# Patient Record
Sex: FEMALE | Race: WHITE | Employment: OTHER | URBAN - NONMETROPOLITAN AREA
[De-identification: names, ages, dates, MRNs, and addresses within clinical notes are randomized per-mention and may not be internally consistent; named-entity substitution may affect disease eponyms.]

---

## 2017-01-11 ENCOUNTER — ANTI-COAG VISIT (OUTPATIENT)
Dept: PHARMACY | Facility: CLINIC | Age: 77
End: 2017-01-11

## 2017-01-11 LAB — INR BLD: 2.8

## 2017-02-08 ENCOUNTER — ANTI-COAG VISIT (OUTPATIENT)
Dept: PHARMACY | Facility: CLINIC | Age: 77
End: 2017-02-08

## 2017-02-08 LAB — INR BLD: 2.5

## 2017-02-20 RX ORDER — ALLOPURINOL 100 MG/1
TABLET ORAL
Qty: 180 TABLET | Refills: 0 | Status: SHIPPED | OUTPATIENT
Start: 2017-02-20 | End: 2017-05-18 | Stop reason: ALTCHOICE

## 2017-02-20 RX ORDER — ALLOPURINOL 100 MG/1
TABLET ORAL
Qty: 60 TABLET | Refills: 0 | Status: SHIPPED | OUTPATIENT
Start: 2017-02-20 | End: 2017-02-27

## 2017-02-27 ENCOUNTER — OFFICE VISIT (OUTPATIENT)
Dept: INTERNAL MEDICINE CLINIC | Age: 77
End: 2017-02-27

## 2017-02-27 VITALS — BODY MASS INDEX: 36.32 KG/M2 | WEIGHT: 218 LBS | HEIGHT: 65 IN

## 2017-02-27 DIAGNOSIS — M1A.00X0 CHRONIC GOUTY ARTHRITIS: ICD-10-CM

## 2017-02-27 DIAGNOSIS — I50.32 HEART FAILURE, DIASTOLIC, CHRONIC (HCC): ICD-10-CM

## 2017-02-27 DIAGNOSIS — E78.2 MIXED HYPERLIPIDEMIA: ICD-10-CM

## 2017-02-27 DIAGNOSIS — R82.90 ABNORMAL URINALYSIS: ICD-10-CM

## 2017-02-27 DIAGNOSIS — N18.30 CHRONIC KIDNEY DISEASE, STAGE III (MODERATE) (HCC): ICD-10-CM

## 2017-02-27 DIAGNOSIS — I10 ESSENTIAL HYPERTENSION: Primary | ICD-10-CM

## 2017-02-27 DIAGNOSIS — I48.20 CHRONIC ATRIAL FIBRILLATION (HCC): ICD-10-CM

## 2017-02-27 DIAGNOSIS — Z79.01 LONG TERM CURRENT USE OF ANTICOAGULANT THERAPY: ICD-10-CM

## 2017-02-27 DIAGNOSIS — I10 ESSENTIAL HYPERTENSION: ICD-10-CM

## 2017-02-27 LAB
A/G RATIO: 2.2 (ref 1.1–2.2)
ALBUMIN SERPL-MCNC: 4.3 G/DL (ref 3.4–5)
ALP BLD-CCNC: 67 U/L (ref 40–129)
ALT SERPL-CCNC: 25 U/L (ref 10–40)
ANION GAP SERPL CALCULATED.3IONS-SCNC: 14 MMOL/L (ref 3–16)
AST SERPL-CCNC: 30 U/L (ref 15–37)
BASOPHILS ABSOLUTE: 0 K/UL (ref 0–0.2)
BASOPHILS RELATIVE PERCENT: 0.9 %
BILIRUB SERPL-MCNC: 0.6 MG/DL (ref 0–1)
BILIRUBIN, POC: ABNORMAL
BLOOD URINE, POC: ABNORMAL
BUN BLDV-MCNC: 18 MG/DL (ref 7–20)
CALCIUM SERPL-MCNC: 9.7 MG/DL (ref 8.3–10.6)
CHLORIDE BLD-SCNC: 96 MMOL/L (ref 99–110)
CHOLESTEROL, TOTAL: 112 MG/DL (ref 0–199)
CLARITY, POC: ABNORMAL
CO2: 25 MMOL/L (ref 21–32)
COLOR, POC: ABNORMAL
CREAT SERPL-MCNC: 0.9 MG/DL (ref 0.6–1.2)
EOSINOPHILS ABSOLUTE: 0.1 K/UL (ref 0–0.6)
EOSINOPHILS RELATIVE PERCENT: 1.4 %
GFR AFRICAN AMERICAN: >60
GFR NON-AFRICAN AMERICAN: >60
GLOBULIN: 2 G/DL
GLUCOSE BLD-MCNC: 85 MG/DL (ref 70–99)
GLUCOSE URINE, POC: ABNORMAL
HCT VFR BLD CALC: 38.8 % (ref 36–48)
HDLC SERPL-MCNC: 56 MG/DL (ref 40–60)
HEMOGLOBIN: 13.2 G/DL (ref 12–16)
KETONES, POC: ABNORMAL
LDL CHOLESTEROL CALCULATED: 32 MG/DL
LEUKOCYTE EST, POC: ABNORMAL
LYMPHOCYTES ABSOLUTE: 1.8 K/UL (ref 1–5.1)
LYMPHOCYTES RELATIVE PERCENT: 31.2 %
MCH RBC QN AUTO: 32.6 PG (ref 26–34)
MCHC RBC AUTO-ENTMCNC: 34 G/DL (ref 31–36)
MCV RBC AUTO: 96 FL (ref 80–100)
MONOCYTES ABSOLUTE: 0.6 K/UL (ref 0–1.3)
MONOCYTES RELATIVE PERCENT: 10 %
NEUTROPHILS ABSOLUTE: 3.2 K/UL (ref 1.7–7.7)
NEUTROPHILS RELATIVE PERCENT: 56.5 %
NITRITE, POC: POSITIVE
PARATHYROID HORMONE INTACT: 42.2 PG/ML (ref 14–72)
PDW BLD-RTO: 14.3 % (ref 12.4–15.4)
PH, POC: ABNORMAL
PLATELET # BLD: 237 K/UL (ref 135–450)
PMV BLD AUTO: 8.5 FL (ref 5–10.5)
POTASSIUM SERPL-SCNC: 4.5 MMOL/L (ref 3.5–5.1)
PROTEIN, POC: ABNORMAL
RBC # BLD: 4.04 M/UL (ref 4–5.2)
SODIUM BLD-SCNC: 135 MMOL/L (ref 136–145)
SPECIFIC GRAVITY, POC: ABNORMAL
TOTAL PROTEIN: 6.3 G/DL (ref 6.4–8.2)
TRIGL SERPL-MCNC: 120 MG/DL (ref 0–150)
TSH REFLEX: 1.73 UIU/ML (ref 0.27–4.2)
URIC ACID, SERUM: 6.2 MG/DL (ref 2.6–6)
UROBILINOGEN, POC: ABNORMAL
VLDLC SERPL CALC-MCNC: 24 MG/DL
WBC # BLD: 5.6 K/UL (ref 4–11)

## 2017-02-27 PROCEDURE — 81002 URINALYSIS NONAUTO W/O SCOPE: CPT | Performed by: INTERNAL MEDICINE

## 2017-02-27 PROCEDURE — 99214 OFFICE O/P EST MOD 30 MIN: CPT | Performed by: INTERNAL MEDICINE

## 2017-02-27 RX ORDER — ALLOPURINOL 100 MG/1
TABLET ORAL
Qty: 180 TABLET | Refills: 1 | Status: SHIPPED | OUTPATIENT
Start: 2017-02-27 | End: 2018-01-08 | Stop reason: SDUPTHER

## 2017-02-27 RX ORDER — ATORVASTATIN CALCIUM 20 MG/1
20 TABLET, FILM COATED ORAL DAILY
Qty: 90 TABLET | Refills: 1 | Status: SHIPPED | OUTPATIENT
Start: 2017-02-27 | End: 2017-08-27 | Stop reason: SDUPTHER

## 2017-02-27 RX ORDER — LOSARTAN POTASSIUM 50 MG/1
TABLET ORAL
Qty: 90 TABLET | Refills: 1 | Status: SHIPPED | OUTPATIENT
Start: 2017-02-27 | End: 2017-05-18 | Stop reason: DRUGHIGH

## 2017-03-01 LAB — URINE CULTURE, ROUTINE: NORMAL

## 2017-03-08 ENCOUNTER — ANTI-COAG VISIT (OUTPATIENT)
Dept: PHARMACY | Facility: CLINIC | Age: 77
End: 2017-03-08

## 2017-03-08 LAB — INR BLD: 2.3

## 2017-03-08 RX ORDER — WARFARIN SODIUM 5 MG/1
TABLET ORAL
Qty: 90 TABLET | Refills: 3 | Status: SHIPPED | OUTPATIENT
Start: 2017-03-08 | End: 2018-12-03 | Stop reason: ALTCHOICE

## 2017-04-05 ENCOUNTER — ANTI-COAG VISIT (OUTPATIENT)
Dept: PHARMACY | Facility: CLINIC | Age: 77
End: 2017-04-05

## 2017-04-05 LAB — INR BLD: 2

## 2017-05-03 ENCOUNTER — ANTI-COAG VISIT (OUTPATIENT)
Dept: PHARMACY | Facility: CLINIC | Age: 77
End: 2017-05-03

## 2017-05-03 DIAGNOSIS — Z79.01 LONG TERM CURRENT USE OF ANTICOAGULANT THERAPY: ICD-10-CM

## 2017-05-03 DIAGNOSIS — I48.20 CHRONIC ATRIAL FIBRILLATION (HCC): ICD-10-CM

## 2017-05-03 LAB — INR BLD: 2.7

## 2017-05-18 ENCOUNTER — OFFICE VISIT (OUTPATIENT)
Dept: INTERNAL MEDICINE CLINIC | Age: 77
End: 2017-05-18

## 2017-05-18 VITALS
BODY MASS INDEX: 37.22 KG/M2 | WEIGHT: 218 LBS | DIASTOLIC BLOOD PRESSURE: 89 MMHG | SYSTOLIC BLOOD PRESSURE: 150 MMHG | HEIGHT: 64 IN

## 2017-05-18 DIAGNOSIS — N17.9 ACUTE RENAL FAILURE, UNSPECIFIED ACUTE RENAL FAILURE TYPE (HCC): ICD-10-CM

## 2017-05-18 DIAGNOSIS — I10 ESSENTIAL HYPERTENSION: ICD-10-CM

## 2017-05-18 DIAGNOSIS — N17.9 ACUTE RENAL FAILURE, UNSPECIFIED ACUTE RENAL FAILURE TYPE (HCC): Primary | ICD-10-CM

## 2017-05-18 LAB
ANION GAP SERPL CALCULATED.3IONS-SCNC: 18 MMOL/L (ref 3–16)
BUN BLDV-MCNC: 21 MG/DL (ref 7–20)
CALCIUM SERPL-MCNC: 9.3 MG/DL (ref 8.3–10.6)
CHLORIDE BLD-SCNC: 91 MMOL/L (ref 99–110)
CO2: 21 MMOL/L (ref 21–32)
CREAT SERPL-MCNC: 0.9 MG/DL (ref 0.6–1.2)
GFR AFRICAN AMERICAN: >60
GFR NON-AFRICAN AMERICAN: >60
GLUCOSE BLD-MCNC: 106 MG/DL (ref 70–99)
POTASSIUM SERPL-SCNC: 4.6 MMOL/L (ref 3.5–5.1)
SODIUM BLD-SCNC: 130 MMOL/L (ref 136–145)

## 2017-05-18 PROCEDURE — 99213 OFFICE O/P EST LOW 20 MIN: CPT | Performed by: PHYSICIAN ASSISTANT

## 2017-05-18 RX ORDER — LOSARTAN POTASSIUM AND HYDROCHLOROTHIAZIDE 12.5; 1 MG/1; MG/1
1 TABLET ORAL DAILY
Qty: 30 TABLET | Refills: 0 | Status: SHIPPED | OUTPATIENT
Start: 2017-05-18 | End: 2017-06-08 | Stop reason: ALTCHOICE

## 2017-05-18 ASSESSMENT — ENCOUNTER SYMPTOMS
SORE THROAT: 0
VOMITING: 0
SHORTNESS OF BREATH: 0
PHOTOPHOBIA: 0
BACK PAIN: 0
NAUSEA: 0
RHINORRHEA: 0
ABDOMINAL PAIN: 0

## 2017-05-28 ENCOUNTER — TELEPHONE (OUTPATIENT)
Dept: INTERNAL MEDICINE CLINIC | Age: 77
End: 2017-05-28

## 2017-05-31 ENCOUNTER — ANTI-COAG VISIT (OUTPATIENT)
Dept: PHARMACY | Facility: CLINIC | Age: 77
End: 2017-05-31

## 2017-05-31 LAB — INR BLD: 2.1

## 2017-06-06 ENCOUNTER — TELEPHONE (OUTPATIENT)
Dept: INTERNAL MEDICINE CLINIC | Age: 77
End: 2017-06-06

## 2017-06-08 ENCOUNTER — OFFICE VISIT (OUTPATIENT)
Dept: INTERNAL MEDICINE CLINIC | Age: 77
End: 2017-06-08

## 2017-06-08 VITALS
RESPIRATION RATE: 14 BRPM | HEART RATE: 81 BPM | BODY MASS INDEX: 37.56 KG/M2 | DIASTOLIC BLOOD PRESSURE: 90 MMHG | WEIGHT: 220 LBS | SYSTOLIC BLOOD PRESSURE: 150 MMHG | HEIGHT: 64 IN

## 2017-06-08 DIAGNOSIS — I10 ESSENTIAL HYPERTENSION: Primary | ICD-10-CM

## 2017-06-08 DIAGNOSIS — R60.0 PEDAL EDEMA: ICD-10-CM

## 2017-06-08 PROCEDURE — 99213 OFFICE O/P EST LOW 20 MIN: CPT | Performed by: NURSE PRACTITIONER

## 2017-06-08 RX ORDER — LOSARTAN POTASSIUM AND HYDROCHLOROTHIAZIDE 25; 100 MG/1; MG/1
1 TABLET ORAL DAILY
Qty: 30 TABLET | Refills: 3 | Status: ON HOLD | OUTPATIENT
Start: 2017-06-08 | End: 2019-09-28 | Stop reason: HOSPADM

## 2017-06-08 ASSESSMENT — ENCOUNTER SYMPTOMS
DIARRHEA: 0
COUGH: 0
ABDOMINAL PAIN: 0
NAUSEA: 0
SHORTNESS OF BREATH: 0
VOMITING: 0

## 2017-06-28 ENCOUNTER — ANTI-COAG VISIT (OUTPATIENT)
Dept: PHARMACY | Facility: CLINIC | Age: 77
End: 2017-06-28

## 2017-06-28 LAB — INR BLD: 2

## 2017-07-26 ENCOUNTER — ANTI-COAG VISIT (OUTPATIENT)
Dept: PHARMACY | Facility: CLINIC | Age: 77
End: 2017-07-26

## 2017-07-26 LAB — INR BLD: 2.4

## 2017-08-01 ENCOUNTER — TELEPHONE (OUTPATIENT)
Dept: INTERNAL MEDICINE CLINIC | Age: 77
End: 2017-08-01

## 2017-08-28 RX ORDER — ATORVASTATIN CALCIUM 20 MG/1
TABLET, FILM COATED ORAL
Qty: 90 TABLET | Refills: 0 | Status: SHIPPED | OUTPATIENT
Start: 2017-08-28 | End: 2017-11-26 | Stop reason: SDUPTHER

## 2017-08-30 ENCOUNTER — OFFICE VISIT (OUTPATIENT)
Dept: ORTHOPEDIC SURGERY | Age: 77
End: 2017-08-30

## 2017-08-30 VITALS
SYSTOLIC BLOOD PRESSURE: 153 MMHG | BODY MASS INDEX: 36.7 KG/M2 | HEART RATE: 66 BPM | HEIGHT: 64 IN | DIASTOLIC BLOOD PRESSURE: 90 MMHG | WEIGHT: 215 LBS

## 2017-08-30 DIAGNOSIS — M25.561 RIGHT KNEE PAIN, UNSPECIFIED CHRONICITY: ICD-10-CM

## 2017-08-30 DIAGNOSIS — M17.11 PRIMARY OSTEOARTHRITIS OF RIGHT KNEE: Primary | ICD-10-CM

## 2017-08-30 PROCEDURE — 73564 X-RAY EXAM KNEE 4 OR MORE: CPT | Performed by: PHYSICIAN ASSISTANT

## 2017-08-30 PROCEDURE — 99213 OFFICE O/P EST LOW 20 MIN: CPT | Performed by: PHYSICIAN ASSISTANT

## 2017-09-12 ENCOUNTER — TELEPHONE (OUTPATIENT)
Dept: PHARMACY | Facility: CLINIC | Age: 77
End: 2017-09-12

## 2017-09-13 ENCOUNTER — ANTI-COAG VISIT (OUTPATIENT)
Dept: PHARMACY | Facility: CLINIC | Age: 77
End: 2017-09-13

## 2017-09-13 DIAGNOSIS — Z79.01 LONG TERM CURRENT USE OF ANTICOAGULANT THERAPY: ICD-10-CM

## 2017-09-13 DIAGNOSIS — I48.20 CHRONIC ATRIAL FIBRILLATION (HCC): ICD-10-CM

## 2017-09-13 LAB — INR BLD: 2.7

## 2017-09-14 ENCOUNTER — TELEPHONE (OUTPATIENT)
Dept: ORTHOPEDIC SURGERY | Age: 77
End: 2017-09-14

## 2017-09-18 ENCOUNTER — TELEPHONE (OUTPATIENT)
Dept: ORTHOPEDIC SURGERY | Age: 77
End: 2017-09-18

## 2017-09-21 ENCOUNTER — OFFICE VISIT (OUTPATIENT)
Dept: ORTHOPEDIC SURGERY | Age: 77
End: 2017-09-21

## 2017-09-21 DIAGNOSIS — M25.561 RIGHT KNEE PAIN, UNSPECIFIED CHRONICITY: ICD-10-CM

## 2017-09-21 DIAGNOSIS — M17.11 PRIMARY OSTEOARTHRITIS OF RIGHT KNEE: ICD-10-CM

## 2017-09-21 DIAGNOSIS — M17.11 PRIMARY LOCALIZED OSTEOARTHROSIS, LOWER LEG, RIGHT: Primary | ICD-10-CM

## 2017-09-21 PROCEDURE — 20610 DRAIN/INJ JOINT/BURSA W/O US: CPT | Performed by: PHYSICIAN ASSISTANT

## 2017-09-28 ENCOUNTER — NURSE ONLY (OUTPATIENT)
Dept: ORTHOPEDIC SURGERY | Age: 77
End: 2017-09-28

## 2017-09-28 DIAGNOSIS — M17.11 PRIMARY LOCALIZED OSTEOARTHROSIS, LOWER LEG, RIGHT: Primary | ICD-10-CM

## 2017-09-28 PROCEDURE — 20610 DRAIN/INJ JOINT/BURSA W/O US: CPT | Performed by: PHYSICIAN ASSISTANT

## 2017-10-05 ENCOUNTER — NURSE ONLY (OUTPATIENT)
Dept: ORTHOPEDIC SURGERY | Age: 77
End: 2017-10-05

## 2017-10-05 DIAGNOSIS — M17.11 PRIMARY LOCALIZED OSTEOARTHROSIS, LOWER LEG, RIGHT: Primary | ICD-10-CM

## 2017-10-05 PROCEDURE — 20611 DRAIN/INJ JOINT/BURSA W/US: CPT | Performed by: PHYSICIAN ASSISTANT

## 2017-10-05 NOTE — PROGRESS NOTES
10/5/17 1:04 PM         NDC: [id number]    Lot Number: [id number]    Comments: 2ML RT KNEE [id number]  EXP 9/22/18

## 2017-10-05 NOTE — PATIENT INSTRUCTIONS
Management discussed and patient voiced understanding. They were instructed to follow up with their PCP regarding any abnormal vitals reading.

## 2017-10-11 ENCOUNTER — ANTI-COAG VISIT (OUTPATIENT)
Dept: PHARMACY | Facility: CLINIC | Age: 77
End: 2017-10-11

## 2017-10-11 DIAGNOSIS — I48.20 CHRONIC ATRIAL FIBRILLATION (HCC): ICD-10-CM

## 2017-10-11 DIAGNOSIS — Z79.01 LONG TERM CURRENT USE OF ANTICOAGULANT THERAPY: ICD-10-CM

## 2017-10-11 LAB — INR BLD: 2.8

## 2017-10-11 NOTE — PROGRESS NOTES
Ms. Melissa Glynn is here with hx of afib, for anticoagulation monitoring. Pt has PMH significant for gout, OA, Hyperlipidemia, HTN, CVA (2007)and afib (2007). Pt verifies dosing regimen as listed above. Pt denies s/s bleeding/bruising/swelling/SOB. No BRBPR. No melena. No changes in RX/OTCs/Herbals. Pt denies EtOH and tobacco use. No dietary changes. Pt started on Centrum Silver 08/2016. INR 2.8 is within therapeutic goal range of 2-3. Recommend to continue 5 mg daily, except 7.5 mg on Wednesdays. Patient has Warfarin 5 mg tablets. Will continue to monitor and check INR in 4 weeks. Dosing reminder card given with phone number, appointment date and time.   Return to clinic: 11/8 @ 11:15am    Hanna Cedeño PharmD 11:19 AM 10/11/17

## 2017-11-08 ENCOUNTER — ANTI-COAG VISIT (OUTPATIENT)
Dept: PHARMACY | Facility: CLINIC | Age: 77
End: 2017-11-08

## 2017-11-08 LAB — INR BLD: 2.4

## 2017-11-27 RX ORDER — ATORVASTATIN CALCIUM 20 MG/1
TABLET, FILM COATED ORAL
Qty: 90 TABLET | Refills: 0 | Status: SHIPPED | OUTPATIENT
Start: 2017-11-27 | End: 2018-04-13 | Stop reason: SDUPTHER

## 2017-12-06 ENCOUNTER — ANTI-COAG VISIT (OUTPATIENT)
Dept: PHARMACY | Facility: CLINIC | Age: 77
End: 2017-12-06

## 2017-12-06 LAB — INR BLD: 2.8

## 2018-01-08 RX ORDER — ALLOPURINOL 100 MG/1
TABLET ORAL
Qty: 180 TABLET | Refills: 1 | Status: SHIPPED | OUTPATIENT
Start: 2018-01-08 | End: 2018-01-09 | Stop reason: SDUPTHER

## 2018-01-09 RX ORDER — ALLOPURINOL 100 MG/1
TABLET ORAL
Qty: 60 TABLET | Refills: 0 | Status: SHIPPED | OUTPATIENT
Start: 2018-01-09 | End: 2018-04-13 | Stop reason: SDUPTHER

## 2018-01-17 ENCOUNTER — ANTI-COAG VISIT (OUTPATIENT)
Dept: PHARMACY | Facility: CLINIC | Age: 78
End: 2018-01-17

## 2018-01-17 LAB — INR BLD: 1.9

## 2018-02-07 ENCOUNTER — ANTI-COAG VISIT (OUTPATIENT)
Dept: PHARMACY | Facility: CLINIC | Age: 78
End: 2018-02-07

## 2018-02-07 LAB — INR BLD: 2.5

## 2018-02-07 NOTE — PROGRESS NOTES
Ms. Brenna Drew is here with hx of afib, for anticoagulation monitoring. Pt has PMH significant for gout, OA, Hyperlipidemia, HTN, CVA (2007)and afib (2007). Pt verifies dosing regimen as listed above. Pt denies s/s bleeding/bruising/swelling/SOB. No BRBPR. No melena. No changes in RX/OTCs/Herbals. Pt denies EtOH and tobacco use. No dietary changes. Pt started on Centrum Silver 08/2016. Patient leaving for [place] on 2/9/18. INR 2.5 is within acceptable therapeutic goal range of 2-3. Recommend to continue 5 mg daily, except 7.5 mg on Wednesdays. Patient has Warfarin 5 mg tablets. Will continue to monitor and check INR in 4 weeks. Dosing reminder card given with phone number, appointment date and time.   Return to clinic: 3/7@ 11:30am

## 2018-03-07 ENCOUNTER — ANTI-COAG VISIT (OUTPATIENT)
Dept: PHARMACY | Facility: CLINIC | Age: 78
End: 2018-03-07

## 2018-03-07 LAB — INR BLD: 1.9

## 2018-04-04 ENCOUNTER — ANTI-COAG VISIT (OUTPATIENT)
Dept: PHARMACY | Facility: CLINIC | Age: 78
End: 2018-04-04

## 2018-04-04 LAB — INR BLD: 2.3

## 2018-04-13 ENCOUNTER — OFFICE VISIT (OUTPATIENT)
Dept: INTERNAL MEDICINE CLINIC | Age: 78
End: 2018-04-13

## 2018-04-13 VITALS — BODY MASS INDEX: 35.7 KG/M2 | WEIGHT: 208 LBS

## 2018-04-13 DIAGNOSIS — I48.20 CHRONIC ATRIAL FIBRILLATION (HCC): ICD-10-CM

## 2018-04-13 DIAGNOSIS — N18.30 CHRONIC KIDNEY DISEASE, STAGE III (MODERATE) (HCC): ICD-10-CM

## 2018-04-13 DIAGNOSIS — I10 ESSENTIAL HYPERTENSION: ICD-10-CM

## 2018-04-13 DIAGNOSIS — M1A.00X0 CHRONIC GOUTY ARTHRITIS: ICD-10-CM

## 2018-04-13 DIAGNOSIS — I10 ESSENTIAL HYPERTENSION: Primary | ICD-10-CM

## 2018-04-13 LAB
BASOPHILS ABSOLUTE: 0 K/UL (ref 0–0.2)
BASOPHILS RELATIVE PERCENT: 0.7 %
EOSINOPHILS ABSOLUTE: 0.1 K/UL (ref 0–0.6)
EOSINOPHILS RELATIVE PERCENT: 1.8 %
HCT VFR BLD CALC: 38.7 % (ref 36–48)
HEMOGLOBIN: 13.3 G/DL (ref 12–16)
LYMPHOCYTES ABSOLUTE: 1.8 K/UL (ref 1–5.1)
LYMPHOCYTES RELATIVE PERCENT: 36.2 %
MCH RBC QN AUTO: 32.7 PG (ref 26–34)
MCHC RBC AUTO-ENTMCNC: 34.5 G/DL (ref 31–36)
MCV RBC AUTO: 94.9 FL (ref 80–100)
MONOCYTES ABSOLUTE: 0.6 K/UL (ref 0–1.3)
MONOCYTES RELATIVE PERCENT: 12.1 %
NEUTROPHILS ABSOLUTE: 2.4 K/UL (ref 1.7–7.7)
NEUTROPHILS RELATIVE PERCENT: 49.2 %
PDW BLD-RTO: 14 % (ref 12.4–15.4)
PLATELET # BLD: 229 K/UL (ref 135–450)
PMV BLD AUTO: 7.6 FL (ref 5–10.5)
RBC # BLD: 4.08 M/UL (ref 4–5.2)
WBC # BLD: 5 K/UL (ref 4–11)

## 2018-04-13 PROCEDURE — 99214 OFFICE O/P EST MOD 30 MIN: CPT | Performed by: INTERNAL MEDICINE

## 2018-04-13 RX ORDER — ALLOPURINOL 100 MG/1
TABLET ORAL
Qty: 180 TABLET | Refills: 0 | Status: SHIPPED | OUTPATIENT
Start: 2018-04-13 | End: 2019-04-26 | Stop reason: SDUPTHER

## 2018-04-13 RX ORDER — ATORVASTATIN CALCIUM 20 MG/1
TABLET, FILM COATED ORAL
Qty: 90 TABLET | Refills: 0 | Status: SHIPPED | OUTPATIENT
Start: 2018-04-13 | End: 2018-07-15 | Stop reason: SDUPTHER

## 2018-04-13 ASSESSMENT — PATIENT HEALTH QUESTIONNAIRE - PHQ9
2. FEELING DOWN, DEPRESSED OR HOPELESS: 0
SUM OF ALL RESPONSES TO PHQ9 QUESTIONS 1 & 2: 0
SUM OF ALL RESPONSES TO PHQ QUESTIONS 1-9: 0
1. LITTLE INTEREST OR PLEASURE IN DOING THINGS: 0

## 2018-04-14 LAB
A/G RATIO: 2.3 (ref 1.1–2.2)
ALBUMIN SERPL-MCNC: 4.6 G/DL (ref 3.4–5)
ALP BLD-CCNC: 69 U/L (ref 40–129)
ALT SERPL-CCNC: 22 U/L (ref 10–40)
ANION GAP SERPL CALCULATED.3IONS-SCNC: 15 MMOL/L (ref 3–16)
AST SERPL-CCNC: 31 U/L (ref 15–37)
BILIRUB SERPL-MCNC: 0.7 MG/DL (ref 0–1)
BUN BLDV-MCNC: 15 MG/DL (ref 7–20)
CALCIUM SERPL-MCNC: 9.5 MG/DL (ref 8.3–10.6)
CHLORIDE BLD-SCNC: 91 MMOL/L (ref 99–110)
CO2: 26 MMOL/L (ref 21–32)
CREAT SERPL-MCNC: 0.7 MG/DL (ref 0.6–1.2)
GFR AFRICAN AMERICAN: >60
GFR NON-AFRICAN AMERICAN: >60
GLOBULIN: 2 G/DL
GLUCOSE BLD-MCNC: 101 MG/DL (ref 70–99)
POTASSIUM SERPL-SCNC: 4 MMOL/L (ref 3.5–5.1)
SODIUM BLD-SCNC: 132 MMOL/L (ref 136–145)
TOTAL PROTEIN: 6.6 G/DL (ref 6.4–8.2)
TSH REFLEX: 1.47 UIU/ML (ref 0.27–4.2)
URIC ACID, SERUM: 5.5 MG/DL (ref 2.6–6)

## 2018-04-23 ENCOUNTER — TELEPHONE (OUTPATIENT)
Dept: INTERNAL MEDICINE CLINIC | Age: 78
End: 2018-04-23

## 2018-05-09 ENCOUNTER — ANTI-COAG VISIT (OUTPATIENT)
Dept: PHARMACY | Facility: CLINIC | Age: 78
End: 2018-05-09

## 2018-05-09 DIAGNOSIS — Z79.01 LONG TERM CURRENT USE OF ANTICOAGULANT THERAPY: ICD-10-CM

## 2018-05-09 DIAGNOSIS — I48.20 CHRONIC ATRIAL FIBRILLATION (HCC): ICD-10-CM

## 2018-05-09 LAB — INR BLD: 2.2

## 2018-06-04 ENCOUNTER — OFFICE VISIT (OUTPATIENT)
Dept: ORTHOPEDIC SURGERY | Age: 78
End: 2018-06-04

## 2018-06-04 VITALS
HEART RATE: 68 BPM | BODY MASS INDEX: 35.49 KG/M2 | WEIGHT: 207.89 LBS | SYSTOLIC BLOOD PRESSURE: 162 MMHG | HEIGHT: 64 IN | DIASTOLIC BLOOD PRESSURE: 94 MMHG

## 2018-06-04 DIAGNOSIS — M25.561 PAIN IN JOINT OF RIGHT KNEE: ICD-10-CM

## 2018-06-04 DIAGNOSIS — M17.11 PRIMARY LOCALIZED OSTEOARTHROSIS OF RIGHT LOWER LEG: Primary | ICD-10-CM

## 2018-06-04 PROCEDURE — 99214 OFFICE O/P EST MOD 30 MIN: CPT | Performed by: PHYSICIAN ASSISTANT

## 2018-06-05 ENCOUNTER — HOSPITAL ENCOUNTER (OUTPATIENT)
Dept: OTHER | Age: 78
Discharge: HOME | End: 2018-06-05
Attending: ORTHOPAEDIC SURGERY | Admitting: ORTHOPAEDIC SURGERY

## 2018-06-05 LAB
ALBUMIN SERPL-MCNC: 4.2 G/DL (ref 3.4–5)
ANION GAP SERPL CALCULATED.3IONS-SCNC: 15 MMOL/L (ref 3–16)
APTT: 32.4 SEC (ref 26–36)
BACTERIA: ABNORMAL /HPF
BASOPHILS ABSOLUTE: 0 K/UL (ref 0–0.2)
BASOPHILS RELATIVE PERCENT: 0.8 %
BILIRUBIN URINE: NEGATIVE
BLOOD, URINE: NEGATIVE
BUN BLDV-MCNC: 15 MG/DL (ref 7–20)
CALCIUM SERPL-MCNC: 8.9 MG/DL (ref 8.3–10.6)
CHLORIDE BLD-SCNC: 97 MMOL/L (ref 99–110)
CLARITY: CLEAR
CO2: 26 MMOL/L (ref 21–32)
COLOR: YELLOW
CREAT SERPL-MCNC: 0.8 MG/DL (ref 0.6–1.2)
EOSINOPHILS ABSOLUTE: 0.1 K/UL (ref 0–0.6)
EOSINOPHILS RELATIVE PERCENT: 1.8 %
EPITHELIAL CELLS, UA: ABNORMAL /HPF
GFR AFRICAN AMERICAN: >60
GFR NON-AFRICAN AMERICAN: >60
GLUCOSE BLD-MCNC: 97 MG/DL (ref 70–99)
GLUCOSE URINE: NEGATIVE MG/DL
HCT VFR BLD CALC: 36 % (ref 36–48)
HEMOGLOBIN: 12.7 G/DL (ref 12–16)
INR BLD: 2.32 (ref 0.86–1.14)
KETONES, URINE: NEGATIVE MG/DL
LEUKOCYTE ESTERASE, URINE: NEGATIVE
LYMPHOCYTES ABSOLUTE: 1.5 K/UL (ref 1–5.1)
LYMPHOCYTES RELATIVE PERCENT: 28.9 %
MCH RBC QN AUTO: 33.1 PG (ref 26–34)
MCHC RBC AUTO-ENTMCNC: 35.3 G/DL (ref 31–36)
MCV RBC AUTO: 94 FL (ref 80–100)
MICROSCOPIC EXAMINATION: YES
MONOCYTES ABSOLUTE: 0.6 K/UL (ref 0–1.3)
MONOCYTES RELATIVE PERCENT: 11 %
MUCUS: ABNORMAL /LPF
NEUTROPHILS ABSOLUTE: 3.1 K/UL (ref 1.7–7.7)
NEUTROPHILS RELATIVE PERCENT: 57.5 %
NITRITE, URINE: NEGATIVE
PDW BLD-RTO: 14.4 % (ref 12.4–15.4)
PH UA: 7
PLATELET # BLD: 208 K/UL (ref 135–450)
PMV BLD AUTO: 8.1 FL (ref 5–10.5)
POTASSIUM SERPL-SCNC: 3.7 MMOL/L (ref 3.5–5.1)
PROTEIN UA: 100 MG/DL
PROTHROMBIN TIME: 26.5 SEC (ref 9.8–13)
RBC # BLD: 3.83 M/UL (ref 4–5.2)
RBC UA: ABNORMAL /HPF (ref 0–2)
SODIUM BLD-SCNC: 138 MMOL/L (ref 136–145)
SPECIFIC GRAVITY UA: 1.01
TRANSFERRIN: 248 MG/DL (ref 200–360)
URINE TYPE: ABNORMAL
UROBILINOGEN, URINE: 0.2 E.U./DL
WBC # BLD: 5.3 K/UL (ref 4–11)
WBC UA: ABNORMAL /HPF (ref 0–5)

## 2018-06-06 LAB
ESTIMATED AVERAGE GLUCOSE: 114 MG/DL
HBA1C MFR BLD: 5.6 %
URINE CULTURE, ROUTINE: NORMAL

## 2018-06-08 DIAGNOSIS — M25.561 RIGHT KNEE PAIN, UNSPECIFIED CHRONICITY: Primary | ICD-10-CM

## 2018-06-11 ENCOUNTER — HOSPITAL ENCOUNTER (OUTPATIENT)
Dept: PHYSICAL THERAPY | Age: 78
Discharge: HOME | End: 2018-06-30
Admitting: ORTHOPAEDIC SURGERY

## 2018-06-11 NOTE — FLOWSHEET NOTE
[address], [address] [place] [address] [address]    Physical Therapy Daily Treatment Note  Date:  2018    Patient Name:  George Forrester    :  1940  MRN: [id number]  Restrictions/Precautions:    Medical/Treatment Diagnosis Information:  · Diagnosis: PT: R knee OA (Prehab)  · Treatment Diagnosis: R knee pain secondary to OA M25.561/ Z48.33  Insurance/Certification information:  PT Insurance Information: Aetna [facility]  Physician Information:  Referring Practitioner: Valeriy Yun of care signed (Y/N):     Date of Patient follow up with Physician:     G-Code (if applicable):      Date G-Code Applied:    PT G-Codes  Functional Assessment Tool Used: LEFS  Score: 35% disability  Functional Limitation: Mobility: Walking and moving around  Mobility: Walking and Moving Around Current Status (): At least 20 percent but less than 40 percent impaired, limited or restricted  Mobility: Walking and Moving Around Discharge Status (): At least 20 percent but less than 40 percent impaired, limited or restricted    Progress Note: [x]  Yes  []  No  Next due by: Visit #10       Latex Allergy:  [x]NO      []YES  Preferred Language for Healthcare:   [x]English       []other:    Visit # Insurance Allowable   1 Med Nec     Pain level:  8/10     SUBJECTIVE:  See eval    OBJECTIVE: See eval  Observation:   Test measurements:      RESTRICTIONS/PRECAUTIONS: L knee OA requires TKR. Fall risk.     Exercises/Interventions:     Therapeutic Ex Sets/reps Notes        Seated HS stretch 3x30 sec HEP   Seated calf stretch 3x30 sec HEP   Seated QS BLEs 10 sec 10x HEP   SLR FLX supine 1x10 HEP   Seated heelslide with strap 10 sec 10x HEP   LAQ  1x10 HEP                                                Manual Intervention                                   NMR re-education                                                      Therapeutic Exercise and NMR EXR  [x]

## 2018-07-01 ENCOUNTER — HOSPITAL ENCOUNTER (OUTPATIENT)
Dept: PHYSICAL THERAPY | Age: 78
Discharge: HOME | End: 2018-07-01
Attending: ORTHOPAEDIC SURGERY | Admitting: ORTHOPAEDIC SURGERY

## 2018-07-09 ENCOUNTER — ANTI-COAG VISIT (OUTPATIENT)
Dept: PHARMACY | Facility: CLINIC | Age: 78
End: 2018-07-09

## 2018-07-09 ENCOUNTER — OFFICE VISIT (OUTPATIENT)
Dept: ORTHOPEDIC SURGERY | Age: 78
End: 2018-07-09

## 2018-07-09 DIAGNOSIS — Z79.01 LONG TERM CURRENT USE OF ANTICOAGULANT THERAPY: ICD-10-CM

## 2018-07-09 DIAGNOSIS — M17.11 PRIMARY LOCALIZED OSTEOARTHROSIS OF RIGHT LOWER LEG: Primary | ICD-10-CM

## 2018-07-09 DIAGNOSIS — I48.20 CHRONIC ATRIAL FIBRILLATION (HCC): ICD-10-CM

## 2018-07-09 LAB — INR BLD: 2.1

## 2018-07-09 PROCEDURE — 99212 OFFICE O/P EST SF 10 MIN: CPT | Performed by: ORTHOPAEDIC SURGERY

## 2018-07-09 NOTE — PROGRESS NOTES
Ms. Radha Franks is here with hx of afib, for anticoagulation monitoring. Pt has PMH significant for gout, OA, Hyperlipidemia, HTN, CVA (2007)and afib (2007). Pt verifies dosing regimen as listed above. Pt denies s/s bleeding/bruising/swelling/SOB. No BRBPR. No melena. No changes in RX/OTCs/Herbals. Pt denies EtOH and tobacco use. No dietary changes. Pt started on Centrum Silver 08/2016. States she has been taking 5 mg daily instead of the 7.5 mg Q Wed  Has knee replacement coming up on Aug 1. Will hold warfarin for 5 days  Will need bridged per Dr. Taylor Méndez. Prepared bridging calendar and provided to patient,  Will call in prescription to her mail order pharmacy. Weight per pt = 210 lb=95 kg. Renal function is good. Will order Lovenox 100 mg BID x7 doses for Pre-op needs. Leave post-op management to hospital/ nursing facility. She plans to ask to be sent to a nursing facility after surgery as she does not have anyone to drive her around at home. She will call clinic for appointment once she is sent home after surgery    INR 2.1 is within acceptable therapeutic goal range of 2-3. Recommend to continue 5 mg daily  Patient has Warfarin 5 mg tablets. Will continue to monitor and check INR after surgery. Dosing reminder card given with phone number, appointment date and time.   Return to clinic: Pt to call     Leon Cervantes PharmD 10:28 AM 7/9/18

## 2018-07-12 ENCOUNTER — TELEPHONE (OUTPATIENT)
Dept: ORTHOPEDIC SURGERY | Age: 78
End: 2018-07-12

## 2018-07-16 ENCOUNTER — OFFICE VISIT (OUTPATIENT)
Dept: INTERNAL MEDICINE CLINIC | Age: 78
End: 2018-07-16

## 2018-07-16 VITALS
HEIGHT: 64 IN | BODY MASS INDEX: 35.51 KG/M2 | RESPIRATION RATE: 18 BRPM | DIASTOLIC BLOOD PRESSURE: 75 MMHG | WEIGHT: 208 LBS | SYSTOLIC BLOOD PRESSURE: 145 MMHG | HEART RATE: 70 BPM

## 2018-07-16 DIAGNOSIS — I34.0 NON-RHEUMATIC MITRAL REGURGITATION: ICD-10-CM

## 2018-07-16 DIAGNOSIS — Z01.818 PRE-OP EVALUATION: Primary | ICD-10-CM

## 2018-07-16 DIAGNOSIS — N18.30 CHRONIC KIDNEY DISEASE, STAGE III (MODERATE) (HCC): ICD-10-CM

## 2018-07-16 DIAGNOSIS — M17.11 PRIMARY LOCALIZED OSTEOARTHROSIS OF RIGHT LOWER LEG: Primary | ICD-10-CM

## 2018-07-16 DIAGNOSIS — I48.20 CHRONIC ATRIAL FIBRILLATION (HCC): ICD-10-CM

## 2018-07-16 DIAGNOSIS — Z79.01 LONG TERM CURRENT USE OF ANTICOAGULANT THERAPY: ICD-10-CM

## 2018-07-16 DIAGNOSIS — I10 ESSENTIAL HYPERTENSION: ICD-10-CM

## 2018-07-16 PROCEDURE — 99214 OFFICE O/P EST MOD 30 MIN: CPT | Performed by: INTERNAL MEDICINE

## 2018-07-16 RX ORDER — ATORVASTATIN CALCIUM 20 MG/1
TABLET, FILM COATED ORAL
Qty: 90 TABLET | Refills: 0 | Status: SHIPPED | OUTPATIENT
Start: 2018-07-16 | End: 2018-10-18 | Stop reason: SDUPTHER

## 2018-07-16 NOTE — PROGRESS NOTES
68 y.o. female with planned surgery as above. Plan:     Patient medically cleared for above planned procedure. Moderate risk surgery    HTN - stable on losartan /hctz 100/25 mg  , metoprolol 25 mg bid    CAD - f/w Dr. Domenico Sims . On ASA, statins- remains asymptomatic   Stress test this am neg at [address]  Pt to stop asa 5 days before surgery        H.o Afibb with CVA - 2007     Afibb-  chronic rate cotnrolled. On metoprolol bid, coumadin   F/w Coumadin clinic, no bleeding issues  Pt going on lovenox bridging for surgery.        Hyperlipidemia - on statins    Gout - with no recurrence - continue allopurinol         F/w in 4 months

## 2018-07-17 DIAGNOSIS — M17.11 PRIMARY LOCALIZED OSTEOARTHROSIS OF RIGHT LOWER LEG: ICD-10-CM

## 2018-07-17 PROCEDURE — L1830 KO IMMOB CANVAS LONG PRE OTS: HCPCS | Performed by: ORTHOPAEDIC SURGERY

## 2018-07-23 ENCOUNTER — TELEPHONE (OUTPATIENT)
Dept: INTERNAL MEDICINE CLINIC | Age: 78
End: 2018-07-23

## 2018-07-24 ENCOUNTER — TELEPHONE (OUTPATIENT)
Dept: INTERNAL MEDICINE CLINIC | Age: 78
End: 2018-07-24

## 2018-07-24 NOTE — TELEPHONE ENCOUNTER
Bipin Lewis only visits patient once a year, says she is with medicare. Per Dr. Myron Licea, have pt come in sometime this week for BP check.  Waiting on call back

## 2018-07-24 NOTE — TELEPHONE ENCOUNTER
----- Message from Evan Ochoa sent at 7/23/2018  4:49 PM EDT -----  Contact: [facility] care nurse- [phone]      ----- Message -----  From: Virgil Lazo MD  Sent: 7/23/2018   4:46 PM  To: Mckenzie Gonzalez    Repeat at next visit and let me know    ----- Message -----  From: Lamberto Rosas  Sent: 7/23/2018   4:10 PM  To: Virgil Lazo MD    Santy Niall with [facility] care called- reporting her high bp today at her visit with her-176/80 and 172/86--no other symptoms-pt. was acting fine-please call Milena Wahl with any orders at [phone]-[facility] appt- [phone]-next appt- 8-13-18-

## 2018-07-25 ENCOUNTER — TELEPHONE (OUTPATIENT)
Dept: INTERNAL MEDICINE CLINIC | Age: 78
End: 2018-07-25

## 2018-07-26 RX ORDER — CARVEDILOL 12.5 MG/1
12.5 TABLET ORAL 2 TIMES DAILY WITH MEALS
COMMUNITY
End: 2020-01-28 | Stop reason: SDUPTHER

## 2018-07-31 ENCOUNTER — ANESTHESIA EVENT (OUTPATIENT)
Dept: OPERATING ROOM | Age: 78
DRG: 470 | End: 2018-07-31
Payer: MEDICARE

## 2018-08-01 ENCOUNTER — TELEPHONE (OUTPATIENT)
Dept: ORTHOPEDIC SURGERY | Age: 78
End: 2018-08-01

## 2018-08-01 ENCOUNTER — ANESTHESIA (OUTPATIENT)
Dept: OPERATING ROOM | Age: 78
DRG: 470 | End: 2018-08-01
Payer: MEDICARE

## 2018-08-01 ENCOUNTER — HOSPITAL ENCOUNTER (INPATIENT)
Age: 78
LOS: 6 days | Discharge: NURSING, REHAB OR LONG-TERM CARE | DRG: 470 | End: 2018-08-07
Attending: ORTHOPAEDIC SURGERY | Admitting: ORTHOPAEDIC SURGERY
Payer: MEDICARE

## 2018-08-01 VITALS
OXYGEN SATURATION: 99 % | RESPIRATION RATE: 16 BRPM | TEMPERATURE: 96.1 F | DIASTOLIC BLOOD PRESSURE: 69 MMHG | SYSTOLIC BLOOD PRESSURE: 113 MMHG

## 2018-08-01 DIAGNOSIS — M17.11 PRIMARY OSTEOARTHRITIS OF RIGHT KNEE: ICD-10-CM

## 2018-08-01 DIAGNOSIS — Z96.651 STATUS POST RIGHT KNEE REPLACEMENT: Primary | ICD-10-CM

## 2018-08-01 LAB
ABO/RH: NORMAL
ANTIBODY SCREEN: NORMAL
APTT: 32.3 SEC (ref 26–36)
GLUCOSE BLD-MCNC: 262 MG/DL (ref 70–99)
INR BLD: 0.99 (ref 0.86–1.14)
PERFORMED ON: ABNORMAL
PROTHROMBIN TIME: 11.3 SEC (ref 9.8–13)

## 2018-08-01 PROCEDURE — 86901 BLOOD TYPING SEROLOGIC RH(D): CPT

## 2018-08-01 PROCEDURE — 86900 BLOOD TYPING SEROLOGIC ABO: CPT

## 2018-08-01 PROCEDURE — 6360000002 HC RX W HCPCS: Performed by: ORTHOPAEDIC SURGERY

## 2018-08-01 PROCEDURE — 7100000001 HC PACU RECOVERY - ADDTL 15 MIN: Performed by: ORTHOPAEDIC SURGERY

## 2018-08-01 PROCEDURE — 2580000003 HC RX 258: Performed by: ORTHOPAEDIC SURGERY

## 2018-08-01 PROCEDURE — 85730 THROMBOPLASTIN TIME PARTIAL: CPT

## 2018-08-01 PROCEDURE — 3600000019 HC SURGERY ROBOT ADDTL 15MIN: Performed by: ORTHOPAEDIC SURGERY

## 2018-08-01 PROCEDURE — C1776 JOINT DEVICE (IMPLANTABLE): HCPCS | Performed by: ORTHOPAEDIC SURGERY

## 2018-08-01 PROCEDURE — 2580000003 HC RX 258: Performed by: ANESTHESIOLOGY

## 2018-08-01 PROCEDURE — 88311 DECALCIFY TISSUE: CPT

## 2018-08-01 PROCEDURE — 3600000009 HC SURGERY ROBOT BASE: Performed by: ORTHOPAEDIC SURGERY

## 2018-08-01 PROCEDURE — 2720000010 HC SURG SUPPLY STERILE: Performed by: ORTHOPAEDIC SURGERY

## 2018-08-01 PROCEDURE — C9290 INJ, BUPIVACAINE LIPOSOME: HCPCS | Performed by: ORTHOPAEDIC SURGERY

## 2018-08-01 PROCEDURE — 6370000000 HC RX 637 (ALT 250 FOR IP): Performed by: ANESTHESIOLOGY

## 2018-08-01 PROCEDURE — 3700000001 HC ADD 15 MINUTES (ANESTHESIA): Performed by: ORTHOPAEDIC SURGERY

## 2018-08-01 PROCEDURE — 3E0T3BZ INTRODUCTION OF ANESTHETIC AGENT INTO PERIPHERAL NERVES AND PLEXI, PERCUTANEOUS APPROACH: ICD-10-PCS | Performed by: ORTHOPAEDIC SURGERY

## 2018-08-01 PROCEDURE — S2900 ROBOTIC SURGICAL SYSTEM: HCPCS | Performed by: ORTHOPAEDIC SURGERY

## 2018-08-01 PROCEDURE — 64447 NJX AA&/STRD FEMORAL NRV IMG: CPT | Performed by: ANESTHESIOLOGY

## 2018-08-01 PROCEDURE — 6360000002 HC RX W HCPCS: Performed by: PHYSICIAN ASSISTANT

## 2018-08-01 PROCEDURE — 94761 N-INVAS EAR/PLS OXIMETRY MLT: CPT

## 2018-08-01 PROCEDURE — 6370000000 HC RX 637 (ALT 250 FOR IP): Performed by: PHYSICIAN ASSISTANT

## 2018-08-01 PROCEDURE — 94664 DEMO&/EVAL PT USE INHALER: CPT

## 2018-08-01 PROCEDURE — 2500000003 HC RX 250 WO HCPCS: Performed by: ORTHOPAEDIC SURGERY

## 2018-08-01 PROCEDURE — 2709999900 HC NON-CHARGEABLE SUPPLY: Performed by: ORTHOPAEDIC SURGERY

## 2018-08-01 PROCEDURE — 2580000003 HC RX 258: Performed by: PHYSICIAN ASSISTANT

## 2018-08-01 PROCEDURE — C1713 ANCHOR/SCREW BN/BN,TIS/BN: HCPCS | Performed by: ORTHOPAEDIC SURGERY

## 2018-08-01 PROCEDURE — 86850 RBC ANTIBODY SCREEN: CPT

## 2018-08-01 PROCEDURE — 94150 VITAL CAPACITY TEST: CPT

## 2018-08-01 PROCEDURE — 7100000000 HC PACU RECOVERY - FIRST 15 MIN: Performed by: ORTHOPAEDIC SURGERY

## 2018-08-01 PROCEDURE — 88305 TISSUE EXAM BY PATHOLOGIST: CPT

## 2018-08-01 PROCEDURE — 6360000002 HC RX W HCPCS: Performed by: ANESTHESIOLOGY

## 2018-08-01 PROCEDURE — 85610 PROTHROMBIN TIME: CPT

## 2018-08-01 PROCEDURE — 8E0Y0CZ ROBOTIC ASSISTED PROCEDURE OF LOWER EXTREMITY, OPEN APPROACH: ICD-10-PCS | Performed by: ORTHOPAEDIC SURGERY

## 2018-08-01 PROCEDURE — 2700000000 HC OXYGEN THERAPY PER DAY

## 2018-08-01 PROCEDURE — 1200000000 HC SEMI PRIVATE

## 2018-08-01 PROCEDURE — 3700000000 HC ANESTHESIA ATTENDED CARE: Performed by: ORTHOPAEDIC SURGERY

## 2018-08-01 PROCEDURE — 6360000002 HC RX W HCPCS: Performed by: NURSE ANESTHETIST, CERTIFIED REGISTERED

## 2018-08-01 PROCEDURE — 2500000003 HC RX 250 WO HCPCS: Performed by: NURSE ANESTHETIST, CERTIFIED REGISTERED

## 2018-08-01 PROCEDURE — 0SRC0J9 REPLACEMENT OF RIGHT KNEE JOINT WITH SYNTHETIC SUBSTITUTE, CEMENTED, OPEN APPROACH: ICD-10-PCS | Performed by: ORTHOPAEDIC SURGERY

## 2018-08-01 DEVICE — CEMENT BNE 20ML 41GM FULL DOSE PMMA W/ TOBRA M VISC RADPQ: Type: IMPLANTABLE DEVICE | Status: FUNCTIONAL

## 2018-08-01 DEVICE — COMPONENT PAT DIA31MM THK9MM KNEE TRITANIUM MTL BK: Type: IMPLANTABLE DEVICE | Status: FUNCTIONAL

## 2018-08-01 RX ORDER — HYDROCHLOROTHIAZIDE 25 MG/1
25 TABLET ORAL DAILY
Status: DISCONTINUED | OUTPATIENT
Start: 2018-08-01 | End: 2018-08-02

## 2018-08-01 RX ORDER — DEXTROSE MONOHYDRATE 25 G/50ML
12.5 INJECTION, SOLUTION INTRAVENOUS PRN
Status: DISCONTINUED | OUTPATIENT
Start: 2018-08-01 | End: 2018-08-07 | Stop reason: HOSPADM

## 2018-08-01 RX ORDER — NICOTINE POLACRILEX 4 MG
15 LOZENGE BUCCAL PRN
Status: DISCONTINUED | OUTPATIENT
Start: 2018-08-01 | End: 2018-08-07 | Stop reason: HOSPADM

## 2018-08-01 RX ORDER — ROCURONIUM BROMIDE 10 MG/ML
INJECTION, SOLUTION INTRAVENOUS PRN
Status: DISCONTINUED | OUTPATIENT
Start: 2018-08-01 | End: 2018-08-01 | Stop reason: SDUPTHER

## 2018-08-01 RX ORDER — DEXAMETHASONE SODIUM PHOSPHATE 4 MG/ML
INJECTION, SOLUTION INTRA-ARTICULAR; INTRALESIONAL; INTRAMUSCULAR; INTRAVENOUS; SOFT TISSUE PRN
Status: DISCONTINUED | OUTPATIENT
Start: 2018-08-01 | End: 2018-08-01 | Stop reason: SDUPTHER

## 2018-08-01 RX ORDER — ACETAMINOPHEN 325 MG/1
650 TABLET ORAL EVERY 4 HOURS PRN
Status: DISCONTINUED | OUTPATIENT
Start: 2018-08-01 | End: 2018-08-07 | Stop reason: HOSPADM

## 2018-08-01 RX ORDER — DOCUSATE SODIUM 100 MG/1
100 CAPSULE, LIQUID FILLED ORAL 2 TIMES DAILY
Status: DISCONTINUED | OUTPATIENT
Start: 2018-08-01 | End: 2018-08-07 | Stop reason: HOSPADM

## 2018-08-01 RX ORDER — OXYCODONE HYDROCHLORIDE AND ACETAMINOPHEN 5; 325 MG/1; MG/1
2 TABLET ORAL PRN
Status: DISCONTINUED | OUTPATIENT
Start: 2018-08-01 | End: 2018-08-01 | Stop reason: HOSPADM

## 2018-08-01 RX ORDER — ONDANSETRON 2 MG/ML
INJECTION INTRAMUSCULAR; INTRAVENOUS PRN
Status: DISCONTINUED | OUTPATIENT
Start: 2018-08-01 | End: 2018-08-01 | Stop reason: SDUPTHER

## 2018-08-01 RX ORDER — CYCLOBENZAPRINE HCL 10 MG
10 TABLET ORAL 3 TIMES DAILY PRN
Status: DISCONTINUED | OUTPATIENT
Start: 2018-08-01 | End: 2018-08-07 | Stop reason: HOSPADM

## 2018-08-01 RX ORDER — WARFARIN SODIUM 2.5 MG/1
2.5 TABLET ORAL DAILY
Status: CANCELLED | OUTPATIENT
Start: 2018-08-02

## 2018-08-01 RX ORDER — OXYCODONE HYDROCHLORIDE AND ACETAMINOPHEN 5; 325 MG/1; MG/1
1 TABLET ORAL PRN
Status: DISCONTINUED | OUTPATIENT
Start: 2018-08-01 | End: 2018-08-01 | Stop reason: HOSPADM

## 2018-08-01 RX ORDER — OXYCODONE HYDROCHLORIDE AND ACETAMINOPHEN 5; 325 MG/1; MG/1
1 TABLET ORAL EVERY 4 HOURS PRN
Status: DISCONTINUED | OUTPATIENT
Start: 2018-08-01 | End: 2018-08-07 | Stop reason: HOSPADM

## 2018-08-01 RX ORDER — CARVEDILOL 6.25 MG/1
25 TABLET ORAL 2 TIMES DAILY WITH MEALS
Status: CANCELLED | OUTPATIENT
Start: 2018-08-01

## 2018-08-01 RX ORDER — LIDOCAINE HYDROCHLORIDE 20 MG/ML
INJECTION, SOLUTION INFILTRATION; PERINEURAL PRN
Status: DISCONTINUED | OUTPATIENT
Start: 2018-08-01 | End: 2018-08-01 | Stop reason: SDUPTHER

## 2018-08-01 RX ORDER — ONDANSETRON 2 MG/ML
4 INJECTION INTRAMUSCULAR; INTRAVENOUS EVERY 6 HOURS PRN
Status: DISCONTINUED | OUTPATIENT
Start: 2018-08-01 | End: 2018-08-07 | Stop reason: HOSPADM

## 2018-08-01 RX ORDER — SODIUM CHLORIDE, SODIUM LACTATE, POTASSIUM CHLORIDE, CALCIUM CHLORIDE 600; 310; 30; 20 MG/100ML; MG/100ML; MG/100ML; MG/100ML
INJECTION, SOLUTION INTRAVENOUS CONTINUOUS
Status: DISCONTINUED | OUTPATIENT
Start: 2018-08-01 | End: 2018-08-01

## 2018-08-01 RX ORDER — MORPHINE SULFATE 2 MG/ML
2 INJECTION, SOLUTION INTRAMUSCULAR; INTRAVENOUS
Status: DISCONTINUED | OUTPATIENT
Start: 2018-08-01 | End: 2018-08-07 | Stop reason: HOSPADM

## 2018-08-01 RX ORDER — SODIUM CHLORIDE 0.9 % (FLUSH) 0.9 %
10 SYRINGE (ML) INJECTION PRN
Status: DISCONTINUED | OUTPATIENT
Start: 2018-08-01 | End: 2018-08-01 | Stop reason: HOSPADM

## 2018-08-01 RX ORDER — CARVEDILOL 25 MG/1
25 TABLET ORAL 2 TIMES DAILY WITH MEALS
Status: DISCONTINUED | OUTPATIENT
Start: 2018-08-01 | End: 2018-08-07 | Stop reason: HOSPADM

## 2018-08-01 RX ORDER — MAGNESIUM HYDROXIDE 1200 MG/15ML
LIQUID ORAL CONTINUOUS PRN
Status: COMPLETED | OUTPATIENT
Start: 2018-08-01 | End: 2018-08-01

## 2018-08-01 RX ORDER — SODIUM CHLORIDE, SODIUM LACTATE, POTASSIUM CHLORIDE, CALCIUM CHLORIDE 600; 310; 30; 20 MG/100ML; MG/100ML; MG/100ML; MG/100ML
INJECTION, SOLUTION INTRAVENOUS CONTINUOUS
Status: DISCONTINUED | OUTPATIENT
Start: 2018-08-01 | End: 2018-08-02

## 2018-08-01 RX ORDER — ALLOPURINOL 100 MG/1
100 TABLET ORAL DAILY
Status: DISCONTINUED | OUTPATIENT
Start: 2018-08-02 | End: 2018-08-07 | Stop reason: HOSPADM

## 2018-08-01 RX ORDER — SODIUM CHLORIDE 0.9 % (FLUSH) 0.9 %
10 SYRINGE (ML) INJECTION EVERY 12 HOURS SCHEDULED
Status: DISCONTINUED | OUTPATIENT
Start: 2018-08-01 | End: 2018-08-01 | Stop reason: HOSPADM

## 2018-08-01 RX ORDER — OXYCODONE HYDROCHLORIDE AND ACETAMINOPHEN 5; 325 MG/1; MG/1
2 TABLET ORAL EVERY 4 HOURS PRN
Status: DISCONTINUED | OUTPATIENT
Start: 2018-08-01 | End: 2018-08-07 | Stop reason: HOSPADM

## 2018-08-01 RX ORDER — LABETALOL HYDROCHLORIDE 5 MG/ML
5 INJECTION, SOLUTION INTRAVENOUS
Status: DISCONTINUED | OUTPATIENT
Start: 2018-08-01 | End: 2018-08-01 | Stop reason: HOSPADM

## 2018-08-01 RX ORDER — PROPOFOL 10 MG/ML
INJECTION, EMULSION INTRAVENOUS PRN
Status: DISCONTINUED | OUTPATIENT
Start: 2018-08-01 | End: 2018-08-01 | Stop reason: SDUPTHER

## 2018-08-01 RX ORDER — LOSARTAN POTASSIUM AND HYDROCHLOROTHIAZIDE 25; 100 MG/1; MG/1
1 TABLET ORAL DAILY
Status: CANCELLED | OUTPATIENT
Start: 2018-08-01

## 2018-08-01 RX ORDER — ATORVASTATIN CALCIUM 20 MG/1
1 TABLET, FILM COATED ORAL DAILY
Status: CANCELLED | OUTPATIENT
Start: 2018-08-01

## 2018-08-01 RX ORDER — MEPERIDINE HYDROCHLORIDE 50 MG/ML
12.5 INJECTION INTRAMUSCULAR; INTRAVENOUS; SUBCUTANEOUS EVERY 5 MIN PRN
Status: DISCONTINUED | OUTPATIENT
Start: 2018-08-01 | End: 2018-08-01 | Stop reason: HOSPADM

## 2018-08-01 RX ORDER — HYDRALAZINE HYDROCHLORIDE 20 MG/ML
5 INJECTION INTRAMUSCULAR; INTRAVENOUS EVERY 30 MIN PRN
Status: DISCONTINUED | OUTPATIENT
Start: 2018-08-01 | End: 2018-08-01 | Stop reason: HOSPADM

## 2018-08-01 RX ORDER — LOSARTAN POTASSIUM 100 MG/1
100 TABLET ORAL DAILY
Status: DISCONTINUED | OUTPATIENT
Start: 2018-08-01 | End: 2018-08-07 | Stop reason: HOSPADM

## 2018-08-01 RX ORDER — ACETAMINOPHEN 500 MG
1000 TABLET ORAL ONCE
Status: COMPLETED | OUTPATIENT
Start: 2018-08-01 | End: 2018-08-01

## 2018-08-01 RX ORDER — DIPHENHYDRAMINE HYDROCHLORIDE 50 MG/ML
6.25 INJECTION INTRAMUSCULAR; INTRAVENOUS
Status: DISCONTINUED | OUTPATIENT
Start: 2018-08-01 | End: 2018-08-01 | Stop reason: HOSPADM

## 2018-08-01 RX ORDER — WARFARIN SODIUM 2.5 MG/1
5 TABLET ORAL DAILY
Status: DISCONTINUED | OUTPATIENT
Start: 2018-08-01 | End: 2018-08-02

## 2018-08-01 RX ORDER — DEXTROSE MONOHYDRATE 50 MG/ML
100 INJECTION, SOLUTION INTRAVENOUS PRN
Status: DISCONTINUED | OUTPATIENT
Start: 2018-08-01 | End: 2018-08-07 | Stop reason: HOSPADM

## 2018-08-01 RX ORDER — GLYCOPYRROLATE 0.2 MG/ML
INJECTION INTRAMUSCULAR; INTRAVENOUS PRN
Status: DISCONTINUED | OUTPATIENT
Start: 2018-08-01 | End: 2018-08-01 | Stop reason: SDUPTHER

## 2018-08-01 RX ORDER — PROMETHAZINE HYDROCHLORIDE 25 MG/ML
INJECTION, SOLUTION INTRAMUSCULAR; INTRAVENOUS
Status: DISPENSED
Start: 2018-08-01 | End: 2018-08-02

## 2018-08-01 RX ORDER — LOSARTAN POTASSIUM AND HYDROCHLOROTHIAZIDE 25; 100 MG/1; MG/1
1 TABLET ORAL DAILY
Status: DISCONTINUED | OUTPATIENT
Start: 2018-08-01 | End: 2018-08-01 | Stop reason: CLARIF

## 2018-08-01 RX ORDER — FENTANYL CITRATE 50 UG/ML
INJECTION, SOLUTION INTRAMUSCULAR; INTRAVENOUS PRN
Status: DISCONTINUED | OUTPATIENT
Start: 2018-08-01 | End: 2018-08-01 | Stop reason: SDUPTHER

## 2018-08-01 RX ORDER — ONDANSETRON 2 MG/ML
4 INJECTION INTRAMUSCULAR; INTRAVENOUS EVERY 30 MIN PRN
Status: DISCONTINUED | OUTPATIENT
Start: 2018-08-01 | End: 2018-08-01 | Stop reason: HOSPADM

## 2018-08-01 RX ORDER — SODIUM CHLORIDE 0.9 % (FLUSH) 0.9 %
10 SYRINGE (ML) INJECTION EVERY 12 HOURS SCHEDULED
Status: DISCONTINUED | OUTPATIENT
Start: 2018-08-01 | End: 2018-08-07 | Stop reason: HOSPADM

## 2018-08-01 RX ORDER — MORPHINE SULFATE 4 MG/ML
4 INJECTION, SOLUTION INTRAMUSCULAR; INTRAVENOUS
Status: DISCONTINUED | OUTPATIENT
Start: 2018-08-01 | End: 2018-08-07 | Stop reason: HOSPADM

## 2018-08-01 RX ORDER — ATORVASTATIN CALCIUM 10 MG/1
20 TABLET, FILM COATED ORAL NIGHTLY
Status: DISCONTINUED | OUTPATIENT
Start: 2018-08-01 | End: 2018-08-07 | Stop reason: HOSPADM

## 2018-08-01 RX ORDER — LIDOCAINE HYDROCHLORIDE 10 MG/ML
0.3 INJECTION, SOLUTION EPIDURAL; INFILTRATION; INTRACAUDAL; PERINEURAL
Status: DISCONTINUED | OUTPATIENT
Start: 2018-08-01 | End: 2018-08-01 | Stop reason: HOSPADM

## 2018-08-01 RX ORDER — ALLOPURINOL 100 MG/1
100 TABLET ORAL DAILY
Status: CANCELLED | OUTPATIENT
Start: 2018-08-01

## 2018-08-01 RX ORDER — SODIUM CHLORIDE 0.9 % (FLUSH) 0.9 %
10 SYRINGE (ML) INJECTION PRN
Status: DISCONTINUED | OUTPATIENT
Start: 2018-08-01 | End: 2018-08-07 | Stop reason: HOSPADM

## 2018-08-01 RX ORDER — PROMETHAZINE HYDROCHLORIDE 25 MG/ML
6.25 INJECTION, SOLUTION INTRAMUSCULAR; INTRAVENOUS ONCE
Status: COMPLETED | OUTPATIENT
Start: 2018-08-01 | End: 2018-08-01

## 2018-08-01 RX ADMIN — FENTANYL CITRATE 50 MCG: 50 INJECTION INTRAMUSCULAR; INTRAVENOUS at 12:37

## 2018-08-01 RX ADMIN — FENTANYL CITRATE 25 MCG: 50 INJECTION INTRAMUSCULAR; INTRAVENOUS at 13:54

## 2018-08-01 RX ADMIN — FENTANYL CITRATE 25 MCG: 50 INJECTION INTRAMUSCULAR; INTRAVENOUS at 13:13

## 2018-08-01 RX ADMIN — PROPOFOL 100 MG: 10 INJECTION, EMULSION INTRAVENOUS at 12:07

## 2018-08-01 RX ADMIN — HYDROMORPHONE HYDROCHLORIDE 0.5 MG: 1 INJECTION, SOLUTION INTRAMUSCULAR; INTRAVENOUS; SUBCUTANEOUS at 14:44

## 2018-08-01 RX ADMIN — PROPOFOL 20 MG: 10 INJECTION, EMULSION INTRAVENOUS at 13:39

## 2018-08-01 RX ADMIN — WARFARIN SODIUM 5 MG: 2.5 TABLET ORAL at 18:54

## 2018-08-01 RX ADMIN — Medication 2 G: at 12:04

## 2018-08-01 RX ADMIN — PROPOFOL 20 MG: 10 INJECTION, EMULSION INTRAVENOUS at 13:44

## 2018-08-01 RX ADMIN — FENTANYL CITRATE 25 MCG: 50 INJECTION INTRAMUSCULAR; INTRAVENOUS at 13:08

## 2018-08-01 RX ADMIN — PHENYLEPHRINE HYDROCHLORIDE 100 MCG: 10 INJECTION INTRAVENOUS at 13:22

## 2018-08-01 RX ADMIN — PHENYLEPHRINE HYDROCHLORIDE 100 MCG: 10 INJECTION INTRAVENOUS at 12:30

## 2018-08-01 RX ADMIN — CARVEDILOL 25 MG: 25 TABLET, FILM COATED ORAL at 18:45

## 2018-08-01 RX ADMIN — PHENYLEPHRINE HYDROCHLORIDE 100 MCG: 10 INJECTION INTRAVENOUS at 12:52

## 2018-08-01 RX ADMIN — ACETAMINOPHEN 1000 MG: 500 TABLET ORAL at 11:29

## 2018-08-01 RX ADMIN — Medication 2 G: at 21:06

## 2018-08-01 RX ADMIN — ONDANSETRON 4 MG: 2 INJECTION INTRAMUSCULAR; INTRAVENOUS at 12:16

## 2018-08-01 RX ADMIN — PROPOFOL 20 MG: 10 INJECTION, EMULSION INTRAVENOUS at 13:50

## 2018-08-01 RX ADMIN — SODIUM CHLORIDE, POTASSIUM CHLORIDE, SODIUM LACTATE AND CALCIUM CHLORIDE: 600; 310; 30; 20 INJECTION, SOLUTION INTRAVENOUS at 13:45

## 2018-08-01 RX ADMIN — ROCURONIUM BROMIDE 40 MG: 10 SOLUTION INTRAVENOUS at 12:07

## 2018-08-01 RX ADMIN — PROMETHAZINE HYDROCHLORIDE 6.25 MG: 25 INJECTION INTRAMUSCULAR; INTRAVENOUS at 16:28

## 2018-08-01 RX ADMIN — LIDOCAINE HYDROCHLORIDE 80 MG: 20 INJECTION, SOLUTION INFILTRATION; PERINEURAL at 12:07

## 2018-08-01 RX ADMIN — DOCUSATE SODIUM 100 MG: 100 CAPSULE, LIQUID FILLED ORAL at 21:06

## 2018-08-01 RX ADMIN — INSULIN LISPRO 2 UNITS: 100 INJECTION, SOLUTION INTRAVENOUS; SUBCUTANEOUS at 21:08

## 2018-08-01 RX ADMIN — ONDANSETRON HYDROCHLORIDE 4 MG: 2 INJECTION, SOLUTION INTRAMUSCULAR; INTRAVENOUS at 16:03

## 2018-08-01 RX ADMIN — FENTANYL CITRATE 50 MCG: 50 INJECTION INTRAMUSCULAR; INTRAVENOUS at 12:07

## 2018-08-01 RX ADMIN — ATORVASTATIN CALCIUM 20 MG: 10 TABLET, FILM COATED ORAL at 21:06

## 2018-08-01 RX ADMIN — ONDANSETRON 4 MG: 2 INJECTION INTRAMUSCULAR; INTRAVENOUS at 13:25

## 2018-08-01 RX ADMIN — SODIUM CHLORIDE, POTASSIUM CHLORIDE, SODIUM LACTATE AND CALCIUM CHLORIDE: 600; 310; 30; 20 INJECTION, SOLUTION INTRAVENOUS at 11:26

## 2018-08-01 RX ADMIN — PROPOFOL 20 MG: 10 INJECTION, EMULSION INTRAVENOUS at 13:47

## 2018-08-01 RX ADMIN — HYDROMORPHONE HYDROCHLORIDE 0.5 MG: 1 INJECTION, SOLUTION INTRAMUSCULAR; INTRAVENOUS; SUBCUTANEOUS at 14:36

## 2018-08-01 RX ADMIN — Medication 10 ML: at 21:23

## 2018-08-01 RX ADMIN — DEXAMETHASONE SODIUM PHOSPHATE 10 MG: 4 INJECTION, SOLUTION INTRAMUSCULAR; INTRAVENOUS at 12:16

## 2018-08-01 RX ADMIN — SUGAMMADEX 100 MG: 100 INJECTION, SOLUTION INTRAVENOUS at 13:30

## 2018-08-01 RX ADMIN — GLYCOPYRROLATE 0.2 MG: 0.2 INJECTION, SOLUTION INTRAMUSCULAR; INTRAVENOUS at 12:31

## 2018-08-01 RX ADMIN — PHENYLEPHRINE HYDROCHLORIDE 100 MCG: 10 INJECTION INTRAVENOUS at 12:18

## 2018-08-01 RX ADMIN — PROPOFOL 20 MG: 10 INJECTION, EMULSION INTRAVENOUS at 13:41

## 2018-08-01 ASSESSMENT — PULMONARY FUNCTION TESTS
PIF_VALUE: 36
PIF_VALUE: 25
PIF_VALUE: 21
PIF_VALUE: 2
PIF_VALUE: 26
PIF_VALUE: 4
PIF_VALUE: 0
PIF_VALUE: 16
PIF_VALUE: 3
PIF_VALUE: 23
PIF_VALUE: 25
PIF_VALUE: 27
PIF_VALUE: 26
PIF_VALUE: 19
PIF_VALUE: 22
PIF_VALUE: 25
PIF_VALUE: 2
PIF_VALUE: 2
PIF_VALUE: 20
PIF_VALUE: 17
PIF_VALUE: 26
PIF_VALUE: 4
PIF_VALUE: 26
PIF_VALUE: 17
PIF_VALUE: 31
PIF_VALUE: 20
PIF_VALUE: 16
PIF_VALUE: 0
PIF_VALUE: 27
PIF_VALUE: 23
PIF_VALUE: 26
PIF_VALUE: 20
PIF_VALUE: 16
PIF_VALUE: 17
PIF_VALUE: 0
PIF_VALUE: 24
PIF_VALUE: 21
PIF_VALUE: 25
PIF_VALUE: 27
PIF_VALUE: 28
PIF_VALUE: 16
PIF_VALUE: 3
PIF_VALUE: 26
PIF_VALUE: 0
PIF_VALUE: 2
PIF_VALUE: 24
PIF_VALUE: 3
PIF_VALUE: 22
PIF_VALUE: 26
PIF_VALUE: 26
PIF_VALUE: 25
PIF_VALUE: 26
PIF_VALUE: 26
PIF_VALUE: 17
PIF_VALUE: 26
PIF_VALUE: 27
PIF_VALUE: 17
PIF_VALUE: 28
PIF_VALUE: 24
PIF_VALUE: 22
PIF_VALUE: 26
PIF_VALUE: 17
PIF_VALUE: 26
PIF_VALUE: 16
PIF_VALUE: 21
PIF_VALUE: 27
PIF_VALUE: 16
PIF_VALUE: 24
PIF_VALUE: 14
PIF_VALUE: 21
PIF_VALUE: 15
PIF_VALUE: 27
PIF_VALUE: 26
PIF_VALUE: 16
PIF_VALUE: 19
PIF_VALUE: 16
PIF_VALUE: 27
PIF_VALUE: 27
PIF_VALUE: 16
PIF_VALUE: 21
PIF_VALUE: 25
PIF_VALUE: 14
PIF_VALUE: 17
PIF_VALUE: 14
PIF_VALUE: 16
PIF_VALUE: 2
PIF_VALUE: 16
PIF_VALUE: 28
PIF_VALUE: 25
PIF_VALUE: 26
PIF_VALUE: 16
PIF_VALUE: 27
PIF_VALUE: 25
PIF_VALUE: 26
PIF_VALUE: 16
PIF_VALUE: 26
PIF_VALUE: 25
PIF_VALUE: 17
PIF_VALUE: 27
PIF_VALUE: 26
PIF_VALUE: 22
PIF_VALUE: 27
PIF_VALUE: 16
PIF_VALUE: 26
PIF_VALUE: 14
PIF_VALUE: 26
PIF_VALUE: 1
PIF_VALUE: 2
PIF_VALUE: 26
PIF_VALUE: 27
PIF_VALUE: 17
PIF_VALUE: 26
PIF_VALUE: 26
PIF_VALUE: 2
PIF_VALUE: 26
PIF_VALUE: 25
PIF_VALUE: 26
PIF_VALUE: 6
PIF_VALUE: 27
PIF_VALUE: 10
PIF_VALUE: 2
PIF_VALUE: 27

## 2018-08-01 ASSESSMENT — PAIN SCALES - GENERAL
PAINLEVEL_OUTOF10: 7
PAINLEVEL_OUTOF10: 7
PAINLEVEL_OUTOF10: 0
PAINLEVEL_OUTOF10: 0

## 2018-08-01 ASSESSMENT — PAIN - FUNCTIONAL ASSESSMENT: PAIN_FUNCTIONAL_ASSESSMENT: 0-10

## 2018-08-01 ASSESSMENT — PAIN DESCRIPTION - DESCRIPTORS: DESCRIPTORS: ACHING

## 2018-08-01 NOTE — FLOWSHEET NOTE
Admitted from PACU in bed. Daughter at bedside. Alert and oriented but falls to sleep if not stimulated. Oriented to room, bed, call light, phone, ordering meals and T.V.  IV infusing with LR at 50cc/hr per pump into Lt. FA area. Hx: CHF  NPC. Lungs have decreased breath sounds bibasilar on ascultation. Abdomen soft and round with hypiactive BS on ascultation. Stated had a BM yesterday. Remains to void. Rt.leg ace wrap dry and intact. Immobilizer in place. Neuro circ. checks stable. Feet and toes cool to touch, slippers applied. Wiggles all toes. Push moderate bilateral legs and pulls weak bilaterally. Denies any numbness or tingling. SCD's in place bilaterally. Pedal pulses palpable.

## 2018-08-01 NOTE — TELEPHONE ENCOUNTER
Eleazar Pelzer, they've denied Lupis's surgery for today. ..they said for lack of conservative treatment for 3 months. When I called yesterday I told them she did have PT and that they said she was a fall risk but it's still denied.   peer to peer number is [phone] option 6 and reference number [id number]

## 2018-08-01 NOTE — ANESTHESIA POSTPROCEDURE EVALUATION
Department of Anesthesiology  Postprocedure Note    Patient: Jeni Iglesias  MRN: [id number]  YOB: 1940  Date of evaluation: 8/1/2018  Time:  4:13 PM     Procedure Summary     Date:  08/01/18 Room / Location:  [place] [address] 04 / [facility] OR    Anesthesia Start:  1200 Anesthesia Stop:  2567    Procedure:  RIGHT TOTAL KNEE MAKOPLASTY WITH PLATELET RICH PLASMA, ADDUCTOR CANAL BLOCK FOR PAIN CONTROL                    MAKOPLASTY CHEPE  CPT CODE - 23088 (Right Knee) Diagnosis:       Primary osteoarthritis of right knee      (RIGHT KNEE OSTEOARTHRITIS)    Surgeon:  Concetta Finnegan MD Responsible Provider:  Logan Fung MD    Anesthesia Type:  general, regional ASA Status:  3          Anesthesia Type: general, regional    Syd Phase I: Syd Score: 9    Syd Phase II:      Last vitals: Reviewed and per EMR flowsheets.        Anesthesia Post Evaluation    Comments: Postoperative Anesthesia Note    Name:    Jeni Iglesias  MRN:      [id number]    Patient Vitals in the past 12 hrs:  08/01/18 1535, BP:(!) 149/66, Pulse:69, Resp:16, SpO2:96 %  08/01/18 1530, BP:(!) 144/87, Pulse:68, Resp:12, SpO2:97 %  08/01/18 1525, BP:(!) 156/85, Pulse:74, Resp:12, SpO2:96 %  08/01/18 1520, BP:112/74, Pulse:70, Resp:11, SpO2:96 %  08/01/18 1510, Temp:97.4 °F (36.3 °C), Temp src:Temporal, Pulse:62, Resp:10, SpO2:98 %  08/01/18 1505, BP:125/76, Pulse:56, Resp:10, SpO2:97 %  08/01/18 1500, BP:(!) 137/56, Pulse:67, Resp:10, SpO2:96 %  08/01/18 1455, BP:(!) 155/81, Pulse:63, Resp:11, SpO2:96 %  08/01/18 1450, BP:(!) 144/106, Pulse:66, Resp:10, SpO2:95 %  08/01/18 1445, BP:(!) 142/93, Pulse:61, Resp:11, SpO2:96 %  08/01/18 1440, BP:136/86, Pulse:67, Resp:10, SpO2:95 %  08/01/18 1435, BP:134/77, Pulse:76, Resp:14, SpO2:94 %  08/01/18 1430, BP:(!) 144/87, Pulse:73, Resp:18, SpO2:95 %  08/01/18 1428, Temp:97.1 °F (36.2 °C), Temp src:Temporal  08/01/18 1138, BP:(!) 170/71, Pulse:72, SpO2:100 %  08/01/18 1103, BP:(!) 145/86, Temp:97.2 °F (36.2 °C), Temp src:Temporal, Pulse:68, Resp:12, SpO2:98 %, Height:5' 4\" (1.626 m)  08/01/18 1052, Weight:210 lb 14.4 oz (95.7 kg)     LABS:    CBC  Lab Results       Component                Value               Date/Time                  WBC                      5.3                 06/05/2018 11:56 AM        HGB                      12.7                06/05/2018 11:56 AM        HCT                      36.0                06/05/2018 11:56 AM        PLT                      208                 06/05/2018 11:56 AM   RENAL  Lab Results       Component                Value               Date/Time                  NA                       138                 06/05/2018 11:56 AM        K                        3.7                 06/05/2018 11:56 AM        CL                       97 (L)              06/05/2018 11:56 AM        CO2                      26                  06/05/2018 11:56 AM        BUN                      15                  06/05/2018 11:56 AM        CREATININE               0.8                 06/05/2018 11:56 AM        GLUCOSE                  97                  06/05/2018 11:56 AM   COAGS  Lab Results       Component                Value               Date/Time                  PROTIME                  11.3                08/01/2018 11:15 AM        PROTIME                  20.4 (H)            12/29/2009 11:55 AM        INR                      0.99                08/01/2018 11:15 AM        APTT                     32.3                08/01/2018 11:15 AM     Intake & Output: In: 1200 (I.V.:1200)  Out: 50     Nausea & Vomiting:  No    Level of Consciousness:  Awake    Pain Assessment:  Adequate analgesia    Anesthesia Complications:  No apparent anesthetic complications    SUMMARY      Vital signs stable  OK to discharge from Stage I post anesthesia care.   Care transferred from Anesthesiology department on discharge from perioperative area

## 2018-08-01 NOTE — ANESTHESIA PRE PROCEDURE
keratosis L82.1    Actinic keratoses L57.0    Cough due to ACE inhibitor R05, T46.4X5A    Mastoiditis H70.90    Other affections of shoulder region, not elsewhere classified M75.80    Rotator cuff (capsule) sprain S43.429A    Primary localized osteoarthrosis, lower leg M17.10    Chronic kidney disease, stage III (moderate) N18.3    Essential hypertension I10    Gout attack M10.9    Pain in joint, upper arm M25.529    Localized osteoarthritis of right knee M17.11       Past Medical History:        Diagnosis Date    Arthritis     Atrial fibrillation (HCC)     CAD (coronary artery disease)     a-fib    CHF (congestive heart failure) (HCC)     CKD (chronic kidney disease)     Cutaneous skin tags 4/8/2013    Depression     Gout     Grief reaction 4/8/2013    Hyperlipidemia     Hypertension     Obesity     Osteoarthritis     Pulmonary nodules     Unspecified cerebral artery occlusion with cerebral infarction 01/2007       Past Surgical History:        Procedure Laterality Date    CARPAL TUNNEL RELEASE      CATARACT REMOVAL      both    CHOLECYSTECTOMY      KNEE ARTHROSCOPY      RIGHT    OTHER SURGICAL HISTORY Left 02/27/2013    Left myringotomy, left PE tube insertion    OTHER SURGICAL HISTORY Bilateral 2/24/14    EXCISION OF RT NECK AND LEFT TEMPLE LESIONS       Social History:    Social History   Substance Use Topics    Smoking status: Never Smoker    Smokeless tobacco: Never Used    Alcohol use No                                Counseling given: Not Answered      Vital Signs (Current):   Vitals:    07/26/18 1614 08/01/18 1052 08/01/18 1103   BP:   (!) 145/86   Pulse:   68   Resp:   12   Temp:   97.2 °F (36.2 °C)   TempSrc:   Temporal   SpO2:   98%   Weight: 210 lb (95.3 kg) 210 lb 14.4 oz (95.7 kg)    Height:   5' 4\" (1.626 m)                                              BP Readings from Last 3 Encounters:   08/01/18 (!) 145/86   07/16/18 (!) 145/75   06/04/18 (!) 162/94       NPO Status: Time of last liquid consumption: 2100                        Time of last solid consumption: 2100                        Date of last liquid consumption: 07/31/18                        Date of last solid food consumption: 07/31/18    BMI:   Wt Readings from Last 3 Encounters:   08/01/18 210 lb 14.4 oz (95.7 kg)   07/16/18 208 lb (94.3 kg)   06/04/18 207 lb 14.3 oz (94.3 kg)     Body mass index is 36.2 kg/m². CBC:   Lab Results   Component Value Date    WBC 5.3 06/05/2018    RBC 3.83 06/05/2018    HGB 12.7 06/05/2018    HCT 36.0 06/05/2018    MCV 94.0 06/05/2018    RDW 14.4 06/05/2018     06/05/2018       CMP:   Lab Results   Component Value Date     06/05/2018    K 3.7 06/05/2018    CL 97 06/05/2018    CO2 26 06/05/2018    BUN 15 06/05/2018    CREATININE 0.8 06/05/2018    GFRAA >60 06/05/2018    GFRAA >60 06/10/2013    AGRATIO 2.3 04/13/2018    LABGLOM >60 06/05/2018    LABGLOM 51 08/20/2015    GLUCOSE 97 06/05/2018    PROT 6.6 04/13/2018    PROT 7.8 11/01/2012    CALCIUM 8.9 06/05/2018    BILITOT 0.7 04/13/2018    ALKPHOS 69 04/13/2018    AST 31 04/13/2018    ALT 22 04/13/2018       POC Tests: No results for input(s): POCGLU, POCNA, POCK, POCCL, POCBUN, POCHEMO, POCHCT in the last 72 hours.     Coags:   Lab Results   Component Value Date    PROTIME 26.5 06/05/2018    PROTIME 20.4 12/29/2009    INR 2.1 07/09/2018    APTT 32.4 06/05/2018       HCG (If Applicable): No results found for: PREGTESTUR, PREGSERUM, HCG, HCGQUANT     ABGs: No results found for: PHART, PO2ART, DUL4UIN, YTV0TZJ, BEART, Z8VOQJUR     Type & Screen (If Applicable):  No results found for: [facility]    Anesthesia Evaluation  Patient summary reviewed and Nursing notes reviewed no history of anesthetic complications:   Airway: Mallampati: III     Neck ROM: full   Dental:          Pulmonary:                              Cardiovascular:    (+) hypertension:, CAD:, CHF:,                   Neuro/Psych:   (+) CVA:, psychiatric

## 2018-08-01 NOTE — FLOWSHEET NOTE
Pharmacy called-spoke with [address]. Stated pt. OK to start Coumadin tonight but Lovenox should start in am.

## 2018-08-02 LAB
ANION GAP SERPL CALCULATED.3IONS-SCNC: 9 MMOL/L (ref 3–16)
BASOPHILS ABSOLUTE: 0 K/UL (ref 0–0.2)
BASOPHILS RELATIVE PERCENT: 0.3 %
BUN BLDV-MCNC: 18 MG/DL (ref 7–20)
CALCIUM SERPL-MCNC: 8.5 MG/DL (ref 8.3–10.6)
CHLORIDE BLD-SCNC: 95 MMOL/L (ref 99–110)
CO2: 27 MMOL/L (ref 21–32)
CREAT SERPL-MCNC: 1 MG/DL (ref 0.6–1.2)
EOSINOPHILS ABSOLUTE: 0 K/UL (ref 0–0.6)
EOSINOPHILS RELATIVE PERCENT: 0 %
GFR AFRICAN AMERICAN: >60
GFR NON-AFRICAN AMERICAN: 54
GLUCOSE BLD-MCNC: 149 MG/DL (ref 70–99)
GLUCOSE BLD-MCNC: 168 MG/DL (ref 70–99)
GLUCOSE BLD-MCNC: 170 MG/DL (ref 70–99)
GLUCOSE BLD-MCNC: 178 MG/DL (ref 70–99)
GLUCOSE BLD-MCNC: 232 MG/DL (ref 70–99)
GLUCOSE BLD-MCNC: 318 MG/DL (ref 70–99)
HCT VFR BLD CALC: 33.4 % (ref 36–48)
HEMOGLOBIN: 11.8 G/DL (ref 12–16)
INR BLD: 1.13 (ref 0.86–1.14)
LYMPHOCYTES ABSOLUTE: 0.6 K/UL (ref 1–5.1)
LYMPHOCYTES RELATIVE PERCENT: 6.2 %
MCH RBC QN AUTO: 33.2 PG (ref 26–34)
MCHC RBC AUTO-ENTMCNC: 35.2 G/DL (ref 31–36)
MCV RBC AUTO: 94.2 FL (ref 80–100)
MONOCYTES ABSOLUTE: 0.5 K/UL (ref 0–1.3)
MONOCYTES RELATIVE PERCENT: 5.8 %
NEUTROPHILS ABSOLUTE: 8.3 K/UL (ref 1.7–7.7)
NEUTROPHILS RELATIVE PERCENT: 87.7 %
PDW BLD-RTO: 14.2 % (ref 12.4–15.4)
PERFORMED ON: ABNORMAL
PLATELET # BLD: 189 K/UL (ref 135–450)
PMV BLD AUTO: 7.6 FL (ref 5–10.5)
POTASSIUM REFLEX MAGNESIUM: 4 MMOL/L (ref 3.5–5.1)
PROTHROMBIN TIME: 12.8 SEC (ref 9.8–13)
RBC # BLD: 3.54 M/UL (ref 4–5.2)
SODIUM BLD-SCNC: 131 MMOL/L (ref 136–145)
WBC # BLD: 9.4 K/UL (ref 4–11)

## 2018-08-02 PROCEDURE — G8988 SELF CARE GOAL STATUS: HCPCS

## 2018-08-02 PROCEDURE — 6360000002 HC RX W HCPCS: Performed by: PHYSICIAN ASSISTANT

## 2018-08-02 PROCEDURE — G8987 SELF CARE CURRENT STATUS: HCPCS

## 2018-08-02 PROCEDURE — 2580000003 HC RX 258: Performed by: NURSE PRACTITIONER

## 2018-08-02 PROCEDURE — 85610 PROTHROMBIN TIME: CPT

## 2018-08-02 PROCEDURE — 36415 COLL VENOUS BLD VENIPUNCTURE: CPT

## 2018-08-02 PROCEDURE — 2700000000 HC OXYGEN THERAPY PER DAY

## 2018-08-02 PROCEDURE — 80048 BASIC METABOLIC PNL TOTAL CA: CPT

## 2018-08-02 PROCEDURE — G8979 MOBILITY GOAL STATUS: HCPCS

## 2018-08-02 PROCEDURE — 97535 SELF CARE MNGMENT TRAINING: CPT

## 2018-08-02 PROCEDURE — 2580000003 HC RX 258: Performed by: PHYSICIAN ASSISTANT

## 2018-08-02 PROCEDURE — 97116 GAIT TRAINING THERAPY: CPT

## 2018-08-02 PROCEDURE — 6370000000 HC RX 637 (ALT 250 FOR IP): Performed by: PHYSICIAN ASSISTANT

## 2018-08-02 PROCEDURE — 94761 N-INVAS EAR/PLS OXIMETRY MLT: CPT

## 2018-08-02 PROCEDURE — 97166 OT EVAL MOD COMPLEX 45 MIN: CPT

## 2018-08-02 PROCEDURE — 1200000000 HC SEMI PRIVATE

## 2018-08-02 PROCEDURE — G8978 MOBILITY CURRENT STATUS: HCPCS

## 2018-08-02 PROCEDURE — 97530 THERAPEUTIC ACTIVITIES: CPT

## 2018-08-02 PROCEDURE — 97110 THERAPEUTIC EXERCISES: CPT

## 2018-08-02 PROCEDURE — 97161 PT EVAL LOW COMPLEX 20 MIN: CPT

## 2018-08-02 PROCEDURE — 85025 COMPLETE CBC W/AUTO DIFF WBC: CPT

## 2018-08-02 RX ORDER — WARFARIN SODIUM 5 MG/1
5 TABLET ORAL
Status: COMPLETED | OUTPATIENT
Start: 2018-08-02 | End: 2018-08-02

## 2018-08-02 RX ORDER — SODIUM CHLORIDE 9 MG/ML
INJECTION, SOLUTION INTRAVENOUS CONTINUOUS
Status: DISCONTINUED | OUTPATIENT
Start: 2018-08-02 | End: 2018-08-03

## 2018-08-02 RX ADMIN — LOSARTAN POTASSIUM 100 MG: 100 TABLET, FILM COATED ORAL at 08:54

## 2018-08-02 RX ADMIN — Medication 10 ML: at 20:23

## 2018-08-02 RX ADMIN — ONDANSETRON 4 MG: 2 INJECTION INTRAMUSCULAR; INTRAVENOUS at 23:03

## 2018-08-02 RX ADMIN — ENOXAPARIN SODIUM 30 MG: 30 INJECTION SUBCUTANEOUS at 20:23

## 2018-08-02 RX ADMIN — ALLOPURINOL 100 MG: 100 TABLET ORAL at 08:54

## 2018-08-02 RX ADMIN — CARVEDILOL 25 MG: 25 TABLET, FILM COATED ORAL at 08:54

## 2018-08-02 RX ADMIN — ENOXAPARIN SODIUM 30 MG: 30 INJECTION SUBCUTANEOUS at 08:53

## 2018-08-02 RX ADMIN — WARFARIN SODIUM 5 MG: 5 TABLET ORAL at 17:36

## 2018-08-02 RX ADMIN — OXYCODONE HYDROCHLORIDE AND ACETAMINOPHEN 1 TABLET: 5; 325 TABLET ORAL at 08:54

## 2018-08-02 RX ADMIN — INSULIN LISPRO 1 UNITS: 100 INJECTION, SOLUTION INTRAVENOUS; SUBCUTANEOUS at 08:55

## 2018-08-02 RX ADMIN — ONDANSETRON 4 MG: 2 INJECTION INTRAMUSCULAR; INTRAVENOUS at 08:52

## 2018-08-02 RX ADMIN — ATORVASTATIN CALCIUM 20 MG: 10 TABLET, FILM COATED ORAL at 20:22

## 2018-08-02 RX ADMIN — SODIUM CHLORIDE: 9 INJECTION, SOLUTION INTRAVENOUS at 10:10

## 2018-08-02 RX ADMIN — CARVEDILOL 25 MG: 25 TABLET, FILM COATED ORAL at 17:36

## 2018-08-02 RX ADMIN — Medication 10 ML: at 08:52

## 2018-08-02 RX ADMIN — OXYCODONE HYDROCHLORIDE AND ACETAMINOPHEN 2 TABLET: 5; 325 TABLET ORAL at 23:03

## 2018-08-02 RX ADMIN — DOCUSATE SODIUM 100 MG: 100 CAPSULE, LIQUID FILLED ORAL at 08:54

## 2018-08-02 RX ADMIN — INSULIN LISPRO 4 UNITS: 100 INJECTION, SOLUTION INTRAVENOUS; SUBCUTANEOUS at 12:02

## 2018-08-02 RX ADMIN — Medication 2 G: at 05:56

## 2018-08-02 RX ADMIN — DOCUSATE SODIUM 100 MG: 100 CAPSULE, LIQUID FILLED ORAL at 20:22

## 2018-08-02 RX ADMIN — HYDROCHLOROTHIAZIDE 25 MG: 25 TABLET ORAL at 08:54

## 2018-08-02 RX ADMIN — INSULIN LISPRO 1 UNITS: 100 INJECTION, SOLUTION INTRAVENOUS; SUBCUTANEOUS at 18:15

## 2018-08-02 RX ADMIN — SODIUM CHLORIDE, PRESERVATIVE FREE 10 ML: 5 INJECTION INTRAVENOUS at 10:10

## 2018-08-02 RX ADMIN — INSULIN LISPRO 1 UNITS: 100 INJECTION, SOLUTION INTRAVENOUS; SUBCUTANEOUS at 20:29

## 2018-08-02 ASSESSMENT — PAIN DESCRIPTION - LOCATION
LOCATION: KNEE
LOCATION: KNEE

## 2018-08-02 ASSESSMENT — PAIN SCALES - GENERAL
PAINLEVEL_OUTOF10: 8
PAINLEVEL_OUTOF10: 0
PAINLEVEL_OUTOF10: 2
PAINLEVEL_OUTOF10: 0
PAINLEVEL_OUTOF10: 0

## 2018-08-02 ASSESSMENT — PAIN DESCRIPTION - DESCRIPTORS
DESCRIPTORS: ACHING;SHARP
DESCRIPTORS: ACHING

## 2018-08-02 ASSESSMENT — PAIN DESCRIPTION - ONSET: ONSET: ON-GOING

## 2018-08-02 ASSESSMENT — PAIN DESCRIPTION - FREQUENCY
FREQUENCY: INTERMITTENT
FREQUENCY: CONTINUOUS

## 2018-08-02 ASSESSMENT — PAIN DESCRIPTION - ORIENTATION
ORIENTATION: RIGHT
ORIENTATION: RIGHT

## 2018-08-02 ASSESSMENT — PAIN DESCRIPTION - PAIN TYPE
TYPE: SURGICAL PAIN
TYPE: SURGICAL PAIN

## 2018-08-02 NOTE — CARE COORDINATION
CASE MANAGEMENT INITIAL ASSESSMENT      Reviewed chart and met with patient today, re: s/p TKR  Explained Case Management role/services. Family present: daughter  Primary contact information: Rissa Wahl [phone]    Admit date/status: 8-1-18/ [facility]  Diagnosis: Right TKR    Insurance: Aetna Medicare  Precert required for SNF - Yes    Living arrangements, Adls, care needs, prior to admission: Lives alone in a 2 level house. Independent. Drives. Retired. Transportation: medical transportation to SNF    [address] [place] [address] at home: Walker__Cane_X_RTS__ BSC__Shower Chair_X_  02__ HHN__ CPAP__  BiPap__  Hospital Bed__ W/C___ Other__________    Services in the home and/or outpatient, prior to admission: None    PT/OT recs: [address] Notification (HEN): started    Barriers to discharge: none identified    Plan/comments: Met with pt and daughter at bedside. Discussed therapy recommendations for SNF. Would like a referral to [facility]. Left VM for and face sheet faxed to [facility]- admissions.      ECOC on chart for MD signature

## 2018-08-02 NOTE — PROGRESS NOTES
~ 20 degrees full extension  Knee Short Arc Quad: 10 RLE through limited ROM lacking ~ 10 degrees with min assist  Ankle Pumps: 15 BLE's in supine and sitting  Comments: Pt demonstrates recall of ankle pumps and SLR's, requires cues for recall of quad sets, glute sets, heel slides, and LAQ's     AROM RLE (degrees)  RLE AROM: WFL  RLE General AROM: R knee ~ 10-80 degrees  AROM LLE (degrees)  LLE AROM : WFL  Strength RLE  Comment: Hip and knee at least 3/5 through limited ROM, manual testing deferred d/t pain. R ankle DF: 4/5  Strength LLE  Strength LLE: WFL  Comment: Grossly 4/5     Assessment   Body structures, Functions, Activity limitations: Decreased functional mobility ; Decreased ADL status; Decreased ROM; Decreased strength;Decreased balance  Assessment: Pt remains limited by pain, with fair R quad contraction with quad setting, unable to independently perform 1 rep SLR. Gait speed and step length remain significantly limited, with pt able to ambulate 61' with standard walker and CGA, limited by pain with activity (no pain at rest). Pt requires min assist for bed mobility and sit<>stand transfers. Pt will benefit from continued PT services for increased safety and independence with functional mobility while facilitating return to baseline LOF. Treatment Diagnosis: Generalized weakness R53.1, difficulty walking R26.2  Prognosis: Good  Decision Making: Low Complexity  Patient Education: Ther ex, safety with transfers, gait training with standard walker, bed mobility techniques, POC  Barriers to Learning: None, pt verbalized her understanding.   REQUIRES PT FOLLOW UP: Yes  Activity Tolerance  Activity Tolerance: Patient Tolerated treatment well;Patient limited by pain  Activity Tolerance: Pt reports pain 8/10 with activity     AM-PAC Score  AM-PAC Inpatient Mobility Raw Score : 16  AM-PAC Inpatient T-Scale Score : 40.78  Mobility Inpatient CMS 0-100% Score: 54.16  Mobility Inpatient CMS G-Code Modifier : CK

## 2018-08-02 NOTE — CONSULTS
Hospital Medicine  Consult History & Physical        Chief Complaint:  Right knee pain     Date of Service: Pt seen/examined in consultation on 8/2/18. History Of Present Illness:    68 y.o. female who we are asked to see/evaluate by Starr Fernandez MD for medical management of hyponatremia, HTN, CAD, AFIB, HLD and Gout. The patient reports a several year hx of progressive right knee pain 2nd to OA that is rated 10/10, worse with ambulation and relieved to some degree by rest and analgesic medication. This has progressed to the point that the analgesic meds are minimally effective and the patient decided to undergo an elective right total knee makoplasty on 8/1/18. The patient reports good post-operative pain control w/out associated fevers/chills or other signs/sxs of systemic illness. The patient denies any associated cardiopulmonary c/o such as CP/SOB. Denies any significant post-op N/V. Past Medical History:        Diagnosis Date    Arthritis     Atrial fibrillation ([address]     CAD (coronary artery disease)     a-fib    CHF (congestive heart failure) (HCC)     CKD (chronic kidney disease)     Cutaneous skin tags 4/8/2013    Depression     Gout     Grief reaction 4/8/2013    Hyperlipidemia     Hypertension     Obesity     Osteoarthritis     Pulmonary nodules     Unspecified cerebral artery occlusion with cerebral infarction 01/2007       Past Surgical History:        Procedure Laterality Date    CARPAL TUNNEL RELEASE      CATARACT REMOVAL      both    CHOLECYSTECTOMY      KNEE ARTHROSCOPY      RIGHT    OTHER SURGICAL HISTORY Left 02/27/2013    Left myringotomy, left PE tube insertion    OTHER SURGICAL HISTORY Bilateral 2/24/14    EXCISION OF RT NECK AND LEFT TEMPLE LESIONS       Medications Prior to Admission:    Prior to Admission medications    Medication Sig Start Date End Date Taking?  Authorizing Provider   enoxaparin (LOVENOX) 100 MG/ML injection Inject into the skin 2 times PERFORMED:  /78   Pulse 62   Temp 98.9 °F (37.2 °C) (Oral)   Resp 16   Ht 5' 4\" (1.626 m)   Wt 210 lb (95.3 kg)   SpO2 95%   BMI 36.05 kg/m²        General appearance: Elderly female in no apparent distress, appears stated age and cooperative. HEENT: Normal cephalic, atraumatic without obvious deformity. Pupils equal, round, and reactive to light. Extra ocular muscles intact. Conjunctivae/corneas clear. Neck: Supple, with full range of motion. No jugular venous distention. Trachea midline. Respiratory:  Normal respiratory effort. Clear to auscultation, bilaterally without Rales/Wheezes/Rhonchi. Cardiovascular: Irregular rate and rhythm with normal S1/S2 without murmurs, rubs or gallops. Abdomen: Soft, non-tender, non-distended with normal bowel sounds. Musculoskeletal: No clubbing, cyanosis or edema bilaterally. Right knee dressing C/D/I. Skin: Skin color, texture, turgor normal.  No rashes or lesions. Neurologic:  Neurovascularly intact without any focal sensory/motor deficits.  Cranial nerves: II-XII intact, grossly non-focal.  Psychiatric: Alert and oriented, thought content appropriate, normal insight  Capillary Refill: Brisk,< 3 seconds   Peripheral Pulses: +2 palpable, equal bilaterally     Labs:     Recent Labs      08/02/18   0611   WBC  9.4   HGB  11.8*   HCT  33.4*   PLT  189     Recent Labs      08/02/18   0611   NA  131*   K  4.0   CL  95*   CO2  27   BUN  18   CREATININE  1.0   CALCIUM  8.5     Recent Labs      08/01/18   1115  08/02/18   0815   INR  0.99  1.13     Urinalysis:    Lab Results   Component Value Date    NITRU Negative 06/05/2018    WBCUA 3-5 06/05/2018    BACTERIA Rare 06/05/2018    RBCUA 0-2 06/05/2018    BLOODU Negative 06/05/2018    SPECGRAV 1.010 06/05/2018    GLUCOSEU Negative 06/05/2018    GLUCOSEU NEGATIVE 04/20/2011       ASSESSMENT:    Active Hospital Problems    Diagnosis Date Noted    Localized osteoarthritis of right knee [M17.11] 08/01/2018    Primary

## 2018-08-02 NOTE — PROGRESS NOTES
Pharmacy to Dose Warfarin     Dx: a-fib  Goal INR range 2-3   Home Warfarin dose: 5mg daily per [facility] An[facility]agulation clinic  Was on Lovenox bridge of 100mg BID pre-op,  will clarify post-op bridge order of 30mg BID      Date                 INR                  Warfarin  8/1                  0.9                     5mg  8/2                  1.13                   5mg     Recommend Warfarin 5mg tonight x1. Daily INR ordered. Rx will continue to manage therapy per consult order.   Trung Moreland PharmD  8/2/2018 at 8:42 AM

## 2018-08-02 NOTE — PROGRESS NOTES
Physical Therapy    Facility/Department: [place] [address] - MED SURG/ORTHO  Initial Assessment    NAME: Courtney Rizvi  : 1940  MRN: [id number]    Date of Service: 2018    Discharge Recommendations:  24 hour supervision or assist, Home with Home health PT, Continue to assess pending progress (Cont. to assess. May be able to D/C to daughter's, but pt expresses interest in SNF)   PT Equipment Recommendations  Equipment Needed: Yes (Will need standard walker if D/C home)  Mobility Devices: Ran Josh: Standard    Patient Diagnosis(es): The encounter diagnosis was Primary osteoarthritis of right knee. has a past medical history of Arthritis; Atrial fibrillation ([facility] [address] CAD (coronary artery disease); CHF (congestive heart failure) ([facility] [address] CKD (chronic kidney disease); Cutaneous skin tags; Depression; Gout; Grief reaction; Hyperlipidemia; Hypertension; Obesity; Osteoarthritis; Pulmonary nodules; and Unspecified cerebral artery occlusion with cerebral infarction. has a past surgical history that includes Carpal tunnel release; Knee arthroscopy; Cataract removal; other surgical history (Left, 2013); Cholecystectomy; and other surgical history (Bilateral, 14). Restrictions  Restrictions/Precautions  Restrictions/Precautions: Weight Bearing, Surgical Protocols, General Precautions, Fall Risk  Required Braces or Orthoses?: Yes  Lower Extremity Weight Bearing Restrictions  Right Lower Extremity Weight Bearing: Weight Bearing As Tolerated  Required Braces or Orthoses  Right Lower Extremity Brace: Knee Brace  RLE Brace Type: When ambulating until able to perform SLR in PT  Position Activity Restriction  Other position/activity restrictions:  Activity day of surgery     Vision/Hearing  Vision: Impaired  Vision Exceptions: Wears glasses for reading  Hearing: Within functional limits       Subjective  General  Chart Reviewed: Yes  Patient assessed for rehabilitation services?: Yes  Family / Caregiver Present: rails, and increased time)  Sit to Supine: Unable to assess (Pt left seated up in chair)  Scooting: Modified independent (Toward EOB with increased time)  Transfers  Sit to Stand: Minimal Assistance (From EOB to standing with standard walker, cues for hand placement)  Stand to sit: Moderate Assistance (From standing with standard walker to sitting on toilet and sitting in chair, cues for finding toilet and chair with UE's and mod assist for eccentric control of transfer)  Ambulation  Ambulation?: Yes  Ambulation 1  Surface: level tile  Device: Standard Walker  Assistance: Minimal assistance  Quality of Gait: Pt demonstrates decreased step length, occasionally taking multiple steps after advancing walker (was then cued to keep walker closer/increase step length to optimize sequencing pattern). Decreased foot clearance with limited heel strike/toe off, no LOB  Distance: 13' + 25'  Comments: Pt instructed in proper step-to sequencing pattern with standard walker. Balance  Posture: Fair  Sitting - Static: Good  Sitting - Dynamic: Fair;+  Standing - Static: Fair;+  Standing - Dynamic: Fair  Exercises  Straight Leg Raise: 10 RLE with ~ 10 degree extension lag and mod-to-min assist  Quad Sets: 10 RLE - poor quad contraction  Heelslides: 15 RLE in sitting  Gluteal Sets: 10 BLE's 5s isometric holds  Knee Long Arc Quad: 10 RLE through limited ROM, lacking ~ 20 degrees full extension  Knee Short Arc Quad: 10 RLE through limited ROM lacking ~ 10 degrees with min assist  Ankle Pumps: 15 BLE's in supine and sitting  Comments: Pt instructed in proper performance and purpose of the above exercises. Instructed to perform supine ther ex every 2 hours while in bed and seated heel slides every 30min while seated in chair. Assessment   Body structures, Functions, Activity limitations: Decreased functional mobility ; Decreased ADL status; Decreased ROM; Decreased strength;Decreased balance  Assessment: The pt is a 68year old ():  At least 1 percent but less than 20 percent impaired, limited or restricted    AM-PAC Score  AM-PAC Inpatient Mobility Raw Score : 16  AM-PAC Inpatient T-Scale Score : 40.78  Mobility Inpatient CMS 0-100% Score: 54.16  Mobility Inpatient CMS G-Code Modifier : CK    Goals  Short term goals  Time Frame for Short term goals: 8/7/18  Short term goal 1: Pt will be independent with bed mobility with HOB flat and without use of bed rails  Short term goal 2: Pt will be independent with sit<>stand transfers with RW   Short term goal 3: Pt will safely ambulate 200' with standard walker with supervision  Short term goal 4: Pt will safely ascend and descend 3 steps with charles hand rail as needed to enter and exit her daughter's home  Short term goal 5: Pt will be independent with supine and seated LE TKA ther ex protocol for LE strenghtening and ROM     Therapy Time   Individual Concurrent Group Co-treatment   Time In 0916         Time Out 1004         Minutes 48         Timed Code Treatment Minutes: 35 Minutes       Sunitha Pantoja, PT

## 2018-08-02 NOTE — PROGRESS NOTES
Goal: 2  Pain Intervention(s): Medication (see eMar)  Response to Pain Intervention: Asleep with RR greater than 10  Multiple Pain Sites: No  Oxygen Therapy  SpO2: 94 %  O2 Device: None (Room air)  Social/Functional History  Social/Functional History  Lives With: Alone  Type of Home: House  Home Layout: Two level, Able to Live on Main level with bedroom/bathroom  Home Access: Stairs to enter with rails  Entrance Stairs - Number of Steps: 3 TREY  Entrance Stairs - Rails: Both  Bathroom Shower/Tub: Tub/Shower unit  Bathroom Equipment: Shower chair  Home Equipment: Quad cane  ADL Assistance: Independent  Homemaking Assistance: Independent  Homemaking Responsibilities: Yes  Ambulation Assistance: Independent  Transfer Assistance: Independent  Active : Yes  Mode of Transportation: Car  Occupation: Retired  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[place] [address] Street: Impaired  Vision Exceptions: Wears glasses for reading  Hearing: Within functional limits    Orientation  Overall Orientation Status: Within Functional Limits     Balance  Sitting Balance: Supervision  Standing Balance: Minimal assistance  Standing Balance  Sit to stand: Minimal assistance  Stand to sit: Minimal assistance  Functional Mobility  Functional - Mobility Device: Standard Walker  Activity: To/from bathroom  Assist Level: Minimal assistance  Functional Mobility Comments: vc's for sequencing steps with walker   Toilet Transfers  Toilet - Technique: Ambulating (SW)  Equipment Used: Grab bars  Toilet Transfer: Moderate assistance  ADL  Grooming: Setup  LE Dressing:  (brief and socks)  Toileting: Moderate assistance (to manage brief up/down)        Bed mobility  Supine to Sit: Minimal assistance  Sit to Supine: Unable to assess  Scooting: Modified independent  Transfers  Sit to stand: Minimal assistance  Stand to sit: Minimal assistance     Cognition  Overall Cognitive Status: Exceptions  Following Commands:  Follows one step commands with

## 2018-08-03 LAB
ANION GAP SERPL CALCULATED.3IONS-SCNC: 10 MMOL/L (ref 3–16)
BASOPHILS ABSOLUTE: 0 K/UL (ref 0–0.2)
BASOPHILS RELATIVE PERCENT: 0.7 %
BUN BLDV-MCNC: 22 MG/DL (ref 7–20)
CALCIUM SERPL-MCNC: 8 MG/DL (ref 8.3–10.6)
CHLORIDE BLD-SCNC: 98 MMOL/L (ref 99–110)
CO2: 27 MMOL/L (ref 21–32)
CREAT SERPL-MCNC: 0.9 MG/DL (ref 0.6–1.2)
EOSINOPHILS ABSOLUTE: 0 K/UL (ref 0–0.6)
EOSINOPHILS RELATIVE PERCENT: 0.4 %
GFR AFRICAN AMERICAN: >60
GFR NON-AFRICAN AMERICAN: >60
GLUCOSE BLD-MCNC: 100 MG/DL (ref 70–99)
GLUCOSE BLD-MCNC: 144 MG/DL (ref 70–99)
GLUCOSE BLD-MCNC: 160 MG/DL (ref 70–99)
GLUCOSE BLD-MCNC: 194 MG/DL (ref 70–99)
GLUCOSE BLD-MCNC: 96 MG/DL (ref 70–99)
HCT VFR BLD CALC: 30.8 % (ref 36–48)
HEMOGLOBIN: 10.8 G/DL (ref 12–16)
INR BLD: 1.46 (ref 0.86–1.14)
LYMPHOCYTES ABSOLUTE: 1.6 K/UL (ref 1–5.1)
LYMPHOCYTES RELATIVE PERCENT: 23.7 %
MAGNESIUM: 1.3 MG/DL (ref 1.8–2.4)
MCH RBC QN AUTO: 33.3 PG (ref 26–34)
MCHC RBC AUTO-ENTMCNC: 35.2 G/DL (ref 31–36)
MCV RBC AUTO: 94.8 FL (ref 80–100)
MONOCYTES ABSOLUTE: 0.9 K/UL (ref 0–1.3)
MONOCYTES RELATIVE PERCENT: 12.6 %
NEUTROPHILS ABSOLUTE: 4.2 K/UL (ref 1.7–7.7)
NEUTROPHILS RELATIVE PERCENT: 62.6 %
PDW BLD-RTO: 14.3 % (ref 12.4–15.4)
PERFORMED ON: ABNORMAL
PLATELET # BLD: 165 K/UL (ref 135–450)
PMV BLD AUTO: 7.5 FL (ref 5–10.5)
POTASSIUM REFLEX MAGNESIUM: 3.4 MMOL/L (ref 3.5–5.1)
PROTHROMBIN TIME: 16.7 SEC (ref 9.8–13)
RBC # BLD: 3.25 M/UL (ref 4–5.2)
SODIUM BLD-SCNC: 135 MMOL/L (ref 136–145)
WBC # BLD: 6.8 K/UL (ref 4–11)

## 2018-08-03 PROCEDURE — 6370000000 HC RX 637 (ALT 250 FOR IP): Performed by: PHYSICIAN ASSISTANT

## 2018-08-03 PROCEDURE — 85025 COMPLETE CBC W/AUTO DIFF WBC: CPT

## 2018-08-03 PROCEDURE — 1200000000 HC SEMI PRIVATE

## 2018-08-03 PROCEDURE — 6360000002 HC RX W HCPCS: Performed by: PHYSICIAN ASSISTANT

## 2018-08-03 PROCEDURE — 97110 THERAPEUTIC EXERCISES: CPT

## 2018-08-03 PROCEDURE — 83735 ASSAY OF MAGNESIUM: CPT

## 2018-08-03 PROCEDURE — 97530 THERAPEUTIC ACTIVITIES: CPT

## 2018-08-03 PROCEDURE — 6360000002 HC RX W HCPCS: Performed by: REGISTERED NURSE

## 2018-08-03 PROCEDURE — 80048 BASIC METABOLIC PNL TOTAL CA: CPT

## 2018-08-03 PROCEDURE — 2580000003 HC RX 258: Performed by: PHYSICIAN ASSISTANT

## 2018-08-03 PROCEDURE — 6370000000 HC RX 637 (ALT 250 FOR IP): Performed by: REGISTERED NURSE

## 2018-08-03 PROCEDURE — 97535 SELF CARE MNGMENT TRAINING: CPT

## 2018-08-03 PROCEDURE — 6360000002 HC RX W HCPCS: Performed by: NURSE PRACTITIONER

## 2018-08-03 PROCEDURE — 85610 PROTHROMBIN TIME: CPT

## 2018-08-03 PROCEDURE — 36415 COLL VENOUS BLD VENIPUNCTURE: CPT

## 2018-08-03 PROCEDURE — 97116 GAIT TRAINING THERAPY: CPT

## 2018-08-03 RX ORDER — PROMETHAZINE HYDROCHLORIDE 25 MG/ML
6.25 INJECTION, SOLUTION INTRAMUSCULAR; INTRAVENOUS EVERY 6 HOURS PRN
Status: DISCONTINUED | OUTPATIENT
Start: 2018-08-03 | End: 2018-08-07

## 2018-08-03 RX ORDER — MAGNESIUM SULFATE IN WATER 40 MG/ML
2 INJECTION, SOLUTION INTRAVENOUS ONCE
Status: COMPLETED | OUTPATIENT
Start: 2018-08-03 | End: 2018-08-03

## 2018-08-03 RX ORDER — POTASSIUM CHLORIDE 750 MG/1
40 TABLET, FILM COATED, EXTENDED RELEASE ORAL ONCE
Status: COMPLETED | OUTPATIENT
Start: 2018-08-03 | End: 2018-08-03

## 2018-08-03 RX ORDER — WARFARIN SODIUM 5 MG/1
5 TABLET ORAL DAILY
Status: COMPLETED | OUTPATIENT
Start: 2018-08-03 | End: 2018-08-03

## 2018-08-03 RX ADMIN — ALLOPURINOL 100 MG: 100 TABLET ORAL at 08:31

## 2018-08-03 RX ADMIN — INSULIN LISPRO 1 UNITS: 100 INJECTION, SOLUTION INTRAVENOUS; SUBCUTANEOUS at 21:09

## 2018-08-03 RX ADMIN — ONDANSETRON 4 MG: 2 INJECTION INTRAMUSCULAR; INTRAVENOUS at 05:05

## 2018-08-03 RX ADMIN — MAGNESIUM SULFATE HEPTAHYDRATE 2 G: 40 INJECTION, SOLUTION INTRAVENOUS at 16:41

## 2018-08-03 RX ADMIN — OXYCODONE HYDROCHLORIDE AND ACETAMINOPHEN 2 TABLET: 5; 325 TABLET ORAL at 23:49

## 2018-08-03 RX ADMIN — INSULIN LISPRO 1 UNITS: 100 INJECTION, SOLUTION INTRAVENOUS; SUBCUTANEOUS at 13:03

## 2018-08-03 RX ADMIN — POTASSIUM CHLORIDE 40 MEQ: 750 TABLET, EXTENDED RELEASE ORAL at 16:41

## 2018-08-03 RX ADMIN — OXYCODONE HYDROCHLORIDE AND ACETAMINOPHEN 2 TABLET: 5; 325 TABLET ORAL at 10:31

## 2018-08-03 RX ADMIN — ATORVASTATIN CALCIUM 20 MG: 10 TABLET, FILM COATED ORAL at 20:22

## 2018-08-03 RX ADMIN — PROMETHAZINE HYDROCHLORIDE 6.25 MG: 25 INJECTION INTRAMUSCULAR; INTRAVENOUS at 23:50

## 2018-08-03 RX ADMIN — LOSARTAN POTASSIUM 100 MG: 100 TABLET, FILM COATED ORAL at 08:31

## 2018-08-03 RX ADMIN — WARFARIN SODIUM 5 MG: 5 TABLET ORAL at 17:49

## 2018-08-03 RX ADMIN — DOCUSATE SODIUM 100 MG: 100 CAPSULE, LIQUID FILLED ORAL at 08:31

## 2018-08-03 RX ADMIN — CARVEDILOL 25 MG: 25 TABLET, FILM COATED ORAL at 08:31

## 2018-08-03 RX ADMIN — CARVEDILOL 25 MG: 25 TABLET, FILM COATED ORAL at 17:49

## 2018-08-03 RX ADMIN — OXYCODONE HYDROCHLORIDE AND ACETAMINOPHEN 1 TABLET: 5; 325 TABLET ORAL at 05:05

## 2018-08-03 RX ADMIN — Medication 10 ML: at 08:31

## 2018-08-03 RX ADMIN — ENOXAPARIN SODIUM 30 MG: 30 INJECTION SUBCUTANEOUS at 20:22

## 2018-08-03 RX ADMIN — INSULIN LISPRO 1 UNITS: 100 INJECTION, SOLUTION INTRAVENOUS; SUBCUTANEOUS at 17:49

## 2018-08-03 RX ADMIN — ENOXAPARIN SODIUM 30 MG: 30 INJECTION SUBCUTANEOUS at 08:31

## 2018-08-03 RX ADMIN — ONDANSETRON 4 MG: 2 INJECTION INTRAMUSCULAR; INTRAVENOUS at 10:31

## 2018-08-03 ASSESSMENT — PAIN DESCRIPTION - LOCATION
LOCATION: KNEE

## 2018-08-03 ASSESSMENT — PAIN SCALES - WONG BAKER: WONGBAKER_NUMERICALRESPONSE: 6

## 2018-08-03 ASSESSMENT — PAIN DESCRIPTION - PAIN TYPE
TYPE: SURGICAL PAIN

## 2018-08-03 ASSESSMENT — PAIN SCALES - GENERAL
PAINLEVEL_OUTOF10: 8
PAINLEVEL_OUTOF10: 0
PAINLEVEL_OUTOF10: 4
PAINLEVEL_OUTOF10: 7

## 2018-08-03 ASSESSMENT — PAIN DESCRIPTION - ORIENTATION
ORIENTATION: RIGHT

## 2018-08-03 ASSESSMENT — PAIN DESCRIPTION - ONSET: ONSET: ON-GOING

## 2018-08-03 ASSESSMENT — PAIN DESCRIPTION - DESCRIPTORS: DESCRIPTORS: ACHING

## 2018-08-03 ASSESSMENT — PAIN DESCRIPTION - FREQUENCY: FREQUENCY: CONTINUOUS

## 2018-08-03 NOTE — CARE COORDINATION
Call received from [facility] at [facility]. Pt was DENIED for skilled stay at facility. CM will f/u with patient regarding above and discharge plan.      Amanda High, RN DCP

## 2018-08-03 NOTE — PROGRESS NOTES
Assessment  Patient Currently in Pain: Yes  Pain Assessment: Faces  Keith-Baker Pain Rating: Hurts even more  Pain Type: Surgical pain  Pain Location: Knee  Pain Orientation: Right  Pain Intervention(s): Repositioned; Emotional support; Ambulation/Increased activity    Orientation  Orientation  Overall Orientation Status: Within Functional Limits     Objective    ADL  Grooming: Contact guard assistance (handwashing at sink)  Toileting: Moderate assistance (to manage brief)  Additional Comments: Pt max A to manage KI     Balance  Sitting Balance: Supervision  Standing Balance: Minimal assistance (with SW)  Standing Balance  Sit to stand: Minimal assistance  Stand to sit: Minimal assistance    Functional Mobility  Functional - Mobility Device: Standard Walker  Activity: To/from bathroom  Assist Level: Minimal assistance    Toilet Transfers  Toilet - Technique: Ambulating (with SW)  Equipment Used: Standard toilet (with use of grab bars)  Toilet Transfer: Minimal assistance    Bed mobility  Supine to Sit: Minimal assistance (to L with HOB elevated)     Transfers  Sit to stand: Minimal assistance  Stand to sit: Minimal assistance     Cognition  Overall Cognitive Status: Exceptions  Following Commands: Follows one step commands with repetition  Safety Judgement: Decreased awareness of need for safety  Sequencing: Requires cues for some  Cognition Comment: Pt hard of hearing, however nods head and agrees at times even when pt does not hear what has been said to her. Assessment   Performance deficits / Impairments: Decreased functional mobility ; Decreased ADL status; Decreased high-level IADLs;Decreased endurance;Decreased balance;Decreased strength;Decreased safe awareness  Prognosis: Good  Patient Education: bed mobility, functional transfers, functional mobility, ADLs, role of OT  REQUIRES OT FOLLOW UP: Yes  Activity Tolerance  Activity Tolerance: Treatment limited secondary to medical complications (free text)  Activity

## 2018-08-03 NOTE — PROGRESS NOTES
Delivered jean-paul to [facility] room.   Thanks for the referral.  Tammy Cover  8/3/2018

## 2018-08-03 NOTE — PROGRESS NOTES
ocular muscles intact. Conjunctivae/corneas clear. Neck: Supple, with full range of motion. No jugular venous distention. Trachea midline. Respiratory:  Normal respiratory effort. Clear to auscultation, bilaterally without Rales/Wheezes/Rhonchi. Cardiovascular: Irregular rate and rhythm with normal S1/S2 without murmurs, rubs or gallops. Abdomen: Soft, non-tender, non-distended with normal bowel sounds. Musculoskeletal: No clubbing, cyanosis or edema bilaterally. Right knee dressing C/D/I. Skin: Skin color, texture, turgor normal.  No rashes or lesions. Neurologic:  Neurovascularly intact without any focal sensory/motor deficits. Cranial nerves: II-XII intact, grossly non-focal.  Psychiatric: Alert and oriented, thought content appropriate, normal insight  Capillary Refill: Brisk,< 3 seconds   Peripheral Pulses: +2 palpable, equal bilaterally       Labs:   Recent Labs      08/02/18   0611  08/03/18   0608   WBC  9.4  6.8   HGB  11.8*  10.8*   HCT  33.4*  30.8*   PLT  189  165     Recent Labs      08/02/18   0611  08/03/18   0608   NA  131*  135*   K  4.0  3.4*   CL  95*  98*   CO2  27  27   BUN  18  22*   CREATININE  1.0  0.9   CALCIUM  8.5  8.0*     Recent Labs      08/01/18   1115  08/02/18   0815  08/03/18   0608   INR  0.99  1.13  1.46*     Urinalysis:    Lab Results   Component Value Date    NITRU Negative 06/05/2018    WBCUA 3-5 06/05/2018    BACTERIA Rare 06/05/2018    RBCUA 0-2 06/05/2018    BLOODU Negative 06/05/2018    SPECGRAV 1.010 06/05/2018    GLUCOSEU Negative 06/05/2018    GLUCOSEU NEGATIVE 04/20/2011       Assessment/Plan:    Active Hospital Problems    Diagnosis Date Noted    Localized osteoarthritis of right knee [M17.11] 08/01/2018    Primary localized osteoarthritis of right knee [M17.11] 08/01/2018       Right knee osteoarthritis s/p right total knee makoplasty on 8/1/18:  - Postoperative management and pain control per Orthopedic Surgery.    - Continue PT/OT, DVT prophylaxis, bowel

## 2018-08-03 NOTE — PROGRESS NOTES
Department of Orthopedic Surgery  Nurse Practitioner   Progress Note    Subjective:     Post-Operative Day: 2 Status Post right Total Knee Arthroplasty with cement  Systemic or Specific Complaints:No Complaints. Patient is resting in bed. Pain is controlled, therapy is going ok. No family at bedside. Objective:     Patient Vitals for the past 24 hrs:   BP Temp Temp src Pulse Resp SpO2   08/03/18 0731 119/79 97.8 °F (36.6 °C) Oral 72 14 94 %   08/03/18 0337 139/74 98 °F (36.7 °C) Oral 72 15 97 %   08/03/18 0258 121/74 98.1 °F (36.7 °C) Oral 72 16 95 %   08/02/18 2355 130/64 98 °F (36.7 °C) Oral 63 15 99 %   08/02/18 1926 118/72 98.3 °F (36.8 °C) Oral 69 16 98 %       General: alert, appears stated age, cooperative and no distress   Wound: Wound clean and dry no evidence of infection. Motion: Painful range of Motion   DVT Exam: No evidence of DVT seen on physical exam.       Knee swollen but thigh soft to palpation. Moving foot and ankle. Good distal pulses. Data Review  CBC:   Lab Results   Component Value Date    WBC 6.8 08/03/2018    RBC 3.25 08/03/2018    HGB 10.8 08/03/2018    HCT 30.8 08/03/2018     08/03/2018       Assessment:     Status Post right Total Knee Arthroplasty. Doing well postoperatively. Plan:      1: Continues current post-op course : Post-op labs reviewed and stable. IM reconsulted and now following. Na improved overnight. 2:  Continue Deep venous thrombosis prophylaxis - Coumadin, bridging with Lovenox. INR 1.46 today. 3:  Continue physical therapy, OT, WBAT  4:  Continue Pain Control  5:  Patient was denied SNF at D/C. She will need to safely navigate steps prior to D/C as she will have to D/C home with her daughter, she does not have any help at home and her daughter is not able to stay with her. Discharge pending progress with therapy and safe navigation of steps. Patient will need HHC at D/C for PT, OT. Cardiology to follow INR.     Marcio Joyner, CNP

## 2018-08-03 NOTE — PROGRESS NOTES
Notified NP of pt HR dipping to 36 bpm for a few seconds per CMU around 53-51-13-11, VSS and pt asymptomatic. NP states no new orders and continue to monitor.

## 2018-08-03 NOTE — CARE COORDINATION
Spoke with patient and daughter present, regarding homecare services. [facility] has accepted referral Patient aware and agreeable to services. Demographics verified, but patient and family have not made a decision on service address. Daughter will be initial contact from agency at discharge. Pending referral faxed to [facility]. Electronically signed by Aleja Phillips LPN on 1/4/10 at 1:73 PM        [address]  Cleaning [place] [address] Transition Nurse  [phone]

## 2018-08-04 LAB
ANION GAP SERPL CALCULATED.3IONS-SCNC: 7 MMOL/L (ref 3–16)
BASOPHILS ABSOLUTE: 0 K/UL (ref 0–0.2)
BASOPHILS RELATIVE PERCENT: 0.4 %
BUN BLDV-MCNC: 18 MG/DL (ref 7–20)
CALCIUM SERPL-MCNC: 8.2 MG/DL (ref 8.3–10.6)
CHLORIDE BLD-SCNC: 96 MMOL/L (ref 99–110)
CO2: 30 MMOL/L (ref 21–32)
CREAT SERPL-MCNC: 0.9 MG/DL (ref 0.6–1.2)
EOSINOPHILS ABSOLUTE: 0.1 K/UL (ref 0–0.6)
EOSINOPHILS RELATIVE PERCENT: 1.1 %
GFR AFRICAN AMERICAN: >60
GFR NON-AFRICAN AMERICAN: >60
GLUCOSE BLD-MCNC: 105 MG/DL (ref 70–99)
GLUCOSE BLD-MCNC: 134 MG/DL (ref 70–99)
GLUCOSE BLD-MCNC: 149 MG/DL (ref 70–99)
GLUCOSE BLD-MCNC: 177 MG/DL (ref 70–99)
GLUCOSE BLD-MCNC: 96 MG/DL (ref 70–99)
HCT VFR BLD CALC: 28.1 % (ref 36–48)
HEMOGLOBIN: 9.7 G/DL (ref 12–16)
INR BLD: 1.62 (ref 0.86–1.14)
LYMPHOCYTES ABSOLUTE: 1.7 K/UL (ref 1–5.1)
LYMPHOCYTES RELATIVE PERCENT: 28.2 %
MAGNESIUM: 1.4 MG/DL (ref 1.8–2.4)
MCH RBC QN AUTO: 33.2 PG (ref 26–34)
MCHC RBC AUTO-ENTMCNC: 34.5 G/DL (ref 31–36)
MCV RBC AUTO: 96.2 FL (ref 80–100)
MONOCYTES ABSOLUTE: 0.8 K/UL (ref 0–1.3)
MONOCYTES RELATIVE PERCENT: 13.6 %
NEUTROPHILS ABSOLUTE: 3.4 K/UL (ref 1.7–7.7)
NEUTROPHILS RELATIVE PERCENT: 56.7 %
PDW BLD-RTO: 14.3 % (ref 12.4–15.4)
PERFORMED ON: ABNORMAL
PLATELET # BLD: 161 K/UL (ref 135–450)
PMV BLD AUTO: 7.2 FL (ref 5–10.5)
POTASSIUM SERPL-SCNC: 3.8 MMOL/L (ref 3.5–5.1)
PROTHROMBIN TIME: 18.5 SEC (ref 9.8–13)
RBC # BLD: 2.92 M/UL (ref 4–5.2)
SODIUM BLD-SCNC: 133 MMOL/L (ref 136–145)
WBC # BLD: 6 K/UL (ref 4–11)

## 2018-08-04 PROCEDURE — 6360000002 HC RX W HCPCS: Performed by: NURSE PRACTITIONER

## 2018-08-04 PROCEDURE — 2580000003 HC RX 258: Performed by: PHYSICIAN ASSISTANT

## 2018-08-04 PROCEDURE — G8987 SELF CARE CURRENT STATUS: HCPCS

## 2018-08-04 PROCEDURE — 85610 PROTHROMBIN TIME: CPT

## 2018-08-04 PROCEDURE — 80048 BASIC METABOLIC PNL TOTAL CA: CPT

## 2018-08-04 PROCEDURE — 36415 COLL VENOUS BLD VENIPUNCTURE: CPT

## 2018-08-04 PROCEDURE — 6360000002 HC RX W HCPCS: Performed by: PHYSICIAN ASSISTANT

## 2018-08-04 PROCEDURE — 83735 ASSAY OF MAGNESIUM: CPT

## 2018-08-04 PROCEDURE — 97535 SELF CARE MNGMENT TRAINING: CPT

## 2018-08-04 PROCEDURE — 97530 THERAPEUTIC ACTIVITIES: CPT

## 2018-08-04 PROCEDURE — 1200000000 HC SEMI PRIVATE

## 2018-08-04 PROCEDURE — 6370000000 HC RX 637 (ALT 250 FOR IP): Performed by: NURSE PRACTITIONER

## 2018-08-04 PROCEDURE — 97116 GAIT TRAINING THERAPY: CPT

## 2018-08-04 PROCEDURE — 85025 COMPLETE CBC W/AUTO DIFF WBC: CPT

## 2018-08-04 PROCEDURE — 6370000000 HC RX 637 (ALT 250 FOR IP): Performed by: PHYSICIAN ASSISTANT

## 2018-08-04 PROCEDURE — 97110 THERAPEUTIC EXERCISES: CPT

## 2018-08-04 RX ORDER — SODIUM CHLORIDE 9 MG/ML
INJECTION, SOLUTION INTRAVENOUS
Status: DISPENSED
Start: 2018-08-04 | End: 2018-08-05

## 2018-08-04 RX ORDER — MAGNESIUM SULFATE IN WATER 40 MG/ML
2 INJECTION, SOLUTION INTRAVENOUS ONCE
Status: COMPLETED | OUTPATIENT
Start: 2018-08-04 | End: 2018-08-04

## 2018-08-04 RX ORDER — WARFARIN SODIUM 5 MG/1
5 TABLET ORAL
Status: COMPLETED | OUTPATIENT
Start: 2018-08-04 | End: 2018-08-04

## 2018-08-04 RX ADMIN — MAGNESIUM SULFATE HEPTAHYDRATE 2 G: 40 INJECTION, SOLUTION INTRAVENOUS at 14:21

## 2018-08-04 RX ADMIN — OXYCODONE HYDROCHLORIDE AND ACETAMINOPHEN 2 TABLET: 5; 325 TABLET ORAL at 10:30

## 2018-08-04 RX ADMIN — ENOXAPARIN SODIUM 30 MG: 30 INJECTION SUBCUTANEOUS at 09:27

## 2018-08-04 RX ADMIN — ATORVASTATIN CALCIUM 20 MG: 10 TABLET, FILM COATED ORAL at 21:08

## 2018-08-04 RX ADMIN — Medication 10 ML: at 09:27

## 2018-08-04 RX ADMIN — CARVEDILOL 25 MG: 25 TABLET, FILM COATED ORAL at 18:50

## 2018-08-04 RX ADMIN — INSULIN LISPRO 1 UNITS: 100 INJECTION, SOLUTION INTRAVENOUS; SUBCUTANEOUS at 21:08

## 2018-08-04 RX ADMIN — ALLOPURINOL 100 MG: 100 TABLET ORAL at 09:27

## 2018-08-04 RX ADMIN — DOCUSATE SODIUM 100 MG: 100 CAPSULE, LIQUID FILLED ORAL at 21:08

## 2018-08-04 RX ADMIN — PROMETHAZINE HYDROCHLORIDE 6.25 MG: 25 INJECTION INTRAMUSCULAR; INTRAVENOUS at 10:33

## 2018-08-04 RX ADMIN — INSULIN LISPRO 1 UNITS: 100 INJECTION, SOLUTION INTRAVENOUS; SUBCUTANEOUS at 18:17

## 2018-08-04 RX ADMIN — CARVEDILOL 25 MG: 25 TABLET, FILM COATED ORAL at 09:27

## 2018-08-04 RX ADMIN — WARFARIN SODIUM 5 MG: 5 TABLET ORAL at 18:50

## 2018-08-04 RX ADMIN — ENOXAPARIN SODIUM 30 MG: 30 INJECTION SUBCUTANEOUS at 21:11

## 2018-08-04 RX ADMIN — LOSARTAN POTASSIUM 100 MG: 100 TABLET, FILM COATED ORAL at 09:27

## 2018-08-04 ASSESSMENT — PAIN DESCRIPTION - FREQUENCY
FREQUENCY: CONTINUOUS

## 2018-08-04 ASSESSMENT — PAIN SCALES - GENERAL
PAINLEVEL_OUTOF10: 9
PAINLEVEL_OUTOF10: 5
PAINLEVEL_OUTOF10: 5
PAINLEVEL_OUTOF10: 4
PAINLEVEL_OUTOF10: 8

## 2018-08-04 ASSESSMENT — PAIN DESCRIPTION - ORIENTATION
ORIENTATION: RIGHT

## 2018-08-04 ASSESSMENT — PAIN DESCRIPTION - PAIN TYPE
TYPE: SURGICAL PAIN

## 2018-08-04 ASSESSMENT — PAIN DESCRIPTION - LOCATION
LOCATION: KNEE

## 2018-08-04 ASSESSMENT — PAIN DESCRIPTION - DESCRIPTORS
DESCRIPTORS: ACHING

## 2018-08-04 NOTE — PLAN OF CARE
Problem: Falls - Risk of:  Goal: Will remain free from falls  Will remain free from falls   Outcome: Ongoing  Pt resting in bed quietly. Bed in lowest position, wheels locked, side rails up X2, non skid socks on. Bed check alarm engaged. Pt instructed not to get out of bed on own, to use call light for staff assistance when ambulating or other needs. Pt verbalizes understanding. Call light within reach. Will continue to monitor. Problem: Pain:  Goal: Control of acute pain  Control of acute pain   Outcome: Ongoing  Pt rates pain level using 0-10 pain scale. Pain meds administered as ordered per MAR. Pt instructed to use call light for increasing pain or ineffective pain management. Pt verbalizes understanding. Call light within reach. Will continue to monitor.

## 2018-08-04 NOTE — PROGRESS NOTES
Physical Therapy  Facility/Department: [place] [address] - MED SURG/ORTHO  Daily Treatment Note  NAME: Nelli Mascorro  : 1940  MRN: [id number]    Date of Service: 2018    Discharge Recommendations:  Subacute/Skilled Nursing Facility   PT Equipment Recommendations  Equipment Needed: Yes  Mobility Devices: Nicky Mering: Standard (will be needed if pt D/C's directly home from hospital)    Patient Diagnosis(es): The primary encounter diagnosis was Status post right knee replacement. A diagnosis of Primary osteoarthritis of right knee was also pertinent to this visit. has a past medical history of Arthritis; Atrial fibrillation ([facility] [address] CAD (coronary artery disease); CHF (congestive heart failure) ([facility] [address] CKD (chronic kidney disease); Cutaneous skin tags; Depression; Gout; Grief reaction; Hyperlipidemia; Hypertension; Obesity; Osteoarthritis; Pulmonary nodules; and Unspecified cerebral artery occlusion with cerebral infarction. has a past surgical history that includes Carpal tunnel release; Knee arthroscopy; Cataract removal; other surgical history (Left, 2013); Cholecystectomy; other surgical history (Bilateral, 14); and pr total knee arthroplasty (Right, 2018). Restrictions  Restrictions/Precautions  Restrictions/Precautions: Weight Bearing, Surgical Protocols, General Precautions, Fall Risk  Required Braces or Orthoses?: Yes  Lower Extremity Weight Bearing Restrictions  Right Lower Extremity Weight Bearing: Weight Bearing As Tolerated  Required Braces or Orthoses  Right Lower Extremity Brace: Knee Brace  RLE Brace Type: When ambulating until able to perform SLR in PT  Position Activity Restriction  Other position/activity restrictions: Activity day of surgery     Subjective   General  Chart Reviewed: Yes  Response To Previous Treatment: Patient with no complaints from previous session.   Family / Caregiver Present: No  Referring Practitioner: Eamon Benitez PA-C  Subjective  Subjective: Pt agreeable to work with PT this morning. States she just got finished working with OT. Pt states her nausea feels \"a little better right now. \"  Pt states she would prefer to D/C to SNF. \"I don't feel like I'm ready to go home and my daughter won't be able to help me much. \"  General Comment  Comments: Pt sitting up in chair upon entry of PT; cleared by RN to work with therapy  Pain Screening  Patient Currently in Pain: Yes  Pain Assessment  Pain Assessment: 0-10  Pain Level: 8  Pain Type: Surgical pain  Pain Location: Knee  Pain Orientation: Right  Pain Descriptors: Aching  Pain Frequency: Continuous  Pain Intervention(s): Repositioned; Emotional support; Ambulation/Increased activity;RN notified    Orientation  Orientation  Overall Orientation Status: Within Normal Limits     Objective   Bed mobility  Supine to Sit: Unable to assess (pt up in chair before & after therapy)  Scooting: Modified independent (to scoot forward<>backward in chair)     Transfers  Sit to Stand: Minimal Assistance (from chair to std. walker)  Stand to sit: Contact guard assistance  Bed to Chair: Unable to assess (pt up in chair before & after therapy)     Ambulation  WB Status: WBAT RLE with KI  Surface: level tile  Device: Standard Walker  Other Apparatus: Knee Immobilizer;Right  Assistance: Contact guard assistance;Stand by assistance (CGA x 1 decreasing to close SBA at times)  Quality of Gait: Pt amb with very slow beverly with decreased step length and increased B stance time. C/o increasing knee pain with WB'ing as amb progressed. No cues needed for proper gait sequencing with walker. Distance: x 40 feet  Comments: Amb distance limited by increasing knee pain and fatigue.     Stairs/Curb  Stairs?: No (unable to safely attempt given difficulty with amb over level surfaces)     Balance  Posture: Fair  Sitting - Static: Good  Sitting - Dynamic: Fair;+  Standing - Static: Fair;+  Standing - Dynamic: Fair     Exercises  Quad Sets: x 15 RLE  Heelslides: x 15 RLE (seated towel slides, mod overpressure into flexion on last 5 reps)  Gluteal Sets: x 15 bilat  Hip Abduction: x 15 RLE with modA  Knee Long Arc Quad: x 15 RLE (with modA from PT for TKE)  Knee Active Range of Motion: R knee AAROM = 5-85 degrees  Ankle Pumps: x 15 BLE      Assessment   Body structures, Functions, Activity limitations: Decreased functional mobility ; Decreased ROM; Decreased strength;Decreased balance  Assessment: Pt tolerated therapy session fairly well today. Pt still c/o significant knee pain with ROM and WB'ing, although able to participate fully with PT despite pain. Ambulated slightly increased distance today with less assist needed for portions of transfers. Knee ROM progressing slowly but steadily into flex/ext. Given pt's decreased activity tolerance and current deficits, recommend SNF at D/C. Treatment Diagnosis: Generalized weakness R53.1, difficulty walking R26.2  Specific instructions for Next Treatment: Progress ther ex and mobility as tolerated  Prognosis: Good  Decision Making: Low Complexity  Patient Education: Role of PT, benefits of mobility, safety in transfers/amb with std. walker, RLE ther ex, D/C recs - pt verbalizes understanding  REQUIRES PT FOLLOW UP: Yes  Activity Tolerance: Patient Tolerated treatment well;Patient limited by pain     AM-PAC Score  AM-PAC Inpatient Mobility Raw Score : 16  AM-PAC Inpatient T-Scale Score : 40.78  Mobility Inpatient CMS 0-100% Score: 54.16  Mobility Inpatient CMS G-Code Modifier : CK    Goals  Short term goals  Time Frame for Short term goals: 3-5 days (unless otherwise specified)  Short term goal 1: Pt will be independent with bed mobility with HOB flat and without use of bed rails  Short term goal 2: Pt will be independent with sit<>stand transfers with RW - goal downgraded on 8/04/18 due to slower-than-expected progress. New goal: Pt will transfer sit <-> stand and bed>chair using std.  walker with supervision. Short term goal 3: Pt will safely ambulate 200' with standard walker with supervision - goal downgraded on 8/04/18 due to slower-than-expected progress. New goal: Pt will ambulate x 125 feet using std. walker with SBA. Short term goal 4: If pt D/C's directly home from hospital: Pt will safely ascend and descend 3 steps with B handrails as needed to enter and exit her daughter's home  Short term goal 5: By 8/05/18: Pt will tolerate 12-15 reps RLE ther ex for ROM/strengthening  Patient Goals   Patient goals :  \"To be able to go to rehab and get stronger\"    Plan    Times per week: BID 7x/week  Times per day: Twice a day  Specific instructions for Next Treatment: Progress ther ex and mobility as tolerated  Current Treatment Recommendations: Strengthening, ROM, Balance Training, Functional Mobility Training, Transfer Training, Endurance Training, Gait Training, Stair training, Pain Management, Home Exercise Program, Safety Education & Training, Patient/Caregiver Education & Training, Equipment Evaluation, Education, & procurement, Positioning  Safety Devices: Nurse notified, Call light within reach, Gait belt, Patient at risk for falls, Left in chair, All fall risk precautions in place, Chair alarm in place     Therapy Time   Individual Concurrent Group Co-treatment   Time In 0950         Time Out 1029         Minutes 39         Timed Code Treatment Minutes: [facility], [address]

## 2018-08-04 NOTE — PROGRESS NOTES
Conjunctivae/corneas clear. Neck: Supple, with full range of motion. No jugular venous distention. Trachea midline. Respiratory:  Normal respiratory effort. Clear to auscultation, bilaterally without Rales/Wheezes/Rhonchi. Cardiovascular: Irregular rate and rhythm with normal S1/S2 without murmurs, rubs or gallops. Abdomen: Soft, non-tender, non-distended with normal bowel sounds. Musculoskeletal: No clubbing, cyanosis or edema bilaterally. Right knee dressing C/D/I. Skin: Skin color, texture, turgor normal.  No rashes or lesions. Neurologic:  Neurovascularly intact without any focal sensory/motor deficits.  Cranial nerves: II-XII intact, grossly non-focal.  Psychiatric: Alert and oriented, thought content appropriate, normal insight  Capillary Refill: Brisk,< 3 seconds   Peripheral Pulses: +2 palpable, equal bilaterally       Labs:   Recent Labs      08/02/18   0611  08/03/18   0608  08/04/18   0633   WBC  9.4  6.8  6.0   HGB  11.8*  10.8*  9.7*   HCT  33.4*  30.8*  28.1*   PLT  189  165  161     Recent Labs      08/02/18   0611  08/03/18   0608  08/04/18   0633   NA  131*  135*  133*   K  4.0  3.4*  3.8   CL  95*  98*  96*   CO2  27  27  30   BUN  18  22*  18   CREATININE  1.0  0.9  0.9   CALCIUM  8.5  8.0*  8.2*     Recent Labs      08/02/18   0815  08/03/18   0608  08/04/18   0633   INR  1.13  1.46*  1.62*     Urinalysis:      Lab Results   Component Value Date    NITRU Negative 06/05/2018    WBCUA 3-5 06/05/2018    BACTERIA Rare 06/05/2018    RBCUA 0-2 06/05/2018    BLOODU Negative 06/05/2018    SPECGRAV 1.010 06/05/2018    GLUCOSEU Negative 06/05/2018    GLUCOSEU NEGATIVE 04/20/2011       Assessment/Plan:    Active Hospital Problems    Diagnosis Date Noted    Localized osteoarthritis of right knee [M17.11] 08/01/2018    Primary localized osteoarthritis of right knee [M17.11] 08/01/2018       Right knee osteoarthritis s/p right total knee makoplasty on 8/1/18:  - Postoperative management and pain

## 2018-08-04 NOTE — PLAN OF CARE
Problem: Falls - Risk of:  Goal: Will remain free from falls  Will remain free from falls   Outcome: Ongoing  Bed in lowest position, wheels locked, 2/4 side rails up, nonskid footwear on. Bed/ chair check alarm in place, call light within reach. Pt instructed to call out when needing assistance. Pt stated understanding. Nurse will continue to monitor. Problem: Pain:  Goal: Pain level will decrease  Pain level will decrease   Outcome: Ongoing      Problem: Pain - Acute: Intervention: Assess barriers to pain control  Pt scoring pain on 0-10 scale. Pain medications given per MAR. Pt instructed to call out when pain level increasing. Call light within reach. Nurse will continue to reassess and monitor.

## 2018-08-04 NOTE — PROGRESS NOTES
Occupational Therapy  Facility/Department: [place] [address] - MED SURG/ORTHO  Daily Treatment Note  NAME: Jacqueline Perea  : 1940  MRN: [id number]    Date of Service: 2018    Discharge Recommendations:  Subacute/Skilled Nursing Facility  OT Equipment Recommendations  Other: defer to next level of care    Patient Diagnosis(es): The primary encounter diagnosis was Status post right knee replacement. A diagnosis of Primary osteoarthritis of right knee was also pertinent to this visit. has a past medical history of Arthritis; Atrial fibrillation ([address] CAD (coronary artery disease); CHF (congestive heart failure) ([address] CKD (chronic kidney disease); Cutaneous skin tags; Depression; Gout; Grief reaction; Hyperlipidemia; Hypertension; Obesity; Osteoarthritis; Pulmonary nodules; and Unspecified cerebral artery occlusion with cerebral infarction. has a past surgical history that includes Carpal tunnel release; Knee arthroscopy; Cataract removal; other surgical history (Left, 2013); Cholecystectomy; other surgical history (Bilateral, 14); and pr total knee arthroplasty (Right, 2018). Restrictions  Restrictions/Precautions  Restrictions/Precautions: Weight Bearing, Surgical Protocols, General Precautions, Fall Risk  Required Braces or Orthoses?: Yes  Lower Extremity Weight Bearing Restrictions  Right Lower Extremity Weight Bearing: Weight Bearing As Tolerated  Required Braces or Orthoses  Right Lower Extremity Brace: Knee Brace  RLE Brace Type: When ambulating until able to perform SLR in PT  Position Activity Restriction  Other position/activity restrictions:  Activity day of surgery  Subjective   General  Chart Reviewed: Yes  Patient assessed for rehabilitation services?: Yes  Response to previous treatment: Patient with no complaints from previous session  Family / Caregiver Present: No  Referring Practitioner: Darylene Mas  Diagnosis: R knee OA s/p R TKR 18  Subjective  Subjective: Pt resting in bed, agreeable to OT treatment. General Comment  Comments: RN approved pt for therapy. Pain Assessment  Patient Currently in Pain: Yes  Pain Assessment: 0-10  Pain Level: 5  Pain Type: Surgical pain  Pain Location: Knee  Pain Orientation: Right  Pain Descriptors: Aching  Pain Frequency: Continuous  Pain Intervention(s): Repositioned; Ambulation/Increased activity; Emotional support;RN notified  Response to Pain Intervention: Patient Satisfied  Vital Signs  Patient Currently in Pain: Yes   Orientation  Orientation  Overall Orientation Status: Within Functional Limits  Objective    ADL  Feeding: Independent (seated in chair )  LE Dressing: Moderate assistance (able to doff/don L sock, assist to doff/don R sock seated in chair )        Balance  Sitting Balance: Supervision  Standing Balance: Minimal assistance (SW)  Standing Balance  Sit to stand: Minimal assistance (SW)  Stand to sit: Minimal assistance (SW)  Comment: SW, Min A to initiate stand, pt with posterior lean requiring Min A to remain within YANA  Bed mobility  Supine to Sit: Minimal assistance (assist for RLE)  Comment: HOB elevated, bed rails used  Transfers  Sit to stand: Minimal assistance (SW)  Stand to sit: Minimal assistance (SW)        Cognition  Overall Cognitive Status: Exceptions  Following Commands: Follows one step commands with repetition  Attention Span: Attends with cues to redirect  Safety Judgement: Decreased awareness of need for safety  Problem Solving: Decreased awareness of errors  Sequencing: Requires cues for some        Assessment   Performance deficits / Impairments: Decreased functional mobility ; Decreased ADL status; Decreased high-level IADLs;Decreased endurance;Decreased balance;Decreased strength;Decreased safe awareness  Assessment: Pt requiring Min A to initiate stand with SW, pt with posterior leab standing at SW requiring Min A to remain within YANA.  continue POC  Prognosis: Good  Patient Education: bed mobility, functional transfers, ADLs, role of OT, DC recommendations   REQUIRES OT FOLLOW UP: Yes  Activity Tolerance  Activity Tolerance: Patient Tolerated treatment well  Safety Devices  Safety Devices in place: Yes  Type of devices: Nurse notified;Gait belt;Call light within reach; Chair alarm in place; Left in chair; All fall risk precautions in place          Plan   Plan  Times per week: 4-6x/week   Current Treatment Recommendations: Self-Care / ADL, Functional Mobility Training, Patient/Caregiver Education & Training, Equipment Evaluation, Education, & procurement, Safety Education & Training, Endurance Training, Balance Training  G-Code  OT G-codes  Functional Assessment Tool Used: [facility]  Score: 16  Functional Limitation: Self care  Self Care Current Status ():  At least 40 percent but less than 60 percent impaired, limited or restricted    AM-PAC Score  AM-[facility] Inpatient Daily Activity Raw Score: 16  AM-PAC Inpatient ADL T-Scale Score : 35.96  ADL Inpatient CMS 0-100% Score: 53.32  ADL Inpatient CMS G-Code Modifier : CK    Goals  Short term goals  Time Frame for Short term goals: 1 week  Short term goal 1: Perform LE dressing with min A by 8/7  Short term goal 2: Perform toilet/chair transfers with SBA with SW  Short term goal 3: Perform 2 grooming tasks while standing at sink with SW and CGA  Short term goal 4: Verbalize safe car transfer technique after instruction   Patient Goals   Patient goals : \"Walk better\"       Therapy Time   Individual Concurrent Group Co-treatment   Time In 0910         Time Out 0950         Minutes 40         Timed Code Treatment Minutes: 65 [facility]       Leo Yogi, OTR/L

## 2018-08-04 NOTE — PROGRESS NOTES
supervision. Short term goal 3: Pt will safely ambulate 200' with standard walker with supervision - goal downgraded on 8/04/18 due to slower-than-expected progress. New goal: Pt will ambulate x 125 feet using std. walker with SBA. Short term goal 4: If pt D/C's directly home from hospital: Pt will safely ascend and descend 3 steps with B handrails as needed to enter and exit her daughter's home  Short term goal 5: By 8/05/18: Pt will tolerate 12-15 reps RLE ther ex for ROM/strengthening  Patient Goals   Patient goals :  \"To be able to go to rehab and get stronger\"    Plan    Times per week: BID 7x/week  Times per day: Twice a day  Specific instructions for Next Treatment: Progress ther ex and mobility as tolerated  Current Treatment Recommendations: Strengthening, ROM, Balance Training, Functional Mobility Training, Transfer Training, Endurance Training, Gait Training, Stair training, Pain Management, Home Exercise Program, Safety Education & Training, Patient/Caregiver Education & Training, Equipment Evaluation, Education, & procurement, Positioning  Safety Devices: Nurse notified, Call light within reach, Gait belt, Patient at risk for falls, Left in chair, All fall risk precautions in place, Chair alarm in place     Therapy Time   Individual Concurrent Group Co-treatment   Time In 1332         Time Out 1412         Minutes 40         Timed Code Treatment Minutes: Christal Tobin, [address]

## 2018-08-05 LAB
ANION GAP SERPL CALCULATED.3IONS-SCNC: 8 MMOL/L (ref 3–16)
BASOPHILS ABSOLUTE: 0.1 K/UL (ref 0–0.2)
BASOPHILS RELATIVE PERCENT: 0.9 %
BUN BLDV-MCNC: 23 MG/DL (ref 7–20)
CALCIUM SERPL-MCNC: 8.9 MG/DL (ref 8.3–10.6)
CHLORIDE BLD-SCNC: 97 MMOL/L (ref 99–110)
CO2: 28 MMOL/L (ref 21–32)
CREAT SERPL-MCNC: 0.8 MG/DL (ref 0.6–1.2)
EOSINOPHILS ABSOLUTE: 0.1 K/UL (ref 0–0.6)
EOSINOPHILS RELATIVE PERCENT: 1.9 %
GFR AFRICAN AMERICAN: >60
GFR NON-AFRICAN AMERICAN: >60
GLUCOSE BLD-MCNC: 120 MG/DL (ref 70–99)
GLUCOSE BLD-MCNC: 135 MG/DL (ref 70–99)
GLUCOSE BLD-MCNC: 135 MG/DL (ref 70–99)
GLUCOSE BLD-MCNC: 92 MG/DL (ref 70–99)
GLUCOSE BLD-MCNC: 96 MG/DL (ref 70–99)
HCT VFR BLD CALC: 28.5 % (ref 36–48)
HEMOGLOBIN: 9.9 G/DL (ref 12–16)
INR BLD: 1.44 (ref 0.86–1.14)
LYMPHOCYTES ABSOLUTE: 1.7 K/UL (ref 1–5.1)
LYMPHOCYTES RELATIVE PERCENT: 29 %
MAGNESIUM: 1.6 MG/DL (ref 1.8–2.4)
MCH RBC QN AUTO: 33.3 PG (ref 26–34)
MCHC RBC AUTO-ENTMCNC: 34.8 G/DL (ref 31–36)
MCV RBC AUTO: 95.7 FL (ref 80–100)
MONOCYTES ABSOLUTE: 0.7 K/UL (ref 0–1.3)
MONOCYTES RELATIVE PERCENT: 12 %
NEUTROPHILS ABSOLUTE: 3.3 K/UL (ref 1.7–7.7)
NEUTROPHILS RELATIVE PERCENT: 56.2 %
PDW BLD-RTO: 14.4 % (ref 12.4–15.4)
PERFORMED ON: ABNORMAL
PERFORMED ON: NORMAL
PLATELET # BLD: 178 K/UL (ref 135–450)
PMV BLD AUTO: 7.8 FL (ref 5–10.5)
POTASSIUM SERPL-SCNC: 4.1 MMOL/L (ref 3.5–5.1)
PROTHROMBIN TIME: 16.4 SEC (ref 9.8–13)
RBC # BLD: 2.97 M/UL (ref 4–5.2)
SODIUM BLD-SCNC: 133 MMOL/L (ref 136–145)
WBC # BLD: 5.8 K/UL (ref 4–11)

## 2018-08-05 PROCEDURE — 6360000002 HC RX W HCPCS: Performed by: NURSE PRACTITIONER

## 2018-08-05 PROCEDURE — 36415 COLL VENOUS BLD VENIPUNCTURE: CPT

## 2018-08-05 PROCEDURE — 2580000003 HC RX 258: Performed by: PHYSICIAN ASSISTANT

## 2018-08-05 PROCEDURE — 85610 PROTHROMBIN TIME: CPT

## 2018-08-05 PROCEDURE — 6370000000 HC RX 637 (ALT 250 FOR IP): Performed by: PHYSICIAN ASSISTANT

## 2018-08-05 PROCEDURE — 85025 COMPLETE CBC W/AUTO DIFF WBC: CPT

## 2018-08-05 PROCEDURE — 1200000000 HC SEMI PRIVATE

## 2018-08-05 PROCEDURE — 83735 ASSAY OF MAGNESIUM: CPT

## 2018-08-05 PROCEDURE — 6360000002 HC RX W HCPCS: Performed by: PHYSICIAN ASSISTANT

## 2018-08-05 PROCEDURE — 97110 THERAPEUTIC EXERCISES: CPT

## 2018-08-05 PROCEDURE — 6370000000 HC RX 637 (ALT 250 FOR IP): Performed by: NURSE PRACTITIONER

## 2018-08-05 PROCEDURE — 97116 GAIT TRAINING THERAPY: CPT

## 2018-08-05 PROCEDURE — 80048 BASIC METABOLIC PNL TOTAL CA: CPT

## 2018-08-05 RX ORDER — WARFARIN SODIUM 7.5 MG/1
7.5 TABLET ORAL
Status: COMPLETED | OUTPATIENT
Start: 2018-08-05 | End: 2018-08-05

## 2018-08-05 RX ADMIN — ENOXAPARIN SODIUM 30 MG: 30 INJECTION SUBCUTANEOUS at 09:39

## 2018-08-05 RX ADMIN — CARVEDILOL 25 MG: 25 TABLET, FILM COATED ORAL at 18:28

## 2018-08-05 RX ADMIN — PROMETHAZINE HYDROCHLORIDE 6.25 MG: 25 INJECTION INTRAMUSCULAR; INTRAVENOUS at 09:39

## 2018-08-05 RX ADMIN — DOCUSATE SODIUM 100 MG: 100 CAPSULE, LIQUID FILLED ORAL at 20:43

## 2018-08-05 RX ADMIN — Medication 10 ML: at 09:39

## 2018-08-05 RX ADMIN — OXYCODONE HYDROCHLORIDE AND ACETAMINOPHEN 2 TABLET: 5; 325 TABLET ORAL at 14:25

## 2018-08-05 RX ADMIN — OXYCODONE HYDROCHLORIDE AND ACETAMINOPHEN 2 TABLET: 5; 325 TABLET ORAL at 09:38

## 2018-08-05 RX ADMIN — DOCUSATE SODIUM 100 MG: 100 CAPSULE, LIQUID FILLED ORAL at 09:38

## 2018-08-05 RX ADMIN — Medication 10 ML: at 20:43

## 2018-08-05 RX ADMIN — CARVEDILOL 25 MG: 25 TABLET, FILM COATED ORAL at 09:38

## 2018-08-05 RX ADMIN — ATORVASTATIN CALCIUM 20 MG: 10 TABLET, FILM COATED ORAL at 20:43

## 2018-08-05 RX ADMIN — ENOXAPARIN SODIUM 30 MG: 30 INJECTION SUBCUTANEOUS at 20:43

## 2018-08-05 RX ADMIN — ALLOPURINOL 100 MG: 100 TABLET ORAL at 09:38

## 2018-08-05 RX ADMIN — LOSARTAN POTASSIUM 100 MG: 100 TABLET, FILM COATED ORAL at 09:38

## 2018-08-05 RX ADMIN — WARFARIN SODIUM 7.5 MG: 7.5 TABLET ORAL at 18:28

## 2018-08-05 ASSESSMENT — PAIN SCALES - GENERAL
PAINLEVEL_OUTOF10: 7

## 2018-08-05 ASSESSMENT — PAIN DESCRIPTION - PAIN TYPE
TYPE: SURGICAL PAIN

## 2018-08-05 ASSESSMENT — PAIN DESCRIPTION - ORIENTATION
ORIENTATION: RIGHT

## 2018-08-05 ASSESSMENT — PAIN DESCRIPTION - LOCATION
LOCATION: KNEE
LOCATION: KNEE

## 2018-08-05 NOTE — PROGRESS NOTES
Tolerance  Activity Tolerance: Patient Tolerated treatment well     G-Code     OutComes Score                                                    AM-PAC Score             Goals  Short term goals  Time Frame for Short term goals: 3-5 days (unless otherwise specified)  Short term goal 1: Pt will be independent with bed mobility with HOB flat and without use of bed rails  Short term goal 2: Pt will be independent with sit<>stand transfers with RW - goal downgraded on 8/04/18 due to slower-than-expected progress. New goal: Pt will transfer sit <-> stand and bed>chair using std. walker with supervision. Short term goal 3: Pt will safely ambulate 200' with standard walker with supervision - goal downgraded on 8/04/18 due to slower-than-expected progress. New goal: Pt will ambulate x 125 feet using std. walker with SBA. Short term goal 4: If pt D/C's directly home from hospital: Pt will safely ascend and descend 3 steps with B handrails as needed to enter and exit her daughter's home  Short term goal 5: By 8/05/18: Pt will tolerate 12-15 reps RLE ther ex for ROM/strengthening  Patient Goals   Patient goals :  \"To be able to go to rehab and get stronger\"    Plan    Plan  Times per week: BID 7x/week  Times per day: Twice a day  Specific instructions for Next Treatment: Progress ther ex and mobility as tolerated  Current Treatment Recommendations: Strengthening, ROM, Balance Training, Functional Mobility Training, Transfer Training, Endurance Training, Gait Training, Stair training, Pain Management, Home Exercise Program, Safety Education & Training, Patient/Caregiver Education & Training, Equipment Evaluation, Education, & procurement, Positioning  Safety Devices  Type of devices: Nurse notified, Call light within reach, Gait belt, Patient at risk for falls, Left in chair, All fall risk precautions in place, Chair alarm in place     Therapy Time   Individual Concurrent Group Co-treatment   Time In 0910         Time Out

## 2018-08-05 NOTE — CARE COORDINATION
Call received from dannie at [facility], stating that the precert can not be started until Monday, but doesn't see why they would not be able to except patient.

## 2018-08-05 NOTE — PROGRESS NOTES
Pain: Yes  Pain Assessment  Pain Assessment: 0-10  Pain Level: 7  Pain Type: Surgical pain  Pain Location: Knee  Pain Orientation: Right  Pain Intervention(s): Repositioned; Ambulation/Increased activity;Cold applied  Vital Signs  Patient Currently in Pain: Yes       Orientation     Objective   Bed mobility  Supine to Sit: Unable to assess  Sit to Supine: Unable to assess  Scooting: Modified independent  Transfers  Sit to Stand: Minimal Assistance  Stand to sit: Contact guard assistance  Ambulation  Ambulation?: Yes  WB Status: WBAT RLE with KI  Ambulation 1  Surface: level tile  Device: Standard Walker  Other Apparatus: Knee Immobilizer;Right  Assistance: Stand by assistance  Quality of Gait: Pt amb with very slow beverly with decreased step length and increased B stance time. antalgic RLE  Distance: 30 ft   Comments: limited by fatigue  Stairs/Curb  Stairs?: No     Balance  Sitting - Static: Good  Sitting - Dynamic: Fair;+  Comments: static stance X3 mins while PCA assists with drying/patient care after shower   Exercises  Straight Leg Raise: X10 RLE with max A  Quad Sets: X10 RLE   Heelslides: 2X15 RLE towel slides while seated in chair  Gluteal Sets: X10 B  Hip Flexion: X15 RLE  Hip Abduction: X10 RLE  Knee Long Arc Quad: X15 RLE with min A  Knee Short Arc Quad: X10 RLE with min A  Knee Active Range of Motion: R knee AAROM = 4-86 degrees  Ankle Pumps: X15 BLE  Comments: manual hamstring stretch 30 sec X2 RLE, calf stetch RLE 30 sec X2 (pt educated on self stretch with sheet)         Other Activities: Dangling protocol  Comment: ice applied to knee after               Assessment   Body structures, Functions, Activity limitations: Decreased functional mobility ; Decreased ROM; Decreased strength;Decreased balance  Assessment: Pt limited by fatigue and pain 7/10 Right knee, increased swelling noted. Pt continuing to apply ice after therapy, Pt ambulates 30 ft with RW at SBA, X10 reps of TKA protocol.  Right knee AAROM Patient at risk for falls, Left in chair, All fall risk precautions in place, Chair alarm in place     Therapy Time   Individual Concurrent Group Co-treatment   Time In 1412         Time Out 1444         Minutes 32         Timed Code Treatment Minutes: [facility], [facility]

## 2018-08-05 NOTE — PROGRESS NOTES
Supple, with full range of motion. No jugular venous distention. Trachea midline. Respiratory:  Normal respiratory effort. Clear to auscultation, bilaterally without Rales/Wheezes/Rhonchi. Cardiovascular: Irregular rate and rhythm with normal S1/S2 without murmurs, rubs or gallops. Abdomen: Soft, non-tender, non-distended with normal bowel sounds. Musculoskeletal: No clubbing, cyanosis or edema bilaterally. Right knee dressing C/D/I. Skin: Skin color, texture, turgor normal.  No rashes or lesions. Neurologic:  Neurovascularly intact without any focal sensory/motor deficits. Cranial nerves: II-XII intact, grossly non-focal.  Psychiatric: Alert and oriented, thought content appropriate, normal insight  Capillary Refill: Brisk,< 3 seconds   Peripheral Pulses: +2 palpable, equal bilaterally       Labs:   Recent Labs      08/03/18 0608  08/04/18   0633  08/05/18   0603   WBC  6.8  6.0  5.8   HGB  10.8*  9.7*  9.9*   HCT  30.8*  28.1*  28.5*   PLT  165  161  178     Recent Labs      08/03/18   0608  08/04/18   0633  08/05/18   0603   NA  135*  133*  133*   K  3.4*  3.8  4.1   CL  98*  96*  97*   CO2  27  30  28   BUN  22*  18  23*   CREATININE  0.9  0.9  0.8   CALCIUM  8.0*  8.2*  8.9     Recent Labs      08/03/18   0608  08/04/18   0633  08/05/18   0603   INR  1.46*  1.62*  1.44*     Urinalysis:      Lab Results   Component Value Date    NITRU Negative 06/05/2018    WBCUA 3-5 06/05/2018    BACTERIA Rare 06/05/2018    RBCUA 0-2 06/05/2018    BLOODU Negative 06/05/2018    SPECGRAV 1.010 06/05/2018    GLUCOSEU Negative 06/05/2018    GLUCOSEU NEGATIVE 04/20/2011       Assessment/Plan:    Active Hospital Problems    Diagnosis Date Noted    Localized osteoarthritis of right knee [M17.11] 08/01/2018    Primary localized osteoarthritis of right knee [M17.11] 08/01/2018       Right knee osteoarthritis s/p right total knee makoplasty on 8/1/18:  - Postoperative management and pain control per Orthopedic Surgery.    - Continue PT/OT, DVT prophylaxis, bowel regimen and IS.      Mild acute hyponatremia (resolved):  - Resolved with IV fluids. Monitor labs     Hypokalemia/hypomagnesemia:  - Monitor and replete as needed.      Hypertension:  - BP is controlled. Continue coreg and losartan. Continue to hold HCTZ.      Coronary artery disease:  - Stable. Continue statin. Resume aspirin when okay with Ortho.      Atrial fibrillation:   - Monitor on telemetry. Pt is on coumadin. INR subtherapeutic. Monitor daily INR. Continue coreg.      Hyperglycemia:   - Likely stress response from surgery. A1C 5.6%  on 6/5/18. Continue low dose SSI ACHS. Carb control diet.      Hyperlipidemia:  - Continue statin.      Gout:  - Continue allopurinol. DVT Prophylaxis: Lovenox   Diet: DIET CARB CONTROL;  Code Status: Full Code    PT/OT Eval Status: Active and ongoing.      Dispo - Per primary team.     KWAME Santiago - CNP

## 2018-08-06 PROBLEM — Z96.651 STATUS POST TOTAL RIGHT KNEE REPLACEMENT: Status: ACTIVE | Noted: 2018-08-06

## 2018-08-06 LAB
BASOPHILS ABSOLUTE: 0 K/UL (ref 0–0.2)
BASOPHILS RELATIVE PERCENT: 0.5 %
EOSINOPHILS ABSOLUTE: 0.1 K/UL (ref 0–0.6)
EOSINOPHILS RELATIVE PERCENT: 2.9 %
GLUCOSE BLD-MCNC: 139 MG/DL (ref 70–99)
GLUCOSE BLD-MCNC: 92 MG/DL (ref 70–99)
HCT VFR BLD CALC: 27.6 % (ref 36–48)
HEMOGLOBIN: 9.6 G/DL (ref 12–16)
INR BLD: 1.46 (ref 0.86–1.14)
LYMPHOCYTES ABSOLUTE: 1.6 K/UL (ref 1–5.1)
LYMPHOCYTES RELATIVE PERCENT: 31.8 %
MCH RBC QN AUTO: 33.5 PG (ref 26–34)
MCHC RBC AUTO-ENTMCNC: 34.8 G/DL (ref 31–36)
MCV RBC AUTO: 96.3 FL (ref 80–100)
MONOCYTES ABSOLUTE: 0.7 K/UL (ref 0–1.3)
MONOCYTES RELATIVE PERCENT: 13 %
NEUTROPHILS ABSOLUTE: 2.6 K/UL (ref 1.7–7.7)
NEUTROPHILS RELATIVE PERCENT: 51.8 %
PDW BLD-RTO: 14.2 % (ref 12.4–15.4)
PERFORMED ON: ABNORMAL
PERFORMED ON: NORMAL
PLATELET # BLD: 172 K/UL (ref 135–450)
PMV BLD AUTO: 7.5 FL (ref 5–10.5)
PROTHROMBIN TIME: 16.7 SEC (ref 9.8–13)
RBC # BLD: 2.87 M/UL (ref 4–5.2)
WBC # BLD: 5.1 K/UL (ref 4–11)

## 2018-08-06 PROCEDURE — 6360000002 HC RX W HCPCS: Performed by: PHYSICIAN ASSISTANT

## 2018-08-06 PROCEDURE — 97110 THERAPEUTIC EXERCISES: CPT

## 2018-08-06 PROCEDURE — 2580000003 HC RX 258: Performed by: PHYSICIAN ASSISTANT

## 2018-08-06 PROCEDURE — 6360000002 HC RX W HCPCS: Performed by: NURSE PRACTITIONER

## 2018-08-06 PROCEDURE — 36415 COLL VENOUS BLD VENIPUNCTURE: CPT

## 2018-08-06 PROCEDURE — 97530 THERAPEUTIC ACTIVITIES: CPT

## 2018-08-06 PROCEDURE — 6370000000 HC RX 637 (ALT 250 FOR IP): Performed by: PHYSICIAN ASSISTANT

## 2018-08-06 PROCEDURE — 97116 GAIT TRAINING THERAPY: CPT

## 2018-08-06 PROCEDURE — 97535 SELF CARE MNGMENT TRAINING: CPT

## 2018-08-06 PROCEDURE — 85025 COMPLETE CBC W/AUTO DIFF WBC: CPT

## 2018-08-06 PROCEDURE — 85610 PROTHROMBIN TIME: CPT

## 2018-08-06 PROCEDURE — 1200000000 HC SEMI PRIVATE

## 2018-08-06 RX ORDER — WARFARIN SODIUM 7.5 MG/1
7.5 TABLET ORAL
Status: COMPLETED | OUTPATIENT
Start: 2018-08-06 | End: 2018-08-06

## 2018-08-06 RX ADMIN — CARVEDILOL 25 MG: 25 TABLET, FILM COATED ORAL at 08:27

## 2018-08-06 RX ADMIN — PROMETHAZINE HYDROCHLORIDE 6.25 MG: 25 INJECTION INTRAMUSCULAR; INTRAVENOUS at 08:27

## 2018-08-06 RX ADMIN — ENOXAPARIN SODIUM 100 MG: 100 INJECTION SUBCUTANEOUS at 20:29

## 2018-08-06 RX ADMIN — DOCUSATE SODIUM 100 MG: 100 CAPSULE, LIQUID FILLED ORAL at 20:29

## 2018-08-06 RX ADMIN — OXYCODONE HYDROCHLORIDE AND ACETAMINOPHEN 2 TABLET: 5; 325 TABLET ORAL at 08:26

## 2018-08-06 RX ADMIN — Medication 10 ML: at 08:27

## 2018-08-06 RX ADMIN — WARFARIN SODIUM 7.5 MG: 7.5 TABLET ORAL at 18:12

## 2018-08-06 RX ADMIN — OXYCODONE HYDROCHLORIDE AND ACETAMINOPHEN 2 TABLET: 5; 325 TABLET ORAL at 01:56

## 2018-08-06 RX ADMIN — OXYCODONE HYDROCHLORIDE AND ACETAMINOPHEN 2 TABLET: 5; 325 TABLET ORAL at 22:21

## 2018-08-06 RX ADMIN — PROMETHAZINE HYDROCHLORIDE 6.25 MG: 25 INJECTION INTRAMUSCULAR; INTRAVENOUS at 02:01

## 2018-08-06 RX ADMIN — DOCUSATE SODIUM 100 MG: 100 CAPSULE, LIQUID FILLED ORAL at 08:26

## 2018-08-06 RX ADMIN — ENOXAPARIN SODIUM 30 MG: 30 INJECTION SUBCUTANEOUS at 08:27

## 2018-08-06 RX ADMIN — PROMETHAZINE HYDROCHLORIDE 6.25 MG: 25 INJECTION INTRAMUSCULAR; INTRAVENOUS at 22:21

## 2018-08-06 RX ADMIN — CARVEDILOL 25 MG: 25 TABLET, FILM COATED ORAL at 18:12

## 2018-08-06 RX ADMIN — ATORVASTATIN CALCIUM 20 MG: 10 TABLET, FILM COATED ORAL at 20:29

## 2018-08-06 RX ADMIN — ALLOPURINOL 100 MG: 100 TABLET ORAL at 08:27

## 2018-08-06 RX ADMIN — LOSARTAN POTASSIUM 100 MG: 100 TABLET, FILM COATED ORAL at 08:27

## 2018-08-06 ASSESSMENT — PAIN SCALES - GENERAL
PAINLEVEL_OUTOF10: 8
PAINLEVEL_OUTOF10: 7
PAINLEVEL_OUTOF10: 6
PAINLEVEL_OUTOF10: 8
PAINLEVEL_OUTOF10: 0

## 2018-08-06 ASSESSMENT — PAIN DESCRIPTION - ORIENTATION
ORIENTATION: RIGHT

## 2018-08-06 ASSESSMENT — PAIN SCALES - WONG BAKER
WONGBAKER_NUMERICALRESPONSE: 4
WONGBAKER_NUMERICALRESPONSE: 4

## 2018-08-06 ASSESSMENT — PAIN DESCRIPTION - PROGRESSION
CLINICAL_PROGRESSION: GRADUALLY IMPROVING
CLINICAL_PROGRESSION: GRADUALLY IMPROVING

## 2018-08-06 ASSESSMENT — PAIN DESCRIPTION - ONSET
ONSET: ON-GOING
ONSET: ON-GOING

## 2018-08-06 ASSESSMENT — PAIN DESCRIPTION - PAIN TYPE
TYPE: SURGICAL PAIN

## 2018-08-06 ASSESSMENT — PAIN DESCRIPTION - FREQUENCY: FREQUENCY: INTERMITTENT

## 2018-08-06 ASSESSMENT — PAIN DESCRIPTION - LOCATION
LOCATION: KNEE

## 2018-08-06 ASSESSMENT — PAIN DESCRIPTION - DESCRIPTORS
DESCRIPTORS: DISCOMFORT
DESCRIPTORS: DISCOMFORT

## 2018-08-06 NOTE — PROGRESS NOTES
Assessment  Patient Currently in Pain: Yes  Pain Assessment: 0-10  Pain Level: 6  Pain Type: Surgical pain  Pain Location: Knee  Pain Orientation: Right  Pain Intervention(s): Repositioned; Emotional support; Ambulation/Increased activity    Orientation  Orientation  Overall Orientation Status: Within Functional Limits     Objective    ADL  Grooming: Stand by assistance (sink level to wash face, hands, and comb hair at sink)  Toileting: Contact guard assistance; Increased time to complete  Additional Comments: Pt max A to manage KI     Balance  Sitting Balance: Supervision  Standing Balance: Contact guard assistance (with SW)  Standing Balance  Sit to stand: Contact guard assistance  Stand to sit: Contact guard assistance  Comment: cues for safety    Functional Mobility  Functional - Mobility Device: Standard Walker  Activity: To/from bathroom  Assist Level: Contact guard assistance  Functional Mobility Comments: min cues for sequencing    Toilet Transfers  Toilet - Technique: Ambulating (with SW)  Equipment Used: Standard toilet (with use of grab bars)  Toilet Transfer: Contact guard assistance    Bed mobility  Supine to Sit: Stand by assistance (to L with HOB elevated)     Transfers  Sit to stand: Contact guard assistance  Stand to sit: Contact guard assistance     Cognition  Overall Cognitive Status: Exceptions  Safety Judgement: Decreased awareness of need for safety  Problem Solving: Decreased awareness of errors  Sequencing: Requires cues for some      Assessment   Performance deficits / Impairments: Decreased functional mobility ; Decreased ADL status; Decreased high-level IADLs;Decreased endurance;Decreased balance;Decreased strength;Decreased safe awareness  Prognosis: Good  Patient Education: bed mobility, functional transfers, ADLs, role of OT  REQUIRES OT FOLLOW UP: Yes  Activity Tolerance  Activity Tolerance: Patient Tolerated treatment well  Safety Devices  Safety Devices in place: Yes  Type of devices:

## 2018-08-06 NOTE — PROGRESS NOTES
Department of Orthopedic Surgery  Physician Assistant   Progress Note    Subjective:       Systemic or Specific Complaints:No Complaints today, able to sit up for 1.5 hours this am.  Still fatigues easily. Objective:     Patient Vitals for the past 24 hrs:   BP Temp Temp src Pulse Resp SpO2   08/06/18 0801 124/82 98.2 °F (36.8 °C) Oral 75 16 96 %   08/06/18 0409 113/74 98.1 °F (36.7 °C) Oral 74 16 94 %   08/05/18 2320 116/73 98 °F (36.7 °C) Oral 67 16 97 %   08/05/18 1938 (!) 93/58 98.2 °F (36.8 °C) Oral 73 14 95 %       General: alert, appears stated age and cooperative   Wound: Wound clean and dry no evidence of infection. Motion: Painful range of Motion in affected extremity   DVT Exam: No evidence of DVT seen on physical exam.     Additional exam: n/v intact    Data Review  CBC:   Lab Results   Component Value Date    WBC 5.1 08/06/2018    RBC 2.87 08/06/2018    HGB 9.6 08/06/2018    HCT 27.6 08/06/2018     08/06/2018           Assessment:      right knee makoplasty, POD #5    Plan:      1:  Continue current plan of care, IM following. accuchecks discontinued, normal BS. BMP ordered for tomorrow with hx of CKD. 2:  Continue Deep venous thrombosis prophylaxis- lovenox and coumadin bridge- therapeutic. Pharmacy dosing, INR 1.46 today  3:  Continue Pain Control PRN  4:  PT and OT, WBAT.    5:  D/c planning for SNF placement, EGS contacted and precert pending.   DCP updated    Shukri Kelley PA-C

## 2018-08-06 NOTE — PROGRESS NOTES
Pain: Yes  Pain Assessment: Faces  Keith-Baker Pain Rating: Hurts little more  Pain Type: Surgical pain  Pain Location: Knee  Pain Orientation: Right  Pain Descriptors: Discomfort  Pain Onset: On-going  Clinical Progression: Gradually improving  Pain Intervention(s): Repositioned; Ambulation/Increased activity; Emotional support;Cold applied  Response to Pain Intervention: Patient Satisfied       Orientation  Overall Orientation Status: Within Normal Limits  Objective   Bed mobility  Supine to Sit:  (up in chair on approach)  Sit to Supine: Minimal assistance (for RLE)  Transfers  Sit to Stand: Contact guard assistance  Ambulation  WB Status: WBAT RLE with KI  Surface: level tile  Device: Standard Walker  Other Apparatus: Knee Immobilizer;Right  Assistance: Stand by assistance  Quality of Gait: cues for sequence and to place walker out to toes and step to middle for longer stride and to conserve energy  Distance: 60 ft  Comments: limited by fatigue        Exercises  Straight Leg Raise: 12 x RLE max assist  Quad Sets: 15 x B with verbal and tactile cues  Heelslides: 20 x assisted RLE supine  Gluteal Sets: 15 x B  Hip Flexion: 10 x RLE seated  Knee Long Arc Quad: 2 x 10 B  Knee Active Range of Motion: 2 to 90  Ankle Pumps: 20 x B seated HR/TR             Assessment   Body structures, Functions, Activity limitations: Decreased functional mobility ; Decreased ROM; Decreased strength;Decreased balance  Assessment: Pt s/p R TKR, WBAT, making gradual progress. Pain today 6/10, AAROM 2-90, amb 60 ft with SW and CG, participating in 15-20 reps LE Ex. Needs rest breaks due to fatigue.  Pt states plans to discharge to rehab  Treatment Diagnosis: Generalized weakness R53.1, difficulty walking R26.2  Specific instructions for Next Treatment: Progress ther ex and mobility as tolerated  Prognosis: Good  Patient Education: ther ex, KI, gait training  Barriers to Learning: pt voices understanding  REQUIRES PT FOLLOW UP: Yes  Activity Tolerance  Activity Tolerance: Patient Tolerated treatment well  Activity Tolerance:       G-Code  PT G-Codes  Functional Assessment Tool Used: AM PAC with stairs  Score: 18               AM-PAC Score  AM-PAC Inpatient Mobility Raw Score : 18  AM-PAC Inpatient T-Scale Score : 43.63  Mobility Inpatient CMS 0-100% Score: 46.58  Mobility Inpatient CMS G-Code Modifier : CK     Goals  Short term goals  Time Frame for Short term goals: 3-5 days (unless otherwise specified)  Short term goal 1: Pt will be independent with bed mobility with HOB flat and without use of bed rails  Short term goal 2: Pt will be independent with sit<>stand transfers with RW - goal downgraded on 8/04/18 due to slower-than-expected progress. New goal: Pt will transfer sit <-> stand and bed>chair using std. walker with supervision. Short term goal 3: Pt will safely ambulate 200' with standard walker with supervision - goal downgraded on 8/04/18 due to slower-than-expected progress. New goal: Pt will ambulate x 125 feet using std. walker with SBA. Short term goal 4: If pt D/C's directly home from hospital: Pt will safely ascend and descend 3 steps with B handrails as needed to enter and exit her daughter's home  Short term goal 5: By 8/05/18: Pt will tolerate 12-15 reps RLE ther ex for ROM/strengthening- met 8/6 ONGOING  Patient Goals   Patient goals : \"To be able to go to rehab and get stronger\"    Plan    Times per week: BID 7x/week  Times per day: Daily  Specific instructions for Next Treatment: Progress ther ex and mobility as tolerated  Current Treatment Recommendations: Strengthening, ROM, Balance Training, Functional Mobility Training, Transfer Training, Endurance Training, Gait Training, Stair training, Pain Management, Home Exercise Program, Safety Education & Training, Patient/Caregiver Education & Training, Equipment Evaluation, Education, & procurement, Positioning  Safety Devices  Type of devices:  All fall risk precautions in place,

## 2018-08-06 NOTE — PROGRESS NOTES
Hospitalist Progress Note      PCP: Alicia Beverly MD    Date of Admission: 8/1/2018    Chief Complaint: Right knee pain    Hospital Course: Right knee osteoarthritis s/p right total knee makoplasty on 8/1/18. Subjective:  No acute events overnight. Ambulation is improving slowly. No chest pain, SOB, nausea or vomiting. Tolerating PO well. Medications:  Reviewed    Infusion Medications    dextrose       Scheduled Medications    warfarin  7.5 mg Oral Once    enoxaparin  1 mg/kg Subcutaneous BID    warfarin (COUMADIN) daily dosing (placeholder)   Other RX Placeholder    bupivacaine liposome  20 mL Subcutaneous Once    sodium chloride flush  10 mL Intravenous 2 times per day    docusate sodium  100 mg Oral BID    allopurinol  100 mg Oral Daily    atorvastatin  20 mg Oral Nightly    carvedilol  25 mg Oral BID WC    tranexamic acid (CYCLOKAPRON) IVPB  1,000 mg Irrigation Once    losartan  100 mg Oral Daily     PRN Meds: promethazine, cyclobenzaprine, sodium chloride flush, acetaminophen, oxyCODONE-acetaminophen **OR** oxyCODONE-acetaminophen, morphine **OR** morphine, magnesium hydroxide, ondansetron, glucose, dextrose, glucagon (rDNA), dextrose      Intake/Output Summary (Last 24 hours) at 08/06/18 1412  Last data filed at 08/05/18 1708   Gross per 24 hour   Intake              360 ml   Output                0 ml   Net              360 ml       Physical Exam Performed:    /82   Pulse 75   Temp 98.2 °F (36.8 °C) (Oral)   Resp 16   Ht 5' 4\" (1.626 m)   Wt 210 lb (95.3 kg)   SpO2 96%   BMI 36.05 kg/m²     General appearance: Elderly female in no apparent distress, appears stated age and cooperative. HEENT: Normal cephalic, atraumatic without obvious deformity. Pupils equal, round, and reactive to light. Extra ocular muscles intact. Conjunctivae/corneas clear. Neck: Supple, with full range of motion. No jugular venous distention. Trachea midline.   Respiratory:  Normal

## 2018-08-06 NOTE — PROGRESS NOTES
Physical Therapy  Facility/Department: [place] [address] - MED SURG/ORTHO  Daily Treatment Note  NAME: Sammi Burr  : 1940  MRN: [id number]    Date of Service: 2018    Discharge Recommendations:  Subacute/Skilled Nursing Facility   PT Equipment Recommendations  Equipment Needed: No    Patient Diagnosis(es): The primary encounter diagnosis was Status post right knee replacement. A diagnosis of Primary osteoarthritis of right knee was also pertinent to this visit. has a past medical history of Arthritis; Atrial fibrillation ([facility] [address] CAD (coronary artery disease); CHF (congestive heart failure) ([facility] [address] CKD (chronic kidney disease); Cutaneous skin tags; Depression; Gout; Grief reaction; Hyperlipidemia; Hypertension; Obesity; Osteoarthritis; Pulmonary nodules; and Unspecified cerebral artery occlusion with cerebral infarction. has a past surgical history that includes Carpal tunnel release; Knee arthroscopy; Cataract removal; other surgical history (Left, 2013); Cholecystectomy; other surgical history (Bilateral, 14); and pr total knee arthroplasty (Right, 2018). Restrictions  Restrictions/Precautions: Weight Bearing, Surgical Protocols, General Precautions, Fall Risk  Right Lower Extremity Weight Bearing: Weight Bearing As Tolerated  Right Lower Extremity Brace: Knee Brace  RLE Brace Type: When ambulating until able to perform SLR in PT  Other position/activity restrictions: Activity day of surgery  Subjective   Chart Reviewed: Yes  Response To Previous Treatment: Patient with no complaints from previous session.   Family / Caregiver Present: Yes  Referring Practitioner: Juan C Hernandez PA-C  Subjective: Pt agreeable to PT  Comments: RN cleared pt for therapy, pt up in chair on approach  Patient Currently in Pain: Yes  Pain Assessment: Faces  Keith-Baker Pain Rating: Hurts little more  Pain Type: Surgical pain  Pain Location: Knee  Pain Orientation: Right  Pain Descriptors: Discomfort  Pain Frequency: Intermittent  Pain Onset: On-going  Clinical Progression: Gradually improving  Pain Intervention(s): Ambulation/Increased activity; Emotional support;Repositioned  Response to Pain Intervention: Patient Satisfied         Orientation  Orientation  Overall Orientation Status: Within Normal Limits  Objective   Bed mobility  Supine to Sit: Minimal assistance  Sit to Supine: Minimal assistance (for RLE)  Transfers  Sit to Stand: Contact guard assistance  Stand to sit: Contact guard assistance  Ambulation  WB Status: WBAT RLE with KI  Surface: level tile  Device: Standard Walker  Other Apparatus: Knee Immobilizer;Right  Assistance: Stand by assistance  Quality of Gait: cues for sequence and to place walker out to toes and step to middle for longer stride and to conserve energy  Distance: 75 ft  Comments:  one standing rest during gait trial        Exercises  Straight Leg Raise: 12 x RLE mod assist  Quad Sets: 15 x B with verbal and tactile cues  Heelslides: 20 x assisted RLE supine  Gluteal Sets: 15 x B  Hip Flexion:    Hip Abduction:    Knee Long Arc Quad:    Knee Short Arc Quad: 15 x RLE assisted  Knee Active Range of Motion: 2 to 90  Ankle Pumps: 20 x B        Comment: remained up in chair to work on knee ROM at end of session      Assessment   Body structures, Functions, Activity limitations: Decreased functional mobility ; Decreased ROM; Decreased strength;Decreased balance  Assessment: Pt s/p R TKR, WBAT, making gradual progress. Pain today 6/10, AAROM 2-90, amb 75 ft with SW and CG, participating in 15-20 reps LE Ex. Needs rest breaks due to fatigue. Pt states plans to discharge to rehab.  If pt discharges prior to next PT session this note shall serve as discharge summary  Treatment Diagnosis: Generalized weakness R53.1, difficulty walking R26.2  Specific instructions for Next Treatment: Progress ther ex and mobility as tolerated  Prognosis: Good  Patient Education: ther ex, KI, gait training  Barriers to Devices  Type of devices:  All fall risk precautions in place, Gait belt, Patient at risk for falls, Call light within reach, Left in chair, Chair alarm in place, Nurse notified     Therapy Time   Individual Concurrent Group Co-treatment   Time In [facility]         Time Out 1330         Minutes [address] [facility], [id number]

## 2018-08-06 NOTE — CARE COORDINATION
Daughter, Carson Swift, approached discharge planner about status of precert with [facility]. CM explained precert process and possible time frame for insurance determination. CM further explained that SNF may be denied (especially since [facility] was denied), but peer to peer could be done IF cert denied. CM also discussed possibility of home with home care IF patient was denied SNF. Daughter concerned about amount of steps at patient's home, as well as \"weakness\" that pt had pre-op. Of note: post op day 1, therapy was recommending home with 24 hr asst. Recommendation changed to SNF based on patient preference, not necessarily functional need. Pending precert for EGS/call back from [facility].      Gerri Swann RN DCP

## 2018-08-06 NOTE — CARE COORDINATION
This CM spoke with Lanie Aceves at [facility]. Per Lanie Aceves, pt was DENIED SNF when cert attempted with [address] [place] [address]. Appeal was NOT started for [facility] d/t them not having a bed available. Instead, family asked that referral be made to [facility] and precert started on Monday. Lanie Aceves states she will start precert this morning. CM following.      Renetta Sherman RN DCP

## 2018-08-06 NOTE — PLAN OF CARE
Problem: Falls - Risk of:  Goal: Will remain free from falls  Will remain free from falls   Outcome: Ongoing  Bed in lowest position, wheels locked, 2/4 side rails up, nonskid footwear on. Bed/ chair check alarm in place, call light within reach. Pt instructed to call out when needing assistance. Pt stated understanding. Nurse will continue to monitor. Problem: Pain:  Goal: Pain level will decrease  Pain level will decrease   Outcome: Ongoing  Pt scoring pain on 0-10 scale. Pain medications given per MAR. Pt instructed to call out when pain level increasing. Call light within reach. Nurse will continue to reassess and monitor.       Problem: Mobility - Impaired:  Goal: Mobility will improve  Mobility will improve   Outcome: Ongoing

## 2018-08-07 VITALS
HEART RATE: 85 BPM | HEIGHT: 64 IN | SYSTOLIC BLOOD PRESSURE: 113 MMHG | RESPIRATION RATE: 14 BRPM | TEMPERATURE: 97.7 F | BODY MASS INDEX: 35.85 KG/M2 | OXYGEN SATURATION: 98 % | DIASTOLIC BLOOD PRESSURE: 74 MMHG | WEIGHT: 210 LBS

## 2018-08-07 LAB
ANION GAP SERPL CALCULATED.3IONS-SCNC: 8 MMOL/L (ref 3–16)
BASOPHILS ABSOLUTE: 0 K/UL (ref 0–0.2)
BASOPHILS RELATIVE PERCENT: 0.7 %
BUN BLDV-MCNC: 25 MG/DL (ref 7–20)
CALCIUM SERPL-MCNC: 8.5 MG/DL (ref 8.3–10.6)
CHLORIDE BLD-SCNC: 98 MMOL/L (ref 99–110)
CO2: 29 MMOL/L (ref 21–32)
CREAT SERPL-MCNC: 0.8 MG/DL (ref 0.6–1.2)
EOSINOPHILS ABSOLUTE: 0.1 K/UL (ref 0–0.6)
EOSINOPHILS RELATIVE PERCENT: 2.6 %
GFR AFRICAN AMERICAN: >60
GFR NON-AFRICAN AMERICAN: >60
GLUCOSE BLD-MCNC: 86 MG/DL (ref 70–99)
HCT VFR BLD CALC: 26.9 % (ref 36–48)
HEMOGLOBIN: 9.5 G/DL (ref 12–16)
INR BLD: 1.65 (ref 0.86–1.14)
LYMPHOCYTES ABSOLUTE: 1.8 K/UL (ref 1–5.1)
LYMPHOCYTES RELATIVE PERCENT: 36.8 %
MCH RBC QN AUTO: 33.5 PG (ref 26–34)
MCHC RBC AUTO-ENTMCNC: 35.2 G/DL (ref 31–36)
MCV RBC AUTO: 95.1 FL (ref 80–100)
MONOCYTES ABSOLUTE: 0.6 K/UL (ref 0–1.3)
MONOCYTES RELATIVE PERCENT: 12.9 %
NEUTROPHILS ABSOLUTE: 2.2 K/UL (ref 1.7–7.7)
NEUTROPHILS RELATIVE PERCENT: 47 %
PDW BLD-RTO: 14.1 % (ref 12.4–15.4)
PLATELET # BLD: 184 K/UL (ref 135–450)
PMV BLD AUTO: 7.5 FL (ref 5–10.5)
POTASSIUM SERPL-SCNC: 4.6 MMOL/L (ref 3.5–5.1)
PROTHROMBIN TIME: 18.8 SEC (ref 9.8–13)
RBC # BLD: 2.83 M/UL (ref 4–5.2)
SODIUM BLD-SCNC: 135 MMOL/L (ref 136–145)
WBC # BLD: 4.8 K/UL (ref 4–11)

## 2018-08-07 PROCEDURE — 36415 COLL VENOUS BLD VENIPUNCTURE: CPT

## 2018-08-07 PROCEDURE — 97110 THERAPEUTIC EXERCISES: CPT

## 2018-08-07 PROCEDURE — 6360000002 HC RX W HCPCS: Performed by: PHYSICIAN ASSISTANT

## 2018-08-07 PROCEDURE — 97530 THERAPEUTIC ACTIVITIES: CPT

## 2018-08-07 PROCEDURE — 85025 COMPLETE CBC W/AUTO DIFF WBC: CPT

## 2018-08-07 PROCEDURE — 85610 PROTHROMBIN TIME: CPT

## 2018-08-07 PROCEDURE — 6360000002 HC RX W HCPCS: Performed by: NURSE PRACTITIONER

## 2018-08-07 PROCEDURE — 97116 GAIT TRAINING THERAPY: CPT

## 2018-08-07 PROCEDURE — 80048 BASIC METABOLIC PNL TOTAL CA: CPT

## 2018-08-07 PROCEDURE — 6370000000 HC RX 637 (ALT 250 FOR IP): Performed by: PHYSICIAN ASSISTANT

## 2018-08-07 RX ORDER — WARFARIN SODIUM 7.5 MG/1
7.5 TABLET ORAL
Status: DISCONTINUED | OUTPATIENT
Start: 2018-08-07 | End: 2018-08-07 | Stop reason: HOSPADM

## 2018-08-07 RX ORDER — PROMETHAZINE HYDROCHLORIDE 25 MG/1
25 TABLET ORAL EVERY 6 HOURS PRN
Status: DISCONTINUED | OUTPATIENT
Start: 2018-08-07 | End: 2018-08-07 | Stop reason: HOSPADM

## 2018-08-07 RX ORDER — OXYCODONE HYDROCHLORIDE AND ACETAMINOPHEN 5; 325 MG/1; MG/1
1-2 TABLET ORAL EVERY 4 HOURS PRN
Qty: 10 TABLET | Refills: 0 | Status: ON HOLD | OUTPATIENT
Start: 2018-08-07 | End: 2018-08-16 | Stop reason: HOSPADM

## 2018-08-07 RX ADMIN — PROMETHAZINE HYDROCHLORIDE 25 MG: 25 TABLET ORAL at 12:39

## 2018-08-07 RX ADMIN — DOCUSATE SODIUM 100 MG: 100 CAPSULE, LIQUID FILLED ORAL at 08:51

## 2018-08-07 RX ADMIN — ENOXAPARIN SODIUM 100 MG: 100 INJECTION SUBCUTANEOUS at 08:50

## 2018-08-07 RX ADMIN — OXYCODONE HYDROCHLORIDE AND ACETAMINOPHEN 2 TABLET: 5; 325 TABLET ORAL at 05:22

## 2018-08-07 RX ADMIN — LOSARTAN POTASSIUM 100 MG: 100 TABLET, FILM COATED ORAL at 08:51

## 2018-08-07 RX ADMIN — CARVEDILOL 25 MG: 25 TABLET, FILM COATED ORAL at 08:51

## 2018-08-07 RX ADMIN — PROMETHAZINE HYDROCHLORIDE 6.25 MG: 25 INJECTION INTRAMUSCULAR; INTRAVENOUS at 05:21

## 2018-08-07 RX ADMIN — ALLOPURINOL 100 MG: 100 TABLET ORAL at 08:51

## 2018-08-07 RX ADMIN — OXYCODONE HYDROCHLORIDE AND ACETAMINOPHEN 2 TABLET: 5; 325 TABLET ORAL at 12:41

## 2018-08-07 ASSESSMENT — PAIN DESCRIPTION - PAIN TYPE
TYPE: SURGICAL PAIN

## 2018-08-07 ASSESSMENT — PAIN SCALES - GENERAL
PAINLEVEL_OUTOF10: 6
PAINLEVEL_OUTOF10: 8
PAINLEVEL_OUTOF10: 6
PAINLEVEL_OUTOF10: 8

## 2018-08-07 ASSESSMENT — PAIN DESCRIPTION - ORIENTATION
ORIENTATION: RIGHT

## 2018-08-07 ASSESSMENT — PAIN DESCRIPTION - LOCATION
LOCATION: KNEE

## 2018-08-07 NOTE — PROGRESS NOTES
respiratory effort. Clear to auscultation, bilaterally without Rales/Wheezes/Rhonchi. Cardiovascular: Irregular rate and rhythm with normal S1/S2 without murmurs, rubs or gallops. Abdomen: Soft, non-tender, non-distended with normal bowel sounds. Musculoskeletal: No clubbing, cyanosis or edema bilaterally. Right knee dressing C/D/I. Skin: Skin color, texture, turgor normal.  No rashes or lesions. Neurologic:  Neurovascularly intact without any focal sensory/motor deficits. Cranial nerves: II-XII intact, grossly non-focal.  Psychiatric: Alert and oriented, thought content appropriate, normal insight  Capillary Refill: Brisk,< 3 seconds   Peripheral Pulses: +2 palpable, equal bilaterally       Labs:   Recent Labs      08/05/18   0603  08/06/18   0606  08/07/18   0541   WBC  5.8  5.1  4.8   HGB  9.9*  9.6*  9.5*   HCT  28.5*  27.6*  26.9*   PLT  178  172  184     Recent Labs      08/05/18   0603  08/07/18   0541   NA  133*  135*   K  4.1  4.6   CL  97*  98*   CO2  28  29   BUN  23*  25*   CREATININE  0.8  0.8   CALCIUM  8.9  8.5     Recent Labs      08/05/18   0603  08/06/18   0606  08/07/18   0541   INR  1.44*  1.46*  1.65*     Urinalysis:      Lab Results   Component Value Date    NITRU Negative 06/05/2018    WBCUA 3-5 06/05/2018    BACTERIA Rare 06/05/2018    RBCUA 0-2 06/05/2018    BLOODU Negative 06/05/2018    SPECGRAV 1.010 06/05/2018    GLUCOSEU Negative 06/05/2018    GLUCOSEU NEGATIVE 04/20/2011       Assessment/Plan:    Active Hospital Problems    Diagnosis Date Noted    Status post total right knee replacement [Z96.651] 08/06/2018    Localized osteoarthritis of right knee [M17.11] 08/01/2018    Primary localized osteoarthritis of right knee [M17.11] 08/01/2018       Right knee osteoarthritis s/p right total knee makoplasty on 8/1/18:  - Postoperative management and pain control per Orthopedic Surgery.    - Continue PT/OT, DVT prophylaxis, bowel regimen and IS.      Mild acute hyponatremia (resolved):  - Resolved with IV fluids. Monitor labs     Hypokalemia/hypomagnesemia:  - Monitor and replete as needed.      Hypertension:  - BP is controlled. Continue coreg and losartan. Continue to hold HCTZ.      Coronary artery disease:  - Stable. Continue statin. Resume aspirin when okay with Ortho.      Atrial fibrillation:   - Monitor on telemetry. Pt is on coumadin. INR subtherapeutic. Monitor daily INR. Continue coreg.      Hyperglycemia:   - Likely stress response from surgery. A1C 5.6%  on 6/5/18. Continue low dose SSI ACHS. Carb control diet.      Hyperlipidemia:  - Continue statin.      Gout:  - Continue allopurinol. DVT Prophylaxis: Lovenox   Diet: DIET CARB CONTROL;  Code Status: Full Code    PT/OT Eval Status: Active and ongoing.      Dispo - Per primary team.     KWAME Cowart - CNP

## 2018-08-07 NOTE — PROGRESS NOTES
being DC'd within the half hour  Treatment Diagnosis: Generalized weakness R53.1, difficulty walking R26.2  Specific instructions for Next Treatment: Progress ther ex and mobility as tolerated  Prognosis: Good  Decision Making: Low Complexity  Patient Education: ther ex, KI, gait training  REQUIRES PT FOLLOW UP: Yes  Activity Tolerance  Activity Tolerance: Patient Tolerated treatment well     G-Code  PT G-Codes  Functional Assessment Tool Used: AMPA  Score: 20    AM-PAC Score  AM-PAC Inpatient Mobility Raw Score : 20  AM-PAC Inpatient T-Scale Score : 47.67  Mobility Inpatient CMS 0-100% Score: 35.83  Mobility Inpatient CMS G-Code Modifier : CJ          Goals  Short term goals  Time Frame for Short term goals: 3-5 days (unless otherwise specified)  Short term goal 1: Pt will be independent with bed mobility with HOB flat and without use of bed rails  Short term goal 2: Pt will be independent with sit<>stand transfers with RW - goal downgraded on 8/04/18 due to slower-than-expected progress. New goal: Pt will transfer sit <-> stand and bed>chair using std. walker with supervision. Short term goal 3: Pt will safely ambulate 200' with standard walker with supervision - goal downgraded on 8/04/18 due to slower-than-expected progress. New goal: Pt will ambulate x 125 feet using std. walker with SBA.; goal met 8/7/18  Short term goal 4: If pt D/C's directly home from hospital: Pt will safely ascend and descend 3 steps with B handrails as needed to enter and exit her daughter's home  Short term goal 5: By 8/05/18: Pt will tolerate 12-15 reps RLE ther ex for ROM/strengthening- met 8/6 ONGOING  Patient Goals   Patient goals :  \"To be able to go to rehab and get stronger\"    Plan    Plan  Times per week: BID 7x/week  Times per day: Daily  Specific instructions for Next Treatment: Progress ther ex and mobility as tolerated  Current Treatment Recommendations: Strengthening, ROM, Balance Training, Functional Mobility Training, Transfer Training, Endurance Training, Gait Training, Stair training, Pain Management, Home Exercise Program, Safety Education & Training, Patient/Caregiver Education & Training, Equipment Evaluation, Education, & procurement, Positioning  Safety Devices  Type of devices:  All fall risk precautions in place, Patient at risk for falls, Call light within reach, Chair alarm in place, Nurse notified, Bed alarm in place, Left in bed     Therapy Time   Individual Concurrent Group Co-treatment   Time In [address] [place] [address],

## 2018-08-07 NOTE — DISCHARGE SUMMARY
1,000 mg in dextrose 5 % 100 mL IVPB, 1,000 mg, Irrigation, Once  losartan (COZAAR) tablet 100 mg, 100 mg, Oral, Daily **AND** [DISCONTINUED] hydrochlorothiazide (HYDRODIURIL) tablet 25 mg, 25 mg, Oral, Daily    Post-operatively the patients diet was advanced as tolerated and their incision was checked on POD #1. The incision is dressing in place, clean, dry and intact with no signs of infection. The patient remained neurovascularly intact in the lower extremity and had intact pulses distally. Patients calf remained soft and showed no evidence of DVT. The patient was able to move their leg and ankle/foot without any problems post-operatively. Physical therapy and occupational therapy were consulted and began working with the patient post-operatively. The patient progressed with PT/OT as would be expected and continued to improve through their stay. The patients pain was initially controlled with IV medications but we were able to transition to oral pain medications soon after arrival to the floor and their pain remained under good control through their hospital stay. From a medical standpoint the patient remained stable and continued to have the medicine team follow throughout their stay. The patients dressing was changed/incison was checked on day of d/c. The patient will be discharged at this time to Home  with their current diet restrictions and will continue to follow the total knee precautions outlined to them by us and PT/OT. INR monitoring during stay, required therapeutic bridging. Condition on Discharge: Stable    Plan  Return visit in 2 weeks. .  Patient was instructed on the use of pain medications, the signs and symptoms of infection, and was given our number to call should they have any questions or concerns following discharge.

## 2018-08-07 NOTE — PROGRESS NOTES
Physical Therapy  Facility/Department: [place] [address] - MED SURG/ORTHO  Daily Treatment Note  NAME: Sammi Burr  : 1940  MRN: [id number]    Date of Service: 2018    Discharge Recommendations:  [address]        Patient Diagnosis(es): The primary encounter diagnosis was Status post right knee replacement. A diagnosis of Primary osteoarthritis of right knee was also pertinent to this visit. has a past medical history of Arthritis; Atrial fibrillation ([facility] [address] CAD (coronary artery disease); CHF (congestive heart failure) ([address] CKD (chronic kidney disease); Cutaneous skin tags; Depression; Gout; Grief reaction; Hyperlipidemia; Hypertension; Obesity; Osteoarthritis; Pulmonary nodules; and Unspecified cerebral artery occlusion with cerebral infarction. has a past surgical history that includes Carpal tunnel release; Knee arthroscopy; Cataract removal; other surgical history (Left, 2013); Cholecystectomy; other surgical history (Bilateral, 14); and pr total knee arthroplasty (Right, 2018). Restrictions  Restrictions/Precautions  Restrictions/Precautions: Weight Bearing, Surgical Protocols, General Precautions, Fall Risk  Required Braces or Orthoses?: Yes  Lower Extremity Weight Bearing Restrictions  Right Lower Extremity Weight Bearing: Weight Bearing As Tolerated  Required Braces or Orthoses  Right Lower Extremity Brace: Knee Brace  RLE Brace Type: When ambulating until able to perform SLR in PT  Position Activity Restriction  Other position/activity restrictions: Activity day of surgery  Subjective   General  Chart Reviewed: Yes  Response To Previous Treatment: Patient with no complaints from previous session. Family / Caregiver Present: No  Referring Practitioner: Juan C Hernandez PA-C  Subjective  Subjective: Pt agreeable to PT  General Comment  Comments: RN cleared pt for therapy, pt in restroom upon entry.   Pain Screening  Patient Currently in Pain: Yes  Pain Assessment  Pain Assessment: 0-10  Pain Level: 6  Pain Type: Surgical pain  Pain Location: Knee  Pain Orientation: Right  Pain Intervention(s): Ambulation/Increased activity;Cold applied;Repositioned; Therapeutic presence;RN notified  Vital Signs  Patient Currently in Pain: Yes       Orientation  Orientation  Overall Orientation Status: Within Normal Limits  Objective   Bed mobility  Supine to Sit: Stand by assistance  Sit to Supine: Minimal assistance (for RLE)  Scooting: Modified independent  Transfers  Sit to Stand: Stand by assistance  Stand to sit: Stand by assistance  Bed to Chair: Stand by assistance (with SW)  Ambulation  Ambulation?: Yes  WB Status: WBAT RLE with KI  Ambulation 1  Surface: level tile  Device: Standard Walker  Other Apparatus: Knee Immobilizer;Right  Assistance: Stand by assistance  Quality of Gait: cues for sequencing required  Distance: 150 ft  Comments: Pt fatigued with progressive ambulation, requiring short frequent stops during last 50 ft. Balance  Posture: Fair  Sitting - Static: Good  Sitting - Dynamic: Good;-  Standing - Static: Good;-  Standing - Dynamic: Good; - (with SW)  Exercises  Straight Leg Raise: 12 x RLE min assist  Quad Sets: 15 x B   Heelslides: 15 x assisted RLE supine  Gluteal Sets: 15 x B  Knee Short Arc Quad: 15 x RLE   Knee Passive Range of Motion: 2 to 86  Ankle Pumps: 20 x B                         Assessment   Body structures, Functions, Activity limitations: Decreased functional mobility ; Decreased ROM; Decreased strength;Decreased balance  Assessment: Pt progressing, SBA for supine to sit, gait with SWx 150 ft, [employer] for RLE for sit to supine  Treatment Diagnosis: Generalized weakness R53.1, difficulty walking R26.2  Specific instructions for Next Treatment: Progress ther ex and mobility as tolerated  Prognosis: Good  Patient Education: ther ex, KI, gait training  REQUIRES PT FOLLOW UP: Yes  Activity Tolerance  Activity Tolerance: Patient Tolerated treatment

## 2018-08-07 NOTE — PLAN OF CARE
Problem: Falls - Risk of:  Goal: Will remain free from falls  Will remain free from falls   Outcome: Ongoing  Bed in lowest, locked position, side rails up X2, bed check in place, call light within reach. Instructed pt to call for assistance before getting out of bed, pt verbalized understanding. Will continue to monitor.

## 2018-08-07 NOTE — CARE COORDINATION
1105: Received call from [facility] stating they have obtained pre- cert. MARY Jon aware. CASE MANAGEMENT DISCHARGE SUMMARY      Discharge to: [facility] completed: per facility  Hospital Exemption Notification (HENS) completed: Yes 8-7-18    Transportation:    Medical Transport/ time: [address] [address]   Ambulance form completed: Yes    Notified:    Family: Left VM for daughterJustice Part at [address]: Marlyn Paz- admissions. DARRYN/AVS faxed to [phone]   RN: Whitley   Phone number for report to facility: [phone]    Note: Discharging nurse to complete DARRYN, reconcile AVS, and place final copy with patient's discharge packet. RN to ensure that written prescriptions for  Level II medications are sent with patient to the facility as per protocol.

## 2018-08-07 NOTE — PROGRESS NOTES
Pharmacy to Dose Warfarin     Dx: a-fib  Goal INR range 2-3   Home Warfarin dose: 5mg daily per [facility] Anticoagulation clinic  Was on Lovenox bridge of 100mg BID pre-op,  post-op bridge order of 30mg BID      Date                 INR                  Warfarin  8/1                  0.9                     5 mg  8/2                  1.13                   5 mg  8/3                  1.46                   5 mg  8/4                  1.62                   5 mg  8/5                  1.44                   7.5 mg  8/6                  1.46                   7.5 mg     8/7                  1.65                   7.5 mg     Recommend Warfarin 7.5 mg tonight x1. Daily INR ordered. Rx will continue to manage therapy per consult order.     Marlyn Johnson [facility]

## 2018-08-09 ENCOUNTER — HOSPITAL ENCOUNTER (INPATIENT)
Age: 78
LOS: 7 days | Discharge: NURSING, REHAB OR LONG-TERM CARE | DRG: 920 | End: 2018-08-16
Attending: EMERGENCY MEDICINE | Admitting: INTERNAL MEDICINE
Payer: MEDICARE

## 2018-08-09 ENCOUNTER — TELEPHONE (OUTPATIENT)
Dept: INTERNAL MEDICINE CLINIC | Age: 78
End: 2018-08-09

## 2018-08-09 ENCOUNTER — APPOINTMENT (OUTPATIENT)
Dept: CT IMAGING | Age: 78
DRG: 920 | End: 2018-08-09
Payer: MEDICARE

## 2018-08-09 DIAGNOSIS — R10.9 ABDOMINAL PAIN, UNSPECIFIED ABDOMINAL LOCATION: ICD-10-CM

## 2018-08-09 DIAGNOSIS — S30.1XXA ABDOMINAL HEMATOMA: Primary | ICD-10-CM

## 2018-08-09 DIAGNOSIS — Z96.651 STATUS POST TOTAL RIGHT KNEE REPLACEMENT: ICD-10-CM

## 2018-08-09 LAB
A/G RATIO: 1.2 (ref 1.1–2.2)
ALBUMIN SERPL-MCNC: 3.1 G/DL (ref 3.4–5)
ALP BLD-CCNC: 49 U/L (ref 40–129)
ALT SERPL-CCNC: 12 U/L (ref 10–40)
ANION GAP SERPL CALCULATED.3IONS-SCNC: 11 MMOL/L (ref 3–16)
AST SERPL-CCNC: 23 U/L (ref 15–37)
BACTERIA: ABNORMAL /HPF
BASOPHILS ABSOLUTE: 0 K/UL (ref 0–0.2)
BASOPHILS RELATIVE PERCENT: 0.5 %
BILIRUB SERPL-MCNC: 0.9 MG/DL (ref 0–1)
BILIRUBIN URINE: NEGATIVE
BLOOD, URINE: NEGATIVE
BUN BLDV-MCNC: 21 MG/DL (ref 7–20)
CALCIUM SERPL-MCNC: 8.6 MG/DL (ref 8.3–10.6)
CHLORIDE BLD-SCNC: 98 MMOL/L (ref 99–110)
CLARITY: CLEAR
CO2: 27 MMOL/L (ref 21–32)
COLOR: YELLOW
CREAT SERPL-MCNC: 0.7 MG/DL (ref 0.6–1.2)
EOSINOPHILS ABSOLUTE: 0.2 K/UL (ref 0–0.6)
EOSINOPHILS RELATIVE PERCENT: 2.8 %
EPITHELIAL CELLS, UA: ABNORMAL /HPF
GFR AFRICAN AMERICAN: >60
GFR NON-AFRICAN AMERICAN: >60
GLOBULIN: 2.6 G/DL
GLUCOSE BLD-MCNC: 110 MG/DL (ref 70–99)
GLUCOSE URINE: NEGATIVE MG/DL
HCT VFR BLD CALC: 25.4 % (ref 36–48)
HCT VFR BLD CALC: 26.3 % (ref 36–48)
HEMOGLOBIN: 8.7 G/DL (ref 12–16)
HEMOGLOBIN: 9 G/DL (ref 12–16)
INR BLD: 1.98 (ref 0.86–1.14)
KETONES, URINE: NEGATIVE MG/DL
LEUKOCYTE ESTERASE, URINE: NEGATIVE
LIPASE: 14 U/L (ref 13–60)
LYMPHOCYTES ABSOLUTE: 1.1 K/UL (ref 1–5.1)
LYMPHOCYTES RELATIVE PERCENT: 19.4 %
MCH RBC QN AUTO: 33 PG (ref 26–34)
MCHC RBC AUTO-ENTMCNC: 34.3 G/DL (ref 31–36)
MCV RBC AUTO: 96.2 FL (ref 80–100)
MICROSCOPIC EXAMINATION: YES
MONOCYTES ABSOLUTE: 0.8 K/UL (ref 0–1.3)
MONOCYTES RELATIVE PERCENT: 14.5 %
NEUTROPHILS ABSOLUTE: 3.5 K/UL (ref 1.7–7.7)
NEUTROPHILS RELATIVE PERCENT: 62.8 %
NITRITE, URINE: NEGATIVE
PDW BLD-RTO: 13.9 % (ref 12.4–15.4)
PH UA: 6
PLATELET # BLD: 231 K/UL (ref 135–450)
PMV BLD AUTO: 7.2 FL (ref 5–10.5)
POTASSIUM SERPL-SCNC: 4.4 MMOL/L (ref 3.5–5.1)
PROTEIN UA: ABNORMAL MG/DL
PROTHROMBIN TIME: 22.6 SEC (ref 9.8–13)
RBC # BLD: 2.73 M/UL (ref 4–5.2)
RBC UA: ABNORMAL /HPF (ref 0–2)
SODIUM BLD-SCNC: 136 MMOL/L (ref 136–145)
SPECIFIC GRAVITY UA: 1.01
TOTAL PROTEIN: 5.7 G/DL (ref 6.4–8.2)
URINE TYPE: ABNORMAL
UROBILINOGEN, URINE: 1 E.U./DL
WBC # BLD: 5.5 K/UL (ref 4–11)
WBC UA: ABNORMAL /HPF (ref 0–5)

## 2018-08-09 PROCEDURE — S0028 INJECTION, FAMOTIDINE, 20 MG: HCPCS | Performed by: NURSE PRACTITIONER

## 2018-08-09 PROCEDURE — 2580000003 HC RX 258: Performed by: NURSE PRACTITIONER

## 2018-08-09 PROCEDURE — 83690 ASSAY OF LIPASE: CPT

## 2018-08-09 PROCEDURE — 6360000004 HC RX CONTRAST MEDICATION: Performed by: EMERGENCY MEDICINE

## 2018-08-09 PROCEDURE — 85610 PROTHROMBIN TIME: CPT

## 2018-08-09 PROCEDURE — 96376 TX/PRO/DX INJ SAME DRUG ADON: CPT

## 2018-08-09 PROCEDURE — 85018 HEMOGLOBIN: CPT

## 2018-08-09 PROCEDURE — 6370000000 HC RX 637 (ALT 250 FOR IP): Performed by: NURSE PRACTITIONER

## 2018-08-09 PROCEDURE — 80053 COMPREHEN METABOLIC PANEL: CPT

## 2018-08-09 PROCEDURE — 81001 URINALYSIS AUTO W/SCOPE: CPT

## 2018-08-09 PROCEDURE — 99222 1ST HOSP IP/OBS MODERATE 55: CPT | Performed by: SURGERY

## 2018-08-09 PROCEDURE — 1200000000 HC SEMI PRIVATE

## 2018-08-09 PROCEDURE — 85025 COMPLETE CBC W/AUTO DIFF WBC: CPT

## 2018-08-09 PROCEDURE — 85014 HEMATOCRIT: CPT

## 2018-08-09 PROCEDURE — 6360000002 HC RX W HCPCS: Performed by: NURSE PRACTITIONER

## 2018-08-09 PROCEDURE — 99285 EMERGENCY DEPT VISIT HI MDM: CPT

## 2018-08-09 PROCEDURE — 6360000002 HC RX W HCPCS: Performed by: EMERGENCY MEDICINE

## 2018-08-09 PROCEDURE — 36415 COLL VENOUS BLD VENIPUNCTURE: CPT

## 2018-08-09 PROCEDURE — 74177 CT ABD & PELVIS W/CONTRAST: CPT

## 2018-08-09 PROCEDURE — 96375 TX/PRO/DX INJ NEW DRUG ADDON: CPT

## 2018-08-09 PROCEDURE — 96374 THER/PROPH/DIAG INJ IV PUSH: CPT

## 2018-08-09 PROCEDURE — 2500000003 HC RX 250 WO HCPCS: Performed by: NURSE PRACTITIONER

## 2018-08-09 PROCEDURE — 2580000003 HC RX 258: Performed by: EMERGENCY MEDICINE

## 2018-08-09 PROCEDURE — 6360000002 HC RX W HCPCS

## 2018-08-09 RX ORDER — MORPHINE SULFATE 2 MG/ML
2 INJECTION, SOLUTION INTRAMUSCULAR; INTRAVENOUS EVERY 4 HOURS PRN
Status: DISCONTINUED | OUTPATIENT
Start: 2018-08-09 | End: 2018-08-12

## 2018-08-09 RX ORDER — ONDANSETRON 2 MG/ML
4 INJECTION INTRAMUSCULAR; INTRAVENOUS ONCE
Status: COMPLETED | OUTPATIENT
Start: 2018-08-09 | End: 2018-08-09

## 2018-08-09 RX ORDER — SODIUM CHLORIDE 9 MG/ML
INJECTION, SOLUTION INTRAVENOUS CONTINUOUS
Status: DISCONTINUED | OUTPATIENT
Start: 2018-08-09 | End: 2018-08-13

## 2018-08-09 RX ORDER — LOSARTAN POTASSIUM 100 MG/1
100 TABLET ORAL DAILY
Status: DISCONTINUED | OUTPATIENT
Start: 2018-08-09 | End: 2018-08-16 | Stop reason: HOSPADM

## 2018-08-09 RX ORDER — PROMETHAZINE HYDROCHLORIDE 25 MG/1
25 TABLET ORAL EVERY 6 HOURS PRN
Status: DISCONTINUED | OUTPATIENT
Start: 2018-08-09 | End: 2018-08-16 | Stop reason: HOSPADM

## 2018-08-09 RX ORDER — SODIUM CHLORIDE 0.9 % (FLUSH) 0.9 %
10 SYRINGE (ML) INJECTION PRN
Status: DISCONTINUED | OUTPATIENT
Start: 2018-08-09 | End: 2018-08-16 | Stop reason: HOSPADM

## 2018-08-09 RX ORDER — CARVEDILOL 25 MG/1
25 TABLET ORAL 2 TIMES DAILY WITH MEALS
Status: DISCONTINUED | OUTPATIENT
Start: 2018-08-09 | End: 2018-08-16 | Stop reason: HOSPADM

## 2018-08-09 RX ORDER — MORPHINE SULFATE 4 MG/ML
4 INJECTION, SOLUTION INTRAMUSCULAR; INTRAVENOUS ONCE
Status: COMPLETED | OUTPATIENT
Start: 2018-08-09 | End: 2018-08-09

## 2018-08-09 RX ORDER — FAMOTIDINE 20 MG/1
20 TABLET, FILM COATED ORAL 2 TIMES DAILY
Status: DISCONTINUED | OUTPATIENT
Start: 2018-08-09 | End: 2018-08-09

## 2018-08-09 RX ORDER — ATORVASTATIN CALCIUM 10 MG/1
20 TABLET, FILM COATED ORAL NIGHTLY
Status: DISCONTINUED | OUTPATIENT
Start: 2018-08-09 | End: 2018-08-16 | Stop reason: HOSPADM

## 2018-08-09 RX ORDER — ONDANSETRON 2 MG/ML
4 INJECTION INTRAMUSCULAR; INTRAVENOUS EVERY 6 HOURS PRN
Status: DISCONTINUED | OUTPATIENT
Start: 2018-08-09 | End: 2018-08-16 | Stop reason: HOSPADM

## 2018-08-09 RX ORDER — BISACODYL 10 MG
10 SUPPOSITORY, RECTAL RECTAL DAILY PRN
Status: DISCONTINUED | OUTPATIENT
Start: 2018-08-09 | End: 2018-08-16 | Stop reason: HOSPADM

## 2018-08-09 RX ORDER — PROMETHAZINE HYDROCHLORIDE 25 MG/1
TABLET ORAL
Status: COMPLETED
Start: 2018-08-09 | End: 2018-08-09

## 2018-08-09 RX ORDER — SODIUM CHLORIDE 0.9 % (FLUSH) 0.9 %
10 SYRINGE (ML) INJECTION EVERY 12 HOURS SCHEDULED
Status: DISCONTINUED | OUTPATIENT
Start: 2018-08-09 | End: 2018-08-16 | Stop reason: HOSPADM

## 2018-08-09 RX ADMIN — BISACODYL 10 MG: 10 SUPPOSITORY RECTAL at 21:31

## 2018-08-09 RX ADMIN — PROMETHAZINE HYDROCHLORIDE: 25 TABLET ORAL at 17:46

## 2018-08-09 RX ADMIN — HYDROMORPHONE HYDROCHLORIDE 1 MG: 1 INJECTION, SOLUTION INTRAMUSCULAR; INTRAVENOUS; SUBCUTANEOUS at 13:41

## 2018-08-09 RX ADMIN — SODIUM CHLORIDE: 9 INJECTION, SOLUTION INTRAVENOUS at 14:00

## 2018-08-09 RX ADMIN — MORPHINE SULFATE 4 MG: 4 INJECTION INTRAVENOUS at 08:38

## 2018-08-09 RX ADMIN — Medication 10 ML: at 14:38

## 2018-08-09 RX ADMIN — PROMETHAZINE HYDROCHLORIDE 25 MG: 25 TABLET ORAL at 16:58

## 2018-08-09 RX ADMIN — FAMOTIDINE 20 MG: 20 TABLET ORAL at 14:37

## 2018-08-09 RX ADMIN — ONDANSETRON 4 MG: 2 INJECTION INTRAMUSCULAR; INTRAVENOUS at 18:36

## 2018-08-09 RX ADMIN — ATORVASTATIN CALCIUM 20 MG: 10 TABLET, FILM COATED ORAL at 21:31

## 2018-08-09 RX ADMIN — IOPAMIDOL 75 ML: 755 INJECTION, SOLUTION INTRAVENOUS at 07:31

## 2018-08-09 RX ADMIN — Medication 10 ML: at 21:31

## 2018-08-09 RX ADMIN — ONDANSETRON 4 MG: 2 INJECTION INTRAMUSCULAR; INTRAVENOUS at 07:00

## 2018-08-09 RX ADMIN — MAGNESIUM HYDROXIDE 30 ML: 400 SUSPENSION ORAL at 18:39

## 2018-08-09 RX ADMIN — MORPHINE SULFATE 4 MG: 4 INJECTION INTRAVENOUS at 07:00

## 2018-08-09 RX ADMIN — FAMOTIDINE 20 MG: 10 INJECTION, SOLUTION INTRAVENOUS at 21:31

## 2018-08-09 RX ADMIN — PHYTONADIONE 10 MG: 10 INJECTION, EMULSION INTRAMUSCULAR; INTRAVENOUS; SUBCUTANEOUS at 12:31

## 2018-08-09 RX ADMIN — MORPHINE SULFATE 2 MG: 2 INJECTION, SOLUTION INTRAMUSCULAR; INTRAVENOUS at 10:27

## 2018-08-09 RX ADMIN — SODIUM CHLORIDE: 9 INJECTION, SOLUTION INTRAVENOUS at 21:31

## 2018-08-09 RX ADMIN — ONDANSETRON 4 MG: 2 INJECTION INTRAMUSCULAR; INTRAVENOUS at 13:40

## 2018-08-09 ASSESSMENT — PAIN DESCRIPTION - PAIN TYPE
TYPE: ACUTE PAIN

## 2018-08-09 ASSESSMENT — PAIN SCALES - GENERAL
PAINLEVEL_OUTOF10: 5
PAINLEVEL_OUTOF10: 10
PAINLEVEL_OUTOF10: 6
PAINLEVEL_OUTOF10: 10
PAINLEVEL_OUTOF10: 6
PAINLEVEL_OUTOF10: 10
PAINLEVEL_OUTOF10: 8
PAINLEVEL_OUTOF10: 10

## 2018-08-09 ASSESSMENT — PAIN DESCRIPTION - LOCATION
LOCATION: ABDOMEN
LOCATION: ABDOMEN;BACK
LOCATION: ABDOMEN

## 2018-08-09 ASSESSMENT — PAIN DESCRIPTION - DESCRIPTORS: DESCRIPTORS: CRAMPING

## 2018-08-09 NOTE — CONSULTS
Department of General Surgery Consult    PATIENT NAME: Tiki Wu   YOB: 1940    ADMISSION DATE: 8/9/2018  6:20 AM      TODAY'S DATE: 8/9/2018    Reason for Consult:  Rectus sheath hematoma    Chief Complaint: pain    Requesting Physician:  Elmer Figueroa    HISTORY OF PRESENT ILLNESS:              The patient is a 68 y.o. female who presents with pain in lower abdomen. Started Tuesday and associated with ecchymosis and swelling. Has been getting therapeutic lovenox and bridging to coumadin. No fevers, chills, nausea. .    Past Medical History:        Diagnosis Date    Arthritis     Atrial fibrillation ([facility] [address]     CAD (coronary artery disease)     a-fib    CHF (congestive heart failure) (HCC)     CKD (chronic kidney disease)     Cutaneous skin tags 4/8/2013    Depression     Gout     Grief reaction 4/8/2013    Hyperlipidemia     Hypertension     Obesity     Osteoarthritis     Pulmonary nodules     Unspecified cerebral artery occlusion with cerebral infarction 01/2007       Past Surgical History:        Procedure Laterality Date    CARPAL TUNNEL RELEASE      CATARACT REMOVAL      both    CHOLECYSTECTOMY      KNEE ARTHROSCOPY      RIGHT    OTHER SURGICAL HISTORY Left 02/27/2013    Left myringotomy, left PE tube insertion    OTHER SURGICAL HISTORY Bilateral 2/24/14    EXCISION OF RT NECK AND LEFT TEMPLE LESIONS    UT TOTAL KNEE ARTHROPLASTY Right 8/1/2018    RIGHT TOTAL KNEE MAKOPLASTY WITH PLATELET RICH PLASMA, ADDUCTOR CANAL BLOCK FOR PAIN CONTROL                    MAKOPLASTY CHEPE  CPT CODE - 60369 performed by Abeba Shultz MD at [facility]       Current Medications:   Current Facility-Administered Medications: atorvastatin (LIPITOR) tablet 20 mg, 20 mg, Oral, Nightly  carvedilol (COREG) tablet 25 mg, 25 mg, Oral, BID WC  sodium chloride flush 0.9 % injection 10 mL, 10 mL, Intravenous, 2 times per day  sodium chloride flush 0.9 % injection 10 mL, 10 mL, Intravenous, PRN  magnesium hydroxide (MILK OF MAGNESIA) 400 MG/5ML suspension 30 mL, 30 mL, Oral, Daily PRN  ondansetron (ZOFRAN) injection 4 mg, 4 mg, Intravenous, Q6H PRN  0.9 % sodium chloride infusion, , Intravenous, Continuous  famotidine (PEPCID) tablet 20 mg, 20 mg, Oral, BID  morphine injection 2 mg, 2 mg, Intravenous, Q4H PRN  losartan (COZAAR) tablet 100 mg, 100 mg, Oral, Daily  Prior to Admission medications    Medication Sig Start Date End Date Taking? Authorizing Provider   oxyCODONE-acetaminophen (PERCOCET) 5-325 MG per tablet Take 1-2 tablets by mouth every 4 hours as needed for Pain for up to 7 days. . 8/7/18 8/14/18 Yes Miryam Seen, PA   enoxaparin (LOVENOX) 100 MG/ML injection Inject 1 mL into the skin 2 times daily Continue with lovenox bridge until INR therapeutic, then continue coumadin only 8/7/18  Yes MARY Holland   carvedilol (COREG) 12.5 MG tablet Take 25 mg by mouth 2 times daily (with meals)   Yes Historical Provider, MD   atorvastatin (LIPITOR) 20 MG tablet TAKE 1 TABLET DAILY 7/16/18  Yes Joey Camargo MD   allopurinol (ZYLOPRIM) 100 MG tablet TAKE 2 TABLETS DAILY 4/13/18  Yes Joey Camargo MD   losartan-hydrochlorothiazide [facility]) 100-25 MG per tablet Take 1 tablet by mouth daily 6/8/17  Yes KWAME Gillespie - CNP   warfarin (COUMADIN) 5 MG tablet Indications: SUNDAY, TUES., THURSDAY, SATURDAY Take 1 tablet by mouth daily, except 1.5 tablets on Wednesdays 3/8/17  Yes Princess Nolan MD   aspirin 81 MG EC tablet Take 81 mg by mouth daily. Yes Historical Provider, MD        Allergies:  Hydrocodone; Hydrocodone bitartrate;  Lisinopril; and Naproxen    Social History:   TOBACCO:  no  ETOH: no    Family History:    Family History   Problem Relation Age of Onset    Depression Mother     Arthritis Father     Asthma Father     High Blood Pressure Father     High Cholesterol Father     High Blood Pressure Sister     High Cholesterol Sister     Arthritis Sister     High Blood Pressure Brother     Hearing Loss Brother     High Cholesterol Brother     Learning Disabilities Brother     Mental Illness Brother     Asthma Brother     Arthritis Brother        REVIEW OF SYSTEMS:  CONSTITUTIONAL:  negative  HEENT:  negative  RESPIRATORY:  negative  CARDIOVASCULAR:  negative  GASTROINTESTINAL:  negative except for abdominal pain  GENITOURINARY:  negative  HEMATOLOGIC/LYMPHATIC:  negative  NEUROLOGICAL:  Negative  * All other ROS reviewed and negative. PHYSICAL EXAM:  VITALS:  BP 97/68   Pulse 90   Temp 97.7 °F (36.5 °C) (Oral)   Resp 16   Ht 5' 4\" (1.626 m)   Wt 227 lb 8.2 oz (103.2 kg)   SpO2 96%   BMI 39.05 kg/m²   24HR INTAKE/OUTPUT:    No intake/output data recorded. No intake/output data recorded.     CONSTITUTIONAL:  alert, no apparent distress and moderately obese  EYES:  PERRL, sclera clear  ENT:  Normocepalic,atraumatic, without obvious abnormality  NECK:  supple, symmetrical, trachea midline  LUNGS: Resp effort easy and unlabored, no crackles or wheezing  CARDIOVASCULAR:  NO JVD, regular rate   ABDOMEN:  Hematoma lower right abdomen with ecchymosis, normal bowel sounds, soft, non-distended, tenderness noted in the left lower quadrant, voluntary guarding absent  MUSCULOSKELETAL: No clubbing or cyanosis, 0+ pitting edema lower extremities  NEUROLOGIC:  Mental Status Exam:  Level of Alertness:   awake  PSYCHIATRIC:   person, place, time  SKIN:  no redness, warmth, or swelling    DATA:    CBC:   Recent Labs      08/07/18   0541  08/09/18   0645  08/09/18   1220   WBC  4.8  5.5   --    HGB  9.5*  9.0*  8.7*   HCT  26.9*  26.3*  25.4*   PLT  184  231   --      BMP:    Recent Labs      08/07/18   0541  08/09/18   0645   NA  135*  136   K  4.6  4.4   CL  98*  98*   CO2  29  27   BUN  25*  21*   CREATININE  0.8  0.7   GLUCOSE  86  110*     Hepatic:   Recent Labs      08/09/18   0645   AST  23   ALT  12   BILITOT  0.9   ALKPHOS  49     Mag:    No results for input(s): MG in the

## 2018-08-09 NOTE — PROGRESS NOTES
Pt and family arrived to floor. Orientated to room and surroundings. Call light within reach. Educated on how to use call light and phone. No questions or concern. Inge

## 2018-08-09 NOTE — H&P
HPI. All other systems reviewed and negative. PHYSICAL EXAM PERFORMED:    /74   Pulse 85   Temp 98.3 °F (36.8 °C) (Oral)   Resp 18   Ht 5' 4\" (1.626 m)   Wt 210 lb (95.3 kg)   SpO2 96%   BMI 36.05 kg/m²     General appearance:  No apparent distress, appears stated age and cooperative. HEENT:  Normal cephalic, atraumatic without obvious deformity. Pupils equal, round, and reactive to light. Extra ocular muscles intact. Conjunctivae/corneas clear. Neck: Supple, with full range of motion. No jugular venous distention. Trachea midline. Respiratory:  Normal respiratory effort. Clear to auscultation, bilaterally without Rales/Wheezes/Rhonchi. Cardiovascular:  Regular rate and rhythm with normal S1/S2 without murmurs, rubs or gallops. Abdomen: Soft, tender, large grapefruit size hematoma, non-distended with normal bowel sounds. Musculoskeletal:  No clubbing, cyanosis or edema bilaterally. Full range of motion without deformity. Skin: Skin color pale, texture, turgor poor. No rashes or lesions. Right knee incision intact   Neurologic:  Neurovascularly intact without any focal sensory/motor deficits. Cranial nerves: II-XII intact, grossly non-focal.  Psychiatric:  Alert and oriented, thought content appropriate, normal insight  Capillary Refill: Brisk,< 3 seconds   Peripheral Pulses: +2 palpable, equal bilaterally       Labs:     Recent Labs      08/07/18 0541  08/09/18   0645   WBC  4.8  5.5   HGB  9.5*  9.0*   HCT  26.9*  26.3*   PLT  184  231     Recent Labs      08/07/18   0541  08/09/18   0645   NA  135*  136   K  4.6  4.4   CL  98*  98*   CO2  29  27   BUN  25*  21*   CREATININE  0.8  0.7   CALCIUM  8.5  8.6     Recent Labs      08/09/18   0645   AST  23   ALT  12   BILITOT  0.9   ALKPHOS  49     Recent Labs      08/07/18   0541  08/09/18   0645   INR  1.65*  1.98*     No results for input(s): Ladena Quale in the last 72 hours.     Urinalysis:      Lab Results   Component Value Date NITRU Negative 06/05/2018    WBCUA 3-5 06/05/2018    BACTERIA Rare 06/05/2018    RBCUA 0-2 06/05/2018    BLOODU Negative 06/05/2018    SPECGRAV 1.010 06/05/2018    GLUCOSEU Negative 06/05/2018    GLUCOSEU NEGATIVE 04/20/2011       Radiology:     EKG: Atrial fibrillation   Low voltage QRS   Nonspecific ST abnormality   Abnormal ECG   When compared with ECG of 12-JAN-2014 14:43,   No significant change was found   Confirmed by AMBIKA Dunn MD ([id number]) on 5/16/2017 8:05:23 AM     CT ABDOMEN PELVIS W IV CONTRAST Additional Contrast? None   Final Result   Rectus muscle hematoma with hematoma extension into the extraperitoneal   prevesical space.   There is extravasation of intravenous contrast compatible   with active bleeding at the time of imaging             ASSESSMENT:    [address] Problems    Diagnosis Date Noted    Atrial fibrillation [facility]) [I48.91] 11/30/2012     Priority: Medium    Long term current use of anticoagulant therapy [Z79.01] 11/30/2012     Priority: Medium    MR (mitral regurgitation) [I34.0] 10/11/2011     Priority: Medium    TI (tricuspid incompetence) [I07.1] 10/11/2011     Priority: Medium    Rectus sheath hematoma [S30.1XXA] 08/09/2018    Abdominal pain [R10.9] 08/09/2018    Status post total right knee replacement [Z96.651] 08/06/2018    Localized osteoarthritis of right knee [M17.11] 08/01/2018    Essential hypertension [I10] 08/13/2014    Chronic kidney disease, stage III (moderate) [N18.3] 08/13/2014    HTN (hypertension) [I10] 10/10/2011         PLAN:    Abdominal pain POA 2/2 to rectus sheath hematoma in the setting of Lovenox and coumadin   - CT imaginging as above   - HOLD coumadin and no DVT PPX, received Vitamin K dose for active bleeding   - serial H/H q 6  - IVF and pain and nausea meds    S/P total knee replacement   - was in SNF for rehab will need to continue PT/OT     Anemia acute blood loss from surgery and now bleeding hematoma in abdomen   - Serial H/H     AF -

## 2018-08-09 NOTE — ED PROVIDER NOTES
HENT:   Head: Normocephalic and atraumatic. Mouth/Throat: Oropharynx is clear and moist.   Eyes: EOM are normal. Pupils are equal, round, and reactive to light. Right eye exhibits no discharge. Left eye exhibits no discharge. Neck: Normal range of motion. Neck supple. No tracheal deviation present. Cardiovascular: Normal rate, regular rhythm, normal heart sounds and intact distal pulses. Exam reveals no gallop and no friction rub. No murmur heard. Pulmonary/Chest: Effort normal and breath sounds normal. No stridor. No respiratory distress. She has no wheezes. She has no rales. She exhibits no tenderness. Abdominal: Soft. She exhibits no distension. There is tenderness. There is no rigidity, no rebound, no guarding, no CVA tenderness, no tenderness at McBurney's point and negative Beckham's sign. Musculoskeletal: Normal range of motion. She exhibits no edema, tenderness or deformity. Neurological: She is alert and oriented to person, place, and time. No cranial nerve deficit. Coordination normal.   Skin: Skin is warm and dry. No rash noted. She is not diaphoretic. No erythema. No pallor. Psychiatric: She has a normal mood and affect. Nursing note and vitals reviewed.       Diagnostics   Labs:  Results for orders placed or performed during the hospital encounter of 08/09/18   CBC auto differential   Result Value Ref Range    WBC 5.5 4.0 - 11.0 K/uL    RBC 2.73 (L) 4.00 - 5.20 M/uL    Hemoglobin 9.0 (L) 12.0 - 16.0 g/dL    Hematocrit 26.3 (L) 36.0 - 48.0 %    MCV 96.2 80.0 - 100.0 fL    MCH 33.0 26.0 - 34.0 pg    MCHC 34.3 31.0 - 36.0 g/dL    RDW 13.9 12.4 - 15.4 %    Platelets 224 973 - 532 K/uL    MPV 7.2 5.0 - 10.5 fL    Neutrophils % 62.8 %    Lymphocytes % 19.4 %    Monocytes % 14.5 %    Eosinophils % 2.8 %    Basophils % 0.5 %    Neutrophils # 3.5 1.7 - 7.7 K/uL    Lymphocytes # 1.1 1.0 - 5.1 K/uL    Monocytes # 0.8 0.0 - 1.3 K/uL    Eosinophils # 0.2 0.0 - 0.6 K/uL    Basophils # 0.0 0.0 - 0.2 K/uL   Comprehensive metabolic panel   Result Value Ref Range    Sodium 136 136 - 145 mmol/L    Potassium 4.4 3.5 - 5.1 mmol/L    Chloride 98 (L) 99 - 110 mmol/L    CO2 27 21 - 32 mmol/L    Anion Gap 11 3 - 16    Glucose 110 (H) 70 - 99 mg/dL    BUN 21 (H) 7 - 20 mg/dL    CREATININE 0.7 0.6 - 1.2 mg/dL    GFR Non-African American >60 >60    GFR African American >60 >60    Calcium 8.6 8.3 - 10.6 mg/dL    Total Protein 5.7 (L) 6.4 - 8.2 g/dL    Alb 3.1 (L) 3.4 - 5.0 g/dL    Albumin/Globulin Ratio 1.2 1.1 - 2.2    Total Bilirubin 0.9 0.0 - 1.0 mg/dL    Alkaline Phosphatase 49 40 - 129 U/L    ALT 12 10 - 40 U/L    AST 23 15 - 37 U/L    Globulin 2.6 g/dL   Lipase   Result Value Ref Range    Lipase 14.0 13.0 - 60.0 U/L     Radiographs:  Ct Abdomen Pelvis W Iv Contrast Additional Contrast? None    Result Date: 8/9/2018  EXAMINATION: CT OF THE ABDOMEN AND PELVIS WITH CONTRAST 8/9/2018 7:32 am TECHNIQUE: CT of the abdomen and pelvis was performed with the administration of intravenous contrast. Multiplanar reformatted images are provided for review. Dose modulation, iterative reconstruction, and/or weight based adjustment of the mA/kV was utilized to reduce the radiation dose to as low as reasonably achievable. COMPARISON: 06/06/2013 HISTORY: ORDERING SYSTEM PROVIDED HISTORY: abdominal pain TECHNOLOGIST PROVIDED HISTORY: Additional Contrast?->None Ordering Physician Provided Reason for Exam: low abd pain started yesterday with nausea, noticed lump this am Acuity: Acute Type of Exam: Initial Relevant Medical/Surgical History: knee surgery 8/1/18 FINDINGS: Lower Chest: Bibasilar atelectasis. Small hiatal hernia Organs: Solid abdominal organs unremarkable. Gallbladder surgically absent. Bile ducts within normal limits when accounting for patient's age and cholecystectomy status GI/Bowel: Colonic diverticulosis with no pericolonic inflammatory stranding. No other gastrointestinal abnormality demonstrated.  Pelvis: on blood thinning medication with active bleeding I do feel that she best be treated with admission to hospital for further monitoring of her H&H, orthopedic consultation for anticoagulation issues, and continue monitoring her hematoma. I will discuss the case with the hospitalist.  Patient family agree with the plan at this time no further concerns or questions. ED Medication Orders     Start Ordered     Status Ordering Provider    08/09/18 0845 08/09/18 0830  morphine (PF) injection 4 mg  ONCE      Last MAR action:  Given - by Heather Nunez on 08/09/18 at 0838 [facility]    08/09/18 0713 08/09/18 0714  iopamidol (ISOVUE-370) 76 % injection 75 mL  IMG ONCE PRN      Last MAR action:  Given - by Letty Shah on 08/09/18 at 0731 [facility]    08/09/18 0645 08/09/18 0642  ondansetron (ZOFRAN) injection 4 mg  ONCE      Last MAR action:  Given - by Heather Nunez on 08/09/18 at [address]    08/09/18 0645 08/09/18 0642  morphine (PF) injection 4 mg  ONCE      Last MAR action:  Given - by Heather Nunez on 08/09/18 at [facility] Time:  Upon my examination, the patient has a high probability of imminent or life threatening deterioration due to bleeding/hypovolemia, which required my direct attention, intervention, and management. I have personally provided 30 minutes of critical care time exclusive of time spent on separately billable procedures. Time includes review of labs, radiology results, discussion with consultants, and monitoring for potential decompensation. Interventions were documented as above. Final Impression      1. Abdominal hematoma    2.  Abdominal pain, unspecified abdominal location      DISPOSITION    (Please note that portions of this note may have been completed with a voice recognition program. Efforts were made to edit the dictations but occasionally words are mis-transcribed.)    Mele Mueller,   [facility] Acute

## 2018-08-10 LAB
ABO/RH: NORMAL
ANION GAP SERPL CALCULATED.3IONS-SCNC: 11 MMOL/L (ref 3–16)
ANTIBODY SCREEN: NORMAL
BUN BLDV-MCNC: 26 MG/DL (ref 7–20)
CALCIUM SERPL-MCNC: 8.4 MG/DL (ref 8.3–10.6)
CHLORIDE BLD-SCNC: 95 MMOL/L (ref 99–110)
CO2: 26 MMOL/L (ref 21–32)
CREAT SERPL-MCNC: 1 MG/DL (ref 0.6–1.2)
GFR AFRICAN AMERICAN: >60
GFR NON-AFRICAN AMERICAN: 54
GLUCOSE BLD-MCNC: 143 MG/DL (ref 70–99)
HCT VFR BLD CALC: 17.6 % (ref 36–48)
HCT VFR BLD CALC: 19.6 % (ref 36–48)
HCT VFR BLD CALC: 21 % (ref 36–48)
HCT VFR BLD CALC: 21.8 % (ref 36–48)
HEMOGLOBIN: 6 G/DL (ref 12–16)
HEMOGLOBIN: 6.9 G/DL (ref 12–16)
HEMOGLOBIN: 7.4 G/DL (ref 12–16)
HEMOGLOBIN: 7.8 G/DL (ref 12–16)
INR BLD: 1 (ref 0.86–1.14)
MCH RBC QN AUTO: 33 PG (ref 26–34)
MCHC RBC AUTO-ENTMCNC: 35.9 G/DL (ref 31–36)
MCV RBC AUTO: 92 FL (ref 80–100)
PDW BLD-RTO: 14.3 % (ref 12.4–15.4)
PLATELET # BLD: 223 K/UL (ref 135–450)
PMV BLD AUTO: 6.9 FL (ref 5–10.5)
POTASSIUM REFLEX MAGNESIUM: 4 MMOL/L (ref 3.5–5.1)
PRO-BNP: 2089 PG/ML (ref 0–449)
PROTHROMBIN TIME: 11.4 SEC (ref 9.8–13)
RBC # BLD: 2.37 M/UL (ref 4–5.2)
REASON FOR REJECTION: NORMAL
REJECTED TEST: NORMAL
SODIUM BLD-SCNC: 132 MMOL/L (ref 136–145)
WBC # BLD: 8.7 K/UL (ref 4–11)

## 2018-08-10 PROCEDURE — 2500000003 HC RX 250 WO HCPCS: Performed by: NURSE PRACTITIONER

## 2018-08-10 PROCEDURE — 83880 ASSAY OF NATRIURETIC PEPTIDE: CPT

## 2018-08-10 PROCEDURE — 85018 HEMOGLOBIN: CPT

## 2018-08-10 PROCEDURE — 86901 BLOOD TYPING SEROLOGIC RH(D): CPT

## 2018-08-10 PROCEDURE — 80048 BASIC METABOLIC PNL TOTAL CA: CPT

## 2018-08-10 PROCEDURE — 85027 COMPLETE CBC AUTOMATED: CPT

## 2018-08-10 PROCEDURE — 36430 TRANSFUSION BLD/BLD COMPNT: CPT

## 2018-08-10 PROCEDURE — 6370000000 HC RX 637 (ALT 250 FOR IP): Performed by: NURSE PRACTITIONER

## 2018-08-10 PROCEDURE — 85014 HEMATOCRIT: CPT

## 2018-08-10 PROCEDURE — 2580000003 HC RX 258: Performed by: NURSE PRACTITIONER

## 2018-08-10 PROCEDURE — 36415 COLL VENOUS BLD VENIPUNCTURE: CPT

## 2018-08-10 PROCEDURE — 85610 PROTHROMBIN TIME: CPT

## 2018-08-10 PROCEDURE — 86923 COMPATIBILITY TEST ELECTRIC: CPT

## 2018-08-10 PROCEDURE — 6360000002 HC RX W HCPCS: Performed by: NURSE PRACTITIONER

## 2018-08-10 PROCEDURE — S0028 INJECTION, FAMOTIDINE, 20 MG: HCPCS | Performed by: NURSE PRACTITIONER

## 2018-08-10 PROCEDURE — P9016 RBC LEUKOCYTES REDUCED: HCPCS

## 2018-08-10 PROCEDURE — 1200000000 HC SEMI PRIVATE

## 2018-08-10 PROCEDURE — 99223 1ST HOSP IP/OBS HIGH 75: CPT | Performed by: INTERNAL MEDICINE

## 2018-08-10 PROCEDURE — 86850 RBC ANTIBODY SCREEN: CPT

## 2018-08-10 PROCEDURE — 86900 BLOOD TYPING SEROLOGIC ABO: CPT

## 2018-08-10 PROCEDURE — 99232 SBSQ HOSP IP/OBS MODERATE 35: CPT | Performed by: SURGERY

## 2018-08-10 PROCEDURE — 51702 INSERT TEMP BLADDER CATH: CPT

## 2018-08-10 RX ORDER — OXYCODONE HYDROCHLORIDE AND ACETAMINOPHEN 5; 325 MG/1; MG/1
2 TABLET ORAL EVERY 6 HOURS PRN
Status: DISCONTINUED | OUTPATIENT
Start: 2018-08-10 | End: 2018-08-16 | Stop reason: HOSPADM

## 2018-08-10 RX ORDER — SODIUM CHLORIDE 9 MG/ML
INJECTION, SOLUTION INTRAVENOUS
Status: DISPENSED
Start: 2018-08-10 | End: 2018-08-10

## 2018-08-10 RX ORDER — 0.9 % SODIUM CHLORIDE 0.9 %
500 INTRAVENOUS SOLUTION INTRAVENOUS ONCE
Status: COMPLETED | OUTPATIENT
Start: 2018-08-10 | End: 2018-08-10

## 2018-08-10 RX ORDER — 0.9 % SODIUM CHLORIDE 0.9 %
250 INTRAVENOUS SOLUTION INTRAVENOUS ONCE
Status: COMPLETED | OUTPATIENT
Start: 2018-08-10 | End: 2018-08-10

## 2018-08-10 RX ORDER — OXYCODONE HYDROCHLORIDE AND ACETAMINOPHEN 5; 325 MG/1; MG/1
1 TABLET ORAL EVERY 6 HOURS PRN
Status: DISCONTINUED | OUTPATIENT
Start: 2018-08-10 | End: 2018-08-16 | Stop reason: HOSPADM

## 2018-08-10 RX ADMIN — SODIUM CHLORIDE 500 ML: 9 INJECTION, SOLUTION INTRAVENOUS at 00:20

## 2018-08-10 RX ADMIN — ONDANSETRON 4 MG: 2 INJECTION INTRAMUSCULAR; INTRAVENOUS at 09:58

## 2018-08-10 RX ADMIN — SODIUM CHLORIDE 250 ML: 9 INJECTION, SOLUTION INTRAVENOUS at 21:36

## 2018-08-10 RX ADMIN — FAMOTIDINE 20 MG: 10 INJECTION, SOLUTION INTRAVENOUS at 21:34

## 2018-08-10 RX ADMIN — CARVEDILOL 25 MG: 25 TABLET, FILM COATED ORAL at 09:15

## 2018-08-10 RX ADMIN — FAMOTIDINE 20 MG: 10 INJECTION, SOLUTION INTRAVENOUS at 09:16

## 2018-08-10 RX ADMIN — Medication 10 ML: at 21:35

## 2018-08-10 RX ADMIN — CARVEDILOL 25 MG: 25 TABLET, FILM COATED ORAL at 17:32

## 2018-08-10 RX ADMIN — SODIUM CHLORIDE: 9 INJECTION, SOLUTION INTRAVENOUS at 22:54

## 2018-08-10 RX ADMIN — OXYCODONE HYDROCHLORIDE AND ACETAMINOPHEN 1 TABLET: 5; 325 TABLET ORAL at 17:34

## 2018-08-10 RX ADMIN — ATORVASTATIN CALCIUM 20 MG: 10 TABLET, FILM COATED ORAL at 21:34

## 2018-08-10 RX ADMIN — OXYCODONE HYDROCHLORIDE AND ACETAMINOPHEN 1 TABLET: 5; 325 TABLET ORAL at 09:58

## 2018-08-10 RX ADMIN — LOSARTAN POTASSIUM 100 MG: 100 TABLET, FILM COATED ORAL at 09:15

## 2018-08-10 RX ADMIN — SODIUM CHLORIDE 250 ML: 900 INJECTION, SOLUTION INTRAVENOUS at 02:03

## 2018-08-10 RX ADMIN — OXYCODONE HYDROCHLORIDE AND ACETAMINOPHEN 1 TABLET: 5; 325 TABLET ORAL at 11:17

## 2018-08-10 RX ADMIN — Medication 10 ML: at 09:16

## 2018-08-10 ASSESSMENT — PAIN SCALES - GENERAL
PAINLEVEL_OUTOF10: 6
PAINLEVEL_OUTOF10: 6
PAINLEVEL_OUTOF10: 8

## 2018-08-10 NOTE — CONSULTS
auscultation bilaterally, respirations unlabored   Chest Wall:  No tenderness or deformity   Heart:  Irregular rate and rhythm, S1, S2 normal, no murmur, rub or gallop   Abdomen:   Soft, mild tenderness. Bowel sounds active all four quadrants,  Ecchymosis in lower abdomen           Extremities: Extremities normal, atraumatic, no cyanosis or edema   Pulses: 2+ and symmetric   Skin: Skin color, texture, turgor normal, no rashes or lesions   Pysch: Normal mood and affect   Neurologic: Normal gross motor and sensory exam.       Labs  CBC:   Lab Results   Component Value Date    WBC 8.7 08/10/2018    RBC 2.37 08/10/2018    HGB 7.8 08/10/2018    HCT 21.8 08/10/2018    MCV 92.0 08/10/2018    RDW 14.3 08/10/2018     08/10/2018     CMP:    Lab Results   Component Value Date     08/10/2018    K 4.0 08/10/2018    CL 95 08/10/2018    CO2 26 08/10/2018    BUN 26 08/10/2018    CREATININE 1.0 08/10/2018    GFRAA >60 08/10/2018    GFRAA >60 06/10/2013    AGRATIO 1.2 08/09/2018    LABGLOM 54 08/10/2018    LABGLOM 51 08/20/2015    GLUCOSE 143 08/10/2018    PROT 5.7 08/09/2018    PROT 7.8 11/01/2012    CALCIUM 8.4 08/10/2018    BILITOT 0.9 08/09/2018    ALKPHOS 49 08/09/2018    AST 23 08/09/2018    ALT 12 08/09/2018     PT/INR:  No results found for: [facility]  Lab Results   Component Value Date    CKTOTAL 68 07/09/2013    TROPONINI <0.01 05/15/2017       Imaging:  NM myocardial perfusion 7/11/2018 ([facility])  SPECT PERFUSION IMAGES:   Normal scan after attenuation correction. On nonattenuation corrected images there is a moderate fixed defect in the midportion of the lateral wall. LVEF:   71 %      (Normal is at least 62% for women and 53% for men)  LV WALL MOTION:   Normal without areas of hypo- or dyskinesia. LVEDV:   96ml     (Normal is up to 100ml for women and 150ml for men)  TRANSIENT DILATATION RATIO:    0.97      (Normal is up to 1.3 for women and 1.2 for men)  OTHER:  None      Assessment  Permanent A.fib: on BB. D/c'd coumadin yesterday: INR 1.0 today  Rectus sheath hematoma S/P total knee replacement in setting of coumadin and lovenox  Anemia secondary to blood loss due to rectus hematoma  History of CVA  HTN: controlled  CKD: controlled      Plan:    I had the opportunity to review the clinical symptoms and presentation of Jacque Gallagher with Dr. Maria Del Carmen Nunez    - Continue to hold Asa and Coumadin. Will renew Coumadin depending on stability of her H&H. She was transfused 2U PRBC. She has a high [id number] score of 6 and needs long tern anticoagulation. Thank you for allowing to us to participate in the care or Jacque Gallagher. Further evaluation will be based upon the patient's clinical course and testing results. Queenie Pineda PGY-2  8/10/2018 2:21 PM  [phone]    I personally took the history and examined the patient, reviewed the chart and labs and formulated the plan.        TRAVIS Koch MD, [facility]

## 2018-08-10 NOTE — PROGRESS NOTES
Nutrition Assessment    Type and Reason for Visit: Initial, Positive Nutrition Screen    Nutrition Recommendations:   · Continue general diet  · Start Ensure High Protein to increase protein intake and aid in wound healing  · Monitor and encourage PO intake  · Monitor nutrition adequacy, weights, pertinent labs, BMs and clinical progress      Malnutrition Assessment:  · Malnutrition Status: No malnutrition    Nutrition Diagnosis:   · Problem: Increased nutrient needs (protein)  · Etiology: related to Increased demand for energy/nutrients due to     Signs and symptoms:  as evidenced by Presence of wounds (surgical incision)    Nutrition Assessment:  · Subjective Assessment: Positive screen for home TPN or TF. Patient is a 68 y.o. female admitted from SNF for rectus cheath hematoma. PMHx ofCKD, HTN and HLD. Flase screen for EN or TPN. S/p TKR on 8/1/18. Pt seen in room this date, family at bedside. Pt and family report decreased appetite and intake this past week. Pt denies N/V this date. Family reports pt was planning on increasing protein intake at SNF to aid in wound healing. Pt agreeable to Ensure High Protein, will order. Encouraged meals as able and supplement to increase protein intake. · Nutrition-Focused Physical Findings: surgical incision  · Wound Type: Surgical Wound  · Current Nutrition Therapies:  · Oral Diet Orders: General   · Oral Diet intake: 26-50%  · Oral Nutrition Supplement (ONS) Orders: None  · Anthropometric Measures:  · Ht: 5' 4\" (162.6 cm)   · Current Body Wt: 227 lb (103 kg)  · Ideal Body Wt: 120 lb (54.4 kg)   · BMI Classification: BMI 35.0 - 39.9 Obese Class II  · Comparative Standards (Estimated Nutrition Needs):  · Estimated Daily Total Kcal: 6312-7816  · Estimated Daily Protein (g): 66-77    Estimated Intake vs Estimated Needs: Intake Less Than Needs    Nutrition Risk Level:  Moderate    Nutrition Interventions:   Continue current diet, Start ONS  Continued Inpatient

## 2018-08-10 NOTE — PROGRESS NOTES
reactive to light. Extra ocular muscles intact. Conjunctivae/corneas clear. Neck: Supple, with full range of motion. No jugular venous distention. Trachea midline. Respiratory:  Normal respiratory effort. Clear to auscultation, bilaterally without Rales/Wheezes/Rhonchi. Cardiovascular:  Regular rate and rhythm with normal S1/S2 without murmurs, rubs or gallops. Abdomen: Soft, tender, hematoma resolved, non-distended with normal bowel sounds. Musculoskeletal:  No clubbing, cyanosis or edema bilaterally. Full range of motion without deformity. Skin: Skin color pale, texture, turgor poor. No rashes or lesions. Right knee incision intact   Neurologic:  Neurovascularly intact without any focal sensory/motor deficits. Cranial nerves: II-XII intact, grossly non-focal.  Psychiatric:  Alert and oriented, thought content appropriate, normal insight  Capillary Refill: Brisk,< 3 seconds   Peripheral Pulses: +2 palpable, equal bilaterally          Labs:   Recent Labs      08/09/18   0645   08/10/18   0014  08/10/18   1045  08/10/18   1701   WBC  5.5   --    --   8.7   --    HGB  9.0*   < >  6.0*  7.8*  7.4*   HCT  26.3*   < >  17.6*  21.8*  21.0*   PLT  231   --    --   223   --     < > = values in this interval not displayed. Recent Labs      08/09/18   0645  08/10/18   0957   NA  136  132*   K  4.4  4.0   CL  98*  95*   CO2  27  26   BUN  21*  26*   CREATININE  0.7  1.0   CALCIUM  8.6  8.4     Recent Labs      08/09/18   0645   AST  23   ALT  12   BILITOT  0.9   ALKPHOS  49     Recent Labs      08/09/18   0645  08/10/18   0957   INR  1.98*  1.00     No results for input(s): [place] in the last 72 hours.     Urinalysis:    Lab Results   Component Value Date    NITRU Negative 08/09/2018    WBCUA 0-2 08/09/2018    BACTERIA 1+ 08/09/2018    RBCUA 3-5 08/09/2018    BLOODU Negative 08/09/2018    SPECGRAV 1.010 08/09/2018    GLUCOSEU Negative 08/09/2018    GLUCOSEU NEGATIVE 04/20/2011       Radiology:  CT ABDOMEN PELVIS W IV CONTRAST Additional Contrast? None   Final Result   Rectus muscle hematoma with hematoma extension into the extraperitoneal   prevesical space.   There is extravasation of intravenous contrast compatible   with active bleeding at the time of imaging                 Assessment/Plan:    Active Hospital Problems    Diagnosis Date Noted    Atrial fibrillation [facility]) [I48.91] 11/30/2012     Priority: Medium    Long term current use of anticoagulant therapy [Z79.01] 11/30/2012     Priority: Medium    MR (mitral regurgitation) [I34.0] 10/11/2011     Priority: Medium    TI (tricuspid incompetence) [I07.1] 10/11/2011     Priority: Medium    Rectus sheath hematoma [S30.1XXA] 08/09/2018    Abdominal pain [R10.9] 08/09/2018    Abdominal hematoma [S30.1XXA]     Status post total right knee replacement [Z96.651] 08/06/2018    Localized osteoarthritis of right knee [M17.11] 08/01/2018    Chronic kidney disease, stage III (moderate) [N18.3] 08/13/2014    HTN (hypertension) [I10] 10/10/2011       Abdominal pain POA 2/2 to rectus sheath hematoma in the setting of Lovenox and coumadin   - CT imaginging as above   - HOLD coumadin and no DVT PPX, received Vitamin K dose for active bleeding   - serial H/H q 6  - IVF and pain and nausea meds  - If continues to have s/so pf bleeding will need consult to IR  - INR @1.0 no need for FFP      S/P total knee replacement   - was in SNF for rehab will need to continue PT/OT   - Made Ortho aware pt was readmitted      Anemia acute blood loss from surgery and now bleeding hematoma in abdomen   - Serial H/H   - 8/10 drop to 6.0 transfused with 2 Units      AF - controlled   - will hold AC, cont BB for rate control   - Cardiology consulted per family request- appreciated  - Has [id number] score of 6 and needs long tern anticoagulation.  - R>B at pres for any AC     HTN- ( BP soft 2/2 to volume loss )   - continue current meds, coreg, losartan- with parameters      CKD-

## 2018-08-10 NOTE — PROGRESS NOTES
[facility]    PATIENT NAME: Brendan Alykeeper     TODAY'S DATE: 8/10/2018    CHIEF COMPLAINT: abd pain    INTERVAL HISTORY/HPI:    Pt with mild pain, no nausea, no fevers or chills. Getting PRBCs. REVIEW OF SYSTEMS:  Pertinent positives and negatives as per interval history section    OBJECTIVE:  VITALS:  /62   Pulse 88   Temp 98.2 °F (36.8 °C) (Oral)   Resp 18   Ht 5' 4\" (1.626 m)   Wt 227 lb 8.2 oz (103.2 kg)   SpO2 93%   BMI 39.05 kg/m²     INTAKE/OUTPUT:    I/O last 3 completed shifts: In: 9343 [P.O.:560; I.V.:1935]  Out: 900 [Urine:900]  No intake/output data recorded. CONSTITUTIONAL:  awake and alert  LUNGS:  Respirations easy and unlabored, no crackles or wheezing  CARD:  regular rate   ABDOMEN:  normal bowel sounds, soft, non-distended, mild tender lower abdomen, ecchymosis stable     Data:  CBC:   Recent Labs      08/09/18   0645  08/09/18   1220  08/10/18   0014   WBC  5.5   --    --    HGB  9.0*  8.7*  6.0*   HCT  26.3*  25.4*  17.6*   PLT  231   --    --      BMP:    Recent Labs      08/09/18   0645   NA  136   K  4.4   CL  98*   CO2  27   BUN  21*   CREATININE  0.7   GLUCOSE  110*     Hepatic:   Recent Labs      08/09/18   0645   AST  23   ALT  12   BILITOT  0.9   ALKPHOS  49     Mag:    No results for input(s): MG in the last 72 hours. Phos:   No results for input(s): PHOS in the last 72 hours. INR:   Recent Labs      08/09/18   0645   INR  1.98*       Radiology Review:  *Imaging personally reviewed by me. NA      ASSESSMENT AND PLAN:  69 yo with rectus sheath hematoma  1. Responding well to 2 units PRBC with stable vitals. 2.  Recheck Labs including INR after transfusion. Received Vit K yesterday morning. If INR still elevated may have to consider FFP to more quickly correct.   3.  If were to become unstable and still bleeding would consult with CAMPBELL Robertson   [id number]

## 2018-08-10 NOTE — PLAN OF CARE
Problem: Nutrition  Goal: Optimal nutrition therapy  Outcome: Ongoing  Nutrition Problem: Increased nutrient needs (protein)  Intervention: Food and/or Nutrient Delivery: Continue current diet, Start ONS  Nutritional Goals: PO intakes of 50% or more of protein rich meals and supplements this admission

## 2018-08-10 NOTE — PROGRESS NOTES
Shift assessment completed and charted. Per pt hasnt had a bm in 3 days, suppository given, pt now has had two bm. x2 to [facility]. No weight per pt on right leg due to recent knee replacement. No c/o for pain med needs at this time. bp stable but low, pt denies any symptoms at this time. Bed alarm on for pt safety. Pt not tolerating any PO at this time. Bed locked and in lowest position. Call light within reach. Pt denies any other needs at this time. Will continue to monitor.

## 2018-08-11 LAB
BLOOD BANK DISPENSE STATUS: NORMAL
BLOOD BANK PRODUCT CODE: NORMAL
BPU ID: NORMAL
DESCRIPTION BLOOD BANK: NORMAL
HCT VFR BLD CALC: 22.4 % (ref 36–48)
HCT VFR BLD CALC: 22.6 % (ref 36–48)
HCT VFR BLD CALC: 23.1 % (ref 36–48)
HEMOGLOBIN: 7.7 G/DL (ref 12–16)
HEMOGLOBIN: 7.7 G/DL (ref 12–16)
HEMOGLOBIN: 8 G/DL (ref 12–16)

## 2018-08-11 PROCEDURE — 6360000002 HC RX W HCPCS: Performed by: NURSE PRACTITIONER

## 2018-08-11 PROCEDURE — 85014 HEMATOCRIT: CPT

## 2018-08-11 PROCEDURE — P9016 RBC LEUKOCYTES REDUCED: HCPCS

## 2018-08-11 PROCEDURE — 94761 N-INVAS EAR/PLS OXIMETRY MLT: CPT

## 2018-08-11 PROCEDURE — 2580000003 HC RX 258: Performed by: NURSE PRACTITIONER

## 2018-08-11 PROCEDURE — 99232 SBSQ HOSP IP/OBS MODERATE 35: CPT | Performed by: SURGERY

## 2018-08-11 PROCEDURE — 36415 COLL VENOUS BLD VENIPUNCTURE: CPT

## 2018-08-11 PROCEDURE — 1200000000 HC SEMI PRIVATE

## 2018-08-11 PROCEDURE — 6370000000 HC RX 637 (ALT 250 FOR IP): Performed by: NURSE PRACTITIONER

## 2018-08-11 PROCEDURE — 86923 COMPATIBILITY TEST ELECTRIC: CPT

## 2018-08-11 PROCEDURE — 85018 HEMOGLOBIN: CPT

## 2018-08-11 PROCEDURE — S0028 INJECTION, FAMOTIDINE, 20 MG: HCPCS | Performed by: NURSE PRACTITIONER

## 2018-08-11 PROCEDURE — 36430 TRANSFUSION BLD/BLD COMPNT: CPT

## 2018-08-11 PROCEDURE — 2500000003 HC RX 250 WO HCPCS: Performed by: NURSE PRACTITIONER

## 2018-08-11 RX ORDER — 0.9 % SODIUM CHLORIDE 0.9 %
250 INTRAVENOUS SOLUTION INTRAVENOUS ONCE
Status: COMPLETED | OUTPATIENT
Start: 2018-08-11 | End: 2018-08-11

## 2018-08-11 RX ADMIN — SODIUM CHLORIDE: 9 INJECTION, SOLUTION INTRAVENOUS at 20:49

## 2018-08-11 RX ADMIN — OXYCODONE HYDROCHLORIDE AND ACETAMINOPHEN 1 TABLET: 5; 325 TABLET ORAL at 12:37

## 2018-08-11 RX ADMIN — ONDANSETRON 4 MG: 2 INJECTION INTRAMUSCULAR; INTRAVENOUS at 12:41

## 2018-08-11 RX ADMIN — LOSARTAN POTASSIUM 100 MG: 100 TABLET, FILM COATED ORAL at 09:33

## 2018-08-11 RX ADMIN — CARVEDILOL 25 MG: 25 TABLET, FILM COATED ORAL at 07:42

## 2018-08-11 RX ADMIN — FAMOTIDINE 20 MG: 10 INJECTION, SOLUTION INTRAVENOUS at 20:51

## 2018-08-11 RX ADMIN — FAMOTIDINE 20 MG: 10 INJECTION, SOLUTION INTRAVENOUS at 08:06

## 2018-08-11 RX ADMIN — OXYCODONE HYDROCHLORIDE AND ACETAMINOPHEN 1 TABLET: 5; 325 TABLET ORAL at 08:03

## 2018-08-11 RX ADMIN — SODIUM CHLORIDE 250 ML: 900 INJECTION, SOLUTION INTRAVENOUS at 01:12

## 2018-08-11 RX ADMIN — CARVEDILOL 25 MG: 25 TABLET, FILM COATED ORAL at 17:11

## 2018-08-11 RX ADMIN — OXYCODONE HYDROCHLORIDE AND ACETAMINOPHEN 2 TABLET: 5; 325 TABLET ORAL at 19:34

## 2018-08-11 RX ADMIN — ATORVASTATIN CALCIUM 20 MG: 10 TABLET, FILM COATED ORAL at 20:50

## 2018-08-11 RX ADMIN — ONDANSETRON 4 MG: 2 INJECTION INTRAMUSCULAR; INTRAVENOUS at 19:34

## 2018-08-11 ASSESSMENT — PAIN DESCRIPTION - LOCATION
LOCATION: KNEE
LOCATION: KNEE

## 2018-08-11 ASSESSMENT — PAIN DESCRIPTION - PAIN TYPE
TYPE: ACUTE PAIN
TYPE: ACUTE PAIN

## 2018-08-11 ASSESSMENT — PAIN SCALES - GENERAL
PAINLEVEL_OUTOF10: 7
PAINLEVEL_OUTOF10: 6
PAINLEVEL_OUTOF10: 5
PAINLEVEL_OUTOF10: 6

## 2018-08-11 NOTE — PLAN OF CARE
Problem: Pain:  Goal: Control of acute pain  Control of acute pain   Outcome: Ongoing  Patient rated pain 6/10. Administered PRN analgesic,see MAR. Will reassess.

## 2018-08-11 NOTE — PROGRESS NOTES
Shift assessment completed and charted. Low b/p-250 bolus given, b/p improved. 1 unit of blood ordered, will start when blood ready. Q2 turn. Daughter updated over phone. x2 to [facility] when not dizzy or lightheaded. abd less hard. Per pt she feels a little better today. [place] better output, draining. SCDs in place. Bed alarm on. Bed locked and in lowest position. Call light within reach. Pt denies any other needs at this time. Will continue to monitor.

## 2018-08-11 NOTE — PROGRESS NOTES
[facility]    PATIENT NAME: Shahnaz Anderson     TODAY'S DATE: 8/11/2018    CHIEF COMPLAINT: Abdominal pain    INTERVAL HISTORY/HPI:    Pt c/o pain over hematoma. Worse with movement. REVIEW OF SYSTEMS:  Pertinent positives and negatives as per interval history section    OBJECTIVE:  VITALS:  BP 96/68   Pulse 69   Temp 98.2 °F (36.8 °C) (Oral)   Resp 16   Ht 5' 4\" (1.626 m)   Wt 227 lb 8.2 oz (103.2 kg)   SpO2 96%   BMI 39.05 kg/m²     INTAKE/OUTPUT:    I/O last 3 completed shifts: In: 1861.7 [P.O.:820; I.V.:450; Blood:591.7]  Out: 950 [Urine:950]  No intake/output data recorded. CONSTITUTIONAL:  awake and alert  LUNGS:  Respirations easy and unlabored, clear to auscultation  CARD:  regular rate and rhythm  ABDOMEN:  normal bowel sounds, soft, non-distended, tenderness noted in the left upper quadrant and in the left lower quadrant     Data:  CBC:   Recent Labs      08/09/18   0645   08/10/18   1045  08/10/18   1701  08/10/18   2339  08/11/18   0538   WBC  5.5   --   8.7   --    --    --    HGB  9.0*   < >  7.8*  7.4*  6.9*  8.0*   HCT  26.3*   < >  21.8*  21.0*  19.6*  23.1*   PLT  231   --   223   --    --    --     < > = values in this interval not displayed. BMP:    Recent Labs      08/09/18   0645  08/10/18   0957   NA  136  132*   K  4.4  4.0   CL  98*  95*   CO2  27  26   BUN  21*  26*   CREATININE  0.7  1.0   GLUCOSE  110*  143*     Hepatic:   Recent Labs      08/09/18   0645   AST  23   ALT  12   BILITOT  0.9   ALKPHOS  49     Mag:    No results for input(s): MG in the last 72 hours. Phos:   No results for input(s): PHOS in the last 72 hours. INR:   Recent Labs      08/09/18   0645  08/10/18   0957   INR  1.98*  1.00       Radiology Review:  *Imaging personally reviewed by me. No new imaging    INR yesterday was normal      ASSESSMENT AND PLAN:  Rectus sheath hematoma. Coags corrected. Follow HH. If she has ongoing bleeding will need IR embolization. No surgical needs. Will sign off.     Manuela ENCARNACION   [id number]

## 2018-08-11 NOTE — PROGRESS NOTES
range of motion. No jugular venous distention. Trachea midline. Respiratory:  Normal respiratory effort. Clear to auscultation, bilaterally without Rales/Wheezes/Rhonchi. Cardiovascular:  Regular rate and rhythm with normal S1/S2 without murmurs, rubs or gallops. Abdomen: Soft, tender, hematoma resolved, non-distended with normal bowel sounds. Musculoskeletal:  No clubbing, cyanosis or edema bilaterally. Full range of motion without deformity. Skin: Skin color pale, texture, turgor poor. No rashes or lesions. Right knee incision intact   Neurologic:  Neurovascularly intact without any focal sensory/motor deficits. Cranial nerves: II-XII intact, grossly non-focal.  Psychiatric:  Alert and oriented, thought content appropriate, normal insight  Capillary Refill: Brisk,< 3 seconds   Peripheral Pulses: +2 palpable, equal bilaterally          Labs:   Recent Labs      08/09/18   0645   08/10/18   1045   08/10/18   2339  08/11/18   0538  08/11/18   1201   WBC  5.5   --   8.7   --    --    --    --    HGB  9.0*   < >  7.8*   < >  6.9*  8.0*  7.7*   HCT  26.3*   < >  21.8*   < >  19.6*  23.1*  22.4*   PLT  231   --   223   --    --    --    --     < > = values in this interval not displayed.      Recent Labs      08/09/18   0645  08/10/18   0957   NA  136  132*   K  4.4  4.0   CL  98*  95*   CO2  27  26   BUN  21*  26*   CREATININE  0.7  1.0   CALCIUM  8.6  8.4     Recent Labs      08/09/18   0645   AST  23   ALT  12   BILITOT  0.9   ALKPHOS  49     Recent Labs      08/09/18   0645  08/10/18   0957   INR  1.98*  1.00     Urinalysis:      Lab Results   Component Value Date    NITRU Negative 08/09/2018    WBCUA 0-2 08/09/2018    BACTERIA 1+ 08/09/2018    RBCUA 3-5 08/09/2018    BLOODU Negative 08/09/2018    SPECGRAV 1.010 08/09/2018    GLUCOSEU Negative 08/09/2018    GLUCOSEU NEGATIVE 04/20/2011       Radiology:  CT ABDOMEN PELVIS W IV CONTRAST Additional Contrast? None   Final Result   Rectus muscle hematoma with hematoma extension into the extraperitoneal   prevesical space.   There is extravasation of intravenous contrast compatible   with active bleeding at the time of imaging                 Assessment/Plan:    Active Hospital Problems    Diagnosis Date Noted    Atrial fibrillation [facility]) [I48.91] 11/30/2012     Priority: Medium    Long term current use of anticoagulant therapy [Z79.01] 11/30/2012     Priority: Medium    MR (mitral regurgitation) [I34.0] 10/11/2011     Priority: Medium    TI (tricuspid incompetence) [I07.1] 10/11/2011     Priority: Medium    Rectus sheath hematoma [S30.1XXA] 08/09/2018    Abdominal pain [R10.9] 08/09/2018    Abdominal hematoma [S30.1XXA]     Status post total right knee replacement [Z96.651] 08/06/2018    Localized osteoarthritis of right knee [M17.11] 08/01/2018    Chronic kidney disease, stage III (moderate) [N18.3] 08/13/2014    HTN (hypertension) [I10] 10/10/2011       Abdominal pain POA and acute blood loss anemia 2/2 to rectus sheath hematoma:  - In the setting of Lovenox and Coumadin   - Continue to hold coumadin and no DVT PPX, s/p Vitamin K   - Continue to monitor serial H/H's q 6 hrs; pt is s/p 2 units PRBC on 8/10  - INR @1.0 - no need for FFP   - General surgery consulted, no surgical needs, if continues to have s/s of bleeding will need to consult IR for embolization      Status post total knee replacement:   - Was in SNF for rehab will need to continue PT/OT   - Made Ortho aware pt was readmitted      Atrial fibrillation - controlled:   - Continue to hold AC, R>B at present for any AC   - Continue BB for rate control   - Cardiology consulted, has [id number] score of 6 and needs long tern anticoagulation.   - Monitor on telemetry    Hypertension:    - BP soft 2/2 to volume/blood loss  - Continue current meds, coreg and losartan with hold parameters      Chronic kidney disease:   - Stable, monitor labs     DVT Prophylaxis: None in setting of active bleeding,

## 2018-08-11 NOTE — PROGRESS NOTES
Shift assessment updated as charted. Patient a/ox4. VSS. Patient resting at this time. Bed alarm active and audible. Call light in reach. Will monitor.

## 2018-08-12 ENCOUNTER — APPOINTMENT (OUTPATIENT)
Dept: GENERAL RADIOLOGY | Age: 78
DRG: 920 | End: 2018-08-12
Payer: MEDICARE

## 2018-08-12 LAB
ANION GAP SERPL CALCULATED.3IONS-SCNC: 9 MMOL/L (ref 3–16)
BUN BLDV-MCNC: 21 MG/DL (ref 7–20)
CALCIUM SERPL-MCNC: 8.1 MG/DL (ref 8.3–10.6)
CHLORIDE BLD-SCNC: 99 MMOL/L (ref 99–110)
CO2: 26 MMOL/L (ref 21–32)
CREAT SERPL-MCNC: 0.9 MG/DL (ref 0.6–1.2)
GFR AFRICAN AMERICAN: >60
GFR NON-AFRICAN AMERICAN: >60
GLUCOSE BLD-MCNC: 84 MG/DL (ref 70–99)
HCT VFR BLD CALC: 22.1 % (ref 36–48)
HCT VFR BLD CALC: 22.5 % (ref 36–48)
HCT VFR BLD CALC: 23.7 % (ref 36–48)
HEMOGLOBIN: 7.7 G/DL (ref 12–16)
HEMOGLOBIN: 7.9 G/DL (ref 12–16)
HEMOGLOBIN: 8.3 G/DL (ref 12–16)
MCH RBC QN AUTO: 31.9 PG (ref 26–34)
MCHC RBC AUTO-ENTMCNC: 34.9 G/DL (ref 31–36)
MCV RBC AUTO: 91.4 FL (ref 80–100)
PDW BLD-RTO: 15.4 % (ref 12.4–15.4)
PLATELET # BLD: 224 K/UL (ref 135–450)
PMV BLD AUTO: 7.4 FL (ref 5–10.5)
POTASSIUM REFLEX MAGNESIUM: 4.1 MMOL/L (ref 3.5–5.1)
RBC # BLD: 2.42 M/UL (ref 4–5.2)
SODIUM BLD-SCNC: 134 MMOL/L (ref 136–145)
WBC # BLD: 6.8 K/UL (ref 4–11)

## 2018-08-12 PROCEDURE — 85014 HEMATOCRIT: CPT

## 2018-08-12 PROCEDURE — 74018 RADEX ABDOMEN 1 VIEW: CPT

## 2018-08-12 PROCEDURE — 2580000003 HC RX 258: Performed by: NURSE PRACTITIONER

## 2018-08-12 PROCEDURE — 85027 COMPLETE CBC AUTOMATED: CPT

## 2018-08-12 PROCEDURE — 99233 SBSQ HOSP IP/OBS HIGH 50: CPT | Performed by: NURSE PRACTITIONER

## 2018-08-12 PROCEDURE — 80048 BASIC METABOLIC PNL TOTAL CA: CPT

## 2018-08-12 PROCEDURE — 85018 HEMOGLOBIN: CPT

## 2018-08-12 PROCEDURE — 6370000000 HC RX 637 (ALT 250 FOR IP): Performed by: REGISTERED NURSE

## 2018-08-12 PROCEDURE — 6360000002 HC RX W HCPCS: Performed by: NURSE PRACTITIONER

## 2018-08-12 PROCEDURE — 1200000000 HC SEMI PRIVATE

## 2018-08-12 PROCEDURE — S0028 INJECTION, FAMOTIDINE, 20 MG: HCPCS | Performed by: NURSE PRACTITIONER

## 2018-08-12 PROCEDURE — 2500000003 HC RX 250 WO HCPCS: Performed by: NURSE PRACTITIONER

## 2018-08-12 PROCEDURE — 6370000000 HC RX 637 (ALT 250 FOR IP): Performed by: NURSE PRACTITIONER

## 2018-08-12 PROCEDURE — 36415 COLL VENOUS BLD VENIPUNCTURE: CPT

## 2018-08-12 RX ORDER — DOCUSATE SODIUM 100 MG/1
100 CAPSULE, LIQUID FILLED ORAL 2 TIMES DAILY
Status: DISCONTINUED | OUTPATIENT
Start: 2018-08-12 | End: 2018-08-15

## 2018-08-12 RX ADMIN — LOSARTAN POTASSIUM 100 MG: 100 TABLET, FILM COATED ORAL at 08:43

## 2018-08-12 RX ADMIN — FAMOTIDINE 20 MG: 10 INJECTION, SOLUTION INTRAVENOUS at 20:12

## 2018-08-12 RX ADMIN — DOCUSATE SODIUM 100 MG: 100 CAPSULE, LIQUID FILLED ORAL at 20:12

## 2018-08-12 RX ADMIN — ONDANSETRON 4 MG: 2 INJECTION INTRAMUSCULAR; INTRAVENOUS at 14:50

## 2018-08-12 RX ADMIN — ONDANSETRON 4 MG: 2 INJECTION INTRAMUSCULAR; INTRAVENOUS at 08:43

## 2018-08-12 RX ADMIN — PROMETHAZINE HYDROCHLORIDE 25 MG: 25 TABLET ORAL at 16:32

## 2018-08-12 RX ADMIN — CARVEDILOL 25 MG: 25 TABLET, FILM COATED ORAL at 08:43

## 2018-08-12 RX ADMIN — PROMETHAZINE HYDROCHLORIDE 25 MG: 25 TABLET ORAL at 10:03

## 2018-08-12 RX ADMIN — SODIUM CHLORIDE: 9 INJECTION, SOLUTION INTRAVENOUS at 23:06

## 2018-08-12 RX ADMIN — FAMOTIDINE 20 MG: 10 INJECTION, SOLUTION INTRAVENOUS at 08:43

## 2018-08-12 RX ADMIN — OXYCODONE HYDROCHLORIDE AND ACETAMINOPHEN 2 TABLET: 5; 325 TABLET ORAL at 15:28

## 2018-08-12 RX ADMIN — DOCUSATE SODIUM 100 MG: 100 CAPSULE, LIQUID FILLED ORAL at 11:27

## 2018-08-12 RX ADMIN — OXYCODONE HYDROCHLORIDE AND ACETAMINOPHEN 2 TABLET: 5; 325 TABLET ORAL at 08:43

## 2018-08-12 RX ADMIN — ATORVASTATIN CALCIUM 20 MG: 10 TABLET, FILM COATED ORAL at 20:12

## 2018-08-12 ASSESSMENT — PAIN DESCRIPTION - PAIN TYPE
TYPE: ACUTE PAIN
TYPE: ACUTE PAIN

## 2018-08-12 ASSESSMENT — PAIN SCALES - GENERAL
PAINLEVEL_OUTOF10: 7
PAINLEVEL_OUTOF10: 6

## 2018-08-12 ASSESSMENT — PAIN DESCRIPTION - LOCATION
LOCATION: ABDOMEN
LOCATION: KNEE

## 2018-08-12 NOTE — PROGRESS NOTES
Shift assessment updated as charted. Patient a/ox4. VSS. Patient c/o of pain, administered PRN analgesic. Ice applied to R knee. Call light in reach. Will monitor.

## 2018-08-12 NOTE — PROGRESS NOTES
Pt is alert and oriented x 4. Vitals signs are stable. Assessment is as charted. Pt denies further needs at this time. Will continue to monitor.

## 2018-08-12 NOTE — PROGRESS NOTES
[address]     Cardiology                                     Progress Note    Admission date:  2018    Reason for follow up visit: afib and rectus sheath hematoma    HPI/CC: Jennifer Evans was admitted on 2018 with abdominal pain and was diagnosed with a rectus sheath hematoma. INR was 1.98 and Coumadin and Lovenox were stopped. Rhythm has been afib with an average HR of 65. Subjective: She complains of nausea and abdominal tenderness. Denies chest pain and palpitations. Vitals:  Blood pressure 103/70, pulse 71, temperature 98.1 °F (36.7 °C), temperature source Oral, resp. rate 16, height 5' 4\" (1.626 m), weight 227 lb 8.2 oz (103.2 kg), SpO2 96 %, not currently breastfeeding.   Temp  Av.9 °F (36.6 °C)  Min: 97.5 °F (36.4 °C)  Max: 98.1 °F (36.7 °C)  Pulse  Av.9  Min: 65  Max: 89  BP  Min: 94/51  Max: 130/75  SpO2  Av.5 %  Min: 94 %  Max: 97 %    24 hour I/O    Intake/Output Summary (Last 24 hours) at 18 1358  Last data filed at 18 0843   Gross per 24 hour   Intake             1435 ml   Output             1150 ml   Net              285 ml     Current Facility-Administered Medications   Medication Dose Route Frequency Provider Last Rate Last Dose    docusate sodium (COLACE) capsule 100 mg  100 mg Oral  [facility], APRN - CNP   100 mg at 18 1127    oxyCODONE-acetaminophen (PERCOCET) 5-325 MG per tablet 1 tablet  1 tablet Oral Q6H PRN Alireza Urias, APRN - CNP   1 tablet at 18 1237    Or    oxyCODONE-acetaminophen (PERCOCET) 5-325 MG per tablet 2 tablet  2 tablet Oral Q6H PRN Alireza Urias, APRN - CNP   2 tablet at 18 0843    atorvastatin (LIPITOR) tablet 20 mg  20 mg Oral Nightly Alireza Urias, APRN - CNP   20 mg at 18 2050    carvedilol (COREG) tablet 25 mg  25 mg Oral BID  Alirezajose daniel Urias, APRN - CNP   25 mg at 18 0843    sodium chloride flush 0.9 % injection 10 mL  10 mL Intravenous 2 times per day Pat Levels, APRN - CNP   10 mL at 08/10/18 2135    sodium chloride flush 0.9 % injection 10 mL  10 mL Intravenous PRN Pat Levels, APRN - CNP        magnesium hydroxide (MILK OF MAGNESIA) 400 MG/5ML suspension 30 mL  30 mL Oral Daily PRN Pat Levels, APRN - CNP   30 mL at 08/09/18 1839    ondansetron (ZOFRAN) injection 4 mg  4 mg Intravenous Q6H PRN Pat Levels, APRN - CNP   4 mg at 08/12/18 0843    0.9 % sodium chloride infusion   Intravenous Continuous Pat Levels, APRN - CNP 50 mL/hr at 08/11/18 2049      losartan (COZAAR) tablet 100 mg  100 mg Oral Daily Pat Levels, APRN - CNP   100 mg at 08/12/18 4375    promethazine (PHENERGAN) tablet 25 mg  25 mg Oral Q6H PRN Pat Levels, APRN - CNP   25 mg at 08/12/18 1003    famotidine (PEPCID) injection 20 mg  20 mg Intravenous BID Kenyatta Jackeline, APRN - CNP   20 mg at 08/12/18 6140    bisacodyl (DULCOLAX) suppository 10 mg  10 mg Rectal Daily PRN Kenyatta Jackeline, APRN - CNP   10 mg at 08/09/18 2131       Objective:     Telemetry monitor: AFib    Physical Exam:  Constitutional:  Comfortable, NAD  Skin:  Warm and dry; no rash or lesions; pale  Neck:  Supple, no JVD  Heart:  Irregular, normal apex, S1 and S2 normal  Lungs:  Clear; no wheezing/rhonchi/rales  Abdomen: soft, + tender, + bowel sounds  Extremities:  No edema or cyanosis  Neuro: alert and oriented, moves all extremities equally    Data  Stress test 7/11/2018:  1. No convincing evidence of ischemia or infarction. On noncontrasted images there is a moderate sized fixed defect in the midportion of the lateral wall but this resolves with attenuation correction. Echo 9/22/2015:  Atrial fibrillation with a ventricular rate of 92 beats per minute. Overall left ventricular ejection fraction is estimated to be 55-60%. The left ventricular wall motion is normal.  The left atrium is mildly dilated. The right atrium is mildly dilated.   There is trace mitral

## 2018-08-12 NOTE — PROGRESS NOTES
Occupational Therapy/Physical Therapy  HOLD OT/PT Eval at this time per RN, pt with emesis and now resting. Will re-attempt 8/13.    ALICIA Jones/CLAUDE LeonardoT

## 2018-08-12 NOTE — PROGRESS NOTES
Hospitalist Progress Note      PCP: Marie Luis MD    Date of Admission: 8/9/2018     Chief Complaint: Abdominal pain       History Of Present Illness:   68 y.o. female  PNH of AF on long term AC, HTN, CKD, who is s/p TKR on 8/1/18 and was discharged to SNF  who presented to [facility] ED with complaints of abdominal pain and palpable lump. CT showed rectus hematoma with extension and compatable with active bleeding. The patient was on Lovenox and coumadin, INR 1.9 on admission. Hemodynamically stable. ED doc consulted with Surgery in the ED.      Subjective/Objective:   Pt is on RA. Afebrile. Reports N/V this morning after eating breakfast. No dyspnea or chest pain. Mild abdominal pain with palpation. H&H is stable. Discussed plan of care with daughter on the phone today. Medications:  Reviewed    Infusion Medications    sodium chloride 50 mL/hr at 08/11/18 2049     Scheduled Medications    docusate sodium  100 mg Oral BID    atorvastatin  20 mg Oral Nightly    carvedilol  25 mg Oral BID WC    sodium chloride flush  10 mL Intravenous 2 times per day    losartan  100 mg Oral Daily    famotidine (PEPCID) injection  20 mg Intravenous BID     PRN Meds: oxyCODONE-acetaminophen **OR** oxyCODONE-acetaminophen, sodium chloride flush, magnesium hydroxide, ondansetron, promethazine, bisacodyl      Intake/Output Summary (Last 24 hours) at 08/12/18 1301  Last data filed at 08/12/18 0843   Gross per 24 hour   Intake             1435 ml   Output             1150 ml   Net              285 ml       Physical Exam Performed:    /70   Pulse 71   Temp 98.1 °F (36.7 °C) (Oral)   Resp 16   Ht 5' 4\" (1.626 m)   Wt 227 lb 8.2 oz (103.2 kg)   SpO2 96%   BMI 39.05 kg/m²     General appearance:  No apparent distress, appears stated age and cooperative. HEENT:  Normal cephalic, atraumatic without obvious deformity. Pupils equal, round, and reactive to light. Extra ocular muscles intact. Conjunctivae/corneas clear. Neck: Supple, with full range of motion. No jugular venous distention. Trachea midline. Respiratory:  Normal respiratory effort. Clear to auscultation, bilaterally without Rales/Wheezes/Rhonchi. Cardiovascular:  Regular rate and rhythm with normal S1/S2 without murmurs, rubs or gallops. Abdomen: Soft, tender, hematoma resolved, non-distended with normal bowel sounds. Musculoskeletal:  No clubbing, cyanosis or edema bilaterally. Full range of motion without deformity. Skin: Skin color pale, texture, turgor poor. No rashes or lesions. Right knee incision intact   Neurologic:  Neurovascularly intact without any focal sensory/motor deficits. Cranial nerves: II-XII intact, grossly non-focal.  Psychiatric:  Alert and oriented, thought content appropriate, normal insight  Capillary Refill: Brisk,< 3 seconds   Peripheral Pulses: +2 palpable, equal bilaterally          Labs:   Recent Labs      08/10/18   1045   08/11/18   1814  08/12/18   0058  08/12/18   0621   WBC  8.7   --    --    --   6.8   HGB  7.8*   < >  7.7*  7.9*  7.7*   HCT  21.8*   < >  22.6*  22.5*  22.1*   PLT  223   --    --    --   224    < > = values in this interval not displayed. Recent Labs      08/10/18   0957  08/12/18   0621   NA  132*  134*   K  4.0  4.1   CL  95*  99   CO2  26  26   BUN  26*  21*   CREATININE  1.0  0.9   CALCIUM  8.4  8.1*     Recent Labs      08/10/18   0957   INR  1.00     Urinalysis:      Lab Results   Component Value Date    NITRU Negative 08/09/2018    WBCUA 0-2 08/09/2018    BACTERIA 1+ 08/09/2018    RBCUA 3-5 08/09/2018    BLOODU Negative 08/09/2018    SPECGRAV 1.010 08/09/2018    GLUCOSEU Negative 08/09/2018    GLUCOSEU NEGATIVE 04/20/2011       Radiology:  XR ABDOMEN (KUB) (SINGLE AP VIEW)   Final Result   Unremarkable KUB.          CT ABDOMEN PELVIS W IV CONTRAST Additional Contrast? None   Final Result   Rectus muscle hematoma with hematoma extension into the extraperitoneal prevesical space.   There is extravasation of intravenous contrast compatible   with active bleeding at the time of imaging                 Assessment/Plan:    Active Hospital Problems    Diagnosis Date Noted    Atrial fibrillation [facility]) [I48.91] 11/30/2012     Priority: Medium    Long term current use of anticoagulant therapy [Z79.01] 11/30/2012     Priority: Medium    MR (mitral regurgitation) [I34.0] 10/11/2011     Priority: Medium    TI (tricuspid incompetence) [I07.1] 10/11/2011     Priority: Medium    Rectus sheath hematoma [S30.1XXA] 08/09/2018    Abdominal pain [R10.9] 08/09/2018    Abdominal hematoma [S30.1XXA]     Status post total right knee replacement [Z96.651] 08/06/2018    Localized osteoarthritis of right knee [M17.11] 08/01/2018    Chronic kidney disease, stage III (moderate) [N18.3] 08/13/2014    HTN (hypertension) [I10] 10/10/2011       Abdominal pain POA and acute blood loss anemia 2/2 to rectus sheath hematoma:  - In the setting of Lovenox bridge with Coumadin   - Continue to hold coumadin and no DVT PPX, s/p Vitamin K on admission   - Continue to monitor serial H/H's q 12 hrs; pt is s/p 2 units PRBC on 8/10  - INR @1.0 - no need for FFP   - General surgery consulted, no surgical needs, if continues to have s/s of bleeding will need to consult IR for embolization      Status post total knee replacement:   - Was in SNF for rehab, PT/OT ordered   - Made Ortho aware pt was readmitted      Atrial fibrillation - controlled:   - Continue to hold AC, R>B at present for any AC   - Continue BB for rate control   - Cardiology consulted, has [id number] score of 6 and needs long tern anticoagulation.   - Monitor on telemetry    Hypertension:    - BP soft 2/2 to volume/blood loss  - Continue current meds, coreg and losartan with hold parameters      Chronic kidney disease:   - Stable, monitor labs    Nausea/vomiting:  - Possibly side effect of pain meds  - Continue anti-emetics as needed   -

## 2018-08-12 NOTE — PLAN OF CARE
Problem: Falls - Risk of:  Goal: Will remain free from falls  Will remain free from falls    Outcome: Ongoing  Pt is a high fall risk. Bed alarm remains in use to promote pt safety.

## 2018-08-13 LAB
HCT VFR BLD CALC: 23.6 % (ref 36–48)
HCT VFR BLD CALC: 24.9 % (ref 36–48)
HEMOGLOBIN: 8.2 G/DL (ref 12–16)
HEMOGLOBIN: 8.5 G/DL (ref 12–16)
INR BLD: 1.05 (ref 0.86–1.14)
MAGNESIUM: 1.5 MG/DL (ref 1.8–2.4)
PROTHROMBIN TIME: 12 SEC (ref 9.8–13)

## 2018-08-13 PROCEDURE — 1200000000 HC SEMI PRIVATE

## 2018-08-13 PROCEDURE — 97165 OT EVAL LOW COMPLEX 30 MIN: CPT

## 2018-08-13 PROCEDURE — 6360000002 HC RX W HCPCS: Performed by: HOSPITALIST

## 2018-08-13 PROCEDURE — S0028 INJECTION, FAMOTIDINE, 20 MG: HCPCS | Performed by: NURSE PRACTITIONER

## 2018-08-13 PROCEDURE — G8978 MOBILITY CURRENT STATUS: HCPCS

## 2018-08-13 PROCEDURE — 85014 HEMATOCRIT: CPT

## 2018-08-13 PROCEDURE — 99232 SBSQ HOSP IP/OBS MODERATE 35: CPT | Performed by: NURSE PRACTITIONER

## 2018-08-13 PROCEDURE — 36415 COLL VENOUS BLD VENIPUNCTURE: CPT

## 2018-08-13 PROCEDURE — 97116 GAIT TRAINING THERAPY: CPT

## 2018-08-13 PROCEDURE — 6370000000 HC RX 637 (ALT 250 FOR IP): Performed by: REGISTERED NURSE

## 2018-08-13 PROCEDURE — 2500000003 HC RX 250 WO HCPCS: Performed by: NURSE PRACTITIONER

## 2018-08-13 PROCEDURE — 6370000000 HC RX 637 (ALT 250 FOR IP): Performed by: NURSE PRACTITIONER

## 2018-08-13 PROCEDURE — 6360000002 HC RX W HCPCS: Performed by: REGISTERED NURSE

## 2018-08-13 PROCEDURE — 85610 PROTHROMBIN TIME: CPT

## 2018-08-13 PROCEDURE — 85018 HEMOGLOBIN: CPT

## 2018-08-13 PROCEDURE — 97110 THERAPEUTIC EXERCISES: CPT

## 2018-08-13 PROCEDURE — G8987 SELF CARE CURRENT STATUS: HCPCS

## 2018-08-13 PROCEDURE — 97161 PT EVAL LOW COMPLEX 20 MIN: CPT

## 2018-08-13 PROCEDURE — G8988 SELF CARE GOAL STATUS: HCPCS

## 2018-08-13 PROCEDURE — 97535 SELF CARE MNGMENT TRAINING: CPT

## 2018-08-13 PROCEDURE — 2580000003 HC RX 258: Performed by: NURSE PRACTITIONER

## 2018-08-13 PROCEDURE — 6360000002 HC RX W HCPCS: Performed by: NURSE PRACTITIONER

## 2018-08-13 PROCEDURE — 83735 ASSAY OF MAGNESIUM: CPT

## 2018-08-13 PROCEDURE — 97530 THERAPEUTIC ACTIVITIES: CPT

## 2018-08-13 PROCEDURE — G8979 MOBILITY GOAL STATUS: HCPCS

## 2018-08-13 RX ORDER — POTASSIUM CHLORIDE 20MEQ/15ML
40 LIQUID (ML) ORAL PRN
Status: DISCONTINUED | OUTPATIENT
Start: 2018-08-13 | End: 2018-08-16 | Stop reason: HOSPADM

## 2018-08-13 RX ORDER — POTASSIUM CHLORIDE 20 MEQ/1
40 TABLET, EXTENDED RELEASE ORAL PRN
Status: DISCONTINUED | OUTPATIENT
Start: 2018-08-13 | End: 2018-08-16 | Stop reason: HOSPADM

## 2018-08-13 RX ORDER — POTASSIUM CHLORIDE 7.45 MG/ML
10 INJECTION INTRAVENOUS PRN
Status: DISCONTINUED | OUTPATIENT
Start: 2018-08-13 | End: 2018-08-16 | Stop reason: HOSPADM

## 2018-08-13 RX ORDER — MAGNESIUM SULFATE 1 G/100ML
1 INJECTION INTRAVENOUS PRN
Status: DISCONTINUED | OUTPATIENT
Start: 2018-08-13 | End: 2018-08-16 | Stop reason: HOSPADM

## 2018-08-13 RX ORDER — MAGNESIUM SULFATE 1 G/100ML
1 INJECTION INTRAVENOUS ONCE
Status: COMPLETED | OUTPATIENT
Start: 2018-08-13 | End: 2018-08-13

## 2018-08-13 RX ORDER — WARFARIN SODIUM 5 MG/1
5 TABLET ORAL
Status: COMPLETED | OUTPATIENT
Start: 2018-08-13 | End: 2018-08-13

## 2018-08-13 RX ADMIN — OXYCODONE HYDROCHLORIDE AND ACETAMINOPHEN 1 TABLET: 5; 325 TABLET ORAL at 04:21

## 2018-08-13 RX ADMIN — FAMOTIDINE 20 MG: 10 INJECTION, SOLUTION INTRAVENOUS at 08:58

## 2018-08-13 RX ADMIN — FAMOTIDINE 20 MG: 10 INJECTION, SOLUTION INTRAVENOUS at 22:42

## 2018-08-13 RX ADMIN — WARFARIN SODIUM 5 MG: 5 TABLET ORAL at 18:01

## 2018-08-13 RX ADMIN — OXYCODONE HYDROCHLORIDE AND ACETAMINOPHEN 1 TABLET: 5; 325 TABLET ORAL at 18:59

## 2018-08-13 RX ADMIN — DOCUSATE SODIUM 100 MG: 100 CAPSULE, LIQUID FILLED ORAL at 08:58

## 2018-08-13 RX ADMIN — PROMETHAZINE HYDROCHLORIDE 12.5 MG: 25 TABLET ORAL at 18:59

## 2018-08-13 RX ADMIN — MAGNESIUM SULFATE HEPTAHYDRATE 1 G: 1 INJECTION, SOLUTION INTRAVENOUS at 10:58

## 2018-08-13 RX ADMIN — ATORVASTATIN CALCIUM 20 MG: 10 TABLET, FILM COATED ORAL at 22:42

## 2018-08-13 RX ADMIN — CARVEDILOL 25 MG: 25 TABLET, FILM COATED ORAL at 08:58

## 2018-08-13 RX ADMIN — CARVEDILOL 25 MG: 25 TABLET, FILM COATED ORAL at 18:01

## 2018-08-13 RX ADMIN — MAGNESIUM SULFATE HEPTAHYDRATE 1 G: 1 INJECTION, SOLUTION INTRAVENOUS at 12:39

## 2018-08-13 RX ADMIN — PROMETHAZINE HYDROCHLORIDE 25 MG: 25 TABLET ORAL at 04:21

## 2018-08-13 RX ADMIN — LOSARTAN POTASSIUM 100 MG: 100 TABLET, FILM COATED ORAL at 08:58

## 2018-08-13 RX ADMIN — Medication 10 ML: at 22:43

## 2018-08-13 RX ADMIN — MAGNESIUM SULFATE HEPTAHYDRATE 1 G: 1 INJECTION, SOLUTION INTRAVENOUS at 14:21

## 2018-08-13 ASSESSMENT — PAIN DESCRIPTION - ORIENTATION: ORIENTATION: RIGHT

## 2018-08-13 ASSESSMENT — PAIN SCALES - GENERAL
PAINLEVEL_OUTOF10: 6
PAINLEVEL_OUTOF10: 5
PAINLEVEL_OUTOF10: 5

## 2018-08-13 ASSESSMENT — PAIN DESCRIPTION - DESCRIPTORS: DESCRIPTORS: DISCOMFORT

## 2018-08-13 ASSESSMENT — PAIN DESCRIPTION - LOCATION: LOCATION: KNEE

## 2018-08-13 ASSESSMENT — PAIN DESCRIPTION - ONSET: ONSET: ON-GOING

## 2018-08-13 ASSESSMENT — PAIN DESCRIPTION - PAIN TYPE: TYPE: ACUTE PAIN

## 2018-08-13 ASSESSMENT — PAIN DESCRIPTION - PROGRESSION: CLINICAL_PROGRESSION: NOT CHANGED

## 2018-08-13 ASSESSMENT — PAIN DESCRIPTION - FREQUENCY: FREQUENCY: CONTINUOUS

## 2018-08-13 NOTE — PROGRESS NOTES
step commands with repetition; Follows one step commands with increased time  Problem Solving: Assistance required to generate solutions  Insights: Decreased awareness of deficits  Initiation: Requires cues for some  Sequencing: Requires cues for some           Type of ROM/Therapeutic Exercise  Type of ROM/Therapeutic Exercise: AROM  Comment: BUE sitting in chair  Exercises  Scapular Protraction: x10  Scapular Retraction: x10  Shoulder Elevation: x10  Shoulder Flexion: x10  Elbow Flexion: x15  Elbow Extension: x15  Supination: x10  Pronation: x10  Other: x10 chest press, overhead press, circles forwards     LUE AROM (degrees)  LUE AROM : WFL  RUE AROM (degrees)  RUE AROM : WFL  LUE Strength  Gross LUE Strength: WFL  RUE Strength  Gross RUE Strength: WFL                  Assessment   Performance deficits / Impairments: Decreased functional mobility ; Decreased safe awareness;Decreased balance;Decreased ADL status; Decreased endurance;Decreased strength;Decreased high-level IADLs  Assessment: Pt admit to hospital from recent admission to SNF for revision of R knee. Pt c/o pain, dx of rectus hematoma. Pt presents with above mentioned deficits hindering independence with ADLs, mobility, and transfers. SNF post DC to continue to progress with deficits. Pt would benefit from OT skilled services in the acute care setting to address above deficits and f/u with patient education. Prognosis: Fair  Decision Making: Low Complexity  Patient Education: role of OT, OT POC, safety, ADLs  REQUIRES OT FOLLOW UP: Yes  Activity Tolerance  Activity Tolerance: Patient limited by pain; Patient limited by fatigue  Safety Devices  Safety Devices in place: Yes  Type of devices: Gait belt;Patient at risk for falls; Left in chair;Chair alarm in place;Call light within reach;Nurse notified         Plan   Plan  Times per week: 3-5x a week  Times per day: Daily  Current Treatment Recommendations: Strengthening, Endurance Training,

## 2018-08-13 NOTE — PROGRESS NOTES
Physical Therapy    Facility/Department: [facility] C3 TELE/MED SURG/ONC  Initial Assessment    NAME: Priscilla Overton  : 1940  MRN: [id number]    Date of Service: 2018    Discharge Recommendations:  Subacute/Skilled Nursing Facility   PT Equipment Recommendations  Equipment Needed: No    Patient Diagnosis(es): The primary encounter diagnosis was Abdominal hematoma. A diagnosis of Abdominal pain, unspecified abdominal location was also pertinent to this visit. has a past medical history of Arthritis; Atrial fibrillation ([facility] [address] CAD (coronary artery disease); CHF (congestive heart failure) ([address] CKD (chronic kidney disease); Cutaneous skin tags; Depression; Gout; Grief reaction; Hyperlipidemia; Hypertension; Obesity; Osteoarthritis; Pulmonary nodules; and Unspecified cerebral artery occlusion with cerebral infarction. has a past surgical history that includes Carpal tunnel release; Knee arthroscopy; Cataract removal; other surgical history (Left, 2013); Cholecystectomy; other surgical history (Bilateral, 14); pr total knee arthroplasty (Right, 2018); and joint replacement.     Restrictions  Restrictions/Precautions  Restrictions/Precautions: Weight Bearing  Required Braces or Orthoses?: No (now able to perform 10 reps SLR)  Lower Extremity Weight Bearing Restrictions  Right Lower Extremity Weight Bearing: Weight Bearing As Tolerated  Position Activity Restriction  Other position/activity restrictions: up with assist  Vision/Hearing  Vision: Impaired  Vision Exceptions: Wears glasses for reading  Hearing: Within functional limits     Subjective  Chart Reviewed: Yes  Patient assessed for rehabilitation services?: Yes  Referring Practitioner: Zoraida  Referral Date : 18  Diagnosis: a fib and rectus sheath hematoma  Follows Commands: Within Functional Limits  Comments: RN cleared pt for therapy, pt resting in bed on approach  Subjective: Agrees to therapy, voices understanding of all instructions  Patient Currently in Pain: Yes  Pain Assessment  Pain Assessment: 0-10  Pain Level: 5  Pain Type: Acute pain  Pain Location: Knee  Pain Orientation: Right  Pain Descriptors: Discomfort  Pain Frequency: Continuous  Pain Onset: On-going  Clinical Progression: Not changed  Pain Intervention(s): Ambulation/Increased activity; Emotional support;Repositioned  Pre Treatment Pain Screening  Intervention List: Patient able to continue with treatment    Orientation  Orientation  Overall Orientation Status: Within Normal Limits    Social/Functional History  Social/Functional History  Lives With: Alone  Type of Home: House  Home Layout: One level  Home Access: Ramped entrance (plus one step to get inside)  Bathroom Shower/Tub: Tub/Shower unit  Bathroom Toilet: Standard  Bathroom Equipment: Grab bars in shower, Shower chair  Home Equipment: Standard walker  Additional Comments: Pt was discharged post R TKR to rehab at [facility], returned to hospital with hematoma of rectus sheath/abdominal pain  Objective   AROM RLE (degrees)  RLE General AROM: R knee ~ 0 to 90  AROM LLE (degrees)  LLE AROM : WFL           Bed mobility  Rolling to Left: Modified independent  Supine to Sit: Minimal assistance  Comment: HOB slightly elevated, use of rails, extra time needed.  Pt instructed in technique to protect abdomen as she arises  Transfers  Sit to Stand: Contact guard assistance  Stand to sit: Contact guard assistance  Bed to Chair: Contact guard assistance  WB Status: WBAT RLE  Ambulation   Surface: level tile  Device: Standard Walker  Assistance: Contact guard assistance  Quality of Gait: slow pace, cues for sequence and safety, pt with early fatigue  Distance: 25 ft x 1        Exercises  Straight Leg Raise: 15 x RLE indep, lifts only 6-8 inches from bed  Quad Sets: 12 x B  Heelslides: 15 x RLE assisted  Gluteal Sets: 12 x B  Knee Short Arc Quad: 15 x RLE  Ankle Pumps: 15 x B     Assessment   Body structures, Functions, Activity limitations: Decreased functional mobility ; Decreased endurance;Decreased strength;Decreased ROM  Assessment: Pt  had R TKR 8/1, discharged to SNF, returned to hospital with abdominal pain, hematoma of rectus sheath. Prior to TKR pt lived indep, amb indep, indep with ADLs. Functioning below baseline. Recommend return to SNF to complete rehab course  Treatment Diagnosis: RLE weakness and stiffness, decreased gait  Specific instructions for Next Treatment: ther ex, bed mob, transfers, gait  Prognosis: Good  Decision Making: Low Complexity  Patient Education: role of PT, safety, may amb without immob as now indep with SLR 10 x  Barriers to Learning: pt voices understanding  REQUIRES PT FOLLOW UP: Yes  Activity Tolerance: Patient Tolerated treatment well         Plan   Times per week: 5-7 days week  Times per day: Daily  Specific instructions for Next Treatment: ther ex, bed mob, transfers, gait  Current Treatment Recommendations: Strengthening, Transfer Training, Endurance Training, ROM, Functional Mobility Training, Safety Education & Training  Safety Devices  Type of devices: All fall risk precautions in place, Gait belt, Patient at risk for falls, Call light within reach, Left in chair, Chair alarm in place, Nurse notified    G-Code  PT G-Codes  Functional Assessment Tool Used: AM PAC  Score: 16  Functional Limitation: Mobility: Walking and moving around  Mobility: Walking and Moving Around Current Status (): At least 40 percent but less than 60 percent impaired, limited or restricted  Mobility: Walking and Moving Around Goal Status ():  At least 20 percent but less than 40 percent impaired, limited or restricted  OutComes Score                                           AM-PAC Score     AM-PAC Inpatient Mobility without Stair Climbing Raw Score : 16  AM-PAC Inpatient without Stair Climbing T-Scale Score : 45.54  Mobility Inpatient CMS 0-100% Score: 40.64  Mobility Inpatient without Stair CMS G-Code

## 2018-08-13 NOTE — PROGRESS NOTES
[facility]     Cardiology                                     Progress Note    Admission date:  2018    Reason for follow up visit: afib and rectus sheath hematoma    HPI/CC: Grecia Jenkins was admitted on 2018 with abdominal pain and was diagnosed with a rectus sheath hematoma. INR was 1.98 and Coumadin and Lovenox were stopped. Rhythm has been afib with an average HR of 75 (some aberrantly conducted beats noted, no VT noted). Subjective: She denies chest pain, palpitations, shortness of breath, and dizziness. Vitals:  Blood pressure 128/69, pulse 94, temperature 99.5 °F (37.5 °C), temperature source Oral, resp. rate 18, height 5' 4\" (1.626 m), weight 227 lb 8.2 oz (103.2 kg), SpO2 96 %, not currently breastfeeding.   Temp  Av.5 °F (36.9 °C)  Min: 98 °F (36.7 °C)  Max: 99.5 °F (37.5 °C)  Pulse  Av.6  Min: 73  Max: 94  BP  Min: 102/54  Max: 128/69  SpO2  Av.3 %  Min: 93 %  Max: 98 %    24 hour I/O    Intake/Output Summary (Last 24 hours) at 18 1120  Last data filed at 18 0554   Gross per 24 hour   Intake             1003 ml   Output             1000 ml   Net                3 ml     Current Facility-Administered Medications   Medication Dose Route Frequency Provider Last Rate Last Dose    magnesium sulfate 1 g in dextrose 5% 100 mL IVPB  1 g Intravenous PRN Sirena Barnes MD        potassium chloride (KLOR-CON M) extended release tablet 40 mEq  40 mEq Oral PRN Sirena Barnes MD        Or    potassium chloride 20 MEQ/15ML (10%) oral solution 40 mEq  40 mEq Oral PRN Sirena Barnes MD        Or    potassium chloride 10 mEq/100 mL IVPB (Peripheral Line)  10 mEq Intravenous PRN Sirena Barnes MD        magnesium sulfate 1 g in dextrose 5% 100 mL IVPB  1 g Intravenous Once KWAME Wilkins  mL/hr at 18 1058 1 g at 18 1058    docusate sodium (COLACE) capsule 100 mg  100 mg Oral BID Tosha Nascimento 7/11/2018:  1. No convincing evidence of ischemia or infarction. On noncontrasted images there is a moderate sized fixed defect in the midportion of the lateral wall but this resolves with attenuation correction. Echo 9/22/2015:  Atrial fibrillation with a ventricular rate of 92 beats per minute. Overall left ventricular ejection fraction is estimated to be 55-60%. The left ventricular wall motion is normal.  The left atrium is mildly dilated. The right atrium is mildly dilated. There is trace mitral regurgitation. Mild aortic sclerosis present without evidence of stenosis. .  Mild aortic regurgitation. Lab Review     Renal Profile:   Lab Results   Component Value Date    CREATININE 0.9 08/12/2018    BUN 21 08/12/2018     08/12/2018    K 4.1 08/12/2018    CL 99 08/12/2018    CO2 26 08/12/2018     CBC:    Lab Results   Component Value Date    WBC 6.8 08/12/2018    RBC 2.42 08/12/2018    HGB 8.5 08/13/2018    HCT 24.9 08/13/2018    MCV 91.4 08/12/2018    RDW 15.4 08/12/2018     08/12/2018     BNP:    Lab Results   Component Value Date     01/12/2014     Fasting Lipid Panel:    Lab Results   Component Value Date    CHOL 112 02/27/2017    HDL 56 02/27/2017    TRIG 120 02/27/2017     Cardiac Enzymes:  CK/MbTroponin  Lab Results   Component Value Date    CKTOTAL 68 07/09/2013    TROPONINI <0.01 05/15/2017     PT/ INR   Lab Results   Component Value Date    INR 1.00 08/10/2018    INR 1.98 08/09/2018    INR 1.65 08/07/2018    PROTIME 11.4 08/10/2018    PROTIME 22.6 08/09/2018    PROTIME 18.8 08/07/2018    PROTIME 20.4 12/29/2009    PROTIME 30.6 12/18/2009    PROTIME 25.2 12/11/2009     PTT No results found for: PTT   Lab Results   Component Value Date    MG 1.50 08/13/2018      Lab Results   Component Value Date    TSH 1.19 08/20/2015    TSH 1.67 02/06/2014       Assessment:  1.  Permanent atrial fibrillation: HR controlled (brief aberrantly conducted beats noted)   -[id number] score 6 (age,

## 2018-08-13 NOTE — PROGRESS NOTES
[employer] Dr. Solomon Mari notified via perfect serve \"Correction to last page. 348. Rolinda Beech. 5 beats of V tach and then 4 beats of V tach. VSS. Pt stated no chest pain and no SOB. \"

## 2018-08-14 LAB
ANION GAP SERPL CALCULATED.3IONS-SCNC: 13 MMOL/L (ref 3–16)
BACTERIA: ABNORMAL /HPF
BILIRUBIN URINE: NEGATIVE
BLOOD, URINE: ABNORMAL
BUN BLDV-MCNC: 24 MG/DL (ref 7–20)
CALCIUM SERPL-MCNC: 8.6 MG/DL (ref 8.3–10.6)
CHLORIDE BLD-SCNC: 95 MMOL/L (ref 99–110)
CLARITY: CLEAR
CO2: 22 MMOL/L (ref 21–32)
COLOR: YELLOW
CREAT SERPL-MCNC: 1.1 MG/DL (ref 0.6–1.2)
EPITHELIAL CELLS, UA: ABNORMAL /HPF
GFR AFRICAN AMERICAN: 58
GFR NON-AFRICAN AMERICAN: 48
GLUCOSE BLD-MCNC: 181 MG/DL (ref 70–99)
GLUCOSE URINE: NEGATIVE MG/DL
HCT VFR BLD CALC: 25.9 % (ref 36–48)
HEMOGLOBIN: 8.9 G/DL (ref 12–16)
INR BLD: 1.1 (ref 0.86–1.14)
KETONES, URINE: NEGATIVE MG/DL
LEUKOCYTE ESTERASE, URINE: ABNORMAL
MAGNESIUM: 1.6 MG/DL (ref 1.8–2.4)
MICROSCOPIC EXAMINATION: YES
NITRITE, URINE: NEGATIVE
PH UA: 6
POTASSIUM SERPL-SCNC: 4.2 MMOL/L (ref 3.5–5.1)
PROTEIN UA: 100 MG/DL
PROTHROMBIN TIME: 12.5 SEC (ref 9.8–13)
RBC UA: ABNORMAL /HPF (ref 0–2)
SODIUM BLD-SCNC: 130 MMOL/L (ref 136–145)
SPECIFIC GRAVITY UA: 1.01
URINE REFLEX TO CULTURE: YES
URINE TYPE: ABNORMAL
UROBILINOGEN, URINE: 0.2 E.U./DL
WBC UA: ABNORMAL /HPF (ref 0–5)

## 2018-08-14 PROCEDURE — 80048 BASIC METABOLIC PNL TOTAL CA: CPT

## 2018-08-14 PROCEDURE — 97110 THERAPEUTIC EXERCISES: CPT

## 2018-08-14 PROCEDURE — 6360000002 HC RX W HCPCS: Performed by: NURSE PRACTITIONER

## 2018-08-14 PROCEDURE — 83735 ASSAY OF MAGNESIUM: CPT

## 2018-08-14 PROCEDURE — 6370000000 HC RX 637 (ALT 250 FOR IP): Performed by: NURSE PRACTITIONER

## 2018-08-14 PROCEDURE — 85018 HEMOGLOBIN: CPT

## 2018-08-14 PROCEDURE — 1200000000 HC SEMI PRIVATE

## 2018-08-14 PROCEDURE — 36415 COLL VENOUS BLD VENIPUNCTURE: CPT

## 2018-08-14 PROCEDURE — 51798 US URINE CAPACITY MEASURE: CPT

## 2018-08-14 PROCEDURE — 2580000003 HC RX 258: Performed by: REGISTERED NURSE

## 2018-08-14 PROCEDURE — 6360000002 HC RX W HCPCS: Performed by: REGISTERED NURSE

## 2018-08-14 PROCEDURE — 81001 URINALYSIS AUTO W/SCOPE: CPT

## 2018-08-14 PROCEDURE — 85014 HEMATOCRIT: CPT

## 2018-08-14 PROCEDURE — 6370000000 HC RX 637 (ALT 250 FOR IP): Performed by: REGISTERED NURSE

## 2018-08-14 PROCEDURE — S0028 INJECTION, FAMOTIDINE, 20 MG: HCPCS | Performed by: NURSE PRACTITIONER

## 2018-08-14 PROCEDURE — 87186 SC STD MICRODIL/AGAR DIL: CPT

## 2018-08-14 PROCEDURE — 2500000003 HC RX 250 WO HCPCS: Performed by: NURSE PRACTITIONER

## 2018-08-14 PROCEDURE — 87077 CULTURE AEROBIC IDENTIFY: CPT

## 2018-08-14 PROCEDURE — 2580000003 HC RX 258: Performed by: NURSE PRACTITIONER

## 2018-08-14 PROCEDURE — 97116 GAIT TRAINING THERAPY: CPT

## 2018-08-14 PROCEDURE — 87086 URINE CULTURE/COLONY COUNT: CPT

## 2018-08-14 PROCEDURE — 85610 PROTHROMBIN TIME: CPT

## 2018-08-14 RX ORDER — SODIUM CHLORIDE 9 MG/ML
INJECTION, SOLUTION INTRAVENOUS
Status: DISPENSED
Start: 2018-08-14 | End: 2018-08-15

## 2018-08-14 RX ORDER — WARFARIN SODIUM 5 MG/1
5 TABLET ORAL
Status: COMPLETED | OUTPATIENT
Start: 2018-08-14 | End: 2018-08-14

## 2018-08-14 RX ORDER — MAGNESIUM SULFATE IN WATER 40 MG/ML
4 INJECTION, SOLUTION INTRAVENOUS ONCE
Status: COMPLETED | OUTPATIENT
Start: 2018-08-14 | End: 2018-08-14

## 2018-08-14 RX ADMIN — OXYCODONE HYDROCHLORIDE AND ACETAMINOPHEN 1 TABLET: 5; 325 TABLET ORAL at 09:22

## 2018-08-14 RX ADMIN — OXYCODONE HYDROCHLORIDE AND ACETAMINOPHEN 1 TABLET: 5; 325 TABLET ORAL at 19:00

## 2018-08-14 RX ADMIN — Medication 10 ML: at 09:22

## 2018-08-14 RX ADMIN — CEFTRIAXONE SODIUM 1 G: 1 INJECTION, POWDER, FOR SOLUTION INTRAMUSCULAR; INTRAVENOUS at 19:45

## 2018-08-14 RX ADMIN — CARVEDILOL 25 MG: 25 TABLET, FILM COATED ORAL at 09:22

## 2018-08-14 RX ADMIN — DOCUSATE SODIUM 100 MG: 100 CAPSULE, LIQUID FILLED ORAL at 09:22

## 2018-08-14 RX ADMIN — FAMOTIDINE 20 MG: 10 INJECTION, SOLUTION INTRAVENOUS at 09:21

## 2018-08-14 RX ADMIN — OXYCODONE HYDROCHLORIDE AND ACETAMINOPHEN 1 TABLET: 5; 325 TABLET ORAL at 01:01

## 2018-08-14 RX ADMIN — CARVEDILOL 25 MG: 25 TABLET, FILM COATED ORAL at 19:45

## 2018-08-14 RX ADMIN — WARFARIN SODIUM 5 MG: 5 TABLET ORAL at 19:45

## 2018-08-14 RX ADMIN — MAGNESIUM SULFATE HEPTAHYDRATE 4 G: 40 INJECTION, SOLUTION INTRAVENOUS at 16:01

## 2018-08-14 RX ADMIN — LOSARTAN POTASSIUM 100 MG: 100 TABLET, FILM COATED ORAL at 09:21

## 2018-08-14 RX ADMIN — PROMETHAZINE HYDROCHLORIDE 25 MG: 25 TABLET ORAL at 01:01

## 2018-08-14 RX ADMIN — PROMETHAZINE HYDROCHLORIDE 25 MG: 25 TABLET ORAL at 09:30

## 2018-08-14 RX ADMIN — PROMETHAZINE HYDROCHLORIDE 25 MG: 25 TABLET ORAL at 19:00

## 2018-08-14 ASSESSMENT — PAIN DESCRIPTION - ORIENTATION: ORIENTATION: RIGHT

## 2018-08-14 ASSESSMENT — PAIN DESCRIPTION - FREQUENCY: FREQUENCY: CONTINUOUS

## 2018-08-14 ASSESSMENT — PAIN DESCRIPTION - LOCATION: LOCATION: KNEE

## 2018-08-14 ASSESSMENT — PAIN DESCRIPTION - PAIN TYPE: TYPE: ACUTE PAIN

## 2018-08-14 ASSESSMENT — PAIN SCALES - GENERAL
PAINLEVEL_OUTOF10: 5

## 2018-08-14 ASSESSMENT — PAIN DESCRIPTION - DESCRIPTORS: DESCRIPTORS: DISCOMFORT

## 2018-08-14 ASSESSMENT — PAIN DESCRIPTION - ONSET: ONSET: ON-GOING

## 2018-08-14 NOTE — PROGRESS NOTES
Additional Contrast? None   Final Result   Rectus muscle hematoma with hematoma extension into the extraperitoneal   prevesical space. There is extravasation of intravenous contrast compatible   with active bleeding at the time of imaging                 Assessment/Plan:    Active Hospital Problems    Diagnosis Date Noted    Atrial fibrillation [facility]) [I48.91] 11/30/2012     Priority: Medium    Long term current use of anticoagulant therapy [Z79.01] 11/30/2012     Priority: Medium    MR (mitral regurgitation) [I34.0] 10/11/2011     Priority: Medium    TI (tricuspid incompetence) [I07.1] 10/11/2011     Priority: Medium    Rectus sheath hematoma [S30.1XXA] 08/09/2018    Abdominal pain [R10.9] 08/09/2018    Abdominal hematoma [S30.1XXA]     Status post total right knee replacement [Z96.651] 08/06/2018    Localized osteoarthritis of right knee [M17.11] 08/01/2018    Chronic kidney disease, stage III (moderate) [N18.3] 08/13/2014    HTN (hypertension) [I10] 10/10/2011       Abdominal pain POA and acute blood loss anemia 2/2 to rectus sheath hematoma:  - In the setting of Lovenox bridge with Coumadin.  - Held coumadin and no DVT PPX, s/p Vitamin K on admission.  - Pt is s/p 2 units PRBC on 8/10. Monitor daily CBC.   - INR @1.0 - no need for FFP   - General surgery consulted, no surgical needs at this time. H&H has remained stable/improved. Okay to resume coumadin per surgery.      Status post total knee replacement:   - Was in SNF for rehab, PT/OT ordered   - Made Ortho aware pt was readmitted      Atrial fibrillation - controlled:   - Cardiology okay with resuming anticoagulation when okay with surgery. Surgery okay to resume AC.   - Continue BB for rate control.  - Pt has [id number] score of 6 and needs long term anticoagulation.   - Monitor on telemetry.     Hypertension:    - BP soft 2/2 to volume/blood loss  - Continue current meds, coreg and losartan with hold parameters.      Chronic kidney disease:   -

## 2018-08-14 NOTE — PROGRESS NOTES
Pt is alert and oriented. VSS. RA. Pt denies pain and discomfort at this time. BS active x4. Pt abdomen is tender at times. Bruises noted on abdomen. Shift assessment completed and documented. Call light within reach. Bed side table within reach. Wheels locked. Bed in lowest position. Bed check in place. Pt instructed to call out for assistance. Pt expressesed understanding & calls out appropritately. All care per orders. Will continue to monitor.  Electronically signed by Juan F Gupta RN on 8/14/2018 at 2:16 AM

## 2018-08-14 NOTE — PROGRESS NOTES
Physical Therapy  Facility/Department: [facility] C3 TELE/MED SURG/ONC  Daily Treatment Note  NAME: Courtney Rizvi  : 1940  MRN: [id number]    Date of Service: 2018    Discharge Recommendations:  Subacute/Skilled Nursing Facility   PT Equipment Recommendations  Equipment Needed: No    Patient Diagnosis(es): The primary encounter diagnosis was Abdominal hematoma. A diagnosis of Abdominal pain, unspecified abdominal location was also pertinent to this visit. has a past medical history of Arthritis; Atrial fibrillation ([facility] [address] CAD (coronary artery disease); CHF (congestive heart failure) ([facility] [address] CKD (chronic kidney disease); Cutaneous skin tags; Depression; Gout; Grief reaction; Hyperlipidemia; Hypertension; Obesity; Osteoarthritis; Pulmonary nodules; and Unspecified cerebral artery occlusion with cerebral infarction. has a past surgical history that includes Carpal tunnel release; Knee arthroscopy; Cataract removal; other surgical history (Left, 2013); Cholecystectomy; other surgical history (Bilateral, 14); pr total knee arthroplasty (Right, 2018); and joint replacement. Restrictions  Restrictions/Precautions  Restrictions/Precautions: Weight Bearing  Required Braces or Orthoses?: No (able to perform 10 SLR)  Lower Extremity Weight Bearing Restrictions  Right Lower Extremity Weight Bearing: Weight Bearing As Tolerated  Position Activity Restriction  Other position/activity restrictions: up with assist  Subjective   General  Chart Reviewed: Yes  Response To Previous Treatment: Patient with no complaints from previous session.   Family / Caregiver Present: No  Referring Practitioner: Zoraida  Subjective  Subjective: Agrees to therapy, voices understanding of all instructions  General Comment  Comments: RN cleared pt for therapy, pt resting in bed on approach  Pain Screening  Patient Currently in Pain: Yes  Pain Assessment  Pain Assessment: 0-10  Pain Level: 5  Pain Type: Acute pain  Pain Location: Knee  Pain Orientation: Right  Pain Descriptors: Discomfort  Pain Frequency: Continuous  Pain Onset: On-going  Pain Intervention(s): RN notified;Emotional support; Ambulation/Increased activity;Repositioned  Response to Pain Intervention: Patient Satisfied       Orientation  Orientation  Overall Orientation Status: Within Normal Limits  Objective   Bed mobility  Rolling to Left: Modified independent  Supine to Sit: Minimal assistance  Comment: HOB flat, use of rails, increased time, cues for technique  Transfers  Sit to Stand: Contact guard assistance  Stand to sit: Contact guard assistance  Bed to Chair: Contact guard assistance  Ambulation  Ambulation?: Yes  WB Status: WBAT RLE  Ambulation 1  Surface: level tile  Device: Standard Walker  Assistance: Contact guard assistance  Quality of Gait: slow pace, cues for sequence and safety, pt with early fatigue  Distance: 60 ft        Exercises  Straight Leg Raise: 2 x 10 with verbal and tactile cues, intermittent min assist  Quad Sets: 2 x 10  Heelslides: 2 x 10 RLE  Gluteal Sets: 2 x 10  Knee Short Arc Quad: 3 x 10 RLE  Ankle Pumps: 20 x  Manual gastroc stretch 30 sec x 2 RLE    Assessment   Body structures, Functions, Activity limitations: Decreased functional mobility ; Decreased endurance;Decreased strength;Decreased ROM  Assessment: Pt  had R TKR 8/1, discharged to SNF, returned to hospital with abdominal pain, hematoma of rectus sheath. Prior to TKR pt lived indep, amb indep, indep with ADLs. Functioning below baseline.  Recommend return to SNF to complete rehab course  Treatment Diagnosis: RLE weakness and stiffness, decreased gait  Specific instructions for Next Treatment: ther ex, bed mob, transfers, gait  Prognosis: Good  Patient Education: safety, ther ex, use of immobilizer as pt less consistent with SLR today  Barriers to Learning: Pt voices understanding  REQUIRES PT FOLLOW UP: Yes  Activity Tolerance  Activity Tolerance: Patient Tolerated treatment well  Activity Tolerance: /70     G-Code  PT G-Codes  Functional Assessment Tool Used: AM PAC  Score: 16    AM-PAC Score     AM-PAC Inpatient Mobility without Stair Climbing Raw Score : 16  AM-PAC Inpatient without Stair Climbing T-Scale Score : 45.54  Mobility Inpatient CMS 0-100% Score: 40.64  Mobility Inpatient without Stair CMS G-Code Modifier : CK       Goals  Short term goals  Time Frame for Short term goals: 1 week  Short term goal 1: pt to move supine to and from sit CG? Cues  Short term goal 2: pt to move sit to and from stand supervised  Short term goal 3: pt to amb 100 ft with walker and CG  Short term goal 4: pt to participate in 12-15 reps LE Ex per TKR protocol  Patient Goals   Patient goals : \"to move well enough to return home\"    Plan    Plan  Times per week: 5-7 days week  Times per day: Daily  Specific instructions for Next Treatment: ther ex, bed mob, transfers, gait  Current Treatment Recommendations: Strengthening, Transfer Training, Endurance Training, ROM, Functional Mobility Training, Safety Education & Training  Safety Devices  Type of devices:  All fall risk precautions in place, Gait belt, Patient at risk for falls, Call light within reach, Left in chair, Chair alarm in place, Nurse notified     Therapy Time   Individual Concurrent Group Co-treatment   Time In 0900         Time Out [address]         Minutes [address] [place] [id number]

## 2018-08-14 NOTE — PROGRESS NOTES
Pharmacy to Dose Warfarin     Dx: Afib /stroke  Goal INR range 2-3   Home Warfarin dose[facility]     Date                 INR                  Warfarin  8/13                1.00(8/10)          5 mg     8/14                1. 10                   5 mg               Recommend Warfarin 5mg tonight x1. Daily INR ordered. Rx will continue to manage therapy per NP's consult order.

## 2018-08-14 NOTE — PLAN OF CARE
Problem: Falls - Risk of:  Goal: Will remain free from falls  Will remain free from falls    Outcome: Ongoing  Pt will remain free from falls. Pt is a high fall risk. Call light within reach. Bed side table within reach. Wheels locked. Bed in lowest position. Bed check in place. Pt instructed to call out for assistance. Pt expressesed understanding & calls out appropritately. All care per orders. Will continue to monitor.

## 2018-08-15 LAB
ANION GAP SERPL CALCULATED.3IONS-SCNC: 9 MMOL/L (ref 3–16)
BUN BLDV-MCNC: 25 MG/DL (ref 7–20)
CALCIUM SERPL-MCNC: 8.3 MG/DL (ref 8.3–10.6)
CHLORIDE BLD-SCNC: 98 MMOL/L (ref 99–110)
CO2: 25 MMOL/L (ref 21–32)
CREAT SERPL-MCNC: 0.9 MG/DL (ref 0.6–1.2)
GFR AFRICAN AMERICAN: >60
GFR NON-AFRICAN AMERICAN: >60
GLUCOSE BLD-MCNC: 105 MG/DL (ref 70–99)
HCT VFR BLD CALC: 22.2 % (ref 36–48)
HEMOGLOBIN: 7.7 G/DL (ref 12–16)
INR BLD: 1.16 (ref 0.86–1.14)
MAGNESIUM: 2 MG/DL (ref 1.8–2.4)
MCH RBC QN AUTO: 31.7 PG (ref 26–34)
MCHC RBC AUTO-ENTMCNC: 34.7 G/DL (ref 31–36)
MCV RBC AUTO: 91.4 FL (ref 80–100)
PDW BLD-RTO: 15.6 % (ref 12.4–15.4)
PLATELET # BLD: 219 K/UL (ref 135–450)
PMV BLD AUTO: 7.7 FL (ref 5–10.5)
POTASSIUM SERPL-SCNC: 4 MMOL/L (ref 3.5–5.1)
PROTHROMBIN TIME: 13.2 SEC (ref 9.8–13)
RBC # BLD: 2.43 M/UL (ref 4–5.2)
SODIUM BLD-SCNC: 132 MMOL/L (ref 136–145)
WBC # BLD: 6.9 K/UL (ref 4–11)

## 2018-08-15 PROCEDURE — 6360000002 HC RX W HCPCS: Performed by: NURSE PRACTITIONER

## 2018-08-15 PROCEDURE — 1200000000 HC SEMI PRIVATE

## 2018-08-15 PROCEDURE — 80048 BASIC METABOLIC PNL TOTAL CA: CPT

## 2018-08-15 PROCEDURE — S0028 INJECTION, FAMOTIDINE, 20 MG: HCPCS | Performed by: NURSE PRACTITIONER

## 2018-08-15 PROCEDURE — 6370000000 HC RX 637 (ALT 250 FOR IP): Performed by: INTERNAL MEDICINE

## 2018-08-15 PROCEDURE — 85610 PROTHROMBIN TIME: CPT

## 2018-08-15 PROCEDURE — 85027 COMPLETE CBC AUTOMATED: CPT

## 2018-08-15 PROCEDURE — 97535 SELF CARE MNGMENT TRAINING: CPT

## 2018-08-15 PROCEDURE — 6370000000 HC RX 637 (ALT 250 FOR IP): Performed by: REGISTERED NURSE

## 2018-08-15 PROCEDURE — 6360000002 HC RX W HCPCS: Performed by: REGISTERED NURSE

## 2018-08-15 PROCEDURE — 36415 COLL VENOUS BLD VENIPUNCTURE: CPT

## 2018-08-15 PROCEDURE — 6370000000 HC RX 637 (ALT 250 FOR IP): Performed by: NURSE PRACTITIONER

## 2018-08-15 PROCEDURE — 2580000003 HC RX 258: Performed by: NURSE PRACTITIONER

## 2018-08-15 PROCEDURE — 2580000003 HC RX 258: Performed by: REGISTERED NURSE

## 2018-08-15 PROCEDURE — 2500000003 HC RX 250 WO HCPCS: Performed by: NURSE PRACTITIONER

## 2018-08-15 PROCEDURE — 83735 ASSAY OF MAGNESIUM: CPT

## 2018-08-15 RX ORDER — DOCUSATE SODIUM 100 MG/1
100 CAPSULE, LIQUID FILLED ORAL DAILY
Status: DISCONTINUED | OUTPATIENT
Start: 2018-08-16 | End: 2018-08-16 | Stop reason: HOSPADM

## 2018-08-15 RX ADMIN — FAMOTIDINE 20 MG: 10 INJECTION, SOLUTION INTRAVENOUS at 22:01

## 2018-08-15 RX ADMIN — FAMOTIDINE 20 MG: 10 INJECTION, SOLUTION INTRAVENOUS at 00:12

## 2018-08-15 RX ADMIN — WARFARIN SODIUM 7.5 MG: 2.5 TABLET ORAL at 18:47

## 2018-08-15 RX ADMIN — PROMETHAZINE HYDROCHLORIDE 25 MG: 25 TABLET ORAL at 13:23

## 2018-08-15 RX ADMIN — FAMOTIDINE 20 MG: 10 INJECTION, SOLUTION INTRAVENOUS at 10:29

## 2018-08-15 RX ADMIN — CEFTRIAXONE SODIUM 1 G: 1 INJECTION, POWDER, FOR SOLUTION INTRAMUSCULAR; INTRAVENOUS at 15:56

## 2018-08-15 RX ADMIN — OXYCODONE HYDROCHLORIDE AND ACETAMINOPHEN 1 TABLET: 5; 325 TABLET ORAL at 13:23

## 2018-08-15 RX ADMIN — CARVEDILOL 25 MG: 25 TABLET, FILM COATED ORAL at 10:28

## 2018-08-15 RX ADMIN — Medication 10 ML: at 00:13

## 2018-08-15 RX ADMIN — DOCUSATE SODIUM 100 MG: 100 CAPSULE, LIQUID FILLED ORAL at 10:29

## 2018-08-15 RX ADMIN — PROMETHAZINE HYDROCHLORIDE 25 MG: 25 TABLET ORAL at 22:04

## 2018-08-15 RX ADMIN — CARVEDILOL 25 MG: 25 TABLET, FILM COATED ORAL at 18:47

## 2018-08-15 RX ADMIN — OXYCODONE HYDROCHLORIDE AND ACETAMINOPHEN 1 TABLET: 5; 325 TABLET ORAL at 22:04

## 2018-08-15 RX ADMIN — Medication 10 ML: at 10:30

## 2018-08-15 RX ADMIN — LOSARTAN POTASSIUM 100 MG: 100 TABLET, FILM COATED ORAL at 10:28

## 2018-08-15 RX ADMIN — OXYCODONE HYDROCHLORIDE AND ACETAMINOPHEN 1 TABLET: 5; 325 TABLET ORAL at 02:57

## 2018-08-15 RX ADMIN — ATORVASTATIN CALCIUM 20 MG: 10 TABLET, FILM COATED ORAL at 22:01

## 2018-08-15 RX ADMIN — ATORVASTATIN CALCIUM 20 MG: 10 TABLET, FILM COATED ORAL at 00:12

## 2018-08-15 ASSESSMENT — PAIN SCALES - GENERAL
PAINLEVEL_OUTOF10: 5
PAINLEVEL_OUTOF10: 8
PAINLEVEL_OUTOF10: 6

## 2018-08-15 NOTE — PROGRESS NOTES
Pt is alert and oriented. VSS. RA. Pt denies pain and discomfort at this time. The urine output has increased. Shift assessment completed and documented. Call light within reach. Bed side table within reach. Wheels locked. Bed in lowest position. Bed check in place. Pt instructed to call out for assistance. Pt expressesed understanding & calls out appropritately. All care per orders. Will continue to monitor.  Electronically signed by Karlo Dueñas RN on 8/15/2018 at 3:26 AM

## 2018-08-15 NOTE — PROGRESS NOTES
indicated)    Orientation  Orientation  Overall Orientation Status: Within Functional Limits     Objective    ADL  Feeding: Independent (pt finishing lunch upon entry)  UE Bathing: Stand by assistance (ventral sponge bathing standing at sink)  LE Bathing: Stand by assistance;Setup (janki-area standing and BLEs seated during sponge bathing at sink)  LE Dressing: Minimal assistance (to don/doff brief seated and standing)  Toileting: Contact guard assistance (janki-area hygiene and brief management )  Additional Comments: Pt limited by SOB/fatigue, but able to stand for ~5 mins of ADL prior to needing seated rest break. Balance  Sitting Balance: Stand by assistance  Standing Balance: Contact guard assistance (to SBA with RW)    Functional Mobility Comments: Prior to functional mobility, pt completed 10 reps of B ankle, knee, and hip flex/ext while seated due to reports of \"feeling stiff\". Pt walked ~25ft to toilet, 5ft to sink, and 25ft to bed with gait belt, RW, and CGA to SBA. Pt needing verbal cues for energy conservation/rest breaks and proper sequencing during functional mobility. Toilet Transfers  Toilet - Technique: Ambulating (with RW)  Equipment Used: Standard toilet (with grab bar)  Toilet Transfer: Contact guard assistance  Toilet Transfers Comments: Pt able to complete BSC t/f with CGA. Bed mobility  Sit to Supine: Minimal assistance (HOB flat, no rail)  Scooting: Minimal assistance (to boost self to [address])     Transfers  Sit to stand: Minimal assistance  Stand to sit: Contact guard assistance  Transfer Comments: Mod VCs for safe t/f technique      Cognition  Overall Cognitive Status: Exceptions  Following Commands:  Follows one step commands with repetition  Safety Judgement: Decreased awareness of need for safety  Problem Solving: Decreased awareness of errors  Insights: Decreased awareness of deficits      Education: Role of OT, safe t/f training, safe use of DME, awareness of deficits, discharge planning, ADL as therapeutic exercise, importance of OOB, knee ext while in supine, energy conservation techniques     Assessment   Performance deficits / Impairments: Decreased functional mobility ; Decreased safe awareness;Decreased balance;Decreased ADL status; Decreased endurance;Decreased strength;Decreased high-level IADLs  After tx, pt found to be presenting with the above mentioned deficits. Pt would benefit from continued skilled occupational therapy to address these deficits, increasing safety and independence with ADL and functional mobility. Pt has good potential to meet therapy goals. Will continue to assess for discharge needs. Rec SNF. Prognosis: Good  REQUIRES OT FOLLOW UP: Yes  Activity Tolerance  Activity Tolerance: Patient Tolerated treatment well  Safety Devices  Safety Devices in place: Yes  Type of devices: Gait belt;Call light within reach;Nurse notified; Bed alarm in place; Left in bed, provided with ice pack for R knee          Plan   Plan  Times per week: 3-5x a week  Times per day: Daily  Current Treatment Recommendations: Strengthening, Endurance Training, Patient/Caregiver Education & Training, Self-Care / ADL, Equipment Evaluation, Education, & procurement, Balance Training, Pain Management, Home Management Training, Safety Education & Training, Functional Mobility Training    [facility] Score   [facility] Inpatient Daily Activity Raw Score: 20  AM-PAC Inpatient ADL T-Scale Score : 42.03  ADL Inpatient CMS 0-100% Score: 38.32  ADL Inpatient CMS G-Code Modifier : CJ    Goals  Short term goals  Time Frame for Short term goals: 1 week  Short term goal 1: Pt will complete all functional transfers SBA by 8/15  Short term goal 2: Pt will tolerate 15-20 reps BUE to increase strength/endurance for transfers  Short term goal 3: Pt will complete x5 min standing sinkside ADL with LRAD. GOAL MET 8/15.  NEW GOAL: Tolerate 8 mins of standing ADL prior to needing rest break with SBA  Patient Goals   Patient

## 2018-08-16 VITALS
BODY MASS INDEX: 38.84 KG/M2 | TEMPERATURE: 97.8 F | WEIGHT: 227.51 LBS | HEART RATE: 81 BPM | SYSTOLIC BLOOD PRESSURE: 123 MMHG | RESPIRATION RATE: 16 BRPM | DIASTOLIC BLOOD PRESSURE: 73 MMHG | OXYGEN SATURATION: 96 % | HEIGHT: 64 IN

## 2018-08-16 LAB
HCT VFR BLD CALC: 23.1 % (ref 36–48)
HEMOGLOBIN: 8 G/DL (ref 12–16)
INR BLD: 1.28 (ref 0.86–1.14)
MCH RBC QN AUTO: 31.8 PG (ref 26–34)
MCHC RBC AUTO-ENTMCNC: 34.6 G/DL (ref 31–36)
MCV RBC AUTO: 91.9 FL (ref 80–100)
ORGANISM: ABNORMAL
PDW BLD-RTO: 15.7 % (ref 12.4–15.4)
PLATELET # BLD: 236 K/UL (ref 135–450)
PMV BLD AUTO: 7.4 FL (ref 5–10.5)
PROTHROMBIN TIME: 14.6 SEC (ref 9.8–13)
RBC # BLD: 2.51 M/UL (ref 4–5.2)
URINE CULTURE, ROUTINE: ABNORMAL
URINE CULTURE, ROUTINE: ABNORMAL
WBC # BLD: 5.2 K/UL (ref 4–11)

## 2018-08-16 PROCEDURE — 6370000000 HC RX 637 (ALT 250 FOR IP): Performed by: REGISTERED NURSE

## 2018-08-16 PROCEDURE — S0028 INJECTION, FAMOTIDINE, 20 MG: HCPCS | Performed by: NURSE PRACTITIONER

## 2018-08-16 PROCEDURE — 85610 PROTHROMBIN TIME: CPT

## 2018-08-16 PROCEDURE — 51798 US URINE CAPACITY MEASURE: CPT

## 2018-08-16 PROCEDURE — 2580000003 HC RX 258: Performed by: NURSE PRACTITIONER

## 2018-08-16 PROCEDURE — 6370000000 HC RX 637 (ALT 250 FOR IP): Performed by: NURSE PRACTITIONER

## 2018-08-16 PROCEDURE — 97530 THERAPEUTIC ACTIVITIES: CPT

## 2018-08-16 PROCEDURE — 6360000002 HC RX W HCPCS: Performed by: NURSE PRACTITIONER

## 2018-08-16 PROCEDURE — 51701 INSERT BLADDER CATHETER: CPT

## 2018-08-16 PROCEDURE — 97110 THERAPEUTIC EXERCISES: CPT

## 2018-08-16 PROCEDURE — 2500000003 HC RX 250 WO HCPCS: Performed by: NURSE PRACTITIONER

## 2018-08-16 PROCEDURE — 97535 SELF CARE MNGMENT TRAINING: CPT

## 2018-08-16 PROCEDURE — 36415 COLL VENOUS BLD VENIPUNCTURE: CPT

## 2018-08-16 PROCEDURE — 97116 GAIT TRAINING THERAPY: CPT

## 2018-08-16 PROCEDURE — 85027 COMPLETE CBC AUTOMATED: CPT

## 2018-08-16 RX ORDER — CEFDINIR 300 MG/1
300 CAPSULE ORAL EVERY 12 HOURS SCHEDULED
Status: DISCONTINUED | OUTPATIENT
Start: 2018-08-16 | End: 2018-08-16 | Stop reason: HOSPADM

## 2018-08-16 RX ORDER — WARFARIN SODIUM 7.5 MG/1
7.5 TABLET ORAL ONCE
Qty: 1 TABLET | Refills: 0 | Status: SHIPPED | OUTPATIENT
Start: 2018-08-16 | End: 2018-12-03 | Stop reason: ALTCHOICE

## 2018-08-16 RX ORDER — CEFDINIR 300 MG/1
300 CAPSULE ORAL EVERY 12 HOURS SCHEDULED
Qty: 14 CAPSULE | Refills: 0 | Status: SHIPPED | OUTPATIENT
Start: 2018-08-16 | End: 2018-08-23 | Stop reason: ALTCHOICE

## 2018-08-16 RX ORDER — OXYCODONE HYDROCHLORIDE AND ACETAMINOPHEN 5; 325 MG/1; MG/1
1 TABLET ORAL EVERY 6 HOURS PRN
Qty: 28 TABLET | Refills: 0 | Status: SHIPPED | OUTPATIENT
Start: 2018-08-16 | End: 2018-08-28 | Stop reason: SDUPTHER

## 2018-08-16 RX ORDER — FAMOTIDINE 20 MG/1
20 TABLET, FILM COATED ORAL 2 TIMES DAILY
Status: DISCONTINUED | OUTPATIENT
Start: 2018-08-16 | End: 2018-08-16 | Stop reason: HOSPADM

## 2018-08-16 RX ORDER — HYDROCHLOROTHIAZIDE 25 MG/1
25 TABLET ORAL DAILY
Status: DISCONTINUED | OUTPATIENT
Start: 2018-08-16 | End: 2018-08-16 | Stop reason: HOSPADM

## 2018-08-16 RX ADMIN — CARVEDILOL 25 MG: 25 TABLET, FILM COATED ORAL at 09:24

## 2018-08-16 RX ADMIN — Medication 10 ML: at 09:23

## 2018-08-16 RX ADMIN — LOSARTAN POTASSIUM 100 MG: 100 TABLET, FILM COATED ORAL at 09:23

## 2018-08-16 RX ADMIN — PROMETHAZINE HYDROCHLORIDE 25 MG: 25 TABLET ORAL at 16:14

## 2018-08-16 RX ADMIN — CEFDINIR 300 MG: 300 CAPSULE ORAL at 12:57

## 2018-08-16 RX ADMIN — FAMOTIDINE 20 MG: 10 INJECTION, SOLUTION INTRAVENOUS at 09:24

## 2018-08-16 RX ADMIN — OXYCODONE HYDROCHLORIDE AND ACETAMINOPHEN 2 TABLET: 5; 325 TABLET ORAL at 16:11

## 2018-08-16 RX ADMIN — HYDROCHLOROTHIAZIDE 25 MG: 25 TABLET ORAL at 10:34

## 2018-08-16 ASSESSMENT — PAIN DESCRIPTION - ORIENTATION: ORIENTATION: MID

## 2018-08-16 ASSESSMENT — PAIN DESCRIPTION - LOCATION: LOCATION: ABDOMEN

## 2018-08-16 ASSESSMENT — PAIN DESCRIPTION - DESCRIPTORS: DESCRIPTORS: ACHING;DISCOMFORT

## 2018-08-16 ASSESSMENT — PAIN DESCRIPTION - PAIN TYPE: TYPE: ACUTE PAIN

## 2018-08-16 ASSESSMENT — PAIN SCALES - GENERAL
PAINLEVEL_OUTOF10: 5
PAINLEVEL_OUTOF10: 5

## 2018-08-16 ASSESSMENT — PAIN DESCRIPTION - FREQUENCY: FREQUENCY: CONTINUOUS

## 2018-08-16 NOTE — PROGRESS NOTES
Currently in Pain: Denies  Pain Assessment: 0-10  Pain Level: 5  Pain Type: Acute pain  Pain Location: Abdomen  Pain Orientation: Mid  Pain Descriptors: Aching;Discomfort  Pain Frequency: Continuous  Pain Intervention(s): Ambulation/Increased activity; Emotional support;Repositioned  Response to Pain Intervention: Patient Satisfied  Vital Signs  Patient Currently in Pain: Denies     Orientation  Orientation  Overall Orientation Status: Within Functional Limits    Objective    ADL  Feeding: Independent; Beverage management  Grooming: Increased time to complete;Supervision (standing sinkside with SW)  Toileting: Stand by assistance (brief managemnet)        Balance  Sitting Balance: Stand by assistance  Standing Balance: Stand by assistance (with SW)  Standing Balance  Time: 1-2 min x2, 2x3 min, x2.5 min  Activity: sit to stands, amb to/from bathroom, standing sinkside to wash hands  Sit to stand: Contact guard assistance  Stand to sit: Contact guard assistance  Comment: with cues for hand placement  Functional Mobility  Functional - Mobility Device: Standard Walker  Toilet Transfers  Toilet - Technique: Ambulating (with SW)  Equipment Used: Standard toilet (grab bar)  Toilet Transfer: Contact guard assistance  Toilet Transfers Comments: Pt able to complete BSC t/f with CGA. Bed mobility  Rolling to Left: Modified independent  Supine to Sit: Contact guard assistance  Sit to Supine: Contact guard assistance  Scooting: Contact guard assistance  Transfers  Sit to stand: Contact guard assistance  Stand to sit: Contact guard assistance        Cognition  Overall Cognitive Status: Exceptions  Following Commands:  Follows one step commands with repetition  Safety Judgement: Decreased awareness of need for safety  Problem Solving: Decreased awareness of errors  Insights: Decreased awareness of deficits  Initiation: Requires cues for some  Sequencing: Requires cues for some      Assessment   Performance deficits / Impairments: Decreased functional mobility ; Decreased safe awareness;Decreased balance;Decreased ADL status; Decreased endurance;Decreased strength;Decreased high-level IADLs  Assessment: Pt pleasant and agreeable however c/o stomach pain and requesting to use bathroom. Pt able to complete with SPV-CGA with SW and cues for safety and problem solving. Pt needing rest breaks throughotu due to fatigue. Pt requesting to get back to bed due to pain. Bed mobility SBA-CGA with increased time and cues for problem solving. Still recommending SNF post DC to increase strength and OT skilled services in the acute care setting to progress towards goals. Prognosis: Good  Patient Education: role of OT, OT POC, safety, ADLs  Activity Tolerance  Activity Tolerance: Patient Tolerated treatment well  Safety Devices  Safety Devices in place: Yes  Type of devices: Gait belt;Call light within reach;Nurse notified; Bed alarm in place; Left in bed          Plan   Plan  Times per week: 3-5x a week  Times per day: Daily  Current Treatment Recommendations: Strengthening, Endurance Training, Patient/Caregiver Education & Training, Self-Care / ADL, Equipment Evaluation, Education, & procurement, Balance Training, Pain Management, Home Management Training, Safety Education & Training, Functional Mobility Training  G-Code  OT G-codes  Functional Assessment Tool Used: [facility]  Score: 20    AM-PAC Score        AM-PAC Inpatient Daily Activity Raw Score: 20  AM-PAC Inpatient ADL T-Scale Score : 42.03  ADL Inpatient CMS 0-100% Score: 38.32  ADL Inpatient CMS G-Code Modifier : CJ    Goals  Short term goals  Time Frame for Short term goals: 1 week  Short term goal 1: Pt will complete all functional transfers SBA by 8/18- updated date, still at [facility] 346 term goal 2: Pt will tolerate 15-20 reps BUE to increase strength/endurance for transfers  Short term goal 3: Pt will complete x5 min standing sinkside ADL with LRAD. GOAL MET 8/15.  NEW GOAL: Tolerate 8 mins of

## 2018-08-16 NOTE — CARE COORDINATION
Received call from [facility] with [facility]. States they have cert back for patients admission. Per MD notes likely to D/C tomorrow. Following.  Von Black RN
Spoke to [facility] with [facility]. They were waiting on updated PT/OT notes for precert. Notes obtained. Susie to work on [employer].   Nela Pavon RN
independent and drove. She plans to return to SNF, [facility], to complete her rehab with goal to return home. If meets criteria, patient agreeable to participate in care transition program post discharge. Follow Up  No future appointments.     Health Maintenance  Health Maintenance Due   Topic Date Due    DEXA (modify frequency per FRAX score)  12/24/2005       Jarrod Bonilla RN

## 2018-08-16 NOTE — PROGRESS NOTES
Pt transported via [facility] to [facility]. Belongings sent with pt. IV removed. Report called to True Schirmer at [facility].

## 2018-08-16 NOTE — DISCHARGE SUMMARY
needs at this time. H&H has remained stable/improved. Okay to resume coumadin per surgery.      Status post total knee replacement on 8/1/18:   - Was in SNF for rehab, continue PT/OT. Orthopedic surgery aware of readmission.      Atrial fibrillation - controlled:   - Cardiology okay with resuming anticoagulation when okay with surgery. Surgery okay to resume AC.   - Continue BB for rate control. Continue coumadin, goal INR 2-3, check daily INR.   - Pt has [id number] score of 6 and needs long term anticoagulation.      Urinary tract infection:  - UA appears infected, started on empiric rocephin. Urine culture grew E. Coli. Transition to PO omnicef at discharge to complete a total of 10 days of abx therapy. Urinary retention:  - Bladder scans showing urinary retention. Pt unable to void. Will place palma catheter again. Discussed with Urology who recommends replacing palma cath and attempting voiding trial in a couple of days. If urinary retention persists pt with need urology follow-up as an outpt. Can consider trial of flomax. Will hold off as we are resuming pt's HCTZ and want to avoid hypotension.      Hypertension:    - BP controlled. -120's. - Continue current meds coreg and losartan. Resume HCTZ.       Chronic kidney disease:   - Stable, monitor labs.     Hypomagnesemia:  - Monitor and replete as needed.     Hyponatremia:  - Appears chronic. Na stable.       Nausea/vomiting (resolved):  - Possibly side effect of pain meds  - KUB unremarkable   - Continue bowel regimen, pt having BMs    Physical Exam Performed:     /75   Pulse 86   Temp 99 °F (37.2 °C) (Oral)   Resp 16   Ht 5' 4\" (1.626 m)   Wt 227 lb 8.2 oz (103.2 kg)   SpO2 97%   BMI 39.05 kg/m²       General appearance:  No apparent distress, appears stated age and cooperative. HEENT:  Normal cephalic, atraumatic without obvious deformity. Pupils equal, round, and reactive to light.  Extra ocular muscles intact. [facility]     Condition at Discharge: Stable    Discharge Instructions/Follow-up:  Follow-up with PCP and urology if indicated     Code Status:  Full Code     Activity: activity as tolerated    Diet: regular diet      Discharge Medications:     Current Discharge Medication List           Details   cefdinir (OMNICEF) 300 MG capsule Take 1 capsule by mouth every 12 hours for 7 days  Qty: 14 capsule, Refills: 0              Details   oxyCODONE-acetaminophen (PERCOCET) 5-325 MG per tablet Take 1 tablet by mouth every 6 hours as needed for Pain for up to 7 days. Garcia Andres: 28 tablet, Refills: 0    Associated Diagnoses: Status post total right knee replacement              Details   carvedilol (COREG) 12.5 MG tablet Take 25 mg by mouth 2 times daily (with meals)      atorvastatin (LIPITOR) 20 MG tablet TAKE 1 TABLET DAILY  Qty: 90 tablet, Refills: 0      allopurinol (ZYLOPRIM) 100 MG tablet TAKE 2 TABLETS DAILY  Qty: 180 tablet, Refills: 0      losartan-hydrochlorothiazide (HYZAAR) 100-25 MG per tablet Take 1 tablet by mouth daily  Qty: 30 tablet, Refills: 3      warfarin (COUMADIN) 5 MG tablet Indications: SUNDAY, TUES., THURSDAY, SATURDAY Take 1 tablet by mouth daily, except 1.5 tablets on Wednesdays  Qty: 90 tablet, Refills: 3      aspirin 81 MG EC tablet Take 81 mg by mouth daily. Time Spent on discharge is more than 30 minutes in the examination, evaluation, counseling and review of medications and discharge plan. Signed:    KWAME Wilkins - CNP   8/16/2018      Thank you Nelli Anaya MD for the opportunity to be involved in this patient's care. If you have any questions or concerns please feel free to contact me at [phone].

## 2018-08-21 ENCOUNTER — OFFICE VISIT (OUTPATIENT)
Dept: ORTHOPEDIC SURGERY | Age: 78
End: 2018-08-21

## 2018-08-21 VITALS
HEART RATE: 87 BPM | SYSTOLIC BLOOD PRESSURE: 137 MMHG | DIASTOLIC BLOOD PRESSURE: 87 MMHG | HEIGHT: 64 IN | WEIGHT: 210 LBS | BODY MASS INDEX: 35.85 KG/M2

## 2018-08-21 DIAGNOSIS — M17.11 PRIMARY LOCALIZED OSTEOARTHRITIS OF RIGHT KNEE: ICD-10-CM

## 2018-08-21 DIAGNOSIS — Z96.651 STATUS POST TOTAL RIGHT KNEE REPLACEMENT: Primary | ICD-10-CM

## 2018-08-21 DIAGNOSIS — M25.561 RIGHT KNEE PAIN, UNSPECIFIED CHRONICITY: ICD-10-CM

## 2018-08-21 PROCEDURE — 99024 POSTOP FOLLOW-UP VISIT: CPT | Performed by: PHYSICIAN ASSISTANT

## 2018-08-21 NOTE — PROGRESS NOTES
History of present illness: The patient returns today after right total knee replacement. Pain control has been satisfactory with oral medications. There have been no fevers or chills. Pain Assessment  Location of Pain: Knee  Location Modifiers: Right  Severity of Pain: 5  Quality of Pain: Sharp, Aching  Duration of Pain: Persistent  Frequency of Pain: Constant  Aggravating Factors: Walking, Standing, Bending  Limiting Behavior: Some  Relieving Factors: Rest]    Physical examination: The incision is clean, dry, and intact with no drainage or signs of infection. Range of motion is 2 degrees of extension to 95 degrees of flexion. There is expected swelling with no evidence of DVT and a negative Homans sign. Neurovascular exam is intact. Radiographs: X-rays taken today including AP, lateral, and skyline views of the right knee show excellent alignment of the prosthesis. No evidence of septic or aseptic loosening or periprosthetic fractures. Assessment/plan: We would like to begin outpatient physical therapy to restore range of motion and strength. The patient was given local wound care instructions. We did not refill their pain medication today. We will followup in 2 weeks for reevaluation.

## 2018-08-22 ENCOUNTER — TELEPHONE (OUTPATIENT)
Dept: ORTHOPEDIC SURGERY | Age: 78
End: 2018-08-22

## 2018-08-22 NOTE — TELEPHONE ENCOUNTER
Spoke with Marce Oviedo daughter. Pt back at home. Went to [facility] after discharge but left Sunday couldn't take staying there anyone. Incision status: No drainage or redness    Edema/Swelling/Teds: Looks good, feet a bit swollen, taking lasix to help with that. Pain level and status: Still using pain meds for knee, and stomach pain. Use of pain medications: Using percocet with phenergan     Blood thinner: Coumadin    Bowels:     Home Care Agency active: NA    Outpatient therapy: Working on out pt, working on ROM.     Do you have all of your medications: Yes    Changes in medications: No    Ortho Vitals: NA    Follow up appointments:    Future Appointments  Date Time Provider Ashley Maite   8/23/2018 3:40 PM Cathie Isaac MD [facility] None   8/27/2018 2:30 PM Kunal Mendosa, PT [facility] None   9/4/2018 4:00 PM BILL Claudio AND EMMANUEL

## 2018-08-23 ENCOUNTER — OFFICE VISIT (OUTPATIENT)
Dept: INTERNAL MEDICINE CLINIC | Age: 78
End: 2018-08-23

## 2018-08-23 VITALS — DIASTOLIC BLOOD PRESSURE: 70 MMHG | SYSTOLIC BLOOD PRESSURE: 132 MMHG | HEART RATE: 65 BPM | RESPIRATION RATE: 18 BRPM

## 2018-08-23 DIAGNOSIS — I10 ESSENTIAL HYPERTENSION: ICD-10-CM

## 2018-08-23 DIAGNOSIS — Z96.651 STATUS POST TOTAL RIGHT KNEE REPLACEMENT: ICD-10-CM

## 2018-08-23 DIAGNOSIS — S30.1XXD HEMATOMA OF RECTUS SHEATH, SUBSEQUENT ENCOUNTER: ICD-10-CM

## 2018-08-23 DIAGNOSIS — Z09 HOSPITAL DISCHARGE FOLLOW-UP: Primary | ICD-10-CM

## 2018-08-23 DIAGNOSIS — I48.21 PERMANENT ATRIAL FIBRILLATION (HCC): ICD-10-CM

## 2018-08-23 DIAGNOSIS — D62 ACUTE BLOOD LOSS ANEMIA: ICD-10-CM

## 2018-08-23 PROCEDURE — 99214 OFFICE O/P EST MOD 30 MIN: CPT | Performed by: INTERNAL MEDICINE

## 2018-08-24 ENCOUNTER — TELEPHONE (OUTPATIENT)
Dept: INTERNAL MEDICINE CLINIC | Age: 78
End: 2018-08-24

## 2018-08-24 NOTE — TELEPHONE ENCOUNTER
----- Message from Micaela Mora MD sent at 8/24/2018  3:40 PM EDT -----  Contact: Antonio Mireya [place] [phone]  Need INR  ----- Message -----  From: Aviva Hay  Sent: 8/24/2018   3:31 PM  To: Micaela Mora MD    [place] has shown up at pt home and contacted her to draw her INR and hasnt been able to reach her.  -am

## 2018-08-27 ENCOUNTER — HOSPITAL ENCOUNTER (OUTPATIENT)
Dept: PHYSICAL THERAPY | Age: 78
Setting detail: THERAPIES SERIES
Discharge: HOME | End: 2018-08-27
Payer: MEDICARE

## 2018-08-27 PROCEDURE — 97161 PT EVAL LOW COMPLEX 20 MIN: CPT | Performed by: PHYSICAL THERAPIST

## 2018-08-27 PROCEDURE — G8978 MOBILITY CURRENT STATUS: HCPCS | Performed by: PHYSICAL THERAPIST

## 2018-08-27 PROCEDURE — G8979 MOBILITY GOAL STATUS: HCPCS | Performed by: PHYSICAL THERAPIST

## 2018-08-27 PROCEDURE — 97140 MANUAL THERAPY 1/> REGIONS: CPT | Performed by: PHYSICAL THERAPIST

## 2018-08-27 PROCEDURE — 97110 THERAPEUTIC EXERCISES: CPT | Performed by: PHYSICAL THERAPIST

## 2018-08-27 NOTE — PLAN OF CARE
[facility], [facility] [place], [address] [facility] [address]  Phone: [phone]   Fax:     [phone]       Physical Therapy Certification    Dear Referring Practitioner: Yue Soni,    We had the pleasure of evaluating the following patient for physical therapy services at [address] [place] [address]. A summary of our findings can be found in the initial assessment below. This includes our plan of care. If you have any questions or concerns regarding these findings, please do not hesitate to contact me at the office phone number checked above. Thank you for the referral.       Physician Signature:_______________________________Date:__________________  By signing above (or electronic signature), therapists plan is approved by physician      Patient: Kathleen Jamison   : 1940   MRN: [id number]  Referring Physician: Referring Practitioner: Yue Soni      Evaluation Date: 2018      Medical Diagnosis Information:  Diagnosis: PT: R knee TKR secondary to OA (18)   Treatment Diagnosis: R knee pain secondary to OA M17.11/ M25.561                                         Insurance information: PT Insurance Information: [facility]     Precautions/ Contra-indications: WBAT  Latex Allergy:  [x]NO      []YES  Preferred Language for Healthcare:   [x]English       []other:    SUBJECTIVE: Patient stated complaint:Patient underwent TKR 18. The following day, patient was sent to SNF where she was given medication (shots) for blood thinners. The shot ended up hitting an artery causing emergent send back to hospital. Patient was at hospital for one week where she was given 4 pints of blood. Upon DC from hospital, patient went back to SNF for a few days before checking herself out 18. Saw MD who was concerned with motion restrictions.     Relevant Medical History:(+) CVA , L Patient requires intervention due to being higher risk   TUG score (>12s at risk):     [] Falls education provided, including       G-Codes:  PT G-Codes  Functional Assessment Tool Used: LEFS  Score: 84% disability  Functional Limitation: Mobility: Walking and moving around  Mobility: Walking and Moving Around Current Status (): At least 80 percent but less than 100 percent impaired, limited or restricted  Mobility: Walking and Moving Around Goal Status (): At least 20 percent but less than 40 percent impaired, limited or restricted    ASSESSMENT:   Functional Impairments:     [x]Noted lumbar/proximal hip/LE joint hypomobility   [x]Decreased LE functional ROM   [x]Decreased core/proximal hip strength and neuromuscular control   [x]Decreased LE functional strength   [x]Reduced balance/proprioceptive control   []other:      Functional Activity Limitations (from functional questionnaire and intake)   [x]Reduced ability to tolerate prolonged functional positions   [x]Reduced ability or difficulty with changes of positions or transfers between positions   [x]Reduced ability to maintain good posture and demonstrate good body mechanics with sitting, bending, and lifting   [x]Reduced ability to sleep   [x] Reduced ability or tolerance with driving and/or computer work   [x]Reduced ability to perform lifting, carrying tasks   [x]Reduced ability to squat   [x]Reduced ability to forward bend   [x]Reduced ability to ambulate prolonged functional periods/distances/surfaces   [x]Reduced ability to ascend/descend stairs   [x]Reduced ability to run, hop, cut or jump   []other:    Participation Restrictions   [x]Reduced participation in self care activities   [x]Reduced participation in home management activities   []Reduced participation in work activities   [x]Reduced participation in social activities. [x]Reduced participation in sport/recreation activities.     Classification :    [x]Signs/symptoms consistent with

## 2018-08-27 NOTE — FLOWSHEET NOTE
[facility]    Physical Therapy Daily Treatment Note  Date:  2018    Patient Name:  Kathleen Jamison    :  1940  MRN: [id number]  Restrictions/Precautions:    Medical/Treatment Diagnosis Information:  · Diagnosis: PT: R knee TKR secondary to OA (18)  · Treatment Diagnosis: R knee pain secondary to OA M17.11/ L84.817  Insurance/Certification information:  PT Insurance Information: [employer] [facility]  Physician Information:  Referring Practitioner: Mile Donnelly of care signed (Y/N):     Date of Patient follow up with Physician:     G-Code (if applicable):      Date G-Code Applied:    PT G-Codes  Functional Assessment Tool Used: LEFS  Score: 84% disability  Functional Limitation: Mobility: Walking and moving around  Mobility: Walking and Moving Around Current Status (): At least 80 percent but less than 100 percent impaired, limited or restricted  Mobility: Walking and Moving Around Goal Status (): At least 20 percent but less than 40 percent impaired, limited or restricted    Progress Note: [x]  Yes  []  No  Next due by: Visit #10       Latex Allergy:  [x]NO      []YES  Preferred Language for Healthcare:   [x]English       []other:    Visit # Insurance Allowable   1 Med Nec     Pain level:  7/10     SUBJECTIVE:  See eval    OBJECTIVE: See eval  Observation:   Test measurements:      RESTRICTIONS/PRECAUTIONS: WBAT. Recent hospitalization for blood thinning. Used high-low table.     Exercises/Interventions:     Therapeutic Ex Sets/sec Reps Notes HEP   Belt stretch 30\"x3      Heel prop x1      Seated P Flex  Heel slides Review  x10      QS  SLR x10  x10      LAQ x10      EZ stretch 5'  Flex           Patient education 13'  Expected progression, importance of ROM, frequency of HEP                                                            Manual Intervention       GERARDO Johnson with flex, PA for flex 10'                                         NMR re-education                                                                          Therapeutic Exercise and NMR EXR  [] (87701) Provided verbal/tactile cueing for activities related to strengthening, flexibility, endurance, ROM for improvements in LE, proximal hip, and core control with self care, mobility, lifting, ambulation.  [] (21288) Provided verbal/tactile cueing for activities related to improving balance, coordination, kinesthetic sense, posture, motor skill, proprioception  to assist with LE, proximal hip, and core control in self care, mobility, lifting, ambulation and eccentric single leg control.      NMR and Therapeutic Activities:    [] (35281 or 53233) Provided verbal/tactile cueing for activities related to improving balance, coordination, kinesthetic sense, posture, motor skill, proprioception and motor activation to allow for proper function of core, proximal hip and LE with self care and ADLs  [] (32901) Gait Re-education- Provided training and instruction to the patient for proper LE, core and proximal hip recruitment and positioning and eccentric body weight control with ambulation re-education including up and down stairs     Home Exercise Program:    [x] (76304) Reviewed/Progressed HEP activities related to strengthening, flexibility, endurance, ROM of core, proximal hip and LE for functional self-care, mobility, lifting and ambulation/stair navigation   [] (92936)Reviewed/Progressed HEP activities related to improving balance, coordination, kinesthetic sense, posture, motor skill, proprioception of core, proximal hip and LE for self care, mobility, lifting, and ambulation/stair navigation      Manual Treatments:  PROM / STM / Oscillations-Mobs:  G-I, II, III, IV (PA's, Inf., Post.)  [] (47697) Provided manual therapy to mobilize LE, proximal hip and/or LS spine soft tissue/joints for the purpose of modulating pain, promoting relaxation,  increasing ROM, reducing/eliminating soft tissue progression  [] Other:     ASSESSMENT:  See eval    Treatment/Activity Tolerance:  [] Patient tolerated treatment well [] Patient limited by fatique  [] Patient limited by pain  [] Patient limited by other medical complications  [] Other:     Prognosis: [] Good [] Fair  [] Poor    Patient Requires Follow-up: [x] Yes  [] No    PLAN: See eval  [] Continue per plan of care [] Alter current plan (see comments)  [x] Plan of care initiated [] Hold pending MD visit [] Discharge    Electronically signed by: Corky Whipple PT

## 2018-08-28 ENCOUNTER — TELEPHONE (OUTPATIENT)
Dept: INTERNAL MEDICINE CLINIC | Age: 78
End: 2018-08-28

## 2018-08-28 DIAGNOSIS — Z96.651 STATUS POST TOTAL RIGHT KNEE REPLACEMENT: Primary | ICD-10-CM

## 2018-08-28 RX ORDER — OXYCODONE HYDROCHLORIDE AND ACETAMINOPHEN 5; 325 MG/1; MG/1
1 TABLET ORAL EVERY 6 HOURS PRN
Qty: 28 TABLET | Refills: 0 | Status: SHIPPED | OUTPATIENT
Start: 2018-08-28 | End: 2018-09-04 | Stop reason: SDUPTHER

## 2018-08-28 RX ORDER — PROMETHAZINE HYDROCHLORIDE 25 MG/1
25 TABLET ORAL EVERY 6 HOURS PRN
Qty: 25 TABLET | Refills: 0 | Status: SHIPPED | OUTPATIENT
Start: 2018-08-28 | End: 2018-08-29 | Stop reason: SDUPTHER

## 2018-08-28 NOTE — TELEPHONE ENCOUNTER
----- Message from Tamica Crespo MD sent at 8/28/2018  4:09 PM EDT -----  Contact: Home care [phone]  8/14  ----- Message -----  From: Narda Augustine  Sent: 8/28/2018   1:00 PM  To: Tamica Crespo MD    Home care needing to know the date of that? Please advise.   ----- Message -----  From: Tamica Crespo MD  Sent: 8/28/2018  11:56 AM  To: Sylvia cabral uti in hospital  Was treated    ----- Message -----  From: Narda Augustine  Sent: 8/28/2018  11:12 AM  To: Tamica Crespo MD    Home care needing to know if patient was recently diagnosed with a UTI? Please advise.

## 2018-08-29 DIAGNOSIS — Z96.651 STATUS POST TOTAL RIGHT KNEE REPLACEMENT: ICD-10-CM

## 2018-08-29 RX ORDER — PROMETHAZINE HYDROCHLORIDE 25 MG/1
25 TABLET ORAL EVERY 6 HOURS PRN
Qty: 25 TABLET | Refills: 0 | Status: SHIPPED | OUTPATIENT
Start: 2018-08-29 | End: 2018-09-05

## 2018-08-29 RX ORDER — PROMETHAZINE HYDROCHLORIDE 25 MG/1
25 TABLET ORAL EVERY 6 HOURS PRN
Qty: 25 TABLET | Refills: 0 | Status: SHIPPED | OUTPATIENT
Start: 2018-08-29 | End: 2018-08-29 | Stop reason: SDUPTHER

## 2018-08-30 ENCOUNTER — TELEPHONE (OUTPATIENT)
Dept: ORTHOPEDIC SURGERY | Age: 78
End: 2018-08-30

## 2018-08-30 ENCOUNTER — CARE COORDINATION (OUTPATIENT)
Dept: CASE MANAGEMENT | Age: 78
End: 2018-08-30

## 2018-08-30 DIAGNOSIS — Z96.652 STATUS POST TOTAL LEFT KNEE REPLACEMENT: Primary | ICD-10-CM

## 2018-08-30 RX ORDER — ONDANSETRON 4 MG/1
4 TABLET, FILM COATED ORAL EVERY 8 HOURS PRN
Qty: 30 TABLET | Refills: 0 | Status: SHIPPED | OUTPATIENT
Start: 2018-08-30 | End: 2018-11-30

## 2018-08-30 NOTE — CARE COORDINATION
[facility] Update Call    2018    Patient: Ac Mejias Patient : 1940   MRN: [id number]  Reason for Admission: There are no discharge diagnoses documented for the most recent discharge. Discharge Date: 18 RARS: Readmission Risk Score: [facility] Update    Care Transitions Interventions  Post Acute Facility Update       Patient discharged from [place] [address] on 18 and has [facility].  Will continue to follow for transitions to home    Loreto Inman RN BSN   Care Transitions Coordinator  [phone]

## 2018-08-31 ENCOUNTER — TELEPHONE (OUTPATIENT)
Dept: ORTHOPEDIC SURGERY | Age: 78
End: 2018-08-31

## 2018-08-31 ENCOUNTER — CARE COORDINATION (OUTPATIENT)
Dept: CASE MANAGEMENT | Age: 78
End: 2018-08-31

## 2018-08-31 ENCOUNTER — HOSPITAL ENCOUNTER (OUTPATIENT)
Dept: PHYSICAL THERAPY | Age: 78
Setting detail: THERAPIES SERIES
Discharge: HOME | End: 2018-08-31
Payer: MEDICARE

## 2018-08-31 ENCOUNTER — APPOINTMENT (OUTPATIENT)
Dept: PHYSICAL THERAPY | Age: 78
End: 2018-08-31
Payer: MEDICARE

## 2018-08-31 PROCEDURE — 97140 MANUAL THERAPY 1/> REGIONS: CPT | Performed by: PHYSICAL THERAPIST

## 2018-08-31 PROCEDURE — 97110 THERAPEUTIC EXERCISES: CPT | Performed by: PHYSICAL THERAPIST

## 2018-08-31 NOTE — CARE COORDINATION
[facility] Transitions Initial Follow Up Call    Call within 2 business days of discharge: No    Patient: Verita Collar Patient : 1940   MRN: [id number]  Reason for Admission: There are no discharge diagnoses documented for the most recent discharge. Discharge Date: 18 RARS: Readmission Risk Score: 14     Spoke with: Attempted to contact patient for transition call. Unable to reach, left voicemail with contact information for patient to call back.     Facility: [facility]      Care Transitions 24 Hour Call    Patient DME:  Stefano Jean chair, Other  Other Patient DME:  grab bar in shower  Are you an active caregiver in your home?:  No  Care Transitions Interventions         Follow Up  Future Appointments  Date Time Provider Ashley Arora   2018 4:00 PM BILL Gaytan AND EMMANUEL   2018 1:15 PM [address] None   2018 1:30 PM Rosalba Caraballo PT [facility] None   2018 1:30 PM Conrad Cosme PT [facility] None   2018 3:10 PM MD [facility] None       Jill Gowers, RN

## 2018-08-31 NOTE — FLOWSHEET NOTE
improvements in LE, proximal hip, and core control with self care, mobility, lifting, ambulation.  [] (23803) Provided verbal/tactile cueing for activities related to improving balance, coordination, kinesthetic sense, posture, motor skill, proprioception  to assist with LE, proximal hip, and core control in self care, mobility, lifting, ambulation and eccentric single leg control. NMR and Therapeutic Activities:    [] (70052 or 93710) Provided verbal/tactile cueing for activities related to improving balance, coordination, kinesthetic sense, posture, motor skill, proprioception and motor activation to allow for proper function of core, proximal hip and LE with self care and ADLs  [] (69178) Gait Re-education- Provided training and instruction to the patient for proper LE, core and proximal hip recruitment and positioning and eccentric body weight control with ambulation re-education including up and down stairs     Home Exercise Program:    [x] (71283) Reviewed/Progressed HEP activities related to strengthening, flexibility, endurance, ROM of core, proximal hip and LE for functional self-care, mobility, lifting and ambulation/stair navigation   [] (29166)Reviewed/Progressed HEP activities related to improving balance, coordination, kinesthetic sense, posture, motor skill, proprioception of core, proximal hip and LE for self care, mobility, lifting, and ambulation/stair navigation      Manual Treatments:  PROM / STM / Oscillations-Mobs:  G-I, II, III, IV (PA's, Inf., Post.)  [] (02737) Provided manual therapy to mobilize LE, proximal hip and/or LS spine soft tissue/joints for the purpose of modulating pain, promoting relaxation,  increasing ROM, reducing/eliminating soft tissue swelling/inflammation/restriction, improving soft tissue extensibility and allowing for proper ROM for normal function with self care, mobility, lifting and ambulation.      Modalities:  Declined    Charges:  Timed Code Treatment Minutes: 40   Total Treatment Minutes: 40     [] EVAL LOW 07356  [x] XN(54487) x  2   [] IONTO  [] NMR (84705) x      [] VASO  [x] Manual (61743) x       [] Other:  [] TA x       [] Mech Traction (94128)  [] ES(attended) (92358)      [] ES (un) (88946):     GOALS:   Patient stated goal: Walk around home pain free without AD    Therapist goals for Patient:   Short Term Goals: To be achieved in: 2 weeks  1. Independent in HEP and progression per patient tolerance, in order to prevent re-injury. 2. Patient will have a decrease in pain to facilitate improvement in movement, function, and ADLs as indicated by Functional Deficits. Long Term Goals: To be achieved in: 12 weeks  1. Disability index score of 20% or less for the LEFS to assist with reaching prior level of function. 2. Patient will demonstrate increased AROM to 0-120 to allow for proper joint functioning as indicated by patients Functional Deficits. 3. Patient will demonstrate an increase in Strength to good proximal hip strength and control, within 5lb HHD in LE to allow for proper functional mobility as indicated by patients Functional Deficits. 4. Patient will return to full functional activities without increased symptoms or restriction including ability to ascend/ descend stairs with reciprocal gaot. 5. Patient will be able to ambulate I without gait abnormality with a TUG score < 12 seconds to prevent risk of falls in the community. Progression Towards Functional goals:  [] Patient is progressing as expected towards functional goals listed. [] Progression is slowed due to complexities listed. [] Progression has been slowed due to co-morbidities. [x] Plan just implemented, too soon to assess goals progression  [] Other:     ASSESSMENT:  ROM progressing. Added to HEP with good understanding. Able to complete backward revolutions on bike.      Treatment/Activity Tolerance:  [] Patient tolerated treatment well [] Patient limited by sakina  []

## 2018-08-31 NOTE — TELEPHONE ENCOUNTER
Nurse Navigator called patient for one final follow up:  Doing good, up and walking without a walker. Daughter states there is a part on the top of the incision that is open, seems to be scabbed. A little red, not draining. Daughter will send RN a photo. RN forwarded photo to PAs. Instructed to clean with soap and water and put polysporin ointment on scabbed area. Daughter verbalized understanding. Pt has no questions or concerns. Signed off from pt. Instructed pt to [facility] or [facility] with any issues or concerns.     Rachel Dorsey  Orthopedic Nurse Navigator  Phone number: [phone]

## 2018-09-04 ENCOUNTER — CARE COORDINATION (OUTPATIENT)
Dept: CARE COORDINATION | Age: 78
End: 2018-09-04

## 2018-09-04 ENCOUNTER — OFFICE VISIT (OUTPATIENT)
Dept: ORTHOPEDIC SURGERY | Age: 78
End: 2018-09-04

## 2018-09-04 VITALS — SYSTOLIC BLOOD PRESSURE: 155 MMHG | HEART RATE: 86 BPM | DIASTOLIC BLOOD PRESSURE: 96 MMHG

## 2018-09-04 DIAGNOSIS — Z96.651 STATUS POST TOTAL RIGHT KNEE REPLACEMENT: ICD-10-CM

## 2018-09-04 PROCEDURE — 99024 POSTOP FOLLOW-UP VISIT: CPT | Performed by: PHYSICIAN ASSISTANT

## 2018-09-04 RX ORDER — OXYCODONE HYDROCHLORIDE AND ACETAMINOPHEN 5; 325 MG/1; MG/1
1 TABLET ORAL EVERY 6 HOURS PRN
Qty: 28 TABLET | Refills: 0 | Status: SHIPPED | OUTPATIENT
Start: 2018-09-04 | End: 2018-09-11

## 2018-09-05 ENCOUNTER — ANTI-COAG VISIT (OUTPATIENT)
Dept: PHARMACY | Age: 78
End: 2018-09-05
Payer: MEDICARE

## 2018-09-05 DIAGNOSIS — I48.21 PERMANENT ATRIAL FIBRILLATION (HCC): ICD-10-CM

## 2018-09-05 DIAGNOSIS — Z79.01 LONG TERM CURRENT USE OF ANTICOAGULANT THERAPY: ICD-10-CM

## 2018-09-05 LAB — INR BLD: 1.6

## 2018-09-05 PROCEDURE — 99211 OFF/OP EST MAY X REQ PHY/QHP: CPT | Performed by: FAMILY MEDICINE

## 2018-09-05 PROCEDURE — 85610 PROTHROMBIN TIME: CPT | Performed by: FAMILY MEDICINE

## 2018-09-07 ENCOUNTER — TELEPHONE (OUTPATIENT)
Dept: PHARMACY | Facility: CLINIC | Age: 78
End: 2018-09-07

## 2018-09-07 NOTE — TELEPHONE ENCOUNTER
Please obtain discharge medication list from [facility] (suspected discharge date of 8/19/18) and contact patient for medication review.      Thank you,    Liza Ghotra, PharmD  [address] [place] [address] Pharmacist  [phone] or [phone] (Option 7)

## 2018-09-07 NOTE — LETTER
[address]  [address], [address]  Phone: [phone]  Fax: [phone] [facility] [address] [address]           09/18/18     Dear Shahnaz Anderson,    We tried to reach you recently, after your hospital stay, to review your medications. I was unable to reach you on the telephone. We understand that medications can be confusing after a hospital stay. If you are interested in a pharmacist reviewing your medications, please call [phone] option #7. If we do not hear from you after 2 weeks, we will assume you do not have questions or concerns.          Sincerely,     Xavier Bray, PharmD candidate   [address]  Phone: [phone], option 7

## 2018-09-10 NOTE — CARE COORDINATION
[facility] Transitions Initial Follow Up Call    Call within 2 business days of discharge: Yes    Patient: Rina Boyd Patient : 1940   MRN: [id number]  Reason for Admission: There are no discharge diagnoses documented for the most recent discharge. Discharge Date: 18 RARS: Readmission Risk Score: 14     Spoke with: Second attempt to contact patient for transition call. Unable to reach, left voicemail with contact information for patient to call back.     Facility: [facility]        Care Transitions 24 Hour Call    Patient DME:  Margot Lamar chair, Other  Other Patient DME:  grab bar in shower  Are you an active caregiver in your home?:  No  Care Transitions Interventions         Follow Up  Future Appointments  Date Time Provider Ashley Arora   2018 1:15 PM [address] None   10/1/2018 1:30 PM Antione Rae MD [facility]   2018 3:10 PM Tom Damico MD [facility] None       Soraya Martinez RN

## 2018-09-12 ENCOUNTER — ANTI-COAG VISIT (OUTPATIENT)
Dept: PHARMACY | Age: 78
End: 2018-09-12
Payer: MEDICARE

## 2018-09-12 DIAGNOSIS — I48.21 PERMANENT ATRIAL FIBRILLATION (HCC): ICD-10-CM

## 2018-09-12 DIAGNOSIS — Z79.01 LONG TERM CURRENT USE OF ANTICOAGULANT THERAPY: ICD-10-CM

## 2018-09-12 LAB — INTERNATIONAL NORMALIZATION RATIO, POC: 1.9

## 2018-09-12 PROCEDURE — 85610 PROTHROMBIN TIME: CPT | Performed by: PHARMACIST

## 2018-09-12 PROCEDURE — 99211 OFF/OP EST MAY X REQ PHY/QHP: CPT | Performed by: PHARMACIST

## 2018-09-19 ENCOUNTER — ANTI-COAG VISIT (OUTPATIENT)
Dept: PHARMACY | Age: 78
End: 2018-09-19
Payer: MEDICARE

## 2018-09-19 DIAGNOSIS — Z79.01 LONG TERM CURRENT USE OF ANTICOAGULANT THERAPY: ICD-10-CM

## 2018-09-19 DIAGNOSIS — I48.21 PERMANENT ATRIAL FIBRILLATION (HCC): ICD-10-CM

## 2018-09-19 LAB — INTERNATIONAL NORMALIZATION RATIO, POC: 2.5

## 2018-09-19 PROCEDURE — 99211 OFF/OP EST MAY X REQ PHY/QHP: CPT | Performed by: PHARMACIST

## 2018-09-19 PROCEDURE — 85610 PROTHROMBIN TIME: CPT | Performed by: PHARMACIST

## 2018-10-01 ENCOUNTER — OFFICE VISIT (OUTPATIENT)
Dept: ORTHOPEDIC SURGERY | Age: 78
End: 2018-10-01

## 2018-10-01 DIAGNOSIS — Z96.651 STATUS POST TOTAL RIGHT KNEE REPLACEMENT: Primary | ICD-10-CM

## 2018-10-01 PROCEDURE — 99024 POSTOP FOLLOW-UP VISIT: CPT | Performed by: ORTHOPAEDIC SURGERY

## 2018-10-02 RX ORDER — TRAZODONE HYDROCHLORIDE 50 MG/1
TABLET ORAL
Qty: 20 TABLET | Refills: 0 | Status: SHIPPED | OUTPATIENT
Start: 2018-10-02 | End: 2018-10-18 | Stop reason: SDUPTHER

## 2018-10-02 NOTE — TELEPHONE ENCOUNTER
----- Message from Chitra Rivas MD sent at 10/2/2018 11:08 AM EDT -----  Contact: dtr [phone]  Other meds can cause more problems  I would retry trazodone with lower dose    ----- Message -----  From: Gabe Medrano  Sent: 10/2/2018  10:58 AM  To: Chitra Rivas MD    Pt took Trazodone years ago and it made her sick and did not help her sleep. Is there something different you can prescribe?  ----- Message -----  From: Dimitry Peraza  Sent: 10/2/2018  10:52 AM  To: Gabe Medrano        ----- Message -----  From: Chitra Rivas MD  Sent: 10/2/2018  10:21 AM  To: Jefry Rosas    Trazodone 25 mg nightly, if not helpful , increase to 50 mg   Given 50 mg tabs #20  ----- Message -----  From: Jory Rosas  Sent: 10/2/2018   8:34 AM  To: Chitra Rivas MD    Her dtr. connie called-wanted to know if you would prescribe her something to help her sleep- said she is having a lot of trouble sleeping and not getting much - has tried melatonin but not helping-[facility] in [place] at [phone] appt- 8-23-18-next [id number]-please let connie know at [phone]

## 2018-10-03 ENCOUNTER — ANTI-COAG VISIT (OUTPATIENT)
Dept: PHARMACY | Age: 78
End: 2018-10-03
Payer: MEDICARE

## 2018-10-03 DIAGNOSIS — Z79.01 LONG TERM CURRENT USE OF ANTICOAGULANT THERAPY: ICD-10-CM

## 2018-10-03 DIAGNOSIS — I48.21 PERMANENT ATRIAL FIBRILLATION (HCC): ICD-10-CM

## 2018-10-03 LAB — INTERNATIONAL NORMALIZATION RATIO, POC: 2.5

## 2018-10-03 PROCEDURE — 99211 OFF/OP EST MAY X REQ PHY/QHP: CPT | Performed by: PHARMACIST

## 2018-10-03 PROCEDURE — 85610 PROTHROMBIN TIME: CPT | Performed by: PHARMACIST

## 2018-10-18 RX ORDER — TRAZODONE HYDROCHLORIDE 50 MG/1
TABLET ORAL
Qty: 90 TABLET | Refills: 0 | Status: SHIPPED | OUTPATIENT
Start: 2018-10-18 | End: 2019-07-08

## 2018-10-18 RX ORDER — ATORVASTATIN CALCIUM 20 MG/1
TABLET, FILM COATED ORAL
Qty: 90 TABLET | Refills: 0 | Status: SHIPPED | OUTPATIENT
Start: 2018-10-18 | End: 2019-01-16 | Stop reason: SDUPTHER

## 2018-10-31 ENCOUNTER — ANTI-COAG VISIT (OUTPATIENT)
Dept: PHARMACY | Age: 78
End: 2018-10-31
Payer: MEDICARE

## 2018-10-31 DIAGNOSIS — Z79.01 LONG TERM CURRENT USE OF ANTICOAGULANT THERAPY: ICD-10-CM

## 2018-10-31 DIAGNOSIS — I48.21 PERMANENT ATRIAL FIBRILLATION (HCC): ICD-10-CM

## 2018-10-31 LAB — INR BLD: 1.9

## 2018-10-31 PROCEDURE — 99211 OFF/OP EST MAY X REQ PHY/QHP: CPT | Performed by: PHARMACIST

## 2018-10-31 PROCEDURE — 85610 PROTHROMBIN TIME: CPT | Performed by: PHARMACIST

## 2018-11-02 RX ORDER — ALLOPURINOL 100 MG/1
TABLET ORAL
Qty: 180 TABLET | Refills: 1 | Status: SHIPPED | OUTPATIENT
Start: 2018-11-02 | End: 2019-06-03 | Stop reason: SDUPTHER

## 2018-11-28 ENCOUNTER — ANTI-COAG VISIT (OUTPATIENT)
Dept: PHARMACY | Age: 78
End: 2018-11-28
Payer: MEDICARE

## 2018-11-28 DIAGNOSIS — I48.21 PERMANENT ATRIAL FIBRILLATION (HCC): ICD-10-CM

## 2018-11-28 DIAGNOSIS — Z79.01 LONG TERM CURRENT USE OF ANTICOAGULANT THERAPY: ICD-10-CM

## 2018-11-28 LAB — INTERNATIONAL NORMALIZATION RATIO, POC: 1.3

## 2018-11-28 PROCEDURE — 85610 PROTHROMBIN TIME: CPT | Performed by: PHARMACIST

## 2018-11-28 PROCEDURE — 99211 OFF/OP EST MAY X REQ PHY/QHP: CPT | Performed by: PHARMACIST

## 2018-11-28 NOTE — PROGRESS NOTES
Ms. Rayne Pearson is here with hx of afib, for anticoagulation monitoring. Pt has PMH significant for gout, OA, Hyperlipidemia, HTN, CVA (2007)and afib (2007). Pt verifies dosing regimen as listed above. Pt denies s/s bleeding/bruising/swelling/SOB. No BRBPR. No melena. missed dose this Mon. No changes in RX/OTC/herbal medications. No major dietary changes. Pt denies EtOH and tobacco use. INR 1.3 is well below the acceptable therapeutic goal range of 2-3. Likely due to missed dose  Recommend to take 12.5 mg tonight, then continue 7.5mg Mon/Wed/Fri and 5mg daily all other days. Patient has Warfarin 5 mg tablets. Will continue to monitor and check INR in 3 weeks. Dosing reminder card given with phone number, appointment date and time.   Return to clinic: 12/17 @ 11:15 AM    Linda Marshall PharmD 1:15 PM 11/28/18

## 2018-11-30 ENCOUNTER — OFFICE VISIT (OUTPATIENT)
Dept: INTERNAL MEDICINE CLINIC | Age: 78
End: 2018-11-30

## 2018-11-30 VITALS
HEIGHT: 64 IN | SYSTOLIC BLOOD PRESSURE: 145 MMHG | WEIGHT: 206 LBS | DIASTOLIC BLOOD PRESSURE: 75 MMHG | RESPIRATION RATE: 18 BRPM | BODY MASS INDEX: 35.17 KG/M2 | HEART RATE: 70 BPM

## 2018-11-30 DIAGNOSIS — Z79.01 LONG TERM CURRENT USE OF ANTICOAGULANT THERAPY: ICD-10-CM

## 2018-11-30 DIAGNOSIS — I48.21 PERMANENT ATRIAL FIBRILLATION (HCC): ICD-10-CM

## 2018-11-30 DIAGNOSIS — I10 ESSENTIAL HYPERTENSION: ICD-10-CM

## 2018-11-30 DIAGNOSIS — I10 ESSENTIAL HYPERTENSION: Primary | ICD-10-CM

## 2018-11-30 LAB
A/G RATIO: 1.9 (ref 1.1–2.2)
ALBUMIN SERPL-MCNC: 4.3 G/DL (ref 3.4–5)
ALP BLD-CCNC: 78 U/L (ref 40–129)
ALT SERPL-CCNC: 19 U/L (ref 10–40)
ANION GAP SERPL CALCULATED.3IONS-SCNC: 16 MMOL/L (ref 3–16)
AST SERPL-CCNC: 29 U/L (ref 15–37)
BASOPHILS ABSOLUTE: 0 K/UL (ref 0–0.2)
BASOPHILS RELATIVE PERCENT: 0.5 %
BILIRUB SERPL-MCNC: 0.5 MG/DL (ref 0–1)
BUN BLDV-MCNC: 21 MG/DL (ref 7–20)
CALCIUM SERPL-MCNC: 9.8 MG/DL (ref 8.3–10.6)
CHLORIDE BLD-SCNC: 98 MMOL/L (ref 99–110)
CO2: 25 MMOL/L (ref 21–32)
CREAT SERPL-MCNC: 0.8 MG/DL (ref 0.6–1.2)
EOSINOPHILS ABSOLUTE: 0 K/UL (ref 0–0.6)
EOSINOPHILS RELATIVE PERCENT: 0 %
GFR AFRICAN AMERICAN: >60
GFR NON-AFRICAN AMERICAN: >60
GLOBULIN: 2.3 G/DL
GLUCOSE BLD-MCNC: 92 MG/DL (ref 70–99)
HCT VFR BLD CALC: 39.4 % (ref 36–48)
HEMOGLOBIN: 13.3 G/DL (ref 12–16)
LYMPHOCYTES ABSOLUTE: 2 K/UL (ref 1–5.1)
LYMPHOCYTES RELATIVE PERCENT: 41.8 %
MCH RBC QN AUTO: 31 PG (ref 26–34)
MCHC RBC AUTO-ENTMCNC: 33.9 G/DL (ref 31–36)
MCV RBC AUTO: 91.5 FL (ref 80–100)
MONOCYTES ABSOLUTE: 0.4 K/UL (ref 0–1.3)
MONOCYTES RELATIVE PERCENT: 9 %
NEUTROPHILS ABSOLUTE: 2.3 K/UL (ref 1.7–7.7)
NEUTROPHILS RELATIVE PERCENT: 48.7 %
PDW BLD-RTO: 14.7 % (ref 12.4–15.4)
PLATELET # BLD: 205 K/UL (ref 135–450)
PMV BLD AUTO: 7.9 FL (ref 5–10.5)
POTASSIUM SERPL-SCNC: 3.9 MMOL/L (ref 3.5–5.1)
RBC # BLD: 4.3 M/UL (ref 4–5.2)
SODIUM BLD-SCNC: 139 MMOL/L (ref 136–145)
TOTAL PROTEIN: 6.6 G/DL (ref 6.4–8.2)
WBC # BLD: 4.7 K/UL (ref 4–11)

## 2018-11-30 PROCEDURE — 99213 OFFICE O/P EST LOW 20 MIN: CPT | Performed by: INTERNAL MEDICINE

## 2018-12-03 NOTE — TELEPHONE ENCOUNTER
----- Message from Uziel Thornton MD sent at 12/3/2018  4:02 PM EST -----  Contact: pt's daughter, Mallika Canales on eliquis 5 mg bid  Stop coumadin   I have given samples  She needs refills  No need for coumadin checks anymore  Watch for bleeding    ----- Message -----  From: Jasbir Jackson  Sent: 12/3/2018   3:33 PM  To: Uizel Thornton MD    Pt's daughter, Lona Curling, called and said pt's cardiologist, Dr. Bunny Canales, said it is okay for her to go on Eliquis. Last appt. 11/30/18.

## 2018-12-04 ENCOUNTER — TELEPHONE (OUTPATIENT)
Dept: INTERNAL MEDICINE CLINIC | Age: 78
End: 2018-12-04

## 2018-12-04 NOTE — TELEPHONE ENCOUNTER
Pharmacy ([facility]) informed that pt's current therapy is eliquis and not warfarin. Warfarin scripts cancelled.

## 2019-01-15 ENCOUNTER — TELEPHONE (OUTPATIENT)
Dept: INTERNAL MEDICINE CLINIC | Age: 79
End: 2019-01-15

## 2019-01-16 RX ORDER — ATORVASTATIN CALCIUM 20 MG/1
TABLET, FILM COATED ORAL
Qty: 90 TABLET | Refills: 0 | Status: SHIPPED | OUTPATIENT
Start: 2019-01-16 | End: 2019-05-20 | Stop reason: SDUPTHER

## 2019-02-18 RX ORDER — APIXABAN 5 MG/1
TABLET, FILM COATED ORAL
Qty: 180 TABLET | Refills: 0 | Status: SHIPPED | OUTPATIENT
Start: 2019-02-18 | End: 2019-05-21 | Stop reason: SDUPTHER

## 2019-04-26 ENCOUNTER — OFFICE VISIT (OUTPATIENT)
Dept: ORTHOPEDIC SURGERY | Age: 79
End: 2019-04-26
Payer: MEDICARE

## 2019-04-26 VITALS — HEIGHT: 64 IN | WEIGHT: 205.91 LBS | BODY MASS INDEX: 35.15 KG/M2

## 2019-04-26 DIAGNOSIS — M25.562 LEFT KNEE PAIN, UNSPECIFIED CHRONICITY: Primary | ICD-10-CM

## 2019-04-26 DIAGNOSIS — M17.12 PRIMARY OSTEOARTHRITIS OF LEFT KNEE: ICD-10-CM

## 2019-04-26 PROCEDURE — 99213 OFFICE O/P EST LOW 20 MIN: CPT | Performed by: PHYSICIAN ASSISTANT

## 2019-04-26 NOTE — PROGRESS NOTES
Betamethasone 30mg/5mL 2 cc Lot # [id number]  Exp 4/2020 NDC# [address]-4  Bupivacaine 250mg/50mL  3 cc  Lot # -[id number]  Exp  11/1/2020  NDC# 3683-1026-26    SITE:   LEFT KNEE

## 2019-04-26 NOTE — PROGRESS NOTES
Chief Complaint    Knee Pain (LEFT anterior knee pain with some pain and swelling in lower leg; difficulty getting in and out of car; pain with ambulation)      History of Present Illness:  Segundo Kwan is a 66 y.o. female presents the office today for a new problem. She is well-known to our practice. She is here complaining of left pain for approximately the last 6 months without injury or trauma. Her symptoms have progressively worsened. Pain is concentrated over her medial and lateral aspects of her knee. Increased pain with activities and improvement with rest.  He cannot take NSAIDs because she takes Eliquis. She denies locking or instability.       Pain Assessment  Location of Pain: Knee  Location Modifiers: Left  Severity of Pain: 5  Quality of Pain: Aching  Duration of Pain: Persistent  Frequency of Pain: Constant  Aggravating Factors: Bending, Straightening, Walking, Standing  Limiting Behavior: Some  Relieving Factors: Rest]    Medical History:  Past Medical History:   Diagnosis Date    Arthritis     Atrial fibrillation (HCC)     CAD (coronary artery disease)     a-fib    CHF (congestive heart failure) (HCC)     CKD (chronic kidney disease)     Cutaneous skin tags 4/8/2013    Depression     Gout     Grief reaction 4/8/2013    Hyperlipidemia     Hypertension     Obesity     Osteoarthritis     Pulmonary nodules     Unspecified cerebral artery occlusion with cerebral infarction 01/2007     Patient Active Problem List    Diagnosis Date Noted    Atrial fibrillation ([facility] [address] 11/30/2012     Priority: Medium    Long term current use of anticoagulant therapy 11/30/2012     Priority: Medium    MR (mitral regurgitation) 10/11/2011     Priority: Medium    TI (tricuspid incompetence) 10/11/2011     Priority: Medium    Rectus sheath hematoma 08/09/2018    Abdominal pain 08/09/2018    Abdominal hematoma     Status post total right knee replacement 08/06/2018    Localized osteoarthritis of right knee 08/01/2018    Primary localized osteoarthritis of right knee 08/01/2018    Gout attack 12/05/2014    Pain in joint, upper arm 12/05/2014    Chronic kidney disease, stage III (moderate) 08/13/2014    Essential hypertension 08/13/2014    Primary localized osteoarthrosis, lower leg 06/13/2014    Other affections of shoulder region, not elsewhere classified 04/14/2014    Rotator cuff (capsule) sprain 04/14/2014    Mastoiditis 06/08/2013    Seborrheic keratosis 04/08/2013    Actinic keratoses 04/08/2013    Cough due to ACE inhibitor 04/08/2013    HTN (hypertension) 10/10/2011     Past Surgical History:   Procedure Laterality Date    CARPAL TUNNEL RELEASE      CATARACT REMOVAL      both    CHOLECYSTECTOMY      JOINT REPLACEMENT      knee    KNEE ARTHROSCOPY      RIGHT    OTHER SURGICAL HISTORY Left 02/27/2013    Left myringotomy, left PE tube insertion    OTHER SURGICAL HISTORY Bilateral 2/24/14    EXCISION OF RT NECK AND LEFT TEMPLE LESIONS    WI TOTAL KNEE ARTHROPLASTY Right 8/1/2018    RIGHT TOTAL KNEE MAKOPLASTY WITH PLATELET RICH PLASMA, ADDUCTOR CANAL BLOCK FOR PAIN CONTROL                    MAKOPLASTY CHEPE  CPT CODE - 09143 performed by Aamir Slade MD at [facility] History   Problem Relation Age of Onset    Depression Mother     Arthritis Father     Asthma Father     High Blood Pressure Father     High Cholesterol Father     High Blood Pressure Sister     High Cholesterol Sister     Arthritis Sister     High Blood Pressure Brother     Hearing Loss Brother     High Cholesterol Brother     Learning Disabilities Brother     Mental Illness Brother     Asthma Brother     Arthritis Brother      Social History     Socioeconomic History    Marital status:       Spouse name: None    Number of children: None    Years of education: None    Highest education level: None   Occupational History    None   Social Needs    Financial resource strain: None    Food insecurity:     Worry: None     Inability: None    Transportation needs:     Medical: None     Non-medical: None   Tobacco Use    Smoking status: Never Smoker    Smokeless tobacco: Never Used   Substance and Sexual Activity    Alcohol use: No    Drug use: No    Sexual activity: Not Currently   Lifestyle    Physical activity:     Days per week: None     Minutes per session: None    Stress: None   Relationships    Social connections:     Talks on phone: None     Gets together: None     Attends Holiness service: None     Active member of club or organization: None     Attends meetings of clubs or organizations: None     Relationship status: None    Intimate partner violence:     Fear of current or ex partner: None     Emotionally abused: None     Physically abused: None     Forced sexual activity: None   Other Topics Concern    None   Social History Narrative    None     Current Outpatient Medications   Medication Sig Dispense Refill    ELIQUIS 5 MG TABS tablet TAKE 1 TABLET TWICE A  tablet 0    atorvastatin (LIPITOR) 20 MG tablet TAKE 1 TABLET DAILY 90 tablet 0    allopurinol (ZYLOPRIM) 100 MG tablet TAKE 2 TABLETS DAILY 180 tablet 1    traZODone (DESYREL) 50 MG tablet Take 1 tablet by mouth nightly. 90 tablet 0    carvedilol (COREG) 12.5 MG tablet Take 25 mg by mouth 2 times daily (with meals)      losartan-hydrochlorothiazide (HYZAAR) 100-25 MG per tablet Take 1 tablet by mouth daily 30 tablet 3    aspirin 81 MG EC tablet Take 81 mg by mouth daily. No current facility-administered medications for this visit. Review of Systems:  Relevant review of systems reviewed and available in the patient's chart    Vital Signs: There were no vitals filed for this visit. General Exam:   Constitutional: Patient is adequately groomed with no evidence of malnutrition  DTRs: Deep tendon reflexes are intact  Mental Status: The patient is oriented to time, place and person.   The patient's mood and affect are appropriate. Lymphatic: The lymphatic examination bilaterally reveals all areas to be without enlargement or induration. Vascular: Examination reveals no swelling or calf tenderness. Peripheral pulses are palpable and 2+. Neurological: The patient has good coordination. There is no weakness or sensory deficit. Body mass index is 35.33 kg/m². Left Knee Examination:    Inspection:  No erythema or signs of infection. There are no cutaneous lesions    Palpation:  There is tenderness to palpation along the lateral greater than medial joint line. Range of Motion:  0 extension to 110 of active flexion. Strength:  4+/5 quadriceps and hamstrings    Special Tests: The knee is stable to varus valgus stressing/anterior posterior drawer. Negative Homans test.                                 Skin: There are no rashes, ulcerations or lesions. Gait: mildly antalgic favoring the right side    Reflex 2+ patellar    Examination of the right knee reveals intact skin. There is no focal tenderness. The patient demonstrates full painless range of motion with regard to flexion and extension. Strength is 5/5 throughout all planes. Ligamentous stability is grossly intact. Examination of the right and left hip reveals intact skin. The patient demonstrates full painless range of motion with regards to flexion, abduction, internal and external rotation. There is no tenderness about the greater trochanter. There is a negative straight leg raise against resistance. Strength is 5/5 throughout all planes. Radiology:   X-rays obtained and reviewed in office:  Views 4 views including AP weightbearing, PA flexed weightbearing, lateral, and skyline  Location  left knee:    Impression:   There is advanced lateral and patellofemoral joint space narrowing with sclerosis and osteophyte formation present. No fractures are identified. Impression:  Encounter Diagnoses   Name Primary?     Left knee pain, unspecified chronicity     Status post total left knee replacement Yes       Office Procedures:  Orders Placed This Encounter   Procedures    XR KNEE LEFT (MIN 4 VIEWS)     Standing Status:   Future     Number of Occurrences:   1     Standing Expiration Date:   4/24/2020    US ARTHR/ASP/INJ MAJOR JNT/BURSA LEFT     Standing Status:   Future     Standing Expiration Date:   4/26/2020    HI BETAMETHASONE ACET&SOD PHOSP     Left knee cortisone injection    I discussed in detail the risks, benefits and complications of aninjection which included but are not limited to infection, skin reactions, hot swollen joint, and anaphylaxis with the patient. The patient verbalized understanding and gave informed consent for the left knee injection. The patient is placed supine on table with a bolster underneath knee. Knee prepped with Betadine/Sterile alcohol solution. A sterile 22-gauge needle was inserted into the knee and the mixture of 2ml Beta-Beta, 3 mL of 0.5% bupivacaine was injected under sterile technique. The needle was withdrawn and the puncture site sealed with a Band-Aid. Technique: Under sterile conditions a SonExacaster ultrasound unit with a variable frequency (6.0-15.0 MHz) linear transducer was used to localize the placement of a 22-gauge needle into the knee joint. Findings: Successful needle placement for knee injection. Final images were taken and saved for permanent record. The patient tolerated the injection well. The patient was instructed to call the office immediately if there is any pain, redness, warmth, fever, or chills. Treatment Plan:  I discussed with the patient the nature of osteoarthritis of the knee. We talked about treatment of arthritis and the various options that are involved with this. The patient understands that the treatments can vary from essentially doing nothing to a total joint replacement arthroplasty for arthritis.  I then went on to describe the utilization

## 2019-05-20 RX ORDER — ATORVASTATIN CALCIUM 20 MG/1
TABLET, FILM COATED ORAL
Qty: 90 TABLET | Refills: 0 | Status: SHIPPED | OUTPATIENT
Start: 2019-05-20 | End: 2019-07-08

## 2019-05-20 NOTE — TELEPHONE ENCOUNTER
----- Message from [place] GERARDO Peabody sent at 5/20/2019  9:23 AM EDT -----  Contact: pt -[phone]  She needs a refill on her atorvastatin [facility] -last appt- [id number]-next appt- 6-3-19-lr

## 2019-05-22 ENCOUNTER — TELEPHONE (OUTPATIENT)
Dept: INTERNAL MEDICINE CLINIC | Age: 79
End: 2019-05-22

## 2019-05-22 RX ORDER — BENZONATATE 100 MG/1
100 CAPSULE ORAL 3 TIMES DAILY PRN
Qty: 30 CAPSULE | Refills: 0 | Status: SHIPPED | OUTPATIENT
Start: 2019-05-22 | End: 2019-05-29

## 2019-05-22 RX ORDER — AZITHROMYCIN 250 MG/1
250 TABLET, FILM COATED ORAL SEE ADMIN INSTRUCTIONS
Qty: 6 TABLET | Refills: 0 | Status: SHIPPED | OUTPATIENT
Start: 2019-05-22 | End: 2019-05-27

## 2019-05-22 NOTE — TELEPHONE ENCOUNTER
----- Message from Genesis Gonzalez MD sent at 5/22/2019  1:24 PM EDT -----  Contact: pt [phone]  Start on z pack if no allergies  Also try mucinex   See us if not better  Tessalon pearls    ----- Message -----  From: Ramon Alcala  Sent: 5/22/2019   9:06 AM  To: Genesis Gonzalez MD    Pt called and said she has a really bad cough, and is coughing up thick yellow mucus and is having a hard time sleeping due to the cough. She is requesting for you to prescribe her something for the cough, she would like a call back at [phone]. [facility]- [facility]  Last appt. 11-30-18. Next appt. 6-3-19.

## 2019-06-03 ENCOUNTER — OFFICE VISIT (OUTPATIENT)
Dept: INTERNAL MEDICINE CLINIC | Age: 79
End: 2019-06-03

## 2019-06-03 VITALS
HEIGHT: 64 IN | RESPIRATION RATE: 17 BRPM | DIASTOLIC BLOOD PRESSURE: 70 MMHG | SYSTOLIC BLOOD PRESSURE: 135 MMHG | BODY MASS INDEX: 33.63 KG/M2 | HEART RATE: 70 BPM | WEIGHT: 197 LBS

## 2019-06-03 DIAGNOSIS — E78.2 MIXED HYPERLIPIDEMIA: ICD-10-CM

## 2019-06-03 DIAGNOSIS — Z79.01 LONG TERM CURRENT USE OF ANTICOAGULANT THERAPY: Primary | ICD-10-CM

## 2019-06-03 DIAGNOSIS — I10 ESSENTIAL HYPERTENSION: ICD-10-CM

## 2019-06-03 DIAGNOSIS — I48.21 PERMANENT ATRIAL FIBRILLATION (HCC): ICD-10-CM

## 2019-06-03 PROBLEM — R10.9 ABDOMINAL PAIN: Status: RESOLVED | Noted: 2018-08-09 | Resolved: 2019-06-03

## 2019-06-03 PROBLEM — S30.1XXA RECTUS SHEATH HEMATOMA: Status: RESOLVED | Noted: 2018-08-09 | Resolved: 2019-06-03

## 2019-06-03 PROBLEM — Z96.651 STATUS POST TOTAL RIGHT KNEE REPLACEMENT: Status: RESOLVED | Noted: 2018-08-06 | Resolved: 2019-06-03

## 2019-06-03 PROBLEM — M10.9 GOUT: Status: ACTIVE | Noted: 2019-06-03

## 2019-06-03 PROCEDURE — 99213 OFFICE O/P EST LOW 20 MIN: CPT | Performed by: INTERNAL MEDICINE

## 2019-06-03 RX ORDER — ALLOPURINOL 100 MG/1
TABLET ORAL
Qty: 180 TABLET | Refills: 0 | Status: CANCELLED | OUTPATIENT
Start: 2019-06-03

## 2019-06-03 RX ORDER — ALLOPURINOL 100 MG/1
TABLET ORAL
Qty: 14 TABLET | Refills: 0 | Status: SHIPPED | OUTPATIENT
Start: 2019-06-03 | End: 2019-06-03 | Stop reason: SDUPTHER

## 2019-06-03 RX ORDER — ALLOPURINOL 100 MG/1
100 TABLET ORAL 2 TIMES DAILY
Qty: 180 TABLET | Refills: 0 | Status: SHIPPED | OUTPATIENT
Start: 2019-06-03 | End: 2019-09-10 | Stop reason: SDUPTHER

## 2019-06-03 ASSESSMENT — PATIENT HEALTH QUESTIONNAIRE - PHQ9
SUM OF ALL RESPONSES TO PHQ9 QUESTIONS 1 & 2: 0
1. LITTLE INTEREST OR PLEASURE IN DOING THINGS: 0
SUM OF ALL RESPONSES TO PHQ QUESTIONS 1-9: 0
SUM OF ALL RESPONSES TO PHQ QUESTIONS 1-9: 0
2. FEELING DOWN, DEPRESSED OR HOPELESS: 0

## 2019-06-03 NOTE — TELEPHONE ENCOUNTER
----- Message from Jacinto Rojas sent at 6/3/2019  1:50 PM EDT -----  Contact: pt called [phone]  Pt needs a week supply of allopurinol sent to [facility] [phone] because she is almost out. Pt also needs a refill sent to [facility] [phone] (Phone). Pt is being seen today.

## 2019-06-03 NOTE — PROGRESS NOTES
alert and oriented. Appears to be not in any distress  Mucous Membranes:  Pink , anicteric  Neck: No JVD, no carotid bruit, no thyromegaly  Chest:  Clear to auscultation bilaterally, no added sounds  Cardiovascular:  Irregular , S1S2 heard, no murmurs or gallops  Abdomen:  Soft, undistended, non tender, no organomegaly, BS present  Extremities:.   1+ pedal edema charles   Distal pulses well felt  Neurological : grossly normal     ECHO - 2015      Atrial fibrillation with a ventricular rate of 92 beats per minute. Overall left ventricular ejection fraction is estimated to be 55-60%. The left ventricular wall motion is normal.  The left atrium is mildly dilated. The right atrium is mildly dilated. There is trace mitral regurgitation. Mild aortic sclerosis present without evidence of stenosis. .  Mild aortic regurgitation. Assessment:         Diagnosis Orders   1. Long term current use of anticoagulant therapy     2. Permanent atrial fibrillation ([address]     3. Essential hypertension     4. Mixed hyperlipidemia         Plan:           HTN - stable on losartan /hctz 100/25 mg  ,coreg 25 mg bid  Lasix as needed only    CAD - f/w Dr. Ben Strickland . On ASA, statins- remains asymptomatic   Stress test neg 7/18    H.o CVA - 2007     Afibb, chronic rate cotnrolled.  On coreg bid, ELIQUIS  No bleeding issues    Hyperlipidemia - on statins- need lipids when fasting    Gout - with no recurrence - continue allopurinol       Flu vaccine upto date    F/w in 3-6 months

## 2019-06-04 LAB
A/G RATIO: 1.8 (ref 1.1–2.2)
ALBUMIN SERPL-MCNC: 4.4 G/DL (ref 3.4–5)
ALP BLD-CCNC: 105 U/L (ref 40–129)
ALT SERPL-CCNC: 16 U/L (ref 10–40)
ANION GAP SERPL CALCULATED.3IONS-SCNC: 15 MMOL/L (ref 3–16)
AST SERPL-CCNC: 27 U/L (ref 15–37)
BASOPHILS ABSOLUTE: 0 K/UL (ref 0–0.2)
BASOPHILS RELATIVE PERCENT: 0.6 %
BILIRUB SERPL-MCNC: 0.7 MG/DL (ref 0–1)
BUN BLDV-MCNC: 19 MG/DL (ref 7–20)
CALCIUM SERPL-MCNC: 9.6 MG/DL (ref 8.3–10.6)
CHLORIDE BLD-SCNC: 89 MMOL/L (ref 99–110)
CO2: 26 MMOL/L (ref 21–32)
CREAT SERPL-MCNC: 0.8 MG/DL (ref 0.6–1.2)
EOSINOPHILS ABSOLUTE: 0 K/UL (ref 0–0.6)
EOSINOPHILS RELATIVE PERCENT: 0.3 %
GFR AFRICAN AMERICAN: >60
GFR NON-AFRICAN AMERICAN: >60
GLOBULIN: 2.4 G/DL
GLUCOSE BLD-MCNC: 117 MG/DL (ref 70–99)
HCT VFR BLD CALC: 39.5 % (ref 36–48)
HEMOGLOBIN: 13.5 G/DL (ref 12–16)
LYMPHOCYTES ABSOLUTE: 1.6 K/UL (ref 1–5.1)
LYMPHOCYTES RELATIVE PERCENT: 32.1 %
MCH RBC QN AUTO: 33.4 PG (ref 26–34)
MCHC RBC AUTO-ENTMCNC: 34.3 G/DL (ref 31–36)
MCV RBC AUTO: 97.4 FL (ref 80–100)
MONOCYTES ABSOLUTE: 0.5 K/UL (ref 0–1.3)
MONOCYTES RELATIVE PERCENT: 9.5 %
NEUTROPHILS ABSOLUTE: 2.9 K/UL (ref 1.7–7.7)
NEUTROPHILS RELATIVE PERCENT: 57.5 %
PDW BLD-RTO: 14.9 % (ref 12.4–15.4)
PLATELET # BLD: 244 K/UL (ref 135–450)
PMV BLD AUTO: 7.7 FL (ref 5–10.5)
POTASSIUM SERPL-SCNC: 4.6 MMOL/L (ref 3.5–5.1)
RBC # BLD: 4.05 M/UL (ref 4–5.2)
SODIUM BLD-SCNC: 130 MMOL/L (ref 136–145)
TOTAL PROTEIN: 6.8 G/DL (ref 6.4–8.2)
WBC # BLD: 5 K/UL (ref 4–11)

## 2019-06-21 ENCOUNTER — HOSPITAL ENCOUNTER (OUTPATIENT)
Dept: OPERATING ROOM | Age: 79
Setting detail: OUTPATIENT SURGERY
Discharge: HOME | End: 2019-06-21
Payer: MEDICARE

## 2019-06-21 VITALS — SYSTOLIC BLOOD PRESSURE: 185 MMHG | HEART RATE: 63 BPM | DIASTOLIC BLOOD PRESSURE: 94 MMHG

## 2019-06-21 PROCEDURE — 6370000000 HC RX 637 (ALT 250 FOR IP): Performed by: OPHTHALMOLOGY

## 2019-06-21 PROCEDURE — 66821 AFTER CATARACT LASER SURGERY: CPT

## 2019-06-21 RX ORDER — PREDNISOLONE ACETATE 10 MG/ML
2 SUSPENSION/ DROPS OPHTHALMIC ONCE
Status: DISCONTINUED | OUTPATIENT
Start: 2019-06-21 | End: 2019-06-22 | Stop reason: HOSPADM

## 2019-06-21 RX ORDER — BRIMONIDINE TARTRATE 2 MG/ML
1 SOLUTION/ DROPS OPHTHALMIC ONCE
Status: COMPLETED | OUTPATIENT
Start: 2019-06-21 | End: 2019-06-21

## 2019-06-21 RX ORDER — TETRACAINE HYDROCHLORIDE 5 MG/ML
2 SOLUTION OPHTHALMIC ONCE
Status: COMPLETED | OUTPATIENT
Start: 2019-06-21 | End: 2019-06-21

## 2019-06-21 RX ORDER — TROPICAMIDE 10 MG/ML
1 SOLUTION/ DROPS OPHTHALMIC ONCE
Status: COMPLETED | OUTPATIENT
Start: 2019-06-21 | End: 2019-06-21

## 2019-06-21 RX ORDER — PHENYLEPHRINE HCL 2.5 %
1 DROPS OPHTHALMIC (EYE) ONCE
Status: COMPLETED | OUTPATIENT
Start: 2019-06-21 | End: 2019-06-21

## 2019-06-21 RX ADMIN — BRIMONIDINE TARTRATE 1 DROP: 2 SOLUTION OPHTHALMIC at 11:10

## 2019-06-21 RX ADMIN — PHENYLEPHRINE HYDROCHLORIDE 1 DROP: 25 SOLUTION/ DROPS OPHTHALMIC at 10:14

## 2019-06-21 RX ADMIN — TETRACAINE HYDROCHLORIDE 2 DROP: 5 SOLUTION OPHTHALMIC at 11:10

## 2019-06-21 RX ADMIN — TROPICAMIDE 1 DROP: 10 SOLUTION/ DROPS OPHTHALMIC at 10:14

## 2019-06-21 NOTE — OP NOTE
Ligia Mahsa    OPERATIVE NOTE    Preoperative Diagnosis: Posterior Capsular Opacification the left eye    Postoperative Diagnosis: Posterior Capsular Opacification the left eye    Procedure: YAG posterior capsulotomy the left eye    Surgeon: Sanjay Jasso    Anesthesia: Topical    Complications: none    Specimens: none    Indications for procedure: The patient is a 66y.o. year old who is status post cataract extraction with intraocular lens implantation who complained of increasing glare and blurry vision. Visually significant posterior capsular opacification was noted on examination. Risks, benefits, and alternatives to surgery were discussed with the patient and the patient elected to proceed with YAG laser posterior capsulotomy of the the left eye. Details of the procedure: The patient was brought the laser room. The patient had topical proparacaine drops instilled. The patient was positioned at the YAG laser where 27 shots were administered at 2.1 millijoules for a total power of 56.2 millijoules in a cruciate pattern. The patient tolerated the procedure well. One drop of prednisolone acetate 1% and brimonidine 0.2% was placed on the eye. The patient will follow up as an outpatient as scheduled.     Sanjay Jasso

## 2019-07-08 ENCOUNTER — HOSPITAL ENCOUNTER (OUTPATIENT)
Dept: GENERAL RADIOLOGY | Age: 79
Discharge: HOME | End: 2019-07-08
Payer: MEDICARE

## 2019-07-08 ENCOUNTER — OFFICE VISIT (OUTPATIENT)
Dept: INTERNAL MEDICINE CLINIC | Age: 79
End: 2019-07-08

## 2019-07-08 ENCOUNTER — HOSPITAL ENCOUNTER (OUTPATIENT)
Age: 79
Discharge: HOME | End: 2019-07-08
Payer: MEDICARE

## 2019-07-08 VITALS
HEART RATE: 70 BPM | BODY MASS INDEX: 34.15 KG/M2 | SYSTOLIC BLOOD PRESSURE: 145 MMHG | WEIGHT: 200 LBS | DIASTOLIC BLOOD PRESSURE: 80 MMHG | HEIGHT: 64 IN

## 2019-07-08 DIAGNOSIS — S20.211A CONTUSION OF RIGHT CHEST WALL, INITIAL ENCOUNTER: ICD-10-CM

## 2019-07-08 DIAGNOSIS — S00.83XA CONTUSION OF FACE, INITIAL ENCOUNTER: ICD-10-CM
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
## 2019-07-08 PROCEDURE — 71101 X-RAY EXAM UNILAT RIBS/CHEST: CPT

## 2019-07-08 PROCEDURE — 99213 OFFICE O/P EST LOW 20 MIN: CPT | Performed by: PHYSICIAN ASSISTANT

## 2019-07-08 ASSESSMENT — ENCOUNTER SYMPTOMS
COUGH: 0
SHORTNESS OF BREATH: 0
NAUSEA: 0
VOMITING: 0

## 2019-07-08 NOTE — PATIENT INSTRUCTIONS
breathing.     · You have a fever.     · You have a new or worse cough.    Watch closely for changes in your health, and be sure to contact your doctor if:    · You have pain even after taking your medicine.     · You do not get better as expected. Where can you learn more? Go to [email or web address] and sign in to your [email or web address] account. Enter M135 in the [employer] box to learn more about \"Broken Rib: Care Instructions. \"     If you do not have an account, please click on the \"Sign Up Now\" link. Current as of: September 20, 2018  Content Version: 12.0  © 6687-4433 Healthwise, Incorporated. Care instructions adapted under license by [facility] ([facility]). If you have questions about a medical condition or this instruction, always ask your healthcare professional. [address] any warranty or liability for your use of this information.

## 2019-07-12 ENCOUNTER — TELEPHONE (OUTPATIENT)
Dept: INTERNAL MEDICINE CLINIC | Age: 79
End: 2019-07-12

## 2019-07-12 RX ORDER — METHOCARBAMOL 500 MG/1
500 TABLET, FILM COATED ORAL 2 TIMES DAILY PRN
Qty: 20 TABLET | Refills: 0 | Status: SHIPPED | OUTPATIENT
Start: 2019-07-12 | End: 2019-07-22

## 2019-08-05 ENCOUNTER — OFFICE VISIT (OUTPATIENT)
Dept: INTERNAL MEDICINE CLINIC | Age: 79
End: 2019-08-05

## 2019-08-05 VITALS
HEART RATE: 66 BPM | HEIGHT: 64 IN | TEMPERATURE: 97.5 F | DIASTOLIC BLOOD PRESSURE: 86 MMHG | SYSTOLIC BLOOD PRESSURE: 160 MMHG | BODY MASS INDEX: 33.8 KG/M2 | RESPIRATION RATE: 14 BRPM | WEIGHT: 198 LBS | OXYGEN SATURATION: 98 %

## 2019-08-05 DIAGNOSIS — Z01.818 PRE-OP EXAMINATION: ICD-10-CM

## 2019-08-05 DIAGNOSIS — E87.1 HYPONATREMIA: ICD-10-CM

## 2019-08-05 DIAGNOSIS — H02.403 PTOSIS OF BOTH EYELIDS: Primary | ICD-10-CM

## 2019-08-05 PROCEDURE — 99213 OFFICE O/P EST LOW 20 MIN: CPT | Performed by: PHYSICIAN ASSISTANT

## 2019-08-05 NOTE — PROGRESS NOTES
Subjective:      Marguerite Churchill is a 66 y.o. female who presents to the office today for a preoperative consultation at the [facility] who [facility] on performing ptosis B upper eyelids, on 8/21/19 . Planned anesthesia is Local/MAC.        Past Medical History:   Diagnosis Date    Arthritis     Atrial fibrillation ([address]     CAD (coronary artery disease)     a-fib    Cerebral artery occlusion with cerebral infarction ([address] 2007    resolved w/ rehab    CHF (congestive heart failure) ([address]     CKD (chronic kidney disease)     Cutaneous skin tags 4/8/2013    Depression     Gout     Grief reaction 4/8/2013    Hyperlipidemia     Hypertension     Obesity     Osteoarthritis     Pulmonary nodules     Unspecified cerebral artery occlusion with cerebral infarction 01/2007     Patient Active Problem List    Diagnosis Date Noted    Atrial fibrillation ([address] 11/30/2012     Priority: Medium    Long term current use of anticoagulant therapy 11/30/2012     Priority: Medium    MR (mitral regurgitation) 10/11/2011     Priority: Medium    TI (tricuspid incompetence) 10/11/2011     Priority: Medium    Mixed hyperlipidemia 06/03/2019    Gout 06/03/2019    Localized osteoarthritis of right knee 08/01/2018    Primary localized osteoarthritis of right knee 08/01/2018    Chronic kidney disease, stage III (moderate) 08/13/2014    Essential hypertension 08/13/2014    Primary localized osteoarthrosis, lower leg 06/13/2014    Rotator cuff (capsule) sprain 04/14/2014    Seborrheic keratosis 04/08/2013    Actinic keratoses 04/08/2013    Cough due to ACE inhibitor 04/08/2013    HTN (hypertension) 10/10/2011    Atrial fibrillation, chronic ([address] 02/10/2011    Chronic ischemic heart disease 02/27/2004     Past Surgical History:   Procedure Laterality Date    CARPAL TUNNEL RELEASE Bilateral     CARPAL TUNNEL RELEASE      CATARACT REMOVAL      both    Objective:     Gen: No distress. Alert. Eyes: PERRL. B ptosis, EOMI  ENT: No discharge. Pharynx clear. Neck: No JVD. Trachea midline. Resp: No accessory muscle use. No crackles. No wheezes. No rhonchi. CV: Regular rate. Irregular rhythm. No murmur. No rub. trace edema. Capillary Refill: Brisk,< 3 seconds   Peripheral Pulses: +2 palpable, equal bilaterally   GI: Non-tender. Non-distended. Normal bowel sounds. Skin: Warm and dry. No nodule on exposed extremities. No rash on exposed extremities. M/S: No cyanosis. No joint deformity. No clubbing. Neuro: Awake. Grossly nonfocal    Psych: Oriented x 3. No anxiety or agitation. Assessment:       Diagnosis Orders   1. Ptosis of both eyelids     2. Pre-op examination     3. Hyponatremia  BASIC METABOLIC PANEL         66 y.o. female with planned surgery as above. Plan:     Patient medically cleared for above planned procedure pending cardiology clearance for holding Eliquis and ASA 5 days prior to surgery.      Geraldine Thomas PA-C  8/5/2019 3:10 PM

## 2019-08-06 LAB
ANION GAP SERPL CALCULATED.3IONS-SCNC: 14 MMOL/L (ref 3–16)
BUN BLDV-MCNC: 14 MG/DL (ref 7–20)
CALCIUM SERPL-MCNC: 9.4 MG/DL (ref 8.3–10.6)
CHLORIDE BLD-SCNC: 92 MMOL/L (ref 99–110)
CO2: 25 MMOL/L (ref 21–32)
CREAT SERPL-MCNC: 0.7 MG/DL (ref 0.6–1.2)
GFR AFRICAN AMERICAN: >60
GFR NON-AFRICAN AMERICAN: >60
GLUCOSE BLD-MCNC: 95 MG/DL (ref 70–99)
POTASSIUM SERPL-SCNC: 4.2 MMOL/L (ref 3.5–5.1)
SODIUM BLD-SCNC: 131 MMOL/L (ref 136–145)

## 2019-08-07 ENCOUNTER — TELEPHONE (OUTPATIENT)
Dept: INTERNAL MEDICINE CLINIC | Age: 79
End: 2019-08-07

## 2019-08-26 ENCOUNTER — OFFICE VISIT (OUTPATIENT)
Dept: ORTHOPEDIC SURGERY | Age: 79
End: 2019-08-26
Payer: MEDICARE

## 2019-08-26 VITALS — WEIGHT: 194 LBS | HEIGHT: 64 IN | BODY MASS INDEX: 33.12 KG/M2

## 2019-08-26 DIAGNOSIS — M17.12 PRIMARY OSTEOARTHRITIS OF LEFT KNEE: Primary | ICD-10-CM

## 2019-08-26 PROCEDURE — 99214 OFFICE O/P EST MOD 30 MIN: CPT | Performed by: ORTHOPAEDIC SURGERY

## 2019-08-26 NOTE — LETTER
Attention    These are medical screening labs, not pre-admission surgery labs. Via Adam Winter M.D.  [phone]  [address]    Date:  2019    Name: Radha Storm    : 1940    Please take this form with you.       THE FOLLOWING LABS NEED TO BE COMPLETED:    _x__UA  _x__URINE C/S (THIS NEEDS TO BE DONE EVEN IF THE UA IS NORMAL)  _x__CBC W/ DIFF  _x__PT/INR  _x__PTT  _x__TRANSFERRIN  _x__ALBUMIN  _x__CHEM 7  _x__HEMAGLOBIN A1-C  ____OTHER: _____________________________________________                         Diagnosis:  LT KNEE OSTEOARTHRITIS            RT KNEE OA M17.11      LT KNEE OA M17.12         RT HIP OA  M16.11         LT HIP OA M16.12         RT SHLD OA  M19.011   LT SHLD OA  M19.012             Z01.812  (Pre-op examination code)      2019 3:18 PM  Bee Ken PA-C

## 2019-08-26 NOTE — PROGRESS NOTES
Chief Complaint    Knee Pain (lt knee ongoing pain; injection in April helped little and didn't last long)      History of Present Illness:  Evan Herrera is a 66 y.o. female presents to the office today for a follow-up visit. Patient being treated for left knee osteoarthritis. She did receive a cortisone injection into her knee at that time with good results for short period of time. Pain symptoms are concentrated over the medial and lateral aspects of her knee. Increased pain with activities and improvement with rest.  No recent injury or trauma. Her pain has started affecting her ability to perform ADLs and the quality of her life. She has tried a cane and activity modifications without significant relief. She does suffer from congestive heart failure, chronic kidney disease, history of stroke, and takes Eliquis. Right knee sizes    Implants used:  ROXIMITYALON TKR SYSTEM WITH:                          Size 3 CR Femoral component                          Size 3 primary tibial baseplate                          Size 9 mm X3 polyethylene CS tibial insert                          Size 31 x9 mm symmetric patellar component     PAIN:   Pain Assessment  Location of Pain: Knee  Location Modifiers: Left  Severity of Pain: 8  Frequency of Pain: Intermittent  Aggravating Factors: Standing, Walking, Stairs  Limiting Behavior: Some  Result of Injury: No  Work-Related Injury: No  Are there other pain locations you wish to document?: No    The patients chronic pain has gradually worsening over the last 2 years. The patient rates pain on a level of 8/10. Pain impacts patients ability to drive, sleep and climb stairs.       Medical History:     Past Medical History:   Diagnosis Date    Arthritis     Atrial fibrillation ([address]     CAD (coronary artery disease)     a-fib    Cerebral artery occlusion with cerebral infarction ([address] 2007    resolved w/ rehab    CHF (congestive heart failure) ([address]     CKD

## 2019-08-29 RX ORDER — ATORVASTATIN CALCIUM 20 MG/1
20 TABLET, FILM COATED ORAL NIGHTLY
Qty: 30 TABLET | Refills: 0 | Status: SHIPPED | OUTPATIENT
Start: 2019-08-29 | End: 2019-12-20 | Stop reason: SDUPTHER

## 2019-08-29 RX ORDER — ATORVASTATIN CALCIUM 20 MG/1
20 TABLET, FILM COATED ORAL NIGHTLY
Qty: 90 TABLET | Refills: 0 | Status: SHIPPED | OUTPATIENT
Start: 2019-08-29 | End: 2019-08-29 | Stop reason: SDUPTHER

## 2019-09-09 ENCOUNTER — TELEPHONE (OUTPATIENT)
Dept: ORTHOPEDIC SURGERY | Age: 79
End: 2019-09-09

## 2019-09-09 ENCOUNTER — APPOINTMENT (OUTPATIENT)
Dept: GENERAL RADIOLOGY | Age: 79
End: 2019-09-09
Payer: MEDICARE

## 2019-09-09 ENCOUNTER — HOSPITAL ENCOUNTER (EMERGENCY)
Age: 79
Discharge: HOME | End: 2019-09-09
Attending: EMERGENCY MEDICINE
Payer: MEDICARE

## 2019-09-09 VITALS
TEMPERATURE: 97.5 F | RESPIRATION RATE: 14 BRPM | HEART RATE: 72 BPM | WEIGHT: 194 LBS | OXYGEN SATURATION: 99 % | HEIGHT: 64 IN | BODY MASS INDEX: 33.12 KG/M2 | SYSTOLIC BLOOD PRESSURE: 157 MMHG | DIASTOLIC BLOOD PRESSURE: 104 MMHG

## 2019-09-09 DIAGNOSIS — R03.0 ELEVATED BLOOD PRESSURE READING: ICD-10-CM

## 2019-09-09 DIAGNOSIS — M25.562 LEFT KNEE PAIN, UNSPECIFIED CHRONICITY: Primary | ICD-10-CM

## 2019-09-09 PROCEDURE — 99283 EMERGENCY DEPT VISIT LOW MDM: CPT

## 2019-09-09 PROCEDURE — 73560 X-RAY EXAM OF KNEE 1 OR 2: CPT

## 2019-09-09 PROCEDURE — 6370000000 HC RX 637 (ALT 250 FOR IP): Performed by: EMERGENCY MEDICINE

## 2019-09-09 RX ORDER — OXYCODONE HYDROCHLORIDE AND ACETAMINOPHEN 5; 325 MG/1; MG/1
1 TABLET ORAL ONCE
Status: COMPLETED | OUTPATIENT
Start: 2019-09-09 | End: 2019-09-09

## 2019-09-09 RX ORDER — PROMETHAZINE HYDROCHLORIDE 25 MG/1
25 TABLET ORAL ONCE
Status: COMPLETED | OUTPATIENT
Start: 2019-09-09 | End: 2019-09-09

## 2019-09-09 RX ORDER — OXYCODONE HYDROCHLORIDE 5 MG/1
10 TABLET ORAL ONCE
Status: COMPLETED | OUTPATIENT
Start: 2019-09-09 | End: 2019-09-09

## 2019-09-09 RX ORDER — PROMETHAZINE HYDROCHLORIDE 25 MG/1
25 TABLET ORAL 3 TIMES DAILY PRN
Qty: 12 TABLET | Refills: 0 | Status: SHIPPED | OUTPATIENT
Start: 2019-09-09 | End: 2019-09-10 | Stop reason: SDUPTHER

## 2019-09-09 RX ORDER — OXYCODONE HYDROCHLORIDE AND ACETAMINOPHEN 5; 325 MG/1; MG/1
1 TABLET ORAL EVERY 6 HOURS PRN
Qty: 12 TABLET | Refills: 0 | Status: SHIPPED | OUTPATIENT
Start: 2019-09-09 | End: 2019-09-12

## 2019-09-09 RX ADMIN — OXYCODONE HYDROCHLORIDE AND ACETAMINOPHEN 1 TABLET: 5; 325 TABLET ORAL at 02:27

## 2019-09-09 RX ADMIN — PROMETHAZINE HYDROCHLORIDE 25 MG: 25 TABLET ORAL at 02:27

## 2019-09-09 RX ADMIN — OXYCODONE HYDROCHLORIDE 10 MG: 5 TABLET ORAL at 03:38

## 2019-09-09 ASSESSMENT — PAIN SCALES - GENERAL: PAINLEVEL_OUTOF10: 7

## 2019-09-09 NOTE — ED PROVIDER NOTES
atorvastatin (LIPITOR) 20 MG tablet Take 1 tablet by mouth nightly 30 tablet 0    apixaban (ELIQUIS) 5 MG TABS tablet TAKE 1 TABLET TWICE A DAY (Patient taking differently: Take 5 mg by mouth daily TAKE 1 TABLET TWICE A DAY) 180 tablet 0    carvedilol (COREG) 12.5 MG tablet Take 12.5 mg by mouth 2 times daily (with meals)       losartan-hydrochlorothiazide (HYZAAR) 100-25 MG per tablet Take 1 tablet by mouth daily 30 tablet 3    oxyCODONE-acetaminophen (PERCOCET) 5-325 MG per tablet Take 1 tablet by mouth every 6 hours as needed for Pain for up to 7 days. Intended supply: 7 days. Take lowest dose possible to manage pain 28 tablet 0    promethazine (PHENERGAN) 25 MG tablet Take 1 tablet by mouth 3 times daily as needed for Nausea 12 tablet 0    mupirocin (BACTROBAN) 2 % ointment 1 22 gram tube. Apply 1 cm (small drop) to a q-tip and swab the inside of each nostril twice a day starting 5 days prior to surgery. 1 Tube 0    allopurinol (ZYLOPRIM) 100 MG tablet TAKE 2 TABLETS DAILY AS DIRECTED 180 tablet 0    acetaminophen (TYLENOL) 325 MG tablet Take 650 mg by mouth every 6 hours as needed for Pain      aspirin 81 MG EC tablet Take 81 mg by mouth daily. Allergies   Allergen Reactions    Hydrocodone Hives    Hydrocodone Bitartrate Hives    Lisinopril Other (See Comments)     Cough    Hydrocodone Rash    Naproxen Rash       REVIEW OF SYSTEMS  10 systems reviewed, pertinent positives per HPI otherwise noted to be negative. PHYSICAL EXAM  BP (!) 157/104   Pulse 72   Temp 97.5 °F (36.4 °C) (Oral)   Resp 14   Ht 5' 4\" (1.626 m)   Wt 194 lb (88 kg)   SpO2 99%   BMI 33.30 kg/m²    GENERAL APPEARANCE: Awake and alert. Cooperative. No acute distress. HENT: Normocephalic. Atraumatic. Mucous membranes are moist.  No drooling or stridor. NECK: Supple. EYES: PERRL. EOM's grossly intact. HEART/CHEST: RRR. No murmurs. LUNGS: Respirations unlabored. CTAB. Good air exchange.  Speaking comfortably in full sentences. ABDOMEN: No tenderness. Soft. Non-distended. No masses. No organomegaly. No guarding or rebound. MUSCULOSKELETAL: No extremity edema. Compartments soft. No deformity. Mild discomfort with palpation of left knee, no focal bony tenderness. No warmth or significant effusion although slight swelling noted when compared with right knee. No rash or overlying erythema. No distal left LE tenderness or calf tenderness. All extremities neurovascularly intact. 2+ DP pulses b/l. SKIN: Warm and dry. No acute rashes. NEUROLOGICAL: Alert and oriented. CN's 2-12 intact. No gross facial drooping. Strength 5/5, sensation intact. No gross focal deficits. PSYCHIATRIC: Normal mood and affect. LABS  I have reviewed all labs for this visit. No results found for this visit on 09/09/19. RADIOLOGY  Xr Knee Left (1-2 Views)    Result Date: 9/9/2019  EXAMINATION: 2 XRAY VIEWS OF THE LEFT KNEE 9/9/2019 1:57 am COMPARISON: 04/26/2019 HISTORY: ORDERING SYSTEM PROVIDED HISTORY: pain TECHNOLOGIST PROVIDED HISTORY: Reason for exam:->pain Reason for Exam: left knee pain Acuity: Chronic Type of Exam: Ongoing Additional signs and symptoms: pt c/o severe chronic left knee pain, no recent trauma FINDINGS: There is significant tricompartmental spurring with narrowing of the lateral compartment. There is no fracture or malalignment. A fabella is again seen. There is a small joint effusion. Tricompartmental osteoarthrosis without fracture or malalignment. ED COURSE/MDM  Patient seen and evaluated. Old records reviewed. Labs and imaging reviewed and results discussed with patient. Pt with left knee pain, acute on chronic. No new fall/injury. Pt is NV intact. No clinical e/o infection. Pt given oral pain meds + antiemetic as she states she tolerates stronger pain meds although gets nauseated with them at times, so this was pre-treated. Pt with improvement of sxs. Xray without fx.   Will d/c home times daily as needed for Nausea, Disp-12 tablet, R-0Print             Follow-up with:  Andreas Kidd MD  [address]   [address]  [phone]      As needed    Sarbjit Gregory MD  [facility] [phone]    Call in 1 day        DISCLAIMER: This chart was created using Dragon dictation software. Efforts were made by me to ensure accuracy, however some errors may be present due to limitations of this technology and occasionally words are not transcribed correctly.         Ericka Kussmaul, MD  09/18/19 [id number]

## 2019-09-10 ENCOUNTER — HOSPITAL ENCOUNTER (OUTPATIENT)
Dept: PHYSICAL THERAPY | Age: 79
Setting detail: THERAPIES SERIES
Discharge: HOME | End: 2019-09-10
Payer: MEDICARE

## 2019-09-10 DIAGNOSIS — M25.562 LEFT KNEE PAIN, UNSPECIFIED CHRONICITY: Primary | ICD-10-CM

## 2019-09-10 DIAGNOSIS — M25.562 LEFT KNEE PAIN, UNSPECIFIED CHRONICITY: ICD-10-CM

## 2019-09-10 DIAGNOSIS — M17.12 PRIMARY OSTEOARTHRITIS OF LEFT KNEE: Primary | ICD-10-CM

## 2019-09-10 PROCEDURE — 97110 THERAPEUTIC EXERCISES: CPT | Performed by: PHYSICAL THERAPIST

## 2019-09-10 PROCEDURE — 97161 PT EVAL LOW COMPLEX 20 MIN: CPT | Performed by: PHYSICAL THERAPIST

## 2019-09-10 RX ORDER — OXYCODONE HYDROCHLORIDE AND ACETAMINOPHEN 5; 325 MG/1; MG/1
1 TABLET ORAL EVERY 6 HOURS PRN
Qty: 12 TABLET | Refills: 0 | Status: CANCELLED | OUTPATIENT
Start: 2019-09-10 | End: 2019-09-13

## 2019-09-10 RX ORDER — ALLOPURINOL 100 MG/1
TABLET ORAL
Qty: 180 TABLET | Refills: 0 | Status: SHIPPED | OUTPATIENT
Start: 2019-09-10 | End: 2020-01-28 | Stop reason: SDUPTHER

## 2019-09-11 ENCOUNTER — HOSPITAL ENCOUNTER (OUTPATIENT)
Age: 79
Discharge: HOME | End: 2019-09-11
Payer: MEDICARE

## 2019-09-11 ENCOUNTER — TELEPHONE (OUTPATIENT)
Dept: ORTHOPEDIC SURGERY | Age: 79
End: 2019-09-11

## 2019-09-11 LAB
ALBUMIN SERPL-MCNC: 4.2 G/DL (ref 3.4–5)
ANION GAP SERPL CALCULATED.3IONS-SCNC: 15 MMOL/L (ref 3–16)
APTT: 32.5 SEC (ref 26–36)
BACTERIA: ABNORMAL /HPF
BASOPHILS ABSOLUTE: 0 K/UL (ref 0–0.2)
BASOPHILS RELATIVE PERCENT: 0.6 %
BILIRUBIN URINE: NEGATIVE
BLOOD, URINE: NEGATIVE
BUN BLDV-MCNC: 19 MG/DL (ref 7–20)
CALCIUM SERPL-MCNC: 9.6 MG/DL (ref 8.3–10.6)
CHLORIDE BLD-SCNC: 93 MMOL/L (ref 99–110)
CLARITY: CLEAR
CO2: 23 MMOL/L (ref 21–32)
COLOR: YELLOW
CREAT SERPL-MCNC: 0.8 MG/DL (ref 0.6–1.2)
EOSINOPHILS ABSOLUTE: 0.1 K/UL (ref 0–0.6)
EOSINOPHILS RELATIVE PERCENT: 1.2 %
EPITHELIAL CELLS, UA: ABNORMAL /HPF
GFR AFRICAN AMERICAN: >60
GFR NON-AFRICAN AMERICAN: >60
GLUCOSE BLD-MCNC: 112 MG/DL (ref 70–99)
GLUCOSE URINE: NEGATIVE MG/DL
HCT VFR BLD CALC: 39.6 % (ref 36–48)
HEMOGLOBIN: 13.7 G/DL (ref 12–16)
INR BLD: 1.46 (ref 0.86–1.14)
KETONES, URINE: NEGATIVE MG/DL
LEUKOCYTE ESTERASE, URINE: ABNORMAL
LYMPHOCYTES ABSOLUTE: 1.7 K/UL (ref 1–5.1)
LYMPHOCYTES RELATIVE PERCENT: 27.7 %
MCH RBC QN AUTO: 33 PG (ref 26–34)
MCHC RBC AUTO-ENTMCNC: 34.7 G/DL (ref 31–36)
MCV RBC AUTO: 95.1 FL (ref 80–100)
MICROSCOPIC EXAMINATION: YES
MONOCYTES ABSOLUTE: 0.7 K/UL (ref 0–1.3)
MONOCYTES RELATIVE PERCENT: 11.3 %
NEUTROPHILS ABSOLUTE: 3.6 K/UL (ref 1.7–7.7)
NEUTROPHILS RELATIVE PERCENT: 59.2 %
NITRITE, URINE: NEGATIVE
PDW BLD-RTO: 14 % (ref 12.4–15.4)
PH UA: 6.5 (ref 5–8)
PLATELET # BLD: 227 K/UL (ref 135–450)
PMV BLD AUTO: 7 FL (ref 5–10.5)
POTASSIUM SERPL-SCNC: 4 MMOL/L (ref 3.5–5.1)
PROTEIN UA: 100 MG/DL
PROTHROMBIN TIME: 16.5 SEC (ref 9.8–13)
RBC # BLD: 4.16 M/UL (ref 4–5.2)
RBC UA: ABNORMAL /HPF (ref 0–2)
SODIUM BLD-SCNC: 131 MMOL/L (ref 136–145)
SPECIFIC GRAVITY UA: 1.01 (ref 1–1.03)
TRANSFERRIN: 235 MG/DL (ref 200–360)
URINE TYPE: ABNORMAL
UROBILINOGEN, URINE: 0.2 E.U./DL
WBC # BLD: 6.2 K/UL (ref 4–11)
WBC UA: ABNORMAL /HPF (ref 0–5)

## 2019-09-11 PROCEDURE — 87086 URINE CULTURE/COLONY COUNT: CPT

## 2019-09-11 PROCEDURE — 85025 COMPLETE CBC W/AUTO DIFF WBC: CPT

## 2019-09-11 PROCEDURE — 85610 PROTHROMBIN TIME: CPT

## 2019-09-11 PROCEDURE — 84466 ASSAY OF TRANSFERRIN: CPT

## 2019-09-11 PROCEDURE — 82040 ASSAY OF SERUM ALBUMIN: CPT

## 2019-09-11 PROCEDURE — 85730 THROMBOPLASTIN TIME PARTIAL: CPT

## 2019-09-11 PROCEDURE — 83036 HEMOGLOBIN GLYCOSYLATED A1C: CPT

## 2019-09-11 PROCEDURE — 81001 URINALYSIS AUTO W/SCOPE: CPT

## 2019-09-11 PROCEDURE — 36415 COLL VENOUS BLD VENIPUNCTURE: CPT

## 2019-09-11 PROCEDURE — 80048 BASIC METABOLIC PNL TOTAL CA: CPT

## 2019-09-11 RX ORDER — PROMETHAZINE HYDROCHLORIDE 25 MG/1
25 TABLET ORAL 3 TIMES DAILY PRN
Qty: 12 TABLET | Refills: 0 | Status: SHIPPED | OUTPATIENT
Start: 2019-09-11 | End: 2019-09-18

## 2019-09-11 NOTE — TELEPHONE ENCOUNTER
of BMI concern? No    Weight Loss Clinic Consult Ordered: No    Date of Wt Loss Clinic Appt:     BMI at time of surgery (if went through [facility]): Additional Medical Concerns:     None    Additional Recommendations for above concerns:      Discharge Disposition Information:     Attended Pre-Hab Program: yes     Anticipated Discharge Disposition:  Home with out pt PT or Home with [facility] per pre-hab assessment     Who will be with patient at home following discharge? Daughter- but daughter doesn't drive.          Equipment pt already has:  Walker and [address] [facility] [place]- walker is standard     Bedroom on first or second floor: First   Bathroom on first or second floor: First   Weight bearing status: Full   Pre-op ambulatory status: walker   Number of entry steps: 0 steps   Caregiver assistance: Full time        Mary Herron  9/11/2019

## 2019-09-12 ENCOUNTER — TELEPHONE (OUTPATIENT)
Dept: ORTHOPEDIC SURGERY | Age: 79
End: 2019-09-12

## 2019-09-13 ENCOUNTER — OFFICE VISIT (OUTPATIENT)
Dept: INTERNAL MEDICINE CLINIC | Age: 79
End: 2019-09-13

## 2019-09-13 VITALS
BODY MASS INDEX: 32.95 KG/M2 | HEART RATE: 72 BPM | TEMPERATURE: 97.6 F | WEIGHT: 193 LBS | DIASTOLIC BLOOD PRESSURE: 80 MMHG | HEIGHT: 64 IN | SYSTOLIC BLOOD PRESSURE: 150 MMHG

## 2019-09-13 DIAGNOSIS — I25.9 CHRONIC ISCHEMIC HEART DISEASE: ICD-10-CM

## 2019-09-13 DIAGNOSIS — M17.12 OSTEOARTHRITIS OF LEFT KNEE, UNSPECIFIED OSTEOARTHRITIS TYPE: ICD-10-CM

## 2019-09-13 DIAGNOSIS — M10.9 GOUT, UNSPECIFIED CAUSE, UNSPECIFIED CHRONICITY, UNSPECIFIED SITE: ICD-10-CM

## 2019-09-13 DIAGNOSIS — N18.30 CHRONIC KIDNEY DISEASE, STAGE III (MODERATE) (HCC): ICD-10-CM

## 2019-09-13 DIAGNOSIS — Z01.818 PREOP EXAM FOR INTERNAL MEDICINE: Primary | ICD-10-CM

## 2019-09-13 DIAGNOSIS — I48.20 ATRIAL FIBRILLATION, CHRONIC (HCC): ICD-10-CM

## 2019-09-13 DIAGNOSIS — I10 ESSENTIAL HYPERTENSION: ICD-10-CM

## 2019-09-13 LAB
ESTIMATED AVERAGE GLUCOSE: 99.7 MG/DL
HBA1C MFR BLD: 5.1 %
URINE CULTURE, ROUTINE: NORMAL

## 2019-09-13 PROCEDURE — 99214 OFFICE O/P EST MOD 30 MIN: CPT | Performed by: PHYSICIAN ASSISTANT

## 2019-09-13 RX ORDER — OXYCODONE HYDROCHLORIDE AND ACETAMINOPHEN 5; 325 MG/1; MG/1
1 TABLET ORAL EVERY 6 HOURS PRN
Qty: 28 TABLET | Refills: 0 | Status: SHIPPED | OUTPATIENT
Start: 2019-09-13 | End: 2019-09-20

## 2019-09-13 NOTE — PROGRESS NOTES
clear.   Neck: No JVD. Trachea midline. Resp: No accessory muscle use. No crackles. No wheezes. No rhonchi. CV:  Irregularly irregular. No murmur. No rub. No edema. GI: Non-tender. Non-distended. Normal bowel sounds. Skin: Warm and dry. No nodule on exposed extremities. No rash on exposed extremities. M/S: No cyanosis. No joint deformity. No clubbing. Neuro: Awake. Grossly nonfocal    Psych: Oriented x 3. No anxiety or agitation. Assessment:       Diagnosis Orders   1. Preop exam for internal medicine     2. Osteoarthritis of left knee, unspecified osteoarthritis type  oxyCODONE-acetaminophen (PERCOCET) 5-325 MG per tablet   3. Atrial fibrillation, chronic ([address]     4. Chronic kidney disease, stage III (moderate)     5. Essential hypertension     6. Chronic ischemic heart disease     7. Gout, unspecified cause, unspecified chronicity, unspecified site           66 y.o. female with planned surgery as above. Plan:     Patient medically cleared for above planned procedure.   Labs performed by ortho surg  She has appt with her cardiologist next week for cardiac clearance     Edmond Garcia PA-C  9/13/2019 12:18 PM

## 2019-09-20 RX ORDER — M-VIT,TX,IRON,MINS/CALC/FOLIC 27MG-0.4MG
1 TABLET ORAL DAILY
COMMUNITY

## 2019-09-25 ENCOUNTER — ANESTHESIA (OUTPATIENT)
Dept: OPERATING ROOM | Age: 79
DRG: 470 | End: 2019-09-25
Payer: MEDICARE

## 2019-09-25 ENCOUNTER — HOSPITAL ENCOUNTER (INPATIENT)
Age: 79
LOS: 4 days | Discharge: HOME | DRG: 470 | End: 2019-09-29
Attending: ORTHOPAEDIC SURGERY | Admitting: ORTHOPAEDIC SURGERY
Payer: MEDICARE

## 2019-09-25 ENCOUNTER — ANESTHESIA EVENT (OUTPATIENT)
Dept: OPERATING ROOM | Age: 79
DRG: 470 | End: 2019-09-25
Payer: MEDICARE

## 2019-09-25 VITALS — DIASTOLIC BLOOD PRESSURE: 66 MMHG | SYSTOLIC BLOOD PRESSURE: 132 MMHG | OXYGEN SATURATION: 100 %

## 2019-09-25 DIAGNOSIS — Z96.652 STATUS POST TOTAL LEFT KNEE REPLACEMENT: ICD-10-CM

## 2019-09-25 DIAGNOSIS — M17.12 PRIMARY LOCALIZED OSTEOARTHRITIS OF LEFT KNEE: Primary | ICD-10-CM

## 2019-09-25 LAB
ABO/RH: NORMAL
ANTIBODY IDENTIFICATION: NORMAL
ANTIBODY IDENTIFICATION: NORMAL
ANTIBODY SCREEN: NORMAL
ANTIBODY SCREEN: NORMAL
C (LITTLE) ANTIGEN: NORMAL
E (BIG) ANTIGEN: NORMAL
GLUCOSE BLD-MCNC: 100 MG/DL (ref 70–99)
GLUCOSE BLD-MCNC: 203 MG/DL (ref 70–99)
INR BLD: 1.04 (ref 0.86–1.14)
PERFORMED ON: ABNORMAL
PERFORMED ON: ABNORMAL
PROTHROMBIN TIME: 11.9 SEC (ref 9.8–13)

## 2019-09-25 PROCEDURE — 97530 THERAPEUTIC ACTIVITIES: CPT

## 2019-09-25 PROCEDURE — C1713 ANCHOR/SCREW BN/BN,TIS/BN: HCPCS | Performed by: ORTHOPAEDIC SURGERY

## 2019-09-25 PROCEDURE — 6370000000 HC RX 637 (ALT 250 FOR IP): Performed by: PHYSICIAN ASSISTANT

## 2019-09-25 PROCEDURE — 3700000001 HC ADD 15 MINUTES (ANESTHESIA): Performed by: ORTHOPAEDIC SURGERY

## 2019-09-25 PROCEDURE — 6360000002 HC RX W HCPCS: Performed by: NURSE ANESTHETIST, CERTIFIED REGISTERED

## 2019-09-25 PROCEDURE — 2580000003 HC RX 258: Performed by: ORTHOPAEDIC SURGERY

## 2019-09-25 PROCEDURE — 2500000003 HC RX 250 WO HCPCS: Performed by: NURSE ANESTHETIST, CERTIFIED REGISTERED

## 2019-09-25 PROCEDURE — 2580000003 HC RX 258: Performed by: ANESTHESIOLOGY

## 2019-09-25 PROCEDURE — 86901 BLOOD TYPING SEROLOGIC RH(D): CPT

## 2019-09-25 PROCEDURE — 7100000001 HC PACU RECOVERY - ADDTL 15 MIN: Performed by: ORTHOPAEDIC SURGERY

## 2019-09-25 PROCEDURE — C1776 JOINT DEVICE (IMPLANTABLE): HCPCS | Performed by: ORTHOPAEDIC SURGERY

## 2019-09-25 PROCEDURE — 6360000002 HC RX W HCPCS: Performed by: PHYSICIAN ASSISTANT

## 2019-09-25 PROCEDURE — 1200000000 HC SEMI PRIVATE

## 2019-09-25 PROCEDURE — 86905 BLOOD TYPING RBC ANTIGENS: CPT

## 2019-09-25 PROCEDURE — 97162 PT EVAL MOD COMPLEX 30 MIN: CPT

## 2019-09-25 PROCEDURE — 6360000002 HC RX W HCPCS: Performed by: ORTHOPAEDIC SURGERY

## 2019-09-25 PROCEDURE — C9290 INJ, BUPIVACAINE LIPOSOME: HCPCS | Performed by: ORTHOPAEDIC SURGERY

## 2019-09-25 PROCEDURE — 2720000010 HC SURG SUPPLY STERILE: Performed by: ORTHOPAEDIC SURGERY

## 2019-09-25 PROCEDURE — 2580000003 HC RX 258: Performed by: PHYSICIAN ASSISTANT

## 2019-09-25 PROCEDURE — 85610 PROTHROMBIN TIME: CPT

## 2019-09-25 PROCEDURE — 88311 DECALCIFY TISSUE: CPT

## 2019-09-25 PROCEDURE — 97116 GAIT TRAINING THERAPY: CPT

## 2019-09-25 PROCEDURE — 36415 COLL VENOUS BLD VENIPUNCTURE: CPT

## 2019-09-25 PROCEDURE — 8E0Y0CZ ROBOTIC ASSISTED PROCEDURE OF LOWER EXTREMITY, OPEN APPROACH: ICD-10-PCS | Performed by: ORTHOPAEDIC SURGERY

## 2019-09-25 PROCEDURE — 97166 OT EVAL MOD COMPLEX 45 MIN: CPT

## 2019-09-25 PROCEDURE — 86850 RBC ANTIBODY SCREEN: CPT

## 2019-09-25 PROCEDURE — 2500000003 HC RX 250 WO HCPCS: Performed by: ORTHOPAEDIC SURGERY

## 2019-09-25 PROCEDURE — 7100000000 HC PACU RECOVERY - FIRST 15 MIN: Performed by: ORTHOPAEDIC SURGERY

## 2019-09-25 PROCEDURE — 86870 RBC ANTIBODY IDENTIFICATION: CPT

## 2019-09-25 PROCEDURE — 97535 SELF CARE MNGMENT TRAINING: CPT

## 2019-09-25 PROCEDURE — 88305 TISSUE EXAM BY PATHOLOGIST: CPT

## 2019-09-25 PROCEDURE — 3600000005 HC SURGERY LEVEL 5 BASE: Performed by: ORTHOPAEDIC SURGERY

## 2019-09-25 PROCEDURE — 0SRD0J9 REPLACEMENT OF LEFT KNEE JOINT WITH SYNTHETIC SUBSTITUTE, CEMENTED, OPEN APPROACH: ICD-10-PCS | Performed by: ORTHOPAEDIC SURGERY

## 2019-09-25 PROCEDURE — 6360000002 HC RX W HCPCS: Performed by: ANESTHESIOLOGY

## 2019-09-25 PROCEDURE — 86900 BLOOD TYPING SEROLOGIC ABO: CPT

## 2019-09-25 PROCEDURE — 64447 NJX AA&/STRD FEMORAL NRV IMG: CPT | Performed by: ANESTHESIOLOGY

## 2019-09-25 PROCEDURE — 3600000015 HC SURGERY LEVEL 5 ADDTL 15MIN: Performed by: ORTHOPAEDIC SURGERY

## 2019-09-25 PROCEDURE — 2709999900 HC NON-CHARGEABLE SUPPLY: Performed by: ORTHOPAEDIC SURGERY

## 2019-09-25 PROCEDURE — 3700000000 HC ANESTHESIA ATTENDED CARE: Performed by: ORTHOPAEDIC SURGERY

## 2019-09-25 PROCEDURE — 97110 THERAPEUTIC EXERCISES: CPT

## 2019-09-25 DEVICE — COMPONENT PAT DIA31MM THK8MM KNEE X3 SYMMETRIC TRIATHLON: Type: IMPLANTABLE DEVICE | Site: KNEE | Status: FUNCTIONAL

## 2019-09-25 DEVICE — IMPLANTABLE DEVICE: Type: IMPLANTABLE DEVICE | Site: KNEE | Status: FUNCTIONAL

## 2019-09-25 DEVICE — INSERT TIB SZ 3 THK9MM KNEE X3 CNDYL STBL BEAR TECHNOLOGY: Type: IMPLANTABLE DEVICE | Site: KNEE | Status: FUNCTIONAL

## 2019-09-25 DEVICE — BASEPLATE TIB SZ 3 KNEE TRITANIUM CEM PRI LO PROF TRIATHLON: Type: IMPLANTABLE DEVICE | Site: KNEE | Status: FUNCTIONAL

## 2019-09-25 DEVICE — CEMENT BNE 20ML 41GM FULL DOSE PMMA W/ TOBRA M VISC RADPQ: Type: IMPLANTABLE DEVICE | Site: KNEE | Status: FUNCTIONAL

## 2019-09-25 RX ORDER — ATORVASTATIN CALCIUM 10 MG/1
20 TABLET, FILM COATED ORAL NIGHTLY
Status: DISCONTINUED | OUTPATIENT
Start: 2019-09-25 | End: 2019-09-29 | Stop reason: HOSPADM

## 2019-09-25 RX ORDER — HYDROCHLOROTHIAZIDE 25 MG/1
25 TABLET ORAL DAILY
Status: DISCONTINUED | OUTPATIENT
Start: 2019-09-26 | End: 2019-09-26

## 2019-09-25 RX ORDER — SODIUM CHLORIDE 0.9 % (FLUSH) 0.9 %
10 SYRINGE (ML) INJECTION EVERY 12 HOURS SCHEDULED
Status: DISCONTINUED | OUTPATIENT
Start: 2019-09-25 | End: 2019-09-25 | Stop reason: HOSPADM

## 2019-09-25 RX ORDER — LABETALOL HYDROCHLORIDE 5 MG/ML
5 INJECTION, SOLUTION INTRAVENOUS
Status: DISCONTINUED | OUTPATIENT
Start: 2019-09-25 | End: 2019-09-25 | Stop reason: HOSPADM

## 2019-09-25 RX ORDER — ACETAMINOPHEN 500 MG
1000 TABLET ORAL ONCE
Status: COMPLETED | OUTPATIENT
Start: 2019-09-25 | End: 2019-09-25

## 2019-09-25 RX ORDER — NICOTINE POLACRILEX 4 MG
15 LOZENGE BUCCAL PRN
Status: DISCONTINUED | OUTPATIENT
Start: 2019-09-25 | End: 2019-09-29 | Stop reason: HOSPADM

## 2019-09-25 RX ORDER — MEPERIDINE HYDROCHLORIDE 50 MG/ML
12.5 INJECTION INTRAMUSCULAR; INTRAVENOUS; SUBCUTANEOUS EVERY 5 MIN PRN
Status: DISCONTINUED | OUTPATIENT
Start: 2019-09-25 | End: 2019-09-25 | Stop reason: HOSPADM

## 2019-09-25 RX ORDER — TRANEXAMIC ACID 100 MG/ML
INJECTION, SOLUTION INTRAVENOUS PRN
Status: DISCONTINUED | OUTPATIENT
Start: 2019-09-25 | End: 2019-09-25 | Stop reason: ALTCHOICE

## 2019-09-25 RX ORDER — PROPOFOL 10 MG/ML
INJECTION, EMULSION INTRAVENOUS CONTINUOUS PRN
Status: DISCONTINUED | OUTPATIENT
Start: 2019-09-25 | End: 2019-09-25 | Stop reason: SDUPTHER

## 2019-09-25 RX ORDER — OXYCODONE HYDROCHLORIDE AND ACETAMINOPHEN 5; 325 MG/1; MG/1
1 TABLET ORAL PRN
Status: DISCONTINUED | OUTPATIENT
Start: 2019-09-25 | End: 2019-09-25 | Stop reason: HOSPADM

## 2019-09-25 RX ORDER — SODIUM CHLORIDE, SODIUM LACTATE, POTASSIUM CHLORIDE, CALCIUM CHLORIDE 600; 310; 30; 20 MG/100ML; MG/100ML; MG/100ML; MG/100ML
INJECTION, SOLUTION INTRAVENOUS CONTINUOUS
Status: DISCONTINUED | OUTPATIENT
Start: 2019-09-25 | End: 2019-09-29 | Stop reason: HOSPADM

## 2019-09-25 RX ORDER — OXYCODONE HYDROCHLORIDE 5 MG/1
10 TABLET ORAL EVERY 4 HOURS PRN
Status: DISCONTINUED | OUTPATIENT
Start: 2019-09-25 | End: 2019-09-29 | Stop reason: HOSPADM

## 2019-09-25 RX ORDER — ONDANSETRON 2 MG/ML
4 INJECTION INTRAMUSCULAR; INTRAVENOUS EVERY 30 MIN PRN
Status: DISCONTINUED | OUTPATIENT
Start: 2019-09-25 | End: 2019-09-25 | Stop reason: HOSPADM

## 2019-09-25 RX ORDER — OXYCODONE HYDROCHLORIDE 5 MG/1
5 TABLET ORAL EVERY 4 HOURS PRN
Status: DISCONTINUED | OUTPATIENT
Start: 2019-09-25 | End: 2019-09-29 | Stop reason: HOSPADM

## 2019-09-25 RX ORDER — ONDANSETRON 2 MG/ML
4 INJECTION INTRAMUSCULAR; INTRAVENOUS EVERY 6 HOURS PRN
Status: DISCONTINUED | OUTPATIENT
Start: 2019-09-25 | End: 2019-09-25

## 2019-09-25 RX ORDER — EPHEDRINE SULFATE 50 MG/ML
INJECTION INTRAVENOUS PRN
Status: DISCONTINUED | OUTPATIENT
Start: 2019-09-25 | End: 2019-09-25 | Stop reason: SDUPTHER

## 2019-09-25 RX ORDER — LOSARTAN POTASSIUM 100 MG/1
100 TABLET ORAL DAILY
Status: DISCONTINUED | OUTPATIENT
Start: 2019-09-26 | End: 2019-09-29 | Stop reason: HOSPADM

## 2019-09-25 RX ORDER — SODIUM CHLORIDE 0.9 % (FLUSH) 0.9 %
10 SYRINGE (ML) INJECTION PRN
Status: DISCONTINUED | OUTPATIENT
Start: 2019-09-25 | End: 2019-09-25 | Stop reason: HOSPADM

## 2019-09-25 RX ORDER — SODIUM CHLORIDE, SODIUM LACTATE, POTASSIUM CHLORIDE, CALCIUM CHLORIDE 600; 310; 30; 20 MG/100ML; MG/100ML; MG/100ML; MG/100ML
INJECTION, SOLUTION INTRAVENOUS CONTINUOUS
Status: DISCONTINUED | OUTPATIENT
Start: 2019-09-25 | End: 2019-09-25

## 2019-09-25 RX ORDER — GABAPENTIN 300 MG/1
300 CAPSULE ORAL ONCE
Status: COMPLETED | OUTPATIENT
Start: 2019-09-25 | End: 2019-09-25

## 2019-09-25 RX ORDER — SODIUM CHLORIDE 0.9 % (FLUSH) 0.9 %
10 SYRINGE (ML) INJECTION EVERY 12 HOURS SCHEDULED
Status: DISCONTINUED | OUTPATIENT
Start: 2019-09-25 | End: 2019-09-29 | Stop reason: HOSPADM

## 2019-09-25 RX ORDER — ALLOPURINOL 100 MG/1
100 TABLET ORAL 2 TIMES DAILY
Status: DISCONTINUED | OUTPATIENT
Start: 2019-09-25 | End: 2019-09-29 | Stop reason: HOSPADM

## 2019-09-25 RX ORDER — LIDOCAINE HYDROCHLORIDE 10 MG/ML
INJECTION, SOLUTION INFILTRATION; PERINEURAL PRN
Status: DISCONTINUED | OUTPATIENT
Start: 2019-09-25 | End: 2019-09-25 | Stop reason: SDUPTHER

## 2019-09-25 RX ORDER — SODIUM CHLORIDE 0.9 % (FLUSH) 0.9 %
10 SYRINGE (ML) INJECTION PRN
Status: DISCONTINUED | OUTPATIENT
Start: 2019-09-25 | End: 2019-09-29 | Stop reason: HOSPADM

## 2019-09-25 RX ORDER — DEXTROSE MONOHYDRATE 50 MG/ML
100 INJECTION, SOLUTION INTRAVENOUS PRN
Status: DISCONTINUED | OUTPATIENT
Start: 2019-09-25 | End: 2019-09-29 | Stop reason: HOSPADM

## 2019-09-25 RX ORDER — FAMOTIDINE 20 MG/1
20 TABLET, FILM COATED ORAL 2 TIMES DAILY
Status: DISCONTINUED | OUTPATIENT
Start: 2019-09-25 | End: 2019-09-29 | Stop reason: HOSPADM

## 2019-09-25 RX ORDER — MORPHINE SULFATE 2 MG/ML
2 INJECTION, SOLUTION INTRAMUSCULAR; INTRAVENOUS
Status: DISCONTINUED | OUTPATIENT
Start: 2019-09-25 | End: 2019-09-29 | Stop reason: HOSPADM

## 2019-09-25 RX ORDER — LIDOCAINE HYDROCHLORIDE 10 MG/ML
0.3 INJECTION, SOLUTION EPIDURAL; INFILTRATION; INTRACAUDAL; PERINEURAL
Status: DISCONTINUED | OUTPATIENT
Start: 2019-09-25 | End: 2019-09-25 | Stop reason: HOSPADM

## 2019-09-25 RX ORDER — CYCLOBENZAPRINE HCL 10 MG
10 TABLET ORAL 3 TIMES DAILY PRN
Status: DISCONTINUED | OUTPATIENT
Start: 2019-09-25 | End: 2019-09-29 | Stop reason: HOSPADM

## 2019-09-25 RX ORDER — BUPIVACAINE HYDROCHLORIDE 7.5 MG/ML
INJECTION, SOLUTION INTRASPINAL PRN
Status: DISCONTINUED | OUTPATIENT
Start: 2019-09-25 | End: 2019-09-25 | Stop reason: SDUPTHER

## 2019-09-25 RX ORDER — LOSARTAN POTASSIUM AND HYDROCHLOROTHIAZIDE 25; 100 MG/1; MG/1
1 TABLET ORAL DAILY
Status: DISCONTINUED | OUTPATIENT
Start: 2019-09-25 | End: 2019-09-25 | Stop reason: CLARIF

## 2019-09-25 RX ORDER — DEXTROSE MONOHYDRATE 25 G/50ML
12.5 INJECTION, SOLUTION INTRAVENOUS PRN
Status: DISCONTINUED | OUTPATIENT
Start: 2019-09-25 | End: 2019-09-29 | Stop reason: HOSPADM

## 2019-09-25 RX ORDER — OXYCODONE HYDROCHLORIDE AND ACETAMINOPHEN 5; 325 MG/1; MG/1
2 TABLET ORAL PRN
Status: DISCONTINUED | OUTPATIENT
Start: 2019-09-25 | End: 2019-09-25 | Stop reason: HOSPADM

## 2019-09-25 RX ORDER — DIPHENHYDRAMINE HYDROCHLORIDE 50 MG/ML
6.25 INJECTION INTRAMUSCULAR; INTRAVENOUS
Status: DISCONTINUED | OUTPATIENT
Start: 2019-09-25 | End: 2019-09-25 | Stop reason: HOSPADM

## 2019-09-25 RX ORDER — FENTANYL CITRATE 50 UG/ML
INJECTION, SOLUTION INTRAMUSCULAR; INTRAVENOUS PRN
Status: DISCONTINUED | OUTPATIENT
Start: 2019-09-25 | End: 2019-09-25 | Stop reason: SDUPTHER

## 2019-09-25 RX ORDER — HYDRALAZINE HYDROCHLORIDE 20 MG/ML
5 INJECTION INTRAMUSCULAR; INTRAVENOUS EVERY 30 MIN PRN
Status: DISCONTINUED | OUTPATIENT
Start: 2019-09-25 | End: 2019-09-25 | Stop reason: HOSPADM

## 2019-09-25 RX ORDER — ACETAMINOPHEN 325 MG/1
650 TABLET ORAL EVERY 6 HOURS
Status: DISCONTINUED | OUTPATIENT
Start: 2019-09-25 | End: 2019-09-29 | Stop reason: HOSPADM

## 2019-09-25 RX ORDER — MORPHINE SULFATE 4 MG/ML
4 INJECTION, SOLUTION INTRAMUSCULAR; INTRAVENOUS
Status: DISCONTINUED | OUTPATIENT
Start: 2019-09-25 | End: 2019-09-29 | Stop reason: HOSPADM

## 2019-09-25 RX ORDER — CARVEDILOL 6.25 MG/1
12.5 TABLET ORAL 2 TIMES DAILY WITH MEALS
Status: DISCONTINUED | OUTPATIENT
Start: 2019-09-25 | End: 2019-09-29 | Stop reason: HOSPADM

## 2019-09-25 RX ORDER — DOCUSATE SODIUM 100 MG/1
100 CAPSULE, LIQUID FILLED ORAL 2 TIMES DAILY
Status: DISCONTINUED | OUTPATIENT
Start: 2019-09-25 | End: 2019-09-29 | Stop reason: HOSPADM

## 2019-09-25 RX ORDER — PROMETHAZINE HYDROCHLORIDE 25 MG/1
12.5 TABLET ORAL EVERY 6 HOURS PRN
Status: DISCONTINUED | OUTPATIENT
Start: 2019-09-25 | End: 2019-09-29 | Stop reason: HOSPADM

## 2019-09-25 RX ORDER — MIDAZOLAM HYDROCHLORIDE 1 MG/ML
INJECTION INTRAMUSCULAR; INTRAVENOUS PRN
Status: DISCONTINUED | OUTPATIENT
Start: 2019-09-25 | End: 2019-09-25 | Stop reason: SDUPTHER

## 2019-09-25 RX ADMIN — Medication 2 G: at 08:26

## 2019-09-25 RX ADMIN — GABAPENTIN 300 MG: 300 CAPSULE ORAL at 07:09

## 2019-09-25 RX ADMIN — Medication 10 ML: at 21:08

## 2019-09-25 RX ADMIN — PHENYLEPHRINE HYDROCHLORIDE 150 MCG: 10 INJECTION INTRAVENOUS at 08:48

## 2019-09-25 RX ADMIN — MIDAZOLAM HYDROCHLORIDE 2 MG: 2 INJECTION, SOLUTION INTRAMUSCULAR; INTRAVENOUS at 08:26

## 2019-09-25 RX ADMIN — EPHEDRINE SULFATE 10 MG: 50 INJECTION INTRAVENOUS at 08:40

## 2019-09-25 RX ADMIN — SODIUM CHLORIDE, POTASSIUM CHLORIDE, SODIUM LACTATE AND CALCIUM CHLORIDE: 600; 310; 30; 20 INJECTION, SOLUTION INTRAVENOUS at 09:46

## 2019-09-25 RX ADMIN — INSULIN LISPRO 1 UNITS: 100 INJECTION, SOLUTION INTRAVENOUS; SUBCUTANEOUS at 21:23

## 2019-09-25 RX ADMIN — DOCUSATE SODIUM 100 MG: 100 CAPSULE, LIQUID FILLED ORAL at 21:03

## 2019-09-25 RX ADMIN — FENTANYL CITRATE 50 MCG: 50 INJECTION INTRAMUSCULAR; INTRAVENOUS at 08:26

## 2019-09-25 RX ADMIN — PROPOFOL 50 MCG/KG/MIN: 10 INJECTION, EMULSION INTRAVENOUS at 08:34

## 2019-09-25 RX ADMIN — Medication 2 G: at 17:01

## 2019-09-25 RX ADMIN — ALLOPURINOL 100 MG: 100 TABLET ORAL at 21:07

## 2019-09-25 RX ADMIN — CARVEDILOL 12.5 MG: 6.25 TABLET, FILM COATED ORAL at 21:22

## 2019-09-25 RX ADMIN — HYDROMORPHONE HYDROCHLORIDE 0.5 MG: 1 INJECTION, SOLUTION INTRAMUSCULAR; INTRAVENOUS; SUBCUTANEOUS at 13:40

## 2019-09-25 RX ADMIN — ACETAMINOPHEN 650 MG: 325 TABLET ORAL at 16:56

## 2019-09-25 RX ADMIN — SODIUM CHLORIDE, POTASSIUM CHLORIDE, SODIUM LACTATE AND CALCIUM CHLORIDE: 600; 310; 30; 20 INJECTION, SOLUTION INTRAVENOUS at 07:47

## 2019-09-25 RX ADMIN — ATORVASTATIN CALCIUM 20 MG: 10 TABLET, FILM COATED ORAL at 21:03

## 2019-09-25 RX ADMIN — PROMETHAZINE HYDROCHLORIDE 12.5 MG: 25 TABLET ORAL at 17:25

## 2019-09-25 RX ADMIN — MORPHINE SULFATE 4 MG: 4 INJECTION, SOLUTION INTRAMUSCULAR; INTRAVENOUS at 21:02

## 2019-09-25 RX ADMIN — CARVEDILOL 12.5 MG: 6.25 TABLET, FILM COATED ORAL at 16:56

## 2019-09-25 RX ADMIN — BUPIVACAINE HYDROCHLORIDE 1.6 ML: 7.5 INJECTION, SOLUTION SUBARACHNOID at 08:23

## 2019-09-25 RX ADMIN — MORPHINE SULFATE 2 MG: 2 INJECTION, SOLUTION INTRAMUSCULAR; INTRAVENOUS at 16:56

## 2019-09-25 RX ADMIN — FAMOTIDINE 20 MG: 20 TABLET, FILM COATED ORAL at 21:03

## 2019-09-25 RX ADMIN — LIDOCAINE HYDROCHLORIDE 40 MG: 10 INJECTION, SOLUTION INFILTRATION; PERINEURAL at 08:34

## 2019-09-25 RX ADMIN — ACETAMINOPHEN 1000 MG: 500 TABLET ORAL at 07:09

## 2019-09-25 ASSESSMENT — PULMONARY FUNCTION TESTS
PIF_VALUE: 0
PIF_VALUE: 1
PIF_VALUE: 0
PIF_VALUE: 1
PIF_VALUE: 0
PIF_VALUE: 0
PIF_VALUE: 1
PIF_VALUE: 0

## 2019-09-25 ASSESSMENT — PAIN SCALES - GENERAL
PAINLEVEL_OUTOF10: 8
PAINLEVEL_OUTOF10: 7
PAINLEVEL_OUTOF10: 8
PAINLEVEL_OUTOF10: 2

## 2019-09-25 ASSESSMENT — PAIN DESCRIPTION - LOCATION
LOCATION: KNEE

## 2019-09-25 ASSESSMENT — PAIN DESCRIPTION - ORIENTATION
ORIENTATION: LEFT
ORIENTATION: RIGHT
ORIENTATION: LEFT

## 2019-09-25 ASSESSMENT — PAIN - FUNCTIONAL ASSESSMENT: PAIN_FUNCTIONAL_ASSESSMENT: 0-10

## 2019-09-25 ASSESSMENT — PAIN DESCRIPTION - PAIN TYPE
TYPE: SURGICAL PAIN

## 2019-09-25 NOTE — ANESTHESIA PRE PROCEDURE
300 mg Oral Once Perla Moreland PA-C        lactated ringers infusion   Intravenous Continuous Lupis Zapata MD        sodium chloride flush 0.9 % injection 10 mL  10 mL Intravenous 2 times per day Lupis Zapata MD        sodium chloride flush 0.9 % injection 10 mL  10 mL Intravenous PRN Lupis Zapata MD        lidocaine PF 1 % injection 0.3 mL  0.3 mL Intradermal Once PRN Lupis Zapata MD        HYDROmorphone (DILAUDID) injection 0.5 mg  0.5 mg Intravenous Q10 Min PRN Jesusita Barraza MD        HYDROmorphone (DILAUDID) injection 0.5 mg  0.5 mg Intravenous Q5 Min PRN Jesusita Barraza MD        oxyCODONE-acetaminophen (PERCOCET) 5-325 MG per tablet 1 tablet  1 tablet Oral PRN Jesusita Barraza MD        Or    oxyCODONE-acetaminophen (PERCOCET) 5-325 MG per tablet 2 tablet  2 tablet Oral PRN Jesusita Barraza MD        diphenhydrAMINE (BENADRYL) injection 6.25 mg  6.25 mg Intravenous Once PRN Jesusita Barraza MD        ondansetron [facility]) injection 4 mg  4 mg Intravenous Q30 Min PRN Jesusita Barraza MD        labetalol (NORMODYNE;TRANDATE) injection 5 mg  5 mg Intravenous Q15 Min PRN Jesusita Barraza MD        hydrALAZINE (APRESOLINE) injection 5 mg  5 mg Intravenous Q30 Min PRN Jesusita Barraza MD        meperidine (DEMEROL) injection 12.5 mg  12.5 mg Intravenous Q5 Min PRN Jesusita Barraza MD           Allergies:     Allergies   Allergen Reactions    Hydrocodone Hives    Hydrocodone Bitartrate Hives    Lisinopril Other (See Comments)     Cough    Hydrocodone Rash    Naproxen Rash       Problem List:    Patient Active Problem List   Diagnosis Code    HTN (hypertension) I10    MR (mitral regurgitation) I34.0    TI (tricuspid incompetence) I07.1    Atrial fibrillation (HCC) I48.91    Long term current use of anticoagulant therapy Z79.01    Seborrheic keratosis L82.1    Actinic keratoses L57.0    Cough due to ACE inhibitor R05, T46.4X5A    Rotator cuff Counseling given: Not Answered      Vital Signs (Current):   Vitals:    09/20/19 1552 09/25/19 0640   BP:  (!) 174/80   Pulse:  64   Resp:  20   Temp:  97.5 °F (36.4 °C)   TempSrc:  Temporal   SpO2:  97%   Weight: 190 lb (86.2 kg) 195 lb (88.5 kg)   Height: 5' 4\" (1.626 m) 5' 4\" (1.626 m)                                              BP Readings from Last 3 Encounters:   09/25/19 (!) 174/80   09/13/19 (!) 150/80   09/09/19 (!) 157/104       NPO Status: Time of last liquid consumption: 2200                        Time of last solid consumption: 1900                        Date of last liquid consumption: 09/24/19                        Date of last solid food consumption: 09/24/19    BMI:   Wt Readings from Last 3 Encounters:   09/25/19 195 lb (88.5 kg)   09/13/19 193 lb (87.5 kg)   09/09/19 194 lb (88 kg)     Body mass index is 33.47 kg/m². CBC:   Lab Results   Component Value Date    WBC 6.2 09/11/2019    RBC 4.16 09/11/2019    HGB 13.7 09/11/2019    HCT 39.6 09/11/2019    MCV 95.1 09/11/2019    RDW 14.0 09/11/2019     09/11/2019       CMP:   Lab Results   Component Value Date     09/11/2019    K 4.0 09/11/2019    K 4.1 08/12/2018    CL 93 09/11/2019    CO2 23 09/11/2019    BUN 19 09/11/2019    CREATININE 0.8 09/11/2019    GFRAA >60 09/11/2019    GFRAA >60 06/10/2013    AGRATIO 1.8 06/03/2019    LABGLOM >60 09/11/2019    LABGLOM 51 08/20/2015    GLUCOSE 112 09/11/2019    PROT 6.8 06/03/2019    PROT 7.8 11/01/2012    CALCIUM 9.6 09/11/2019    BILITOT 0.7 06/03/2019    ALKPHOS 105 06/03/2019    AST 27 06/03/2019    ALT 16 06/03/2019       POC Tests: No results for input(s): POCGLU, POCNA, POCK, POCCL, POCBUN, POCHEMO, POCHCT in the last 72 hours.     Coags:   Lab Results   Component Value Date    PROTIME 16.5 09/11/2019    PROTIME 20.4 12/29/2009    INR 1.46 09/11/2019    APTT 32.5 09/11/2019       HCG (If Applicable): No results found for: PREGTESTUR, PREGSERUM, HCG, HCGQUANT     ABGs: No results found

## 2019-09-25 NOTE — ANESTHESIA POSTPROCEDURE EVALUATION
09/11/2019 01:22 PM   RENAL  Lab Results       Component                Value               Date/Time                  NA                       131 (L)             09/11/2019 01:22 PM        K                        4.0                 09/11/2019 01:22 PM        K                        4.1                 08/12/2018 06:21 AM        CL                       93 (L)              09/11/2019 01:22 PM        CO2                      23                  09/11/2019 01:22 PM        BUN                      19                  09/11/2019 01:22 PM        CREATININE               0.8                 09/11/2019 01:22 PM        GLUCOSE                  112 (H)             09/11/2019 01:22 PM   COAGS  Lab Results       Component                Value               Date/Time                  PROTIME                  11.9                09/25/2019 07:19 AM        PROTIME                  20.4 (H)            12/29/2009 11:55 AM        INR                      1.04                09/25/2019 07:19 AM        APTT                     32.5                09/11/2019 01:22 PM     Intake & Output: In: 1100 (I.V.:1100)  Out: -     Nausea & Vomiting:  No    Level of Consciousness:  Awake    Pain Assessment:  Adequate analgesia    Anesthesia Complications:  No apparent anesthetic complications    SUMMARY      Vital signs stable  OK to discharge from Stage I post anesthesia care.   Care transferred from Anesthesiology department on discharge from perioperative area

## 2019-09-26 LAB
ANION GAP SERPL CALCULATED.3IONS-SCNC: 9 MMOL/L (ref 3–16)
BASOPHILS ABSOLUTE: 0 K/UL (ref 0–0.2)
BASOPHILS RELATIVE PERCENT: 0.3 %
BUN BLDV-MCNC: 12 MG/DL (ref 7–20)
CALCIUM SERPL-MCNC: 8.8 MG/DL (ref 8.3–10.6)
CHLORIDE BLD-SCNC: 93 MMOL/L (ref 99–110)
CO2: 28 MMOL/L (ref 21–32)
CREAT SERPL-MCNC: 0.7 MG/DL (ref 0.6–1.2)
EOSINOPHILS ABSOLUTE: 0.1 K/UL (ref 0–0.6)
EOSINOPHILS RELATIVE PERCENT: 1.3 %
GFR AFRICAN AMERICAN: >60
GFR NON-AFRICAN AMERICAN: >60
GLUCOSE BLD-MCNC: 113 MG/DL (ref 70–99)
GLUCOSE BLD-MCNC: 123 MG/DL (ref 70–99)
GLUCOSE BLD-MCNC: 133 MG/DL (ref 70–99)
GLUCOSE BLD-MCNC: 136 MG/DL (ref 70–99)
GLUCOSE BLD-MCNC: 160 MG/DL (ref 70–99)
HCT VFR BLD CALC: 32.5 % (ref 36–48)
HEMOGLOBIN: 11.7 G/DL (ref 12–16)
LYMPHOCYTES ABSOLUTE: 1.1 K/UL (ref 1–5.1)
LYMPHOCYTES RELATIVE PERCENT: 17.2 %
MCH RBC QN AUTO: 33.6 PG (ref 26–34)
MCHC RBC AUTO-ENTMCNC: 36 G/DL (ref 31–36)
MCV RBC AUTO: 93.2 FL (ref 80–100)
MONOCYTES ABSOLUTE: 0.8 K/UL (ref 0–1.3)
MONOCYTES RELATIVE PERCENT: 12.3 %
NEUTROPHILS ABSOLUTE: 4.6 K/UL (ref 1.7–7.7)
NEUTROPHILS RELATIVE PERCENT: 68.9 %
PDW BLD-RTO: 14 % (ref 12.4–15.4)
PERFORMED ON: ABNORMAL
PLATELET # BLD: 180 K/UL (ref 135–450)
PMV BLD AUTO: 6.6 FL (ref 5–10.5)
POTASSIUM REFLEX MAGNESIUM: 3.7 MMOL/L (ref 3.5–5.1)
RBC # BLD: 3.49 M/UL (ref 4–5.2)
SODIUM BLD-SCNC: 130 MMOL/L (ref 136–145)
WBC # BLD: 6.6 K/UL (ref 4–11)

## 2019-09-26 PROCEDURE — 2580000003 HC RX 258: Performed by: PHYSICIAN ASSISTANT

## 2019-09-26 PROCEDURE — 6360000002 HC RX W HCPCS: Performed by: PHYSICIAN ASSISTANT

## 2019-09-26 PROCEDURE — 6370000000 HC RX 637 (ALT 250 FOR IP): Performed by: PHYSICIAN ASSISTANT

## 2019-09-26 PROCEDURE — 85025 COMPLETE CBC W/AUTO DIFF WBC: CPT

## 2019-09-26 PROCEDURE — APPSS45 APP SPLIT SHARED TIME 31-45 MINUTES: Performed by: PHYSICIAN ASSISTANT

## 2019-09-26 PROCEDURE — 97116 GAIT TRAINING THERAPY: CPT

## 2019-09-26 PROCEDURE — 97530 THERAPEUTIC ACTIVITIES: CPT

## 2019-09-26 PROCEDURE — 36415 COLL VENOUS BLD VENIPUNCTURE: CPT

## 2019-09-26 PROCEDURE — 1200000000 HC SEMI PRIVATE

## 2019-09-26 PROCEDURE — 97110 THERAPEUTIC EXERCISES: CPT

## 2019-09-26 PROCEDURE — 6370000000 HC RX 637 (ALT 250 FOR IP): Performed by: ORTHOPAEDIC SURGERY

## 2019-09-26 PROCEDURE — 6360000002 HC RX W HCPCS: Performed by: NURSE PRACTITIONER

## 2019-09-26 PROCEDURE — 80048 BASIC METABOLIC PNL TOTAL CA: CPT

## 2019-09-26 RX ORDER — ONDANSETRON 2 MG/ML
4 INJECTION INTRAMUSCULAR; INTRAVENOUS EVERY 6 HOURS PRN
Status: DISCONTINUED | OUTPATIENT
Start: 2019-09-26 | End: 2019-09-29 | Stop reason: HOSPADM

## 2019-09-26 RX ADMIN — INSULIN LISPRO 1 UNITS: 100 INJECTION, SOLUTION INTRAVENOUS; SUBCUTANEOUS at 18:13

## 2019-09-26 RX ADMIN — Medication 10 ML: at 09:12

## 2019-09-26 RX ADMIN — LOSARTAN POTASSIUM 100 MG: 100 TABLET, FILM COATED ORAL at 08:55

## 2019-09-26 RX ADMIN — Medication 10 ML: at 01:12

## 2019-09-26 RX ADMIN — ONDANSETRON 4 MG: 2 INJECTION INTRAMUSCULAR; INTRAVENOUS at 10:22

## 2019-09-26 RX ADMIN — ALLOPURINOL 100 MG: 100 TABLET ORAL at 20:55

## 2019-09-26 RX ADMIN — ACETAMINOPHEN 650 MG: 325 TABLET ORAL at 11:38

## 2019-09-26 RX ADMIN — OXYCODONE HYDROCHLORIDE 10 MG: 5 TABLET ORAL at 09:10

## 2019-09-26 RX ADMIN — PROMETHAZINE HYDROCHLORIDE 12.5 MG: 25 TABLET ORAL at 20:47

## 2019-09-26 RX ADMIN — FAMOTIDINE 20 MG: 20 TABLET, FILM COATED ORAL at 08:55

## 2019-09-26 RX ADMIN — ACETAMINOPHEN 650 MG: 325 TABLET ORAL at 17:36

## 2019-09-26 RX ADMIN — FAMOTIDINE 20 MG: 20 TABLET, FILM COATED ORAL at 20:55

## 2019-09-26 RX ADMIN — DOCUSATE SODIUM 100 MG: 100 CAPSULE, LIQUID FILLED ORAL at 20:55

## 2019-09-26 RX ADMIN — OXYCODONE HYDROCHLORIDE 10 MG: 5 TABLET ORAL at 20:44

## 2019-09-26 RX ADMIN — APIXABAN 2.5 MG: 2.5 TABLET, FILM COATED ORAL at 20:55

## 2019-09-26 RX ADMIN — DOCUSATE SODIUM 100 MG: 100 CAPSULE, LIQUID FILLED ORAL at 08:54

## 2019-09-26 RX ADMIN — ACETAMINOPHEN 650 MG: 325 TABLET ORAL at 20:57

## 2019-09-26 RX ADMIN — ATORVASTATIN CALCIUM 20 MG: 10 TABLET, FILM COATED ORAL at 20:55

## 2019-09-26 RX ADMIN — MORPHINE SULFATE 4 MG: 4 INJECTION, SOLUTION INTRAMUSCULAR; INTRAVENOUS at 04:00

## 2019-09-26 RX ADMIN — Medication 10 ML: at 20:55

## 2019-09-26 RX ADMIN — CARVEDILOL 12.5 MG: 6.25 TABLET, FILM COATED ORAL at 17:36

## 2019-09-26 RX ADMIN — PROMETHAZINE HYDROCHLORIDE 12.5 MG: 25 TABLET ORAL at 09:10

## 2019-09-26 RX ADMIN — Medication 2 G: at 01:11

## 2019-09-26 RX ADMIN — HYDROCHLOROTHIAZIDE 25 MG: 25 TABLET ORAL at 08:55

## 2019-09-26 RX ADMIN — APIXABAN 2.5 MG: 2.5 TABLET, FILM COATED ORAL at 08:55

## 2019-09-26 RX ADMIN — CARVEDILOL 12.5 MG: 6.25 TABLET, FILM COATED ORAL at 08:55

## 2019-09-26 RX ADMIN — Medication 10 ML: at 04:00

## 2019-09-26 RX ADMIN — ALLOPURINOL 100 MG: 100 TABLET ORAL at 08:55

## 2019-09-26 ASSESSMENT — PAIN DESCRIPTION - ORIENTATION
ORIENTATION: LEFT

## 2019-09-26 ASSESSMENT — PAIN DESCRIPTION - PAIN TYPE
TYPE: SURGICAL PAIN

## 2019-09-26 ASSESSMENT — PAIN DESCRIPTION - LOCATION
LOCATION: KNEE;BACK
LOCATION: KNEE

## 2019-09-26 ASSESSMENT — PAIN SCALES - GENERAL
PAINLEVEL_OUTOF10: 10
PAINLEVEL_OUTOF10: 3
PAINLEVEL_OUTOF10: 8
PAINLEVEL_OUTOF10: 6
PAINLEVEL_OUTOF10: 5
PAINLEVEL_OUTOF10: 5
PAINLEVEL_OUTOF10: 8
PAINLEVEL_OUTOF10: 8
PAINLEVEL_OUTOF10: 10
PAINLEVEL_OUTOF10: 6
PAINLEVEL_OUTOF10: 7

## 2019-09-26 ASSESSMENT — PAIN DESCRIPTION - DESCRIPTORS
DESCRIPTORS: ACHING
DESCRIPTORS: ACHING

## 2019-09-26 NOTE — CARE COORDINATION
OT inquired if SW could get a lift chair for pt to return home safely. SW called Jessica who encouraged SW to call      $599.00 basic chair   [facility] [place]- Pt would need to pay for the chair in full then would be reimbursed for the chair $225.00. This would be about a week turnaround. Upon speaking with pt she indicates that she has rented a lift chair in the past. From [employer] [facility]. Carl [address], [address] [facility] [address] [phone]     SW called and they can rent a lift chair for about $25.00 a week. They will call around and locate the cheapest option for pt. Sw will continue to assist with discharge needs.

## 2019-09-26 NOTE — CARE COORDINATION
Chart reviewed, Re: s/p TKR. Pt has all DME and OP therapy set up at this time. No further needs identified by DCP. Please contact should further needs arise.   RITU Metcalf-RN

## 2019-09-26 NOTE — CONSULTS
follows:        Problem Relation Age of Onset    Depression Mother     Arthritis Father     Asthma Father     High Blood Pressure Father     High Cholesterol Father     High Blood Pressure Sister     High Cholesterol Sister     Arthritis Sister     High Blood Pressure Brother     Hearing Loss Brother     High Cholesterol Brother     Learning Disabilities Brother     Mental Illness Brother     Asthma Brother     Arthritis Brother        REVIEW OF SYSTEMS:   Pertinent positives as noted in the HPI. All other systems reviewed and negative. PHYSICAL EXAM PERFORMED:  BP (!) 147/84   Pulse 76   Temp 97.8 °F (36.6 °C) (Oral)   Resp 20   Ht 5' 4\" (1.626 m)   Wt 195 lb (88.5 kg)   SpO2 98%   BMI 33.47 kg/m²   General appearance: No apparent distress, appears stated age and cooperative. HEENT: Normal cephalic, atraumatic without obvious deformity. Pupils equal, round, and reactive to light. Extra ocular muscles intact. Conjunctivae/corneas clear. Neck: Supple, with full range of motion. No jugular venous distention. Trachea midline. Respiratory:  Normal respiratory effort. Clear to auscultation, bilaterally without Rales/Wheezes/Rhonchi. Cardiovascular: Regular rate and rhythm with normal S1/S2 without murmurs, rubs or gallops. Abdomen: Soft, non-tender, non-distended with normal bowel sounds. Musculoskeletal: Left knee dressing clean and intact no clubbing, cyanosis or edema bilaterally. Skin: Skin color, texture, turgor normal.  No rashes or lesions. Neurologic:  Neurovascularly intact without any focal sensory/motor deficits. Cranial nerves: II-XII intact, grossly non-focal.  Psychiatric: Alert and oriented, thought content appropriate, normal insight  Capillary Refill: Brisk,< 3 seconds   Peripheral Pulses: +2 palpable, equal bilaterally     Labs:     No results for input(s): WBC, HGB, HCT, PLT in the last 72 hours.   No results for input(s): NA, K, CL, CO2, BUN, CREATININE, CALCIUM, PHOS

## 2019-09-27 LAB
ANION GAP SERPL CALCULATED.3IONS-SCNC: 8 MMOL/L (ref 3–16)
BASOPHILS ABSOLUTE: 0 K/UL (ref 0–0.2)
BASOPHILS RELATIVE PERCENT: 0.5 %
BUN BLDV-MCNC: 14 MG/DL (ref 7–20)
CALCIUM SERPL-MCNC: 8.6 MG/DL (ref 8.3–10.6)
CHLORIDE BLD-SCNC: 92 MMOL/L (ref 99–110)
CO2: 30 MMOL/L (ref 21–32)
CREAT SERPL-MCNC: 0.8 MG/DL (ref 0.6–1.2)
EOSINOPHILS ABSOLUTE: 0.2 K/UL (ref 0–0.6)
EOSINOPHILS RELATIVE PERCENT: 3.3 %
GFR AFRICAN AMERICAN: >60
GFR NON-AFRICAN AMERICAN: >60
GLUCOSE BLD-MCNC: 100 MG/DL (ref 70–99)
GLUCOSE BLD-MCNC: 127 MG/DL (ref 70–99)
GLUCOSE BLD-MCNC: 131 MG/DL (ref 70–99)
HCT VFR BLD CALC: 30.4 % (ref 36–48)
HEMOGLOBIN: 10.9 G/DL (ref 12–16)
LYMPHOCYTES ABSOLUTE: 1.3 K/UL (ref 1–5.1)
LYMPHOCYTES RELATIVE PERCENT: 21.3 %
MCH RBC QN AUTO: 33.7 PG (ref 26–34)
MCHC RBC AUTO-ENTMCNC: 35.9 G/DL (ref 31–36)
MCV RBC AUTO: 94 FL (ref 80–100)
MONOCYTES ABSOLUTE: 0.9 K/UL (ref 0–1.3)
MONOCYTES RELATIVE PERCENT: 15.6 %
NEUTROPHILS ABSOLUTE: 3.5 K/UL (ref 1.7–7.7)
NEUTROPHILS RELATIVE PERCENT: 59.3 %
PDW BLD-RTO: 13.7 % (ref 12.4–15.4)
PERFORMED ON: ABNORMAL
PERFORMED ON: ABNORMAL
PLATELET # BLD: 162 K/UL (ref 135–450)
PMV BLD AUTO: 7 FL (ref 5–10.5)
POTASSIUM SERPL-SCNC: 3.9 MMOL/L (ref 3.5–5.1)
RBC # BLD: 3.24 M/UL (ref 4–5.2)
SODIUM BLD-SCNC: 130 MMOL/L (ref 136–145)
WBC # BLD: 5.9 K/UL (ref 4–11)

## 2019-09-27 PROCEDURE — 6370000000 HC RX 637 (ALT 250 FOR IP): Performed by: PHYSICIAN ASSISTANT

## 2019-09-27 PROCEDURE — 97116 GAIT TRAINING THERAPY: CPT

## 2019-09-27 PROCEDURE — 6370000000 HC RX 637 (ALT 250 FOR IP): Performed by: ORTHOPAEDIC SURGERY

## 2019-09-27 PROCEDURE — 2580000003 HC RX 258: Performed by: PHYSICIAN ASSISTANT

## 2019-09-27 PROCEDURE — 36415 COLL VENOUS BLD VENIPUNCTURE: CPT

## 2019-09-27 PROCEDURE — 6370000000 HC RX 637 (ALT 250 FOR IP): Performed by: NURSE PRACTITIONER

## 2019-09-27 PROCEDURE — 80048 BASIC METABOLIC PNL TOTAL CA: CPT

## 2019-09-27 PROCEDURE — 85025 COMPLETE CBC W/AUTO DIFF WBC: CPT

## 2019-09-27 PROCEDURE — 97110 THERAPEUTIC EXERCISES: CPT

## 2019-09-27 PROCEDURE — 1200000000 HC SEMI PRIVATE

## 2019-09-27 PROCEDURE — APPSS15 APP SPLIT SHARED TIME 0-15 MINUTES: Performed by: PHYSICIAN ASSISTANT

## 2019-09-27 PROCEDURE — 97535 SELF CARE MNGMENT TRAINING: CPT

## 2019-09-27 PROCEDURE — 97530 THERAPEUTIC ACTIVITIES: CPT

## 2019-09-27 RX ORDER — KETOROLAC TROMETHAMINE 30 MG/ML
15 INJECTION, SOLUTION INTRAMUSCULAR; INTRAVENOUS EVERY 6 HOURS PRN
Status: DISCONTINUED | OUTPATIENT
Start: 2019-09-27 | End: 2019-09-29 | Stop reason: HOSPADM

## 2019-09-27 RX ORDER — SCOLOPAMINE TRANSDERMAL SYSTEM 1 MG/1
1 PATCH, EXTENDED RELEASE TRANSDERMAL
Status: DISCONTINUED | OUTPATIENT
Start: 2019-09-27 | End: 2019-09-29 | Stop reason: HOSPADM

## 2019-09-27 RX ADMIN — APIXABAN 2.5 MG: 2.5 TABLET, FILM COATED ORAL at 20:38

## 2019-09-27 RX ADMIN — ACETAMINOPHEN 650 MG: 325 TABLET ORAL at 15:55

## 2019-09-27 RX ADMIN — Medication 10 ML: at 20:40

## 2019-09-27 RX ADMIN — LOSARTAN POTASSIUM 100 MG: 100 TABLET, FILM COATED ORAL at 09:20

## 2019-09-27 RX ADMIN — CARVEDILOL 12.5 MG: 6.25 TABLET, FILM COATED ORAL at 09:20

## 2019-09-27 RX ADMIN — Medication 10 ML: at 09:20

## 2019-09-27 RX ADMIN — FAMOTIDINE 20 MG: 20 TABLET, FILM COATED ORAL at 20:38

## 2019-09-27 RX ADMIN — ACETAMINOPHEN 650 MG: 325 TABLET ORAL at 06:39

## 2019-09-27 RX ADMIN — FAMOTIDINE 20 MG: 20 TABLET, FILM COATED ORAL at 09:20

## 2019-09-27 RX ADMIN — ATORVASTATIN CALCIUM 20 MG: 10 TABLET, FILM COATED ORAL at 20:38

## 2019-09-27 RX ADMIN — OXYCODONE HYDROCHLORIDE 10 MG: 5 TABLET ORAL at 09:20

## 2019-09-27 RX ADMIN — OXYCODONE HYDROCHLORIDE 10 MG: 5 TABLET ORAL at 20:37

## 2019-09-27 RX ADMIN — CYCLOBENZAPRINE 10 MG: 10 TABLET, FILM COATED ORAL at 10:45

## 2019-09-27 RX ADMIN — PROMETHAZINE HYDROCHLORIDE 12.5 MG: 25 TABLET ORAL at 20:38

## 2019-09-27 RX ADMIN — APIXABAN 2.5 MG: 2.5 TABLET, FILM COATED ORAL at 09:20

## 2019-09-27 RX ADMIN — DOCUSATE SODIUM 100 MG: 100 CAPSULE, LIQUID FILLED ORAL at 09:20

## 2019-09-27 RX ADMIN — ALLOPURINOL 100 MG: 100 TABLET ORAL at 20:38

## 2019-09-27 RX ADMIN — ALLOPURINOL 100 MG: 100 TABLET ORAL at 09:20

## 2019-09-27 RX ADMIN — CARVEDILOL 12.5 MG: 6.25 TABLET, FILM COATED ORAL at 15:55

## 2019-09-27 RX ADMIN — DOCUSATE SODIUM 100 MG: 100 CAPSULE, LIQUID FILLED ORAL at 20:38

## 2019-09-27 ASSESSMENT — PAIN DESCRIPTION - LOCATION
LOCATION: KNEE

## 2019-09-27 ASSESSMENT — PAIN SCALES - WONG BAKER
WONGBAKER_NUMERICALRESPONSE: 8
WONGBAKER_NUMERICALRESPONSE: 6

## 2019-09-27 ASSESSMENT — PAIN DESCRIPTION - PAIN TYPE
TYPE: SURGICAL PAIN

## 2019-09-27 ASSESSMENT — PAIN SCALES - GENERAL
PAINLEVEL_OUTOF10: 8
PAINLEVEL_OUTOF10: 3
PAINLEVEL_OUTOF10: 8
PAINLEVEL_OUTOF10: 7

## 2019-09-27 ASSESSMENT — PAIN DESCRIPTION - ORIENTATION
ORIENTATION: LEFT

## 2019-09-28 LAB
ANION GAP SERPL CALCULATED.3IONS-SCNC: 10 MMOL/L (ref 3–16)
BASOPHILS ABSOLUTE: 0 K/UL (ref 0–0.2)
BASOPHILS RELATIVE PERCENT: 0.4 %
BUN BLDV-MCNC: 19 MG/DL (ref 7–20)
CALCIUM SERPL-MCNC: 8.7 MG/DL (ref 8.3–10.6)
CHLORIDE BLD-SCNC: 94 MMOL/L (ref 99–110)
CO2: 29 MMOL/L (ref 21–32)
CREAT SERPL-MCNC: 1.1 MG/DL (ref 0.6–1.2)
EOSINOPHILS ABSOLUTE: 0.2 K/UL (ref 0–0.6)
EOSINOPHILS RELATIVE PERCENT: 3.9 %
GFR AFRICAN AMERICAN: 58
GFR NON-AFRICAN AMERICAN: 48
GLUCOSE BLD-MCNC: 91 MG/DL (ref 70–99)
HCT VFR BLD CALC: 29.6 % (ref 36–48)
HEMOGLOBIN: 10.2 G/DL (ref 12–16)
LYMPHOCYTES ABSOLUTE: 1.9 K/UL (ref 1–5.1)
LYMPHOCYTES RELATIVE PERCENT: 31.3 %
MCH RBC QN AUTO: 33 PG (ref 26–34)
MCHC RBC AUTO-ENTMCNC: 34.4 G/DL (ref 31–36)
MCV RBC AUTO: 95.9 FL (ref 80–100)
MONOCYTES ABSOLUTE: 0.8 K/UL (ref 0–1.3)
MONOCYTES RELATIVE PERCENT: 13.1 %
NEUTROPHILS ABSOLUTE: 3.1 K/UL (ref 1.7–7.7)
NEUTROPHILS RELATIVE PERCENT: 51.3 %
PDW BLD-RTO: 13.8 % (ref 12.4–15.4)
PLATELET # BLD: 155 K/UL (ref 135–450)
PMV BLD AUTO: 7.2 FL (ref 5–10.5)
POTASSIUM SERPL-SCNC: 3.8 MMOL/L (ref 3.5–5.1)
RBC # BLD: 3.08 M/UL (ref 4–5.2)
SODIUM BLD-SCNC: 133 MMOL/L (ref 136–145)
WBC # BLD: 6 K/UL (ref 4–11)

## 2019-09-28 PROCEDURE — 85025 COMPLETE CBC W/AUTO DIFF WBC: CPT

## 2019-09-28 PROCEDURE — 97530 THERAPEUTIC ACTIVITIES: CPT

## 2019-09-28 PROCEDURE — 2580000003 HC RX 258: Performed by: PHYSICIAN ASSISTANT

## 2019-09-28 PROCEDURE — 97116 GAIT TRAINING THERAPY: CPT

## 2019-09-28 PROCEDURE — 6370000000 HC RX 637 (ALT 250 FOR IP): Performed by: PHYSICIAN ASSISTANT

## 2019-09-28 PROCEDURE — 36415 COLL VENOUS BLD VENIPUNCTURE: CPT

## 2019-09-28 PROCEDURE — 1200000000 HC SEMI PRIVATE

## 2019-09-28 PROCEDURE — 97535 SELF CARE MNGMENT TRAINING: CPT

## 2019-09-28 PROCEDURE — 80048 BASIC METABOLIC PNL TOTAL CA: CPT

## 2019-09-28 PROCEDURE — 6370000000 HC RX 637 (ALT 250 FOR IP): Performed by: ORTHOPAEDIC SURGERY

## 2019-09-28 PROCEDURE — 97110 THERAPEUTIC EXERCISES: CPT

## 2019-09-28 RX ORDER — CYCLOBENZAPRINE HCL 10 MG
10 TABLET ORAL 3 TIMES DAILY PRN
Qty: 30 TABLET | Refills: 1 | Status: SHIPPED | OUTPATIENT
Start: 2019-09-28 | End: 2019-10-18

## 2019-09-28 RX ORDER — TRAMADOL HYDROCHLORIDE 50 MG/1
50 TABLET ORAL EVERY 6 HOURS PRN
Qty: 30 TABLET | Refills: 0 | Status: SHIPPED | OUTPATIENT
Start: 2019-09-28 | End: 2019-10-06

## 2019-09-28 RX ORDER — PROMETHAZINE HYDROCHLORIDE 12.5 MG/1
25 TABLET ORAL EVERY 6 HOURS PRN
Qty: 30 TABLET | Refills: 0 | Status: SHIPPED | OUTPATIENT
Start: 2019-09-28 | End: 2019-10-08 | Stop reason: SDUPTHER

## 2019-09-28 RX ORDER — OXYCODONE HYDROCHLORIDE 5 MG/1
5 TABLET ORAL EVERY 4 HOURS PRN
Qty: 30 TABLET | Refills: 0 | Status: SHIPPED | OUTPATIENT
Start: 2019-09-28 | End: 2019-10-01

## 2019-09-28 RX ORDER — TRAMADOL HYDROCHLORIDE 50 MG/1
50 TABLET ORAL EVERY 6 HOURS PRN
Status: DISCONTINUED | OUTPATIENT
Start: 2019-09-28 | End: 2019-09-29 | Stop reason: HOSPADM

## 2019-09-28 RX ORDER — LOSARTAN POTASSIUM 100 MG/1
100 TABLET ORAL DAILY
Qty: 30 TABLET | Refills: 3 | Status: SHIPPED | OUTPATIENT
Start: 2019-09-29 | End: 2019-10-29 | Stop reason: ALTCHOICE

## 2019-09-28 RX ADMIN — Medication 10 ML: at 08:48

## 2019-09-28 RX ADMIN — DOCUSATE SODIUM 100 MG: 100 CAPSULE, LIQUID FILLED ORAL at 21:37

## 2019-09-28 RX ADMIN — CARVEDILOL 12.5 MG: 6.25 TABLET, FILM COATED ORAL at 08:49

## 2019-09-28 RX ADMIN — ACETAMINOPHEN 650 MG: 325 TABLET ORAL at 21:38

## 2019-09-28 RX ADMIN — DOCUSATE SODIUM 100 MG: 100 CAPSULE, LIQUID FILLED ORAL at 08:49

## 2019-09-28 RX ADMIN — OXYCODONE HYDROCHLORIDE 10 MG: 5 TABLET ORAL at 21:39

## 2019-09-28 RX ADMIN — ALLOPURINOL 100 MG: 100 TABLET ORAL at 08:49

## 2019-09-28 RX ADMIN — ALLOPURINOL 100 MG: 100 TABLET ORAL at 21:47

## 2019-09-28 RX ADMIN — CARVEDILOL 12.5 MG: 6.25 TABLET, FILM COATED ORAL at 17:22

## 2019-09-28 RX ADMIN — ACETAMINOPHEN 650 MG: 325 TABLET ORAL at 04:09

## 2019-09-28 RX ADMIN — ACETAMINOPHEN 650 MG: 325 TABLET ORAL at 10:11

## 2019-09-28 RX ADMIN — APIXABAN 2.5 MG: 2.5 TABLET, FILM COATED ORAL at 21:39

## 2019-09-28 RX ADMIN — APIXABAN 2.5 MG: 2.5 TABLET, FILM COATED ORAL at 08:49

## 2019-09-28 RX ADMIN — Medication 10 ML: at 21:42

## 2019-09-28 RX ADMIN — ATORVASTATIN CALCIUM 20 MG: 10 TABLET, FILM COATED ORAL at 21:37

## 2019-09-28 RX ADMIN — OXYCODONE HYDROCHLORIDE 10 MG: 5 TABLET ORAL at 12:25

## 2019-09-28 RX ADMIN — LOSARTAN POTASSIUM 100 MG: 100 TABLET, FILM COATED ORAL at 08:49

## 2019-09-28 RX ADMIN — FAMOTIDINE 20 MG: 20 TABLET, FILM COATED ORAL at 21:47

## 2019-09-28 RX ADMIN — FAMOTIDINE 20 MG: 20 TABLET, FILM COATED ORAL at 08:49

## 2019-09-28 ASSESSMENT — PAIN DESCRIPTION - ORIENTATION
ORIENTATION: LEFT
ORIENTATION: LEFT

## 2019-09-28 ASSESSMENT — PAIN DESCRIPTION - DESCRIPTORS: DESCRIPTORS: THROBBING

## 2019-09-28 ASSESSMENT — PAIN SCALES - GENERAL
PAINLEVEL_OUTOF10: 2
PAINLEVEL_OUTOF10: 7
PAINLEVEL_OUTOF10: 7
PAINLEVEL_OUTOF10: 4
PAINLEVEL_OUTOF10: 4
PAINLEVEL_OUTOF10: 3
PAINLEVEL_OUTOF10: 4
PAINLEVEL_OUTOF10: 9
PAINLEVEL_OUTOF10: 5

## 2019-09-28 ASSESSMENT — PAIN DESCRIPTION - PAIN TYPE
TYPE: SURGICAL PAIN

## 2019-09-28 ASSESSMENT — PAIN DESCRIPTION - LOCATION
LOCATION: INCISION;KNEE
LOCATION: KNEE

## 2019-09-28 ASSESSMENT — PAIN DESCRIPTION - FREQUENCY: FREQUENCY: CONTINUOUS

## 2019-09-29 VITALS
HEART RATE: 73 BPM | WEIGHT: 195 LBS | OXYGEN SATURATION: 97 % | TEMPERATURE: 98.4 F | HEIGHT: 64 IN | SYSTOLIC BLOOD PRESSURE: 113 MMHG | DIASTOLIC BLOOD PRESSURE: 68 MMHG | BODY MASS INDEX: 33.29 KG/M2 | RESPIRATION RATE: 16 BRPM

## 2019-09-29 LAB
BASOPHILS ABSOLUTE: 0 K/UL (ref 0–0.2)
BASOPHILS RELATIVE PERCENT: 0.4 %
EOSINOPHILS ABSOLUTE: 0.2 K/UL (ref 0–0.6)
EOSINOPHILS RELATIVE PERCENT: 4.1 %
HCT VFR BLD CALC: 28.7 % (ref 36–48)
HEMOGLOBIN: 10.1 G/DL (ref 12–16)
LYMPHOCYTES ABSOLUTE: 1.3 K/UL (ref 1–5.1)
LYMPHOCYTES RELATIVE PERCENT: 25.1 %
MCH RBC QN AUTO: 33.5 PG (ref 26–34)
MCHC RBC AUTO-ENTMCNC: 35.1 G/DL (ref 31–36)
MCV RBC AUTO: 95.6 FL (ref 80–100)
MONOCYTES ABSOLUTE: 0.6 K/UL (ref 0–1.3)
MONOCYTES RELATIVE PERCENT: 12.1 %
NEUTROPHILS ABSOLUTE: 2.9 K/UL (ref 1.7–7.7)
NEUTROPHILS RELATIVE PERCENT: 58.3 %
PDW BLD-RTO: 14.2 % (ref 12.4–15.4)
PLATELET # BLD: 174 K/UL (ref 135–450)
PMV BLD AUTO: 7.1 FL (ref 5–10.5)
RBC # BLD: 3 M/UL (ref 4–5.2)
WBC # BLD: 5 K/UL (ref 4–11)

## 2019-09-29 PROCEDURE — 2580000003 HC RX 258: Performed by: PHYSICIAN ASSISTANT

## 2019-09-29 PROCEDURE — 97530 THERAPEUTIC ACTIVITIES: CPT

## 2019-09-29 PROCEDURE — 6370000000 HC RX 637 (ALT 250 FOR IP): Performed by: ORTHOPAEDIC SURGERY

## 2019-09-29 PROCEDURE — 85025 COMPLETE CBC W/AUTO DIFF WBC: CPT

## 2019-09-29 PROCEDURE — 6370000000 HC RX 637 (ALT 250 FOR IP): Performed by: PHYSICIAN ASSISTANT

## 2019-09-29 PROCEDURE — 97116 GAIT TRAINING THERAPY: CPT

## 2019-09-29 PROCEDURE — 36415 COLL VENOUS BLD VENIPUNCTURE: CPT

## 2019-09-29 RX ADMIN — ALLOPURINOL 100 MG: 100 TABLET ORAL at 09:09

## 2019-09-29 RX ADMIN — DOCUSATE SODIUM 100 MG: 100 CAPSULE, LIQUID FILLED ORAL at 09:08

## 2019-09-29 RX ADMIN — Medication 10 ML: at 09:10

## 2019-09-29 RX ADMIN — OXYCODONE HYDROCHLORIDE 5 MG: 5 TABLET ORAL at 10:14

## 2019-09-29 RX ADMIN — FAMOTIDINE 20 MG: 20 TABLET, FILM COATED ORAL at 09:08

## 2019-09-29 RX ADMIN — LOSARTAN POTASSIUM 100 MG: 100 TABLET, FILM COATED ORAL at 09:09

## 2019-09-29 RX ADMIN — APIXABAN 2.5 MG: 2.5 TABLET, FILM COATED ORAL at 09:08

## 2019-09-29 RX ADMIN — CARVEDILOL 12.5 MG: 6.25 TABLET, FILM COATED ORAL at 09:09

## 2019-09-29 RX ADMIN — OXYCODONE HYDROCHLORIDE 5 MG: 5 TABLET ORAL at 16:46

## 2019-09-29 RX ADMIN — ACETAMINOPHEN 650 MG: 325 TABLET ORAL at 10:14

## 2019-09-29 ASSESSMENT — PAIN DESCRIPTION - ORIENTATION: ORIENTATION: LEFT

## 2019-09-29 ASSESSMENT — PAIN SCALES - GENERAL
PAINLEVEL_OUTOF10: 5
PAINLEVEL_OUTOF10: 4
PAINLEVEL_OUTOF10: 6

## 2019-09-29 ASSESSMENT — PAIN DESCRIPTION - PAIN TYPE: TYPE: SURGICAL PAIN

## 2019-09-29 ASSESSMENT — PAIN DESCRIPTION - LOCATION: LOCATION: KNEE

## 2019-09-29 NOTE — PROGRESS NOTES
4 Eyes Skin Assessment     The patient is being assess for   Admission    I agree that 2 RN's have performed a thorough Head to Toe Skin Assessment on the patient. ALL assessment sites listed below have been assessed. Areas assessed by both nurses:   [x]   Head, Face, and Ears   [x]   Shoulders, Back, and Chest, Abdomen  [x]   Arms, Elbows, and Hands   [x]   Coccyx, Sacrum, and Ischium  [x]   Legs, Feet, and Heels        Few small scabs to LLE    **SHARE this note so that the co-signing nurse is able to place an eSignature**    Co-signer eSignature: Electronically signed by Clarke Lobo RN on 9/26/19 at 10:41 AM    Does the Patient have Skin Breakdown?   No          Richard Prevention initiated:  No   Wound Care Orders initiated:  No      [facility] nurse consulted for Pressure Injury (Stage 3,4, Unstageable, DTI, NWPT, Complex wounds)and New or Established Ostomies:  No      Primary Nurse eSignature: Electronically signed by Jose Mccoy RN on 9/25/19 at 7:23 AM
Consult has been PerfectServed to [facility] on 6/25/2019 at 15:43.    Misha Holloway  9/25/2019
Department of Orthopedic Surgery  Joint Service  Physician Assistant Progress Note        Subjective: Patient ended up staying overnight. States she is feeling better today. Daughter is going to stay with her and [place] [facility] [place] [facility]. States she is ready to go home today     Vitals  VITALS:  /67   Pulse 81   Temp 97.4 °F (36.3 °C) (Oral)   Resp 16   Ht 5' 4\" (1.626 m)   Wt 195 lb (88.5 kg)   SpO2 97%   BMI 33.47 kg/m²     PHYSICAL EXAM:    Orientation:  alert and oriented to person, place and time    Left Lower Extremity    Incision:  dressing in place, clean, dry and intact    Calf soft/ nontender.  Pulses equal      Abnormal Exam findings:  none    Brace:  prn    LABS:    HgB:    Lab Results   Component Value Date    HGB 10.1 09/29/2019     INR:  No results found for: PTINR  CBC with Differential:    Lab Results   Component Value Date    WBC 5.0 09/29/2019    RBC 3.00 09/29/2019    HGB 10.1 09/29/2019    HCT 28.7 09/29/2019     09/29/2019    MCV 95.6 09/29/2019    MCH 33.5 09/29/2019    MCHC 35.1 09/29/2019    RDW 14.2 09/29/2019    SEGSPCT 63.9 08/20/2015    LYMPHOPCT 25.1 09/29/2019    MONOPCT 12.1 09/29/2019    EOSPCT 2.5 10/11/2011    BASOPCT 0.4 09/29/2019    MONOSABS 0.6 09/29/2019    LYMPHSABS 1.3 09/29/2019    EOSABS 0.2 09/29/2019    BASOSABS 0.0 09/29/2019    DIFFTYPE Auto 06/10/2013     Hemoglobin/Hematocrit:    Lab Results   Component Value Date    HGB 10.1 09/29/2019    HCT 28.7 09/29/2019     BMP:    Lab Results   Component Value Date     09/28/2019    K 3.8 09/28/2019    K 3.7 09/26/2019    CL 94 09/28/2019    CO2 29 09/28/2019    BUN 19 09/28/2019    LABALBU 4.2 09/11/2019    CREATININE 1.1 09/28/2019    CALCIUM 8.7 09/28/2019    GFRAA 58 09/28/2019    GFRAA >60 06/10/2013    LABGLOM 48 09/28/2019    LABGLOM 51 08/20/2015    GLUCOSE 91 09/28/2019       ASSESSMENT AND PLAN:    Post operative day 4 status post left total knee arthroplasty    1:  Weight bearing as tolerated  2:
Patient A&O, call light within reach, bed in lowest position and locked. Skin assessment completed. Morning med pass completed. VS stable.  Pt resting, will continue to monitor
02/27/2013); Cholecystectomy; other surgical history (Bilateral, 2/24/14); pr total knee arthroplasty (Right, 8/1/2018); joint replacement (Right); eye surgery; and Total knee arthroplasty (Left, 9/25/2019). Restrictions  Restrictions/Precautions  Restrictions/Precautions: Weight Bearing  Required Braces or Orthoses?: Yes  Lower Extremity Weight Bearing Restrictions  Left Lower Extremity Weight Bearing: Weight Bearing As Tolerated  Required Braces or Orthoses  Left Lower Extremity Brace: Knee Immobilizer     Subjective   General  Chart Reviewed: Yes  Patient assessed for rehabilitation services?: Yes  Response to previous treatment: Patient with no complaints from previous session  Family / Caregiver Present: No    Subjective  Subjective: Pt up in chair, agreeable to OT treatment. Pain Assessment  Pain Assessment: Faces  Keith-Baker Pain Rating: Hurts even more  Pain Type: Surgical pain  Pain Location: Knee  Pain Orientation: Left  Non-Pharmaceutical Pain Intervention(s): Repositioned; Ambulation/Increased Activity;Cold applied  Pre Treatment Pain Screening  Intervention List: Patient able to continue with treatment    Orientation  Orientation  Overall Orientation Status: Within Functional Limits     Objective    ADL  Grooming: Stand by assistance(sink level)  LE Dressing: Maximum assistance(socks, briefs, KI )  Toileting: Moderate assistance     Balance  Sitting Balance: Supervision  Standing Balance: Contact guard assistance(with SW)    Functional Mobility  Functional - Mobility Device: Standard Walker  Activity: To/from bathroom  Assist Level: Contact guard assistance  Functional Mobility Comments: cues for sequencing    Toilet Transfers  Toilet - Technique: Ambulating(with SW)  Equipment Used: Standard toilet(with use of grab bars)  Toilet Transfer: Moderate assistance    Bed mobility  Sit to Supine: Minimal assistance     Transfers  Sit to stand:  Moderate assistance  Stand to sit: Contact guard
2/24/14); pr total knee arthroplasty (Right, 8/1/2018); joint replacement (Right); eye surgery; and Total knee arthroplasty (Left, 9/25/2019). Restrictions  Restrictions/Precautions  Restrictions/Precautions: Weight Bearing  Required Braces or Orthoses?: Yes  Lower Extremity Weight Bearing Restrictions  Left Lower Extremity Weight Bearing: Weight Bearing As Tolerated  Required Braces or Orthoses  Left Lower Extremity Brace: Knee Immobilizer  Subjective   General  Chart Reviewed: Yes  Response To Previous Treatment: Patient reporting fatigue but able to participate. Family / Caregiver Present: No  Referring Practitioner: Antelmo Blackman MD  Subjective  Subjective: Pt agreeable to PT, reports \"a lot of pain right now\"  General Comment  Comments: Pt resting in bed  in bed upon entry, RN cleared pt for therapy  Pain Screening  Patient Currently in Pain: Yes  Pain Assessment  Pain Assessment: Faces  Keith-Baker Pain Rating: Hurts whole lot  Pain Type: Surgical pain  Pain Location: Knee  Pain Orientation: Left  Non-Pharmaceutical Pain Intervention(s): Ambulation/Increased Activity;Repositioned; Therapeutic presence;Cold applied  Vital Signs  Patient Currently in Pain: Yes       Orientation  Orientation  Overall Orientation Status: Within Normal Limits       Objective   Bed mobility  Bridging: Supervision(with RLE)  Supine to Sit: Minimal assistance  Sit to Supine: Minimal assistance  Scooting: Stand by assistance(to EOB and to scoot up in bed)  Transfers  Sit to Stand:  Moderate Assistance(from elevated bed)  Stand to sit: Contact guard assistance  Bed to Chair: Maximum assistance  Comment: 3 trials sit<>stand from chair, 1st with mod A, 2nd and 3rd with max A  Ambulation  Ambulation?: Yes  WB Status: FWBAT  Ambulation 1  Device: Standard Walker  Other Apparatus: Knee Immobilizer  Assistance: Contact guard assistance  Quality of Gait: slow beverly and pace, mildly unsteady with SW, requiring VC's for sequencing  Distance: 12 ft
Carpal tunnel release; Knee arthroscopy; Cataract removal; other surgical history (Left, 02/27/2013); Cholecystectomy; other surgical history (Bilateral, 2/24/14); pr total knee arthroplasty (Right, 8/1/2018); joint replacement (Right); eye surgery; and Total knee arthroplasty (Left, 9/25/2019). Restrictions  Restrictions/Precautions  Restrictions/Precautions: Weight Bearing  Required Braces or Orthoses?: Yes  Lower Extremity Weight Bearing Restrictions  Left Lower Extremity Weight Bearing: Weight Bearing As Tolerated  Required Braces or Orthoses  Left Lower Extremity Brace: Knee Immobilizer  Subjective   General  Chart Reviewed: Yes  Response To Previous Treatment: Patient reporting fatigue but able to participate. Family / Caregiver Present: No  Referring Practitioner: Bisi Corona MD  Subjective  Subjective: Pt agreeable to PT on second attempt, first attempt, pt sleeping, unarousable even with sternal rub  General Comment  Comments: Pt resting in bed  in bed upon entry, RN cleared pt for therapy  Pain Screening  Patient Currently in Pain: Yes  Pain Assessment  Pain Assessment: 0-10  Pain Level: 3  Pain Type: Surgical pain  Pain Location: Knee  Pain Orientation: Left  Non-Pharmaceutical Pain Intervention(s): Ambulation/Increased Activity;Repositioned; Therapeutic presence;Cold applied  Vital Signs  Patient Currently in Pain: Yes       Orientation  Orientation  Overall Orientation Status: Within Normal Limits       Objective   Bed mobility  Supine to Sit: Minimal assistance(with LLE)  Sit to Supine: Minimal assistance(with LLE)  Scooting: Stand by assistance(to EOB)  Transfers  Sit to Stand: Maximum Assistance(max A from EOB, mod A with use of B handrails from toilet)  Stand to sit: Contact guard assistance  Bed to Chair: Unable to assess(pt requesting back in bed at EOS, very tired)  Ambulation  Ambulation?: Yes  WB Status: FWBAT  Ambulation 1  Device: Standard Walker  Other Apparatus: Knee Immobilizer  Assistance:
surgical history (Bilateral, 2/24/14); pr total knee arthroplasty (Right, 8/1/2018); joint replacement (Right); eye surgery; and Total knee arthroplasty (Left, 9/25/2019). Restrictions  Restrictions/Precautions  Restrictions/Precautions: Weight Bearing  Required Braces or Orthoses?: Yes  Lower Extremity Weight Bearing Restrictions  Left Lower Extremity Weight Bearing: Weight Bearing As Tolerated  Required Braces or Orthoses  Left Lower Extremity Brace: Knee Immobilizer  Subjective   General  Chart Reviewed: Yes  Response To Previous Treatment: Patient with no complaints from previous session. Family / Caregiver Present: No  Referring Practitioner: Byron Rogers MD  Subjective  Subjective: Pt agreeable to PT, reports no pain this PM  General Comment  Comments: Pt resting in bed  in bed upon entry, RN cleared pt for therapy  Pain Screening  Patient Currently in Pain: Denies(denies upon entry, reports 6/10 pain left knee when performing sit<>stand)  Pain Assessment  Pain Assessment: Faces  Keith-Baker Pain Rating: Hurts whole lot  Pain Type: Surgical pain  Pain Location: Knee  Pain Orientation: Left  Non-Pharmaceutical Pain Intervention(s): Ambulation/Increased Activity;Repositioned; Therapeutic presence;Cold applied  Vital Signs  Patient Currently in Pain: Denies(denies upon entry, reports 6/10 pain left knee when performing sit<>stand)       Orientation  Orientation  Overall Orientation Status: Within Normal Limits       Objective   Bed mobility  Bridging: Supervision  Supine to Sit: Stand by assistance  Sit to Supine: Stand by assistance  Scooting: Stand by assistance(to EOB and to [address])  Transfers  Sit to Stand: Contact guard assistance(from elevated bed and from toilet with use of grab bars)  Stand to sit: Contact guard assistance  Bed to Chair: Maximum assistance  Comment: 3 trials sit<>stand from chair, 1st with mod A, 2nd and 3rd with max A  Ambulation  Ambulation?: Yes  WB Status: FWBAT  Ambulation 1  Device:
Fair;+  Exercises  Straight Leg Raise: x 12 L with assist  Quad Sets: x 12 B  Heelslides: x 12 L with assist  Gluteal Sets: x 12 B  Knee Short Arc Quad: x 20 L  Knee Passive Range of Motion: 0-89 degrees  Ankle Pumps: x 15 B                          AM-PAC Score     AM-PAC Inpatient Mobility without Stair Climbing Raw Score : 13 (09/26/19 1047)  AM-PAC Inpatient without Stair Climbing T-Scale Score : 38.96 (09/26/19 1047)  Mobility Inpatient CMS 0-100% Score: 58.44 (09/26/19 1047)  Mobility Inpatient without Stair CMS G-Code Modifier : CK (09/26/19 1047)       Goals  Short term goals  Time Frame for Short term goals: 9/29/19 unless specified  Short term goal 1: Pt will perform 10-15 reps of LE exercise for strengtheing and balance by 9/27/19  Short term goal 2: Pt will perform bed mobility with SBA  Short term goal 3: Pt will perform transfers with SBA  Short term goal 4: Pt will ambulate 100 ft with SW and SBA  Patient Goals   Patient goals : \"to go home\"    Plan    Plan  Times per week: BID x7  Times per day: Twice a day  Current Treatment Recommendations: Strengthening, Gait Training, ROM, Balance Training, Functional Mobility Training, Endurance Training, Transfer Training  Safety Devices  Type of devices: All fall risk precautions in place, Call light within reach, Nurse notified, Gait belt, Patient at risk for falls, Left in chair, Chair alarm in place     Therapy Time   Individual Concurrent Group Co-treatment   Time In 0913         Time Out 1007         Minutes 54              If pt is discharged prior to next therapy session, this note will serve as discharge summary.     Neymar Rider, PT

## 2019-09-30 ENCOUNTER — TELEPHONE (OUTPATIENT)
Dept: PHARMACY | Facility: CLINIC | Age: 79
End: 2019-09-30

## 2019-09-30 ENCOUNTER — TELEPHONE (OUTPATIENT)
Dept: ORTHOPEDIC SURGERY | Age: 79
End: 2019-09-30

## 2019-09-30 DIAGNOSIS — Z96.652 STATUS POST TOTAL LEFT KNEE REPLACEMENT: Primary | ICD-10-CM

## 2019-09-30 PROCEDURE — 1111F DSCHRG MED/CURRENT MED MERGE: CPT

## 2019-09-30 NOTE — TELEPHONE ENCOUNTER
CLINICAL PHARMACY NOTE  Post-Discharge Transitions of Care ([facility])    Non-face-to-face services provided:  Assessment and support for treatment adherence and medication management-medication reconciliation    Subjective/Objective:  Tana Stovall is a 66 y.o. female. Patient was discharged from [facility] on 9/29/19 with a diagnosis of left TKA. Patient outreach to review discharge medications and provide medication review and management. Spoke with patient    Allergies   Allergen Reactions    Hydrocodone Hives    Hydrocodone Bitartrate Hives    Lisinopril Other (See Comments)     Cough    Hydrocodone Rash    Naproxen Rash       Discharge Medications (as per discharging medication list): There are NEW medications for you. START taking them after you leave the hospital   cyclobenzaprine (FLEXERIL) 10 MG tablet  · Pt states that she hasn't filled, but confirms that she has the printed prescription. Take 1 tablet by mouth 3 times daily as needed for Muscle spasms    oxyCODONE (ROXICODONE) 5 MG immediate release tablet  · Taking, pain is well-controlled. Pt has been having BM without stool softeners. Take 1 tablet by mouth every 4 hours as needed for Pain for up to 3 days.  promethazine (PHENERGAN) 12.5 MG tablet  · Taking, controlling nausea. Take 2 tablets by mouth every 6 hours as needed for Nausea    traMADol (ULTRAM) 50 MG tablet  · Filled, has not needed yet. Take 1 tablet by mouth every 6 hours as needed for Pain for up to 8 days.  losartan (COZAAR) 100 MG tablet  · Filled via [facility].   Take 1 tablet by mouth daily     These are medications you told us you were taking at home, CONTINUE taking them after you leave the hospital   Medication Sig    Multiple Vitamins-Minerals (THERAPEUTIC MULTIVITAMIN-MINERALS) tablet Take 1 tablet by mouth daily    apixaban (ELIQUIS) 5 MG TABS tablet TAKE 1 TABLET TWICE A DAY    allopurinol (ZYLOPRIM) 100 MG tablet Take 100 mg by mouth 2 times

## 2019-10-02 NOTE — ADT AUTH CERT
Utilization Reviews         Knee Arthroplasty, Total - Care Day 4 (9/28/2019) by Jeanette Harper RN         Review Status Review Entered   Completed 10/1/2019 09:08       Criteria Review      Care Day: 4 Care Date: 9/28/2019 Level of Care:    Guideline Day 3    Clinical Status    (X) * Hemodynamic stability    10/1/2019 9:08 AM EDT by Tomy Khan      129/58-98.0-65-15-96%ra    10/1/2019 8:57 AM EDT by Tomy Khan      143/80-99.1-81-16-95%ra    ( ) * Tolerates independent ambulation    10/1/2019 9:08 AM EDT by Jose Hernandez assist with walker    10/1/2019 8:57 AM EDT by Tomy Khan      pt/ot, may need lift chair at home    (X) * Adequate flexion    (X) * No evidence of lower extremity vascular or neurologic compromise    (X) * No evidence of postoperative or surgical site infection    10/1/2019 8:57 AM EDT by Tomy Khan      Wound:Wound clean and dry no evidence of infection. , Wound Intact and Positive for Edema    (X) * Pain absent or managed    10/1/2019 8:57 AM EDT by Tomy Khan      :Pain Control and nausea continue to be an issue today. oxycodone po x1, phenergan po x1    (X) * Discharge plans and education understood    Activity    (X) * Ambulatory    10/1/2019 8:57 AM EDT by Tomy Khan      pt/ot    Routes    (X) * Oral hydration, medications, and diet    Interventions    (X) Physical therapy    (X) DVT prophylaxis    10/1/2019 9:08 AM EDT by Tomy miles    10/1/2019 8:57 AM EDT by Tomy cameron    * Milestone   Additional Notes   9/28 Post-Operative Day: 3 Status Post left Total Knee Arthroplasty    Systemic or Specific Complaints:No Complaints         General: alert, appears stated age and cooperative    Wound: Wound clean and dry no evidence of infection. Motion: Extension: Full Extension    DVT Exam: No evidence of DVT seen on physical exam.         Assessment:    Status Post left Total Knee Arthroplasty.  Doing well postoperatively.         Plan: Shalini Arriola      WNL    (X) DVT prophylaxis    9/26/2019 12:36 PM EDT by Shalini Arriola      eliquis    Medications    (X) Prophylactic antibiotics discontinued    9/26/2019 12:30 PM EDT by Shalini Arriola      9/25    (X) * Epidural and PCA absent [G]    9/26/2019 12:33 PM EDT by Shalini Arriola      morphine 4 mgs iv x 1, zofran 4 mgs x 1, oxycodone po x 1, phenergan 12.5 mg ps x 1    * Milestone   Additional Notes       Orthopedic Note    1: Continues current post-op course : PT/OT. Nahid Haque out today.  POssibly home tomorrow if pain controlled better    2: Continue Deep venous thrombosis prophylaxis    3:  Continue physical therapy    4:  Continue Pain Control

## 2019-10-08 ENCOUNTER — TELEPHONE (OUTPATIENT)
Dept: ORTHOPEDIC SURGERY | Age: 79
End: 2019-10-08

## 2019-10-08 ENCOUNTER — OFFICE VISIT (OUTPATIENT)
Dept: ORTHOPEDIC SURGERY | Age: 79
End: 2019-10-08

## 2019-10-08 DIAGNOSIS — M17.12 PRIMARY OSTEOARTHRITIS OF LEFT KNEE: ICD-10-CM

## 2019-10-08 DIAGNOSIS — Z96.652 STATUS POST TOTAL LEFT KNEE REPLACEMENT: Primary | ICD-10-CM

## 2019-10-08 DIAGNOSIS — M25.562 LEFT KNEE PAIN, UNSPECIFIED CHRONICITY: ICD-10-CM

## 2019-10-08 PROCEDURE — 99024 POSTOP FOLLOW-UP VISIT: CPT | Performed by: PHYSICIAN ASSISTANT

## 2019-10-08 RX ORDER — OXYCODONE HYDROCHLORIDE 5 MG/1
5 TABLET ORAL EVERY 6 HOURS PRN
Qty: 28 TABLET | Refills: 0 | Status: SHIPPED | OUTPATIENT
Start: 2019-10-08 | End: 2019-10-15

## 2019-10-08 RX ORDER — TRAMADOL HYDROCHLORIDE 50 MG/1
50 TABLET ORAL EVERY 6 HOURS PRN
COMMUNITY
End: 2019-10-29 | Stop reason: ALTCHOICE

## 2019-10-08 RX ORDER — PROMETHAZINE HYDROCHLORIDE 12.5 MG/1
25 TABLET ORAL EVERY 6 HOURS PRN
Qty: 30 TABLET | Refills: 0 | Status: SHIPPED | OUTPATIENT
Start: 2019-10-08 | End: 2019-10-15

## 2019-10-08 RX ORDER — OXYCODONE HYDROCHLORIDE 5 MG/1
TABLET ORAL
COMMUNITY
Start: 2019-10-01 | End: 2019-10-29 | Stop reason: SDUPTHER

## 2019-10-08 NOTE — ADT AUTH CERT
Utilization Reviews         Knee Arthroplasty, Total - Care Day 4 (9/28/2019) by Randy Gonzáles RN         Review Status Review Entered   Completed 10/1/2019 09:08       Criteria Review      Care Day: 4 Care Date: 9/28/2019 Level of Care:    Guideline Day 3    Clinical Status    (X) * Hemodynamic stability    10/1/2019 9:08 AM EDT by Arcenio Teague      129/58-98.0-65-15-96%ra    10/1/2019 8:57 AM EDT by Arcenio Teague      143/80-99.1-81-16-95%ra    ( ) * Tolerates independent ambulation    10/1/2019 9:08 AM EDT by Sandra Verma assist with walker    10/1/2019 8:57 AM EDT by Arcenio Teague      pt/ot, may need lift chair at home    (X) * Adequate flexion    (X) * No evidence of lower extremity vascular or neurologic compromise    (X) * No evidence of postoperative or surgical site infection    10/1/2019 8:57 AM EDT by Arcenio Teague      Wound:Wound clean and dry no evidence of infection. , Wound Intact and Positive for Edema    (X) * Pain absent or managed    10/1/2019 8:57 AM EDT by Arcenio Teague      :Pain Control and nausea continue to be an issue today. oxycodone po x1, phenergan po x1    (X) * Discharge plans and education understood    Activity    (X) * Ambulatory    10/1/2019 8:57 AM EDT by Arcenio Teague      pt/ot    Routes    (X) * Oral hydration, medications, and diet    Interventions    (X) Physical therapy    (X) DVT prophylaxis    10/1/2019 9:08 AM EDT by Arcenio miles    10/1/2019 8:57 AM EDT by Arcenio cameron    * Milestone   Additional Notes   9/28 Post-Operative Day: 3 Status Post left Total Knee Arthroplasty    Systemic or Specific Complaints:No Complaints         General: alert, appears stated age and cooperative    Wound: Wound clean and dry no evidence of infection. Motion: Extension: Full Extension    DVT Exam: No evidence of DVT seen on physical exam.         Assessment:    Status Post left Total Knee Arthroplasty.  Doing well postoperatively.         Plan:

## 2019-10-08 NOTE — ADT AUTH CERT
Knee Arthroplasty, Total - Care Day 4 (9/28/2019) by Annamarie Rodriguez RN         Review Status Review Entered   Completed 10/1/2019 09:08       Criteria Review      Care Day: 4 Care Date: 9/28/2019 Level of Care:    Guideline Day 3    Clinical Status    (X) * Hemodynamic stability    10/1/2019 9:08 AM EDT by Gaye Cuellar      129/58-98.0-65-15-96%ra    10/1/2019 8:57 AM EDT by Gaye Cuellar      143/80-99.1-81-16-95%ra    ( ) * Tolerates independent ambulation    10/1/2019 9:08 AM EDT by Ramez Imus assist with walker    10/1/2019 8:57 AM EDT by Gaye Cuellar      pt/ot, may need lift chair at home    (X) * Adequate flexion    (X) * No evidence of lower extremity vascular or neurologic compromise    (X) * No evidence of postoperative or surgical site infection    10/1/2019 8:57 AM EDT by Gaye Cuellar      Wound:Wound clean and dry no evidence of infection. , Wound Intact and Positive for Edema    (X) * Pain absent or managed    10/1/2019 8:57 AM EDT by Gaye Cuellar      :Pain Control and nausea continue to be an issue today. oxycodone po x1, phenergan po x1    (X) * Discharge plans and education understood    Activity    (X) * Ambulatory    10/1/2019 8:57 AM EDT by Gaye Cuellar      pt/ot    Routes    (X) * Oral hydration, medications, and diet    Interventions    (X) Physical therapy    (X) DVT prophylaxis    10/1/2019 9:08 AM EDT by Gaye miles    10/1/2019 8:57 AM EDT by Gaye cameron    * Milestone   Additional Notes   9/28 Post-Operative Day: 3 Status Post left Total Knee Arthroplasty    Systemic or Specific Complaints:No Complaints         General: alert, appears stated age and cooperative    Wound: Wound clean and dry no evidence of infection. Motion: Extension: Full Extension    DVT Exam: No evidence of DVT seen on physical exam.         Assessment:    Status Post left Total Knee Arthroplasty.  Doing well postoperatively.         Plan:    Discharge today, Return

## 2019-10-15 ENCOUNTER — HOSPITAL ENCOUNTER (OUTPATIENT)
Dept: PHYSICAL THERAPY | Age: 79
Setting detail: THERAPIES SERIES
Discharge: HOME | End: 2019-10-15
Payer: MEDICARE

## 2019-10-15 DIAGNOSIS — Z96.652 STATUS POST TOTAL LEFT KNEE REPLACEMENT: ICD-10-CM

## 2019-10-15 PROCEDURE — 97110 THERAPEUTIC EXERCISES: CPT

## 2019-10-15 PROCEDURE — 97016 VASOPNEUMATIC DEVICE THERAPY: CPT

## 2019-10-15 PROCEDURE — 97162 PT EVAL MOD COMPLEX 30 MIN: CPT

## 2019-10-16 ENCOUNTER — TELEPHONE (OUTPATIENT)
Dept: ORTHOPEDIC SURGERY | Age: 79
End: 2019-10-16

## 2019-10-18 ENCOUNTER — HOSPITAL ENCOUNTER (OUTPATIENT)
Dept: PHYSICAL THERAPY | Age: 79
Setting detail: THERAPIES SERIES
Discharge: HOME | End: 2019-10-18
Payer: MEDICARE

## 2019-10-18 DIAGNOSIS — Z96.652 STATUS POST TOTAL LEFT KNEE REPLACEMENT: ICD-10-CM

## 2019-10-22 ENCOUNTER — HOSPITAL ENCOUNTER (OUTPATIENT)
Dept: PHYSICAL THERAPY | Age: 79
Setting detail: THERAPIES SERIES
Discharge: HOME | End: 2019-10-22
Payer: MEDICARE

## 2019-10-24 ENCOUNTER — HOSPITAL ENCOUNTER (OUTPATIENT)
Dept: PHYSICAL THERAPY | Age: 79
Setting detail: THERAPIES SERIES
Discharge: HOME | End: 2019-10-24
Payer: MEDICARE

## 2019-10-24 PROCEDURE — 97016 VASOPNEUMATIC DEVICE THERAPY: CPT | Performed by: PHYSICAL THERAPIST

## 2019-10-24 PROCEDURE — 97140 MANUAL THERAPY 1/> REGIONS: CPT | Performed by: PHYSICAL THERAPIST

## 2019-10-24 PROCEDURE — 97110 THERAPEUTIC EXERCISES: CPT | Performed by: PHYSICAL THERAPIST

## 2019-10-28 ENCOUNTER — HOSPITAL ENCOUNTER (OUTPATIENT)
Dept: PHYSICAL THERAPY | Age: 79
Setting detail: THERAPIES SERIES
Discharge: HOME | End: 2019-10-28
Payer: MEDICARE

## 2019-10-28 PROCEDURE — 97110 THERAPEUTIC EXERCISES: CPT

## 2019-10-28 PROCEDURE — 97016 VASOPNEUMATIC DEVICE THERAPY: CPT

## 2019-10-28 PROCEDURE — 97140 MANUAL THERAPY 1/> REGIONS: CPT

## 2019-10-29 ENCOUNTER — APPOINTMENT (OUTPATIENT)
Dept: PHYSICAL THERAPY | Age: 79
End: 2019-10-29
Payer: MEDICARE

## 2019-10-29 ENCOUNTER — OFFICE VISIT (OUTPATIENT)
Dept: ORTHOPEDIC SURGERY | Age: 79
End: 2019-10-29

## 2019-10-29 DIAGNOSIS — Z96.652 STATUS POST TOTAL LEFT KNEE REPLACEMENT: Primary | ICD-10-CM

## 2019-10-29 PROCEDURE — 99024 POSTOP FOLLOW-UP VISIT: CPT | Performed by: PHYSICIAN ASSISTANT

## 2019-10-29 RX ORDER — LOSARTAN POTASSIUM AND HYDROCHLOROTHIAZIDE 25; 100 MG/1; MG/1
1 TABLET ORAL DAILY
Qty: 90 TABLET | Refills: 0 | Status: SHIPPED | OUTPATIENT
Start: 2019-10-29 | End: 2020-01-28 | Stop reason: SDUPTHER

## 2019-10-29 RX ORDER — OXYCODONE HYDROCHLORIDE 5 MG/1
5 TABLET ORAL EVERY 8 HOURS PRN
Qty: 21 TABLET | Refills: 0 | Status: SHIPPED | OUTPATIENT
Start: 2019-10-29 | End: 2019-11-05

## 2019-10-30 ENCOUNTER — HOSPITAL ENCOUNTER (OUTPATIENT)
Dept: PHYSICAL THERAPY | Age: 79
Setting detail: THERAPIES SERIES
Discharge: HOME | End: 2019-10-30
Payer: MEDICARE

## 2019-10-30 DIAGNOSIS — Z96.652 STATUS POST TOTAL LEFT KNEE REPLACEMENT: ICD-10-CM

## 2019-10-30 PROCEDURE — 97110 THERAPEUTIC EXERCISES: CPT

## 2019-10-30 PROCEDURE — 97140 MANUAL THERAPY 1/> REGIONS: CPT

## 2019-10-30 PROCEDURE — 97116 GAIT TRAINING THERAPY: CPT

## 2019-11-01 ENCOUNTER — APPOINTMENT (OUTPATIENT)
Dept: PHYSICAL THERAPY | Age: 79
End: 2019-11-01
Payer: MEDICARE

## 2019-11-04 ENCOUNTER — TELEPHONE (OUTPATIENT)
Dept: INTERNAL MEDICINE CLINIC | Age: 79
End: 2019-11-04

## 2019-11-05 ENCOUNTER — HOSPITAL ENCOUNTER (OUTPATIENT)
Dept: PHYSICAL THERAPY | Age: 79
Setting detail: THERAPIES SERIES
Discharge: HOME | End: 2019-11-05
Payer: MEDICARE

## 2019-11-05 PROCEDURE — 97150 GROUP THERAPEUTIC PROCEDURES: CPT

## 2019-11-05 PROCEDURE — 97140 MANUAL THERAPY 1/> REGIONS: CPT

## 2019-11-05 PROCEDURE — 97110 THERAPEUTIC EXERCISES: CPT

## 2019-11-08 ENCOUNTER — HOSPITAL ENCOUNTER (OUTPATIENT)
Dept: PHYSICAL THERAPY | Age: 79
Setting detail: THERAPIES SERIES
Discharge: HOME | End: 2019-11-08
Payer: MEDICARE

## 2019-11-12 ENCOUNTER — HOSPITAL ENCOUNTER (OUTPATIENT)
Dept: PHYSICAL THERAPY | Age: 79
Setting detail: THERAPIES SERIES
Discharge: HOME | End: 2019-11-12
Payer: MEDICARE

## 2019-11-14 ENCOUNTER — HOSPITAL ENCOUNTER (OUTPATIENT)
Dept: PHYSICAL THERAPY | Age: 79
Setting detail: THERAPIES SERIES
Discharge: HOME | End: 2019-11-14
Payer: MEDICARE

## 2019-11-14 PROCEDURE — 97124 MASSAGE THERAPY: CPT

## 2019-11-14 PROCEDURE — 97110 THERAPEUTIC EXERCISES: CPT

## 2019-11-14 PROCEDURE — 97150 GROUP THERAPEUTIC PROCEDURES: CPT

## 2019-11-14 PROCEDURE — 97116 GAIT TRAINING THERAPY: CPT

## 2019-11-19 ENCOUNTER — HOSPITAL ENCOUNTER (OUTPATIENT)
Dept: PHYSICAL THERAPY | Age: 79
Setting detail: THERAPIES SERIES
Discharge: HOME | End: 2019-11-19
Payer: MEDICARE

## 2019-11-19 ENCOUNTER — TELEPHONE (OUTPATIENT)
Dept: ORTHOPEDIC SURGERY | Age: 79
End: 2019-11-19

## 2019-11-22 ENCOUNTER — APPOINTMENT (OUTPATIENT)
Dept: PHYSICAL THERAPY | Age: 79
End: 2019-11-22
Payer: MEDICARE

## 2019-11-25 ENCOUNTER — APPOINTMENT (OUTPATIENT)
Dept: PHYSICAL THERAPY | Age: 79
End: 2019-11-25
Payer: MEDICARE

## 2019-11-25 ENCOUNTER — OFFICE VISIT (OUTPATIENT)
Dept: ORTHOPEDIC SURGERY | Age: 79
End: 2019-11-25

## 2019-11-25 DIAGNOSIS — M17.12 PRIMARY OSTEOARTHRITIS OF LEFT KNEE: ICD-10-CM

## 2019-11-25 DIAGNOSIS — Z96.652 STATUS POST TOTAL LEFT KNEE REPLACEMENT: Primary | ICD-10-CM

## 2019-11-25 PROCEDURE — 99024 POSTOP FOLLOW-UP VISIT: CPT | Performed by: ORTHOPAEDIC SURGERY

## 2019-11-26 ENCOUNTER — APPOINTMENT (OUTPATIENT)
Dept: PHYSICAL THERAPY | Age: 79
End: 2019-11-26
Payer: MEDICARE

## 2019-11-27 ENCOUNTER — APPOINTMENT (OUTPATIENT)
Dept: PHYSICAL THERAPY | Age: 79
End: 2019-11-27
Payer: MEDICARE

## 2019-12-03 ENCOUNTER — CARE COORDINATION (OUTPATIENT)
Dept: CARE COORDINATION | Age: 79
End: 2019-12-03

## 2019-12-20 RX ORDER — ATORVASTATIN CALCIUM 20 MG/1
20 TABLET, FILM COATED ORAL NIGHTLY
Qty: 90 TABLET | Refills: 0 | Status: SHIPPED | OUTPATIENT
Start: 2019-12-20 | End: 2020-01-28 | Stop reason: SDUPTHER

## 2019-12-21 NOTE — PROGRESS NOTES
Physical Therapy  Facility/Department: [facility] C5 - MED SURG/ORTHO  Attempt  NAME: Jai Navas  : 1940  MRN: [id number]     Attempted to see pt for follow up PT treatment . Pt presented in bed with emesis bag within reach. Pt declined PT stating her nausea has not improved since I saw her earlier this morning. I spoke with the pt and her daughter about the importance of mobility training and ther ex after such a procedure and active participation in the recovery process. The pt verbalized her understanding but continued to decline to participate in therapy. I discussed with the pt and the patient's daughter her required functional mobility status in order to safely get around her home, including negotiation of stairs and offered to bring curb step to room for training but still she declined. Pt states she has been doing her exercises every 2 hours today as instructed, but declined to practice them with me at this time. Will continue to follow up with patient bid.     Gilford Splinter, PT Orthopedic

## 2020-01-03 ENCOUNTER — CARE COORDINATION (OUTPATIENT)
Dept: CARE COORDINATION | Age: 80
End: 2020-01-03

## 2020-01-14 ENCOUNTER — CARE COORDINATION (OUTPATIENT)
Dept: CARE COORDINATION | Age: 80
End: 2020-01-14

## 2020-01-17 ENCOUNTER — CARE COORDINATION (OUTPATIENT)
Dept: CARE COORDINATION | Age: 80
End: 2020-01-17

## 2020-01-17 RX ORDER — TRAZODONE HYDROCHLORIDE 50 MG/1
TABLET ORAL
Qty: 30 TABLET | Refills: 0 | Status: SHIPPED | OUTPATIENT
Start: 2020-01-17 | End: 2020-01-28 | Stop reason: SDUPTHER

## 2020-01-17 NOTE — CARE COORDINATION
Patient contacted and declined care coordination at this time. No needs identified. She still drives. Uses no DME at home and requires no additional home services. She does have some dry spots on the sides of his head and one her scalp that she is concerned about. She said they have been changing in color and are more like scabs, no bleeding. ACM will screen out for care coordination at this time. No needs.

## 2020-01-28 ENCOUNTER — OFFICE VISIT (OUTPATIENT)
Dept: INTERNAL MEDICINE CLINIC | Age: 80
End: 2020-01-28

## 2020-01-28 VITALS
BODY MASS INDEX: 32.44 KG/M2 | DIASTOLIC BLOOD PRESSURE: 75 MMHG | HEIGHT: 64 IN | RESPIRATION RATE: 18 BRPM | WEIGHT: 190 LBS | HEART RATE: 70 BPM | SYSTOLIC BLOOD PRESSURE: 135 MMHG

## 2020-01-28 DIAGNOSIS — E78.2 MIXED HYPERLIPIDEMIA: ICD-10-CM

## 2020-01-28 DIAGNOSIS — I48.21 PERMANENT ATRIAL FIBRILLATION (HCC): ICD-10-CM

## 2020-01-28 LAB
A/G RATIO: 1.5 (ref 1.1–2.2)
ALBUMIN SERPL-MCNC: 4.4 G/DL (ref 3.4–5)
ALP BLD-CCNC: 91 U/L (ref 40–129)
ALT SERPL-CCNC: 16 U/L (ref 10–40)
ANION GAP SERPL CALCULATED.3IONS-SCNC: 17 MMOL/L (ref 3–16)
AST SERPL-CCNC: 31 U/L (ref 15–37)
BASOPHILS ABSOLUTE: 0 K/UL (ref 0–0.2)
BASOPHILS RELATIVE PERCENT: 0.3 %
BILIRUB SERPL-MCNC: 0.5 MG/DL (ref 0–1)
BUN BLDV-MCNC: 23 MG/DL (ref 7–20)
CALCIUM SERPL-MCNC: 9.4 MG/DL (ref 8.3–10.6)
CHLORIDE BLD-SCNC: 99 MMOL/L (ref 99–110)
CO2: 21 MMOL/L (ref 21–32)
CREAT SERPL-MCNC: 0.8 MG/DL (ref 0.6–1.2)
EOSINOPHILS ABSOLUTE: 0 K/UL (ref 0–0.6)
EOSINOPHILS RELATIVE PERCENT: 0 %
GFR AFRICAN AMERICAN: >60
GFR NON-AFRICAN AMERICAN: >60
GLOBULIN: 2.9 G/DL
GLUCOSE BLD-MCNC: 118 MG/DL (ref 70–99)
HCT VFR BLD CALC: 40.9 % (ref 36–48)
HEMOGLOBIN: 14 G/DL (ref 12–16)
LYMPHOCYTES ABSOLUTE: 2.4 K/UL (ref 1–5.1)
LYMPHOCYTES RELATIVE PERCENT: 40.6 %
MCH RBC QN AUTO: 30.9 PG (ref 26–34)
MCHC RBC AUTO-ENTMCNC: 34.2 G/DL (ref 31–36)
MCV RBC AUTO: 90.5 FL (ref 80–100)
MONOCYTES ABSOLUTE: 0.6 K/UL (ref 0–1.3)
MONOCYTES RELATIVE PERCENT: 10.3 %
NEUTROPHILS ABSOLUTE: 2.9 K/UL (ref 1.7–7.7)
NEUTROPHILS RELATIVE PERCENT: 48.8 %
PDW BLD-RTO: 15.3 % (ref 12.4–15.4)
PLATELET # BLD: 244 K/UL (ref 135–450)
PMV BLD AUTO: 8.1 FL (ref 5–10.5)
POTASSIUM SERPL-SCNC: 4.3 MMOL/L (ref 3.5–5.1)
RBC # BLD: 4.52 M/UL (ref 4–5.2)
SODIUM BLD-SCNC: 137 MMOL/L (ref 136–145)
TOTAL PROTEIN: 7.3 G/DL (ref 6.4–8.2)
WBC # BLD: 5.9 K/UL (ref 4–11)

## 2020-01-28 PROCEDURE — 99213 OFFICE O/P EST LOW 20 MIN: CPT | Performed by: INTERNAL MEDICINE

## 2020-01-28 RX ORDER — ATORVASTATIN CALCIUM 20 MG/1
20 TABLET, FILM COATED ORAL NIGHTLY
Qty: 90 TABLET | Refills: 0 | Status: SHIPPED | OUTPATIENT
Start: 2020-01-28 | End: 2020-07-06 | Stop reason: SDUPTHER

## 2020-01-28 RX ORDER — LOSARTAN POTASSIUM AND HYDROCHLOROTHIAZIDE 25; 100 MG/1; MG/1
1 TABLET ORAL DAILY
Qty: 90 TABLET | Refills: 0 | Status: SHIPPED | OUTPATIENT
Start: 2020-01-28 | End: 2020-08-24 | Stop reason: SDUPTHER

## 2020-01-28 RX ORDER — TRAZODONE HYDROCHLORIDE 50 MG/1
TABLET ORAL
Qty: 90 TABLET | Refills: 0 | Status: SHIPPED | OUTPATIENT
Start: 2020-01-28 | End: 2020-04-27

## 2020-01-28 RX ORDER — ALLOPURINOL 100 MG/1
100 TABLET ORAL 2 TIMES DAILY
Qty: 180 TABLET | Refills: 1 | Status: SHIPPED | OUTPATIENT
Start: 2020-01-28 | End: 2020-01-28 | Stop reason: SDUPTHER

## 2020-01-28 RX ORDER — CARVEDILOL 12.5 MG/1
12.5 TABLET ORAL 2 TIMES DAILY WITH MEALS
Qty: 180 TABLET | Refills: 0 | Status: SHIPPED | OUTPATIENT
Start: 2020-01-28 | End: 2020-08-14 | Stop reason: SDUPTHER

## 2020-01-28 RX ORDER — ALLOPURINOL 100 MG/1
100 TABLET ORAL 2 TIMES DAILY
Qty: 30 TABLET | Refills: 0 | Status: SHIPPED | OUTPATIENT
Start: 2020-01-28 | End: 2020-07-27

## 2020-01-28 ASSESSMENT — PATIENT HEALTH QUESTIONNAIRE - PHQ9
SUM OF ALL RESPONSES TO PHQ QUESTIONS 1-9: 0
SUM OF ALL RESPONSES TO PHQ9 QUESTIONS 1 & 2: 0
1. LITTLE INTEREST OR PLEASURE IN DOING THINGS: 0
SUM OF ALL RESPONSES TO PHQ QUESTIONS 1-9: 0
2. FEELING DOWN, DEPRESSED OR HOPELESS: 0

## 2020-01-28 NOTE — PROGRESS NOTES
Subjective:      Patient ID: Eron Baer is a 78 y.o. female. HPI     78 y.o. female here for regular f.w    has chronic HTN, hyperlipidemia, hyperthyroidism, AFibb on coumadin, CKD and pulmonary nodules and charles knee OA    Since last time, pt has been doing fairly well. Living independent with help of family  Does drive. No falls  Has limited activity with knee OA  No bleeding on eliquis        In 6/18, had right  TKR done at [facility] and postoperatively did well   howevere when went to [place] , had lovenox bridging and developed rectus sheath hematoma, needing readmission and blood transfusion  Now doing well with no issues  Off walker , back to driving     Now with left knee issues- limited activities      Afibb- no issues with palpiatations or exertional dyspnea  Complaint with coreg and coumadin has been changed to [facility]     Gout - No recent issues recently on allopurinol   No recurrent attacks     HTN - stable on meds, hyzaar   Off lasix , off maxzide        Current Outpatient Medications   Medication Sig Dispense Refill    allopurinol (ZYLOPRIM) 100 MG tablet Take 1 tablet by mouth 2 times daily 30 tablet 0    atorvastatin (LIPITOR) 20 MG tablet Take 1 tablet by mouth nightly 90 tablet 0    traZODone (DESYREL) 50 MG tablet Take 1 tablet by mouth nightly. 90 tablet 0    losartan-hydrochlorothiazide (HYZAAR) 100-25 MG per tablet Take 1 tablet by mouth daily 90 tablet 0    carvedilol (COREG) 12.5 MG tablet Take 1 tablet by mouth 2 times daily (with meals) 180 tablet 0    apixaban (ELIQUIS) 5 MG TABS tablet TAKE 1 TABLET TWICE A  tablet 4    Multiple Vitamins-Minerals (THERAPEUTIC MULTIVITAMIN-MINERALS) tablet Take 1 tablet by mouth daily      acetaminophen (TYLENOL) 325 MG tablet Take 650 mg by mouth every 6 hours as needed for Pain       No current facility-administered medications for this visit.               Review of Systems  As above, otherwise neg    There are no changes to past medical history, family history, social history or review of systems(except as noted in the history section) since prior note (all reviewed with patient). .  Objective:   Physical Exam  There were no vitals filed for this visit. General: elderly female healthy appearing  Awake, alert and oriented. Appears to be not in any distress  Mucous Membranes:  Pink , anicteric  Neck: No JVD, no carotid bruit, no thyromegaly  Chest:  Clear to auscultation bilaterally, no added sounds  Cardiovascular:  Irregular , S1S2 heard, no murmurs or gallops  Abdomen:  Soft, undistended, non tender, no organomegaly, BS present  Extremities:.   1+ pedal edema charles   Distal pulses well felt  Neurological : grossly normal     ECHO - 2015      Atrial fibrillation with a ventricular rate of 92 beats per minute. Overall left ventricular ejection fraction is estimated to be 55-60%. The left ventricular wall motion is normal.  The left atrium is mildly dilated. The right atrium is mildly dilated. There is trace mitral regurgitation. Mild aortic sclerosis present without evidence of stenosis. .  Mild aortic regurgitation. Stress test 2018 -   1. No convincing evidence of ischemia or infarction. On noncontrasted images there is a moderate sized fixed defect in the midportion of the lateral wall but this resolves with attenuation correction. Assessment:         Diagnosis Orders   1. Permanent atrial fibrillation     2. Long term current use of anticoagulant therapy     3. Mixed hyperlipidemia     4. Idiopathic chronic gout of foot without tophus, unspecified laterality         Plan:           HTN - stable on losartan /hctz 100/25 mg  ,coreg 12.5 mg bid  Lasix as needed only    CAD - f/w Dr. Kit Donnelly . On ASA, statins- remains asymptomatic   Stress test neg 7/18  Need lipids     H.o CVA - 2007 - off ASA . No issues     Afibb, chronic and permanent -rate cotnrolled.  On coreg bid, ELIQUIS  No bleeding issues    Hyperlipidemia - on statins- need lipids when fasting    Gout - with no recurrence - continue allopurinol       Flu vaccine upto date  Avoid falls    F/w in 3-6 months

## 2020-04-28 RX ORDER — TRAZODONE HYDROCHLORIDE 50 MG/1
TABLET ORAL
Qty: 90 TABLET | Refills: 0 | Status: SHIPPED | OUTPATIENT
Start: 2020-04-28 | End: 2020-07-27

## 2020-06-11 ENCOUNTER — HOSPITAL ENCOUNTER (EMERGENCY)
Age: 80
Discharge: HOME | End: 2020-06-11
Attending: EMERGENCY MEDICINE
Payer: MEDICARE

## 2020-06-11 VITALS
TEMPERATURE: 98.2 F | HEART RATE: 65 BPM | WEIGHT: 190 LBS | RESPIRATION RATE: 16 BRPM | SYSTOLIC BLOOD PRESSURE: 171 MMHG | HEIGHT: 64 IN | DIASTOLIC BLOOD PRESSURE: 70 MMHG | BODY MASS INDEX: 32.44 KG/M2 | OXYGEN SATURATION: 98 %

## 2020-06-11 PROCEDURE — 99283 EMERGENCY DEPT VISIT LOW MDM: CPT

## 2020-06-11 PROCEDURE — 93005 ELECTROCARDIOGRAM TRACING: CPT | Performed by: EMERGENCY MEDICINE

## 2020-06-12 LAB
EKG ATRIAL RATE: 35 BPM
EKG DIAGNOSIS: NORMAL
EKG Q-T INTERVAL: 392 MS
EKG QRS DURATION: 64 MS
EKG QTC CALCULATION (BAZETT): 392 MS
EKG R AXIS: 9 DEGREES
EKG T AXIS: 61 DEGREES
EKG VENTRICULAR RATE: 60 BPM

## 2020-06-12 PROCEDURE — 93010 ELECTROCARDIOGRAM REPORT: CPT | Performed by: INTERNAL MEDICINE

## 2020-06-12 NOTE — ED PROVIDER NOTES
[address] ED      CHIEF COMPLAINT  Hypertension (pt in via EMS for c/o HTN. EMS states 199/89. pt only called for HTN, but has no other symptoms. Pt with hx of a. fib and is rate controlled. pt talked to cardiologist and changed her carvedolol dose increased it. Pt still having HTN. Pt on eliquis for a. fib.)       HISTORY OF PRESENT ILLNESS  Anatoly Koehler is a 78 y.o. female  who presents to the ED complaining of elevated blood pressure. She states that she called her doctor earlier today and told him that was increasing. She just seen him yesterday and her blood pressure was in the 140s. When it was higher today she called him and was told to double her dose of carvedilol tonight she took at 6 PM.  Despite taking the double dose she kept taking her blood pressure this evening kept increasing as high as 411 systolic over 536I diastolic and so came to the ER for further evaluation. She denies any additional symptoms other than when she stood up to get the door for EMS did feel slightly lightheaded but did not pass out. No chest pain at all. No shortness of breath. No leg pain or swelling. No vision changes. No focal numbness skin tingling or weakness. She states that she had a salad this evening for dinner and earlier today had some Cheerios but has not had anything with salt. No other complaints, modifying factors or associated symptoms. I have reviewed the following from the nursing documentation.     Past Medical History:   Diagnosis Date    Arthritis     Atrial fibrillation ([address]     CAD (coronary artery disease)     a-fib    Cerebral artery occlusion with cerebral infarction ([address] 2007    resolved w/ rehab    CHF (congestive heart failure) ([address]     CKD (chronic kidney disease)     Cutaneous skin tags 4/8/2013    Depression     Gout     Grief reaction 4/8/2013    Hyperlipidemia     Hypertension     Obesity     Osteoarthritis     Pulmonary nodules     Unspecified Never Used   Substance and Sexual Activity    Alcohol use: No    Drug use: No    Sexual activity: Not Currently   Lifestyle    Physical activity     Days per week: Not on file     Minutes per session: Not on file    Stress: Not on file   Relationships    Social connections     Talks on phone: Not on file     Gets together: Not on file     Attends Mandaeism service: Not on file     Active member of club or organization: Not on file     Attends meetings of clubs or organizations: Not on file     Relationship status: Not on file    Intimate partner violence     Fear of current or ex partner: Not on file     Emotionally abused: Not on file     Physically abused: Not on file     Forced sexual activity: Not on file   Other Topics Concern    Not on file   Social History Narrative    ** Merged History Encounter **          No current facility-administered medications for this encounter.       Current Outpatient Medications   Medication Sig Dispense Refill    traZODone (DESYREL) 50 MG tablet TAKE 1 TABLET NIGHTLY 90 tablet 0    allopurinol (ZYLOPRIM) 100 MG tablet Take 1 tablet by mouth 2 times daily 30 tablet 0    atorvastatin (LIPITOR) 20 MG tablet Take 1 tablet by mouth nightly 90 tablet 0    losartan-hydrochlorothiazide (HYZAAR) 100-25 MG per tablet Take 1 tablet by mouth daily 90 tablet 0    carvedilol (COREG) 12.5 MG tablet Take 1 tablet by mouth 2 times daily (with meals) (Patient taking differently: Take 25 mg by mouth 2 times daily (with meals) ) 180 tablet 0    apixaban (ELIQUIS) 5 MG TABS tablet TAKE 1 TABLET TWICE A  tablet 4    Multiple Vitamins-Minerals (THERAPEUTIC MULTIVITAMIN-MINERALS) tablet Take 1 tablet by mouth daily      acetaminophen (TYLENOL) 325 MG tablet Take 650 mg by mouth every 6 hours as needed for Pain       Allergies   Allergen Reactions    Hydrocodone Hives    Hydrocodone Bitartrate Hives    Lisinopril Other (See Comments)     Cough    Hydrocodone Rash    Naproxen significant change from prior EKG dated 8/9/18    RADIOLOGY  None     ED COURSE/MDM  Patient seen and evaluated. Old records reviewed. Labs and imaging reviewed and results discussed with patient. Patient presenting for evaluation of elevated blood pressures. She is asymptomatic at this time. No ischemic changes or concerning findings on EKG. No findings on neurologic exam to suggest stroke. She has not had any chest pain or other symptoms. At this time, her blood pressures in the 170s over 70s. I do not feel that there is any indication for acute lowering of her blood pressure emergently in the emergency department. She had just taken a single double dose of her blood pressure medicine this evening and is somewhat anxious about this which may be contributing to the elevated blood pressures at home. We will have her continue with the double dose for the next 2 days and closely monitor blood we discussed appropriate taking of her blood pressure after sitting for at least a period of time pressures. To ensure that is not falsely elevated. She felt very comfortable with that plan and will come to the ER with any new or worsening symptoms especially any development of chest pain or neurologic symptoms. She is to call her cardiologist tomorrow to update and schedule follow-up appointment as well as schedule follow-up appointment with her PCP for recheck overall. Patient and  verbalized understanding and agreement and all questions answered at time of discharge. I estimate there is LOW risk for ACUTE CORONARY SYNDROME, INTRACRANIAL HEMORRHAGE, MALIGNANT DYSRHYTHMIA or HYPERTENSION, PULMONARY EMBOLISM, SEPSIS, SUBARACHNOID HEMORRHAGE, SUBDURAL HEMATOMA, STROKE, or THORACIC AORTIC DISSECTION, thus I consider the discharge disposition reasonable. Con Espino and I have discussed the diagnosis and risks, and we agree with discharging home to follow-up with their primary doctor.  We also discussed

## 2020-07-06 NOTE — TELEPHONE ENCOUNTER
----- Message from Leia Rosas sent at 7/6/2020 11:01 AM EDT -----  Contact: [phone]  She needs a temp. supply of atorvastatin sent to [facility] to hold her over- needs [facility] reg[facility]- last appt- 1-28-20-next appt- 8-24-20-lr

## 2020-07-07 RX ORDER — ATORVASTATIN CALCIUM 20 MG/1
20 TABLET, FILM COATED ORAL NIGHTLY
Qty: 90 TABLET | Refills: 0 | Status: SHIPPED | OUTPATIENT
Start: 2020-07-07 | End: 2020-08-24 | Stop reason: SDUPTHER

## 2020-07-07 RX ORDER — ATORVASTATIN CALCIUM 20 MG/1
20 TABLET, FILM COATED ORAL NIGHTLY
Qty: 30 TABLET | Refills: 0 | Status: SHIPPED | OUTPATIENT
Start: 2020-07-07 | End: 2020-07-07 | Stop reason: SDUPTHER

## 2020-07-27 RX ORDER — TRAZODONE HYDROCHLORIDE 50 MG/1
TABLET ORAL
Qty: 90 TABLET | Refills: 0 | Status: SHIPPED | OUTPATIENT
Start: 2020-07-27 | End: 2020-08-24 | Stop reason: SDUPTHER

## 2020-07-27 RX ORDER — ALLOPURINOL 100 MG/1
TABLET ORAL
Qty: 180 TABLET | Refills: 0 | Status: SHIPPED | OUTPATIENT
Start: 2020-07-27 | End: 2020-08-24 | Stop reason: SDUPTHER

## 2020-08-14 ENCOUNTER — TELEPHONE (OUTPATIENT)
Dept: INTERNAL MEDICINE CLINIC | Age: 80
End: 2020-08-14

## 2020-08-14 RX ORDER — CARVEDILOL 12.5 MG/1
12.5 TABLET ORAL 2 TIMES DAILY WITH MEALS
Qty: 60 TABLET | Refills: 0 | Status: SHIPPED | OUTPATIENT
Start: 2020-08-14 | End: 2020-08-24 | Stop reason: SDUPTHER

## 2020-08-14 NOTE — TELEPHONE ENCOUNTER
----- Message from Eben Arellano sent at 8/14/2020  9:08 AM EDT -----  Contact: [phone]  Pt called because she needs a small supply of carvedilol (COREG) 12.5 MG tablet called into [facility] [place] until she gets hers in the mail.       [phone]    Past appt-1/28/2020  Future appt-8/24/2020

## 2020-08-24 ENCOUNTER — OFFICE VISIT (OUTPATIENT)
Dept: INTERNAL MEDICINE CLINIC | Age: 80
End: 2020-08-24

## 2020-08-24 VITALS
RESPIRATION RATE: 18 BRPM | BODY MASS INDEX: 34.83 KG/M2 | WEIGHT: 204 LBS | HEART RATE: 60 BPM | SYSTOLIC BLOOD PRESSURE: 168 MMHG | DIASTOLIC BLOOD PRESSURE: 98 MMHG | HEIGHT: 64 IN

## 2020-08-24 PROCEDURE — 99214 OFFICE O/P EST MOD 30 MIN: CPT | Performed by: INTERNAL MEDICINE

## 2020-08-24 RX ORDER — CARVEDILOL 12.5 MG/1
25 TABLET ORAL 2 TIMES DAILY WITH MEALS
Qty: 360 TABLET | Refills: 0 | Status: SHIPPED | OUTPATIENT
Start: 2020-08-24 | End: 2020-11-30

## 2020-08-24 RX ORDER — ALLOPURINOL 100 MG/1
TABLET ORAL
Qty: 180 TABLET | Refills: 0 | Status: SHIPPED | OUTPATIENT
Start: 2020-08-24 | End: 2020-12-31

## 2020-08-24 RX ORDER — LOSARTAN POTASSIUM AND HYDROCHLOROTHIAZIDE 25; 100 MG/1; MG/1
1 TABLET ORAL DAILY
Qty: 90 TABLET | Refills: 0 | Status: SHIPPED | OUTPATIENT
Start: 2020-08-24 | End: 2020-11-03

## 2020-08-24 RX ORDER — ATORVASTATIN CALCIUM 20 MG/1
20 TABLET, FILM COATED ORAL NIGHTLY
Qty: 90 TABLET | Refills: 0 | Status: SHIPPED | OUTPATIENT
Start: 2020-08-24 | End: 2021-01-11

## 2020-08-24 RX ORDER — TRAZODONE HYDROCHLORIDE 50 MG/1
TABLET ORAL
Qty: 90 TABLET | Refills: 0 | Status: SHIPPED | OUTPATIENT
Start: 2020-08-24 | End: 2020-12-31

## 2020-08-24 RX ORDER — CARVEDILOL 12.5 MG/1
12.5 TABLET ORAL 2 TIMES DAILY WITH MEALS
Qty: 60 TABLET | Refills: 2 | Status: CANCELLED | OUTPATIENT
Start: 2020-08-24

## 2020-08-24 NOTE — PROGRESS NOTES
Subjective:      Patient ID: Roxana Calderon is a 78 y.o. female. HPI     78 y.o. female here for regular f.w and concerns for HTn     has chronic HTN, hyperlipidemia, hyperthyroidism, AFibb on coumadin, CKD and pulmonary nodules and charles knee OA    Since last time, pt was noted to have high BP at cardiology office, was increased on coreg to 25 mg bid and later added norvasc 10 mg . Pt reports home readings remain high and intermittent dizzy spells noted         In 6/18, had right  TKR done at [facility] and postoperatively did well   howevere when went to [place] , had lovenox bridging and developed rectus sheath hematoma, needing readmission and blood transfusion  Now doing well with no issues  Off walker , back to driving     Now with left knee issues- limited activities      Afibb- no issues with palpiatations or exertional dyspnea  Complaint with coreg and coumadin has been changed to [facility]     Gout - No recent issues recently on allopurinol   No recurrent attacks          Current Outpatient Medications   Medication Sig Dispense Refill    traZODone (DESYREL) 50 MG tablet TAKE 1 TABLET NIGHTLY 90 tablet 0    allopurinol (ZYLOPRIM) 100 MG tablet TAKE 1 TABLET TWICE A  tablet 0    atorvastatin (LIPITOR) 20 MG tablet Take 1 tablet by mouth nightly 90 tablet 0    losartan-hydroCHLOROthiazide (HYZAAR) 100-25 MG per tablet Take 1 tablet by mouth daily 90 tablet 0    carvedilol (COREG) 12.5 MG tablet Take 2 tablets by mouth 2 times daily (with meals) 360 tablet 0    apixaban (ELIQUIS) 5 MG TABS tablet TAKE 1 TABLET TWICE A  tablet 4    Multiple Vitamins-Minerals (THERAPEUTIC MULTIVITAMIN-MINERALS) tablet Take 1 tablet by mouth daily      acetaminophen (TYLENOL) 325 MG tablet Take 650 mg by mouth every 6 hours as needed for Pain       No current facility-administered medications for this visit.               Review of Systems  As above, otherwise neg    There are no changes to past medical history, family history, social history or review of systems(except as noted in the history section) since prior note (all reviewed with patient). .  Objective:   Physical Exam  Vitals:    08/24/20 1616   BP: (!) 165/95   Pulse: 62   Resp: 18         General: elderly female healthy appearing  Awake, alert and oriented. Appears to be not in any distress  Mucous Membranes:  Pink , anicteric  Neck: No JVD, no carotid bruit, no thyromegaly  Chest:  Clear to auscultation bilaterally, no added sounds  Cardiovascular:  Irregular , S1S2 heard, no murmurs or gallops  Abdomen:  Soft, undistended, non tender, no organomegaly, BS present  Extremities:.   1+ pedal edema charles   Distal pulses well felt  Neurological : grossly normal     ECHO - 2015      Atrial fibrillation with a ventricular rate of 92 beats per minute. Overall left ventricular ejection fraction is estimated to be 55-60%. The left ventricular wall motion is normal.  The left atrium is mildly dilated. The right atrium is mildly dilated. There is trace mitral regurgitation. Mild aortic sclerosis present without evidence of stenosis. .  Mild aortic regurgitation. Stress test 2018 - 1. No convincing evidence of ischemia or infarction. On noncontrasted images there is a moderate sized fixed defect in the midportion of the lateral wall but this resolves with attenuation correction. Assessment:         Diagnosis Orders   1. Benign essential HTN  CBC WITH AUTO DIFFERENTIAL    COMPREHENSIVE METABOLIC PANEL   2. Mixed hyperlipidemia     3. Permanent atrial fibrillation     4.  Idiopathic chronic gout of foot without tophus, unspecified laterality         Plan:           HTN - recently worsened with no clear reason  Was increased on coreg to 25 mg bid but now reports dizziness  Was also added on norvasc 10 mg with no benefit and pt stopped norvasc as it is not controlling her BP  Recent visit to Er also noted  Pt reports compliance to coreg and  losartan /hctz 100/25 mg Pt educated to resume norvasc 10 mg nightly , cut down nightly coreg to 12.5 mg and continue 25 mg in am, hyzaar in am  RTC in 1 week for BP check       CAD - f/w Dr. Richard Polanco . On ASA, statins- remains asymptomatic   Stress test neg 7/18      H.o CVA - 2007 - off ASA . No issues     Afibb, chronic and permanent -rate cotnrolled.  On coreg bid, ELIQUIS  No bleeding issues    Hyperlipidemia - on statins- need lipids when fasting    Gout - with no recurrence - continue allopurinol     avoid falls    F/w in 3-6 months

## 2020-08-31 RX ORDER — AMLODIPINE BESYLATE 10 MG/1
10 TABLET ORAL DAILY
COMMUNITY
Start: 2020-06-16 | End: 2021-04-09 | Stop reason: SDUPTHER

## 2020-09-10 ENCOUNTER — TELEPHONE (OUTPATIENT)
Dept: INTERNAL MEDICINE CLINIC | Age: 80
End: 2020-09-10

## 2020-09-10 RX ORDER — FUROSEMIDE 20 MG/1
20 TABLET ORAL DAILY
Qty: 30 TABLET | Refills: 0 | Status: SHIPPED | OUTPATIENT
Start: 2020-09-10 | End: 2021-04-29

## 2020-09-10 NOTE — TELEPHONE ENCOUNTER
----- Message from Vivian Saenz MD sent at 9/10/2020  1:18 PM EDT -----  Contact: pt- [phone]  Lasix 20 mg daily #30  ----- Message -----  From: Raul Rosas  Sent: 9/10/2020  10:40 AM EDT  To: Vivian Saenz MD    She said her feet and legs have been swelling for about 3 days now - no other symptoms - said she had tried lasix 2 yrs ago and it did help- wanted to know if you wanted to call in a prescription for this to [facility] [place] at [phone]

## 2020-09-29 ENCOUNTER — TELEPHONE (OUTPATIENT)
Dept: INTERNAL MEDICINE CLINIC | Age: 80
End: 2020-09-29

## 2020-09-29 NOTE — TELEPHONE ENCOUNTER
----- Message from Ovidio Caldera PA-C sent at 9/29/2020  1:18 PM EDT -----  Contact: Vaishali Alegre  Blood pressure is okay. The bottom number is a little low, but I would not stop any of her meds because of it   ----- Message -----  From: Job Gasper  Sent: 9/29/2020  10:37 AM EDT  To: BILL Chew Dr., pt-  Mak Dallasbus called because throughout the night she had a horrible headache and this morning her BP was 120/48. She is taking 3 BP medications-carvedilol (COREG) 12.5 MG tablet,  amLODIPine (NORVASC) 10 MG tablet,atorvastatin (LIPITOR) 20 MG tablet (she couldn't remember if those are the ones she is currently taking). She wanted to know if she should be concerned with her BP?     Vaishali Alegre    Past appt-8/24/2020

## 2020-09-30 ENCOUNTER — TELEPHONE (OUTPATIENT)
Dept: INTERNAL MEDICINE CLINIC | Age: 80
End: 2020-09-30

## 2020-11-03 RX ORDER — LOSARTAN POTASSIUM AND HYDROCHLOROTHIAZIDE 25; 100 MG/1; MG/1
TABLET ORAL
Qty: 90 TABLET | Refills: 0 | Status: SHIPPED | OUTPATIENT
Start: 2020-11-03 | End: 2021-03-08

## 2020-11-30 RX ORDER — CARVEDILOL 12.5 MG/1
TABLET ORAL
Qty: 120 TABLET | Refills: 0 | Status: SHIPPED | OUTPATIENT
Start: 2020-11-30 | End: 2020-12-08

## 2021-01-11 RX ORDER — ATORVASTATIN CALCIUM 20 MG/1
TABLET, FILM COATED ORAL
Qty: 90 TABLET | Refills: 0 | Status: SHIPPED | OUTPATIENT
Start: 2021-01-11 | End: 2021-04-12

## 2021-02-08 ENCOUNTER — OFFICE VISIT (OUTPATIENT)
Dept: INTERNAL MEDICINE CLINIC | Age: 81
End: 2021-02-08

## 2021-02-08 VITALS
DIASTOLIC BLOOD PRESSURE: 75 MMHG | SYSTOLIC BLOOD PRESSURE: 130 MMHG | HEART RATE: 65 BPM | WEIGHT: 203 LBS | RESPIRATION RATE: 18 BRPM | BODY MASS INDEX: 34.66 KG/M2 | TEMPERATURE: 97.7 F | HEIGHT: 64 IN

## 2021-02-08 DIAGNOSIS — I48.21 PERMANENT ATRIAL FIBRILLATION (HCC): ICD-10-CM

## 2021-02-08 DIAGNOSIS — I10 BENIGN ESSENTIAL HTN: ICD-10-CM

## 2021-02-08 DIAGNOSIS — I10 BENIGN ESSENTIAL HTN: Primary | ICD-10-CM

## 2021-02-08 DIAGNOSIS — E78.2 MIXED HYPERLIPIDEMIA: ICD-10-CM

## 2021-02-08 PROCEDURE — 99212 OFFICE O/P EST SF 10 MIN: CPT | Performed by: INTERNAL MEDICINE

## 2021-02-08 NOTE — PROGRESS NOTES
Subjective:      Patient ID: Cynthia Moreland is a [de-identified] y.o. female. HPI     [de-identified] y.o. female here for regular f.w and concerns for HTn     has chronic HTN, hyperlipidemia, hyperthyroidism, AFibb on coumadin, CKD and pulmonary nodules and charles knee OA    Since last time, pt was noted to have high BP at cardiology office, was increased on coreg to 25 mg bid and later added norvasc 10 mg . In 6/18, had right  TKR done at [facility] and postoperatively did well   howevere when went to [place] , had lovenox bridging and developed rectus sheath hematoma, needing readmission and blood transfusion  Now doing well with no issues  Off walker , back to driving     Now with left knee issues- limited activities      Afibb- no issues with palpiatations or exertional dyspnea  Complaint with coreg and coumadin has been changed to [facility]     Gout - No recent issues recently on allopurinol   No recurrent attacks    No falls      Current Outpatient Medications   Medication Sig Dispense Refill    atorvastatin (LIPITOR) 20 MG tablet TAKE 1 TABLET NIGHTLY 90 tablet 0    traZODone (DESYREL) 50 MG tablet TAKE 1 TABLET NIGHTLY 90 tablet 0    allopurinol (ZYLOPRIM) 100 MG tablet TAKE 1 TABLET TWICE A  tablet 0    carvedilol (COREG) 12.5 MG tablet TAKE 2 TABLETS TWICE A DAY WITH MEALS (NEED AN APPOINTMENT FOR FUTURE REFILLS) 360 tablet 0    losartan-hydroCHLOROthiazide (HYZAAR) 100-25 MG per tablet TAKE 1 TABLET DAILY 90 tablet 0    furosemide (LASIX) 20 MG tablet Take 1 tablet by mouth daily 30 tablet 0    amLODIPine (NORVASC) 10 MG tablet Take 10 mg by mouth daily      apixaban (ELIQUIS) 5 MG TABS tablet TAKE 1 TABLET TWICE A  tablet 4    Multiple Vitamins-Minerals (THERAPEUTIC MULTIVITAMIN-MINERALS) tablet Take 1 tablet by mouth daily      acetaminophen (TYLENOL) 325 MG tablet Take 650 mg by mouth every 6 hours as needed for Pain       No current facility-administered medications for this visit.               Review of Systems  As above, otherwise neg    There are no changes to past medical history, family history, social history or review of systems(except as noted in the history section) since prior note (all reviewed with patient). .  Objective:   Physical Exam  Vitals:    02/08/21 1401   BP: 130/75   Pulse: 65   Resp: 18   Temp: 97.7 °F (36.5 °C)         General: elderly female healthy appearing  Awake, alert and oriented. Appears to be not in any distress  Mucous Membranes:  Pink , anicteric  Neck: No JVD, no carotid bruit, no thyromegaly  Chest:  Clear to auscultation bilaterally, no added sounds  Cardiovascular:  Irregular , S1S2 heard, no murmurs or gallops  Abdomen:  Soft, undistended, non tender, no organomegaly, BS present  Extremities:. No  pedal edema charles   Distal pulses well felt  Neurological : grossly normal     ECHO - 2015      Atrial fibrillation with a ventricular rate of 92 beats per minute. Overall left ventricular ejection fraction is estimated to be 55-60%. The left ventricular wall motion is normal.  The left atrium is mildly dilated. The right atrium is mildly dilated. There is trace mitral regurgitation. Mild aortic sclerosis present without evidence of stenosis. .  Mild aortic regurgitation. Stress test 2018 -   1. No convincing evidence of ischemia or infarction. On noncontrasted images there is a moderate sized fixed defect in the midportion of the lateral wall but this resolves with attenuation correction. Assessment:         Diagnosis Orders   1. Benign essential HTN     2. Mixed hyperlipidemia     3. Permanent atrial fibrillation (HCC)         Plan:           HTN - well controlled now   on coreg to 12.5mg bid    on norvasc 10 mg   losartan /hctz 100/25 mg         CAD - f/w Dr. Jayden Agustin . On ASA, statins- remains asymptomatic   Stress test neg 7/18      H.o CVA - 2007 - off ASA . No issues     Afibb, chronic and permanent -rate cotnrolled.  On coreg bid, ELIQUIS  No bleeding

## 2021-02-09 LAB
A/G RATIO: 1.8 (ref 1.1–2.2)
ALBUMIN SERPL-MCNC: 4.3 G/DL (ref 3.4–5)
ALP BLD-CCNC: 85 U/L (ref 40–129)
ALT SERPL-CCNC: 15 U/L (ref 10–40)
ANION GAP SERPL CALCULATED.3IONS-SCNC: 12 MMOL/L (ref 3–16)
AST SERPL-CCNC: 29 U/L (ref 15–37)
BASOPHILS ABSOLUTE: 0 K/UL (ref 0–0.2)
BASOPHILS RELATIVE PERCENT: 0.6 %
BILIRUB SERPL-MCNC: 0.5 MG/DL (ref 0–1)
BUN BLDV-MCNC: 16 MG/DL (ref 7–20)
CALCIUM SERPL-MCNC: 9.7 MG/DL (ref 8.3–10.6)
CHLORIDE BLD-SCNC: 99 MMOL/L (ref 99–110)
CO2: 25 MMOL/L (ref 21–32)
CREAT SERPL-MCNC: 0.9 MG/DL (ref 0.6–1.2)
EOSINOPHILS ABSOLUTE: 0.1 K/UL (ref 0–0.6)
EOSINOPHILS RELATIVE PERCENT: 1.8 %
GFR AFRICAN AMERICAN: >60
GFR NON-AFRICAN AMERICAN: >60
GLOBULIN: 2.4 G/DL
GLUCOSE BLD-MCNC: 105 MG/DL (ref 70–99)
HCT VFR BLD CALC: 44 % (ref 36–48)
HEMOGLOBIN: 14.5 G/DL (ref 12–16)
LYMPHOCYTES ABSOLUTE: 1.7 K/UL (ref 1–5.1)
LYMPHOCYTES RELATIVE PERCENT: 30.8 %
MCH RBC QN AUTO: 31.4 PG (ref 26–34)
MCHC RBC AUTO-ENTMCNC: 32.9 G/DL (ref 31–36)
MCV RBC AUTO: 95.6 FL (ref 80–100)
MONOCYTES ABSOLUTE: 0.4 K/UL (ref 0–1.3)
MONOCYTES RELATIVE PERCENT: 6.7 %
NEUTROPHILS ABSOLUTE: 3.3 K/UL (ref 1.7–7.7)
NEUTROPHILS RELATIVE PERCENT: 60.1 %
PDW BLD-RTO: 13.7 % (ref 12.4–15.4)
PLATELET # BLD: 212 K/UL (ref 135–450)
PMV BLD AUTO: 8.9 FL (ref 5–10.5)
POTASSIUM SERPL-SCNC: 4.3 MMOL/L (ref 3.5–5.1)
RBC # BLD: 4.6 M/UL (ref 4–5.2)
SODIUM BLD-SCNC: 136 MMOL/L (ref 136–145)
TOTAL PROTEIN: 6.7 G/DL (ref 6.4–8.2)
WBC # BLD: 5.5 K/UL (ref 4–11)

## 2021-03-08 RX ORDER — LOSARTAN POTASSIUM AND HYDROCHLOROTHIAZIDE 25; 100 MG/1; MG/1
TABLET ORAL
Qty: 90 TABLET | Refills: 0 | Status: SHIPPED | OUTPATIENT
Start: 2021-03-08 | End: 2021-05-19

## 2021-04-09 ENCOUNTER — TELEPHONE (OUTPATIENT)
Dept: INTERNAL MEDICINE CLINIC | Age: 81
End: 2021-04-09

## 2021-04-09 RX ORDER — AMLODIPINE BESYLATE 10 MG/1
10 TABLET ORAL DAILY
Qty: 90 TABLET | Refills: 0 | Status: SHIPPED | OUTPATIENT
Start: 2021-04-09 | End: 2021-05-05 | Stop reason: DRUGHIGH

## 2021-04-09 NOTE — TELEPHONE ENCOUNTER
----- Message from Carlos Irene MD sent at 4/9/2021  3:35 PM EDT -----  Contact: Daughter [phone]  yes  ----- Message -----  From: Chris Huerta  Sent: 4/9/2021   1:18 PM EDT  To: Carlos Irene MD    Daughter called back stating that patient has not had the norvasc filled since February. She believes this is probably the cause of her high BP. Ok to refill? Please advise  Tania Toro's   ----- Message -----  From: Chris Huerta  Sent: 4/9/2021  12:59 PM EDT  To: MD Dr Cristian Eric patient. Daughter states that patient's BP was 170/110. Patient's HTN medications include: Losartan-HCTZ 100-25 1 tablet in the AM  Carvedilol 12.5 mg 2 tablets in the AM and 2 tablets in the PM  Dr Cherelle Dave last note says norvasc 10 mg, but daughter isn't sure if she is on this and will call back with that information.

## 2021-04-12 RX ORDER — ATORVASTATIN CALCIUM 20 MG/1
TABLET, FILM COATED ORAL
Qty: 90 TABLET | Refills: 0 | Status: SHIPPED | OUTPATIENT
Start: 2021-04-12 | End: 2021-07-12

## 2021-04-29 ENCOUNTER — TELEPHONE (OUTPATIENT)
Dept: INTERNAL MEDICINE CLINIC | Age: 81
End: 2021-04-29

## 2021-04-29 RX ORDER — FUROSEMIDE 20 MG/1
20 TABLET ORAL DAILY
Qty: 5 TABLET | Refills: 0 | Status: SHIPPED | OUTPATIENT
Start: 2021-04-29 | End: 2021-05-19 | Stop reason: SDUPTHER

## 2021-04-29 NOTE — TELEPHONE ENCOUNTER
----- Message from Whitley Riggs MD sent at 4/29/2021  1:17 PM EDT -----  Contact: [phone] ([phone]  Lasix 20 mg daily x 5 days  ----- Message -----  From: Dennys Harman  Sent: 4/29/2021  11:12 AM EDT  To: Whitley Riggs MD    Pt would like to see if she can get a medication called in for swelling in her feet it started a couple days ago swelling from ankle down in both feet red and hot to touch and painful when she walks.         [facility] [address] - [address] [phone]

## 2021-05-05 ENCOUNTER — TELEPHONE (OUTPATIENT)
Dept: INTERNAL MEDICINE CLINIC | Age: 81
End: 2021-05-05

## 2021-05-05 ENCOUNTER — OFFICE VISIT (OUTPATIENT)
Dept: INTERNAL MEDICINE CLINIC | Age: 81
End: 2021-05-05

## 2021-05-05 VITALS
WEIGHT: 212 LBS | BODY MASS INDEX: 36.19 KG/M2 | DIASTOLIC BLOOD PRESSURE: 75 MMHG | RESPIRATION RATE: 18 BRPM | HEIGHT: 64 IN | TEMPERATURE: 98.3 F | HEART RATE: 60 BPM | SYSTOLIC BLOOD PRESSURE: 140 MMHG

## 2021-05-05 DIAGNOSIS — I10 BENIGN ESSENTIAL HTN: ICD-10-CM

## 2021-05-05 DIAGNOSIS — R60.0 PEDAL EDEMA: Primary | ICD-10-CM

## 2021-05-05 PROCEDURE — 99212 OFFICE O/P EST SF 10 MIN: CPT | Performed by: INTERNAL MEDICINE

## 2021-05-05 RX ORDER — AMLODIPINE BESYLATE 10 MG/1
5 TABLET ORAL DAILY
Qty: 90 TABLET | Refills: 0 | Status: SHIPPED
Start: 2021-05-05 | End: 2021-10-19 | Stop reason: ALTCHOICE

## 2021-05-05 RX ORDER — DOXAZOSIN MESYLATE 4 MG/1
4 TABLET ORAL DAILY
Qty: 30 TABLET | Refills: 3 | Status: SHIPPED | OUTPATIENT
Start: 2021-05-05 | End: 2021-09-14 | Stop reason: SDUPTHER

## 2021-05-05 NOTE — PROGRESS NOTES
Subjective:      Patient ID: Torey Craft is a [de-identified] y.o. female. HPI     [de-identified] y.o. female here for regular f.w and concerns for pedal edema x 2 weeks    has chronic HTN, hyperlipidemia, hyperthyroidism, AFibb on coumadin, CKD and pulmonary nodules and charles knee OA    In the last 6 months pt BP meds have been adjusted from coreg to 25 mg bid and norvasc 5 to 10 mg daily   Now reports worsening charles pedal edema. Given lasix last week with no benefit  No sob or chest pain         Current Outpatient Medications   Medication Sig Dispense Refill    furosemide (LASIX) 20 MG tablet Take 1 tablet by mouth daily for 5 days 5 tablet 0    atorvastatin (LIPITOR) 20 MG tablet TAKE 1 TABLET NIGHTLY 90 tablet 0    amLODIPine (NORVASC) 10 MG tablet Take 1 tablet by mouth daily 90 tablet 0    traZODone (DESYREL) 50 MG tablet TAKE 1 TABLET NIGHTLY 90 tablet 0    allopurinol (ZYLOPRIM) 100 MG tablet TAKE 1 TABLET TWICE A  tablet 0    losartan-hydroCHLOROthiazide (HYZAAR) 100-25 MG per tablet TAKE 1 TABLET DAILY 90 tablet 0    apixaban (ELIQUIS) 5 MG TABS tablet TAKE 1 TABLET TWICE A  tablet 0    carvedilol (COREG) 12.5 MG tablet TAKE 2 TABLETS TWICE A DAY WITH MEALS (NEED AN APPOINTMENT FOR FUTURE REFILLS) 360 tablet 0    Multiple Vitamins-Minerals (THERAPEUTIC MULTIVITAMIN-MINERALS) tablet Take 1 tablet by mouth daily      acetaminophen (TYLENOL) 325 MG tablet Take 650 mg by mouth every 6 hours as needed for Pain       No current facility-administered medications for this visit. Review of Systems  As above, otherwise neg    There are no changes to past medical history, family history, social history or review of systems(except as noted in the history section) since prior note (all reviewed with patient).     .  Objective:   Physical Exam  Vitals:    05/05/21 1131   BP: (!) 140/75   Pulse: 60   Resp: 18   Temp: 98.3 °F (36.8 °C)         General: elderly female healthy appearing  Awake, alert and oriented. Appears to be not in any distress  Mucous Membranes:  Pink , anicteric  Neck: No JVD, no carotid bruit, no thyromegaly  Chest:  Clear to auscultation bilaterally, no added sounds  Cardiovascular:  Irregular , S1S2 heard, no murmurs or gallops  Abdomen:  Soft, undistended, non tender, no organomegaly, BS present  Extremities:.   2+ charles pedal edema  Distal pulses well felt  Neurological : grossly normal     ECHO - 2015      Atrial fibrillation with a ventricular rate of 92 beats per minute. Overall left ventricular ejection fraction is estimated to be 55-60%. The left ventricular wall motion is normal.  The left atrium is mildly dilated. The right atrium is mildly dilated. There is trace mitral regurgitation. Mild aortic sclerosis present without evidence of stenosis. .  Mild aortic regurgitation. Stress test 2018 -   1. No convincing evidence of ischemia or infarction. On noncontrasted images there is a moderate sized fixed defect in the midportion of the lateral wall but this resolves with attenuation correction. Assessment:         Diagnosis Orders   1. Pedal edema     2. Benign essential HTN         Plan:         PEDAL EDEMA - likely with high dose of amlodipine   Change norvasc to cardura 4 mg nightly   F/w 2 weeks for BP check       CAD - f/w Dr. Rachel Xiong . On ASA, statins- remains asymptomatic   Stress test neg 7/18      H.o CVA - 2007 - off ASA . No issues     Afibb, chronic and permanent -rate cotnrolled.  On coreg bid, ELIQUIS  No bleeding issues    D/w daughter in room

## 2021-05-19 ENCOUNTER — TELEPHONE (OUTPATIENT)
Dept: INTERNAL MEDICINE CLINIC | Age: 81
End: 2021-05-19

## 2021-05-19 RX ORDER — FUROSEMIDE 20 MG/1
20 TABLET ORAL DAILY PRN
Qty: 30 TABLET | Refills: 0 | Status: SHIPPED | OUTPATIENT
Start: 2021-05-19 | End: 2021-07-06 | Stop reason: SDUPTHER

## 2021-05-19 RX ORDER — LOSARTAN POTASSIUM AND HYDROCHLOROTHIAZIDE 25; 100 MG/1; MG/1
TABLET ORAL
Qty: 90 TABLET | Refills: 0 | Status: SHIPPED | OUTPATIENT
Start: 2021-05-19 | End: 2021-08-17

## 2021-05-19 NOTE — TELEPHONE ENCOUNTER
----- Message from Trixie Hendricks MD sent at 5/19/2021 10:46 AM EDT -----  Contact: [phone] Erica Daughter  Driving makes leg edema worse  It will get better in 2 days, keep legs elevated, add compressive socks  Continue water pill  ----- Message -----  From: Jesika Lopez  Sent: 5/19/2021  10:06 AM EDT  To: Trixie Hendricks MD    Pt's daughter called stating the pt still have sever swelling in her legs and ankles can hardly walk just currently drove to [facility] and her legs have gotten worse wanted to know if you need to up her medication.    Mela Lavell [phone]

## 2021-05-25 ENCOUNTER — TELEPHONE (OUTPATIENT)
Dept: INTERNAL MEDICINE CLINIC | Age: 81
End: 2021-05-25

## 2021-05-25 NOTE — TELEPHONE ENCOUNTER
----- Message from Trixie Hendricks MD sent at 5/25/2021  1:29 PM EDT -----  Contact: Sallye Duane [phone] (daughter)  Continue lasix can increase to 2 pills  ----- Message -----  From: Jesika Lopez  Sent: 5/25/2021  10:13 AM EDT  To: Trixie Hendricks MD    Pt still has swelling in legs and feet she just drove back from [place] yesterday didn't know if she needs to give it a few more days but as of now the medication does not seem to be working.

## 2021-05-27 ENCOUNTER — OFFICE VISIT (OUTPATIENT)
Dept: INTERNAL MEDICINE CLINIC | Age: 81
End: 2021-05-27

## 2021-05-27 ENCOUNTER — HOSPITAL ENCOUNTER (OUTPATIENT)
Dept: GENERAL RADIOLOGY | Age: 81
Discharge: HOME | End: 2021-05-27
Payer: MEDICARE

## 2021-05-27 ENCOUNTER — HOSPITAL ENCOUNTER (OUTPATIENT)
Age: 81
Discharge: HOME | End: 2021-05-27
Payer: MEDICARE

## 2021-05-27 VITALS — SYSTOLIC BLOOD PRESSURE: 125 MMHG | HEART RATE: 70 BPM | DIASTOLIC BLOOD PRESSURE: 75 MMHG | RESPIRATION RATE: 18 BRPM

## 2021-05-27 DIAGNOSIS — M79.652 LEFT THIGH PAIN: Primary | ICD-10-CM

## 2021-05-27 DIAGNOSIS — M79.652 LEFT THIGH PAIN: ICD-10-CM

## 2021-05-27 PROCEDURE — 99212 OFFICE O/P EST SF 10 MIN: CPT | Performed by: INTERNAL MEDICINE

## 2021-05-27 PROCEDURE — 73552 X-RAY EXAM OF FEMUR 2/>: CPT

## 2021-05-27 RX ORDER — TRAMADOL HYDROCHLORIDE 50 MG/1
50 TABLET ORAL EVERY 6 HOURS PRN
Qty: 15 TABLET | Refills: 0 | Status: SHIPPED | OUTPATIENT
Start: 2021-05-27 | End: 2021-06-01

## 2021-05-27 NOTE — PROGRESS NOTES
Subjective:      Patient ID: Al Armenta is a [de-identified] y.o. female. HPI     [de-identified] y.o. female here for concerns for left thigh pain since she fell in [place] last Monday    Pt reports she and her family drove to [place] where she got severe pedal edema and unable to walk much. Apparently tripped and fell on her left thigh and since then unable to walk with severe pain . Has chronic back pain but not any worse  No visible bruise per pt  No bleeding   Took lasix for pedal edema which has now improved     has chronic HTN, hyperlipidemia, hyperthyroidism, AFibb on coumadin, CKD and pulmonary nodules and charles knee OA          Current Outpatient Medications   Medication Sig Dispense Refill    losartan-hydroCHLOROthiazide (HYZAAR) 100-25 MG per tablet TAKE 1 TABLET DAILY 90 tablet 0    furosemide (LASIX) 20 MG tablet Take 1 tablet by mouth daily as needed (swelling) 30 tablet 0    amLODIPine (NORVASC) 10 MG tablet Take 0.5 tablets by mouth daily 90 tablet 0    doxazosin (CARDURA) 4 MG tablet Take 1 tablet by mouth daily 30 tablet 3    atorvastatin (LIPITOR) 20 MG tablet TAKE 1 TABLET NIGHTLY 90 tablet 0    traZODone (DESYREL) 50 MG tablet TAKE 1 TABLET NIGHTLY 90 tablet 0    allopurinol (ZYLOPRIM) 100 MG tablet TAKE 1 TABLET TWICE A  tablet 0    apixaban (ELIQUIS) 5 MG TABS tablet TAKE 1 TABLET TWICE A  tablet 0    carvedilol (COREG) 12.5 MG tablet TAKE 2 TABLETS TWICE A DAY WITH MEALS (NEED AN APPOINTMENT FOR FUTURE REFILLS) 360 tablet 0    Multiple Vitamins-Minerals (THERAPEUTIC MULTIVITAMIN-MINERALS) tablet Take 1 tablet by mouth daily      acetaminophen (TYLENOL) 325 MG tablet Take 650 mg by mouth every 6 hours as needed for Pain       No current facility-administered medications for this visit.              Review of Systems  As above, otherwise neg    There are no changes to past medical history, family history, social history or review of systems(except as noted in the history section) since

## 2021-05-31 RX ORDER — PROMETHAZINE HYDROCHLORIDE 12.5 MG/1
TABLET ORAL
Qty: 30 TABLET | Refills: 0 | Status: SHIPPED | OUTPATIENT
Start: 2021-05-31 | End: 2021-10-19 | Stop reason: ALTCHOICE

## 2021-06-08 RX ORDER — TRAZODONE HYDROCHLORIDE 50 MG/1
TABLET ORAL
Qty: 90 TABLET | Refills: 0 | Status: SHIPPED | OUTPATIENT
Start: 2021-06-08 | End: 2021-09-07

## 2021-06-08 RX ORDER — ALLOPURINOL 100 MG/1
TABLET ORAL
Qty: 180 TABLET | Refills: 0 | Status: SHIPPED | OUTPATIENT
Start: 2021-06-08 | End: 2021-09-07

## 2021-06-14 NOTE — TELEPHONE ENCOUNTER
----- Message from Daniela Chakraborty MA sent at 6/14/2021  8:35 AM EDT -----  Contact: Bimal Monet [phone]  Patient called requesting a refill on apixaban (ELIQUIS) 5 MG TABS tablet. Patient was last seen on 5/27/21 no future appointment. Patient would like refill sent to [facility] in [place].  Thank you

## 2021-06-17 ENCOUNTER — TELEPHONE (OUTPATIENT)
Dept: INTERNAL MEDICINE CLINIC | Age: 81
End: 2021-06-17

## 2021-06-17 NOTE — TELEPHONE ENCOUNTER
----- Message from Brain Barrow MD sent at 6/17/2021  3:22 PM EDT -----  Contact: Cathy(daughter) [phone]  Yes might need MRI  Refer to Dr. Rachel Pérez  ----- Message -----  From: Rudolphmichajossue Torres  Sent: 6/17/2021   2:59 PM EDT  To: Brain Barrow MD    Pt's daughter called stating the pt is still in a lot of pain in her left groin area and still having difficulty walking x-ray was normal so wanted to know what they need to do if she needs to see an ortho?

## 2021-07-06 RX ORDER — FUROSEMIDE 20 MG/1
20 TABLET ORAL DAILY PRN
Qty: 30 TABLET | Refills: 0 | Status: SHIPPED | OUTPATIENT
Start: 2021-07-06 | End: 2021-07-23 | Stop reason: SDUPTHER

## 2021-07-06 NOTE — TELEPHONE ENCOUNTER
----- Message from Rudy Becrera sent at 7/6/2021  2:18 PM EDT -----  Contact: Shara Easton [phone]  Medication refill    furosemide (LASIX) 20 MG tablet    Veronica

## 2021-07-12 RX ORDER — ATORVASTATIN CALCIUM 20 MG/1
TABLET, FILM COATED ORAL
Qty: 90 TABLET | Refills: 0 | Status: SHIPPED | OUTPATIENT
Start: 2021-07-12 | End: 2021-09-13 | Stop reason: SDUPTHER

## 2021-07-13 ENCOUNTER — TELEPHONE (OUTPATIENT)
Dept: INTERNAL MEDICINE CLINIC | Age: 81
End: 2021-07-13

## 2021-07-13 DIAGNOSIS — I50.32 CHRONIC DIASTOLIC CONGESTIVE HEART FAILURE (HCC): Primary | ICD-10-CM

## 2021-07-13 NOTE — TELEPHONE ENCOUNTER
----- Message from Zainab Rangel MD sent at 7/13/2021  2:32 PM EDT -----  Contact: Cathy/daughter [phone]  Check echocardiogram to see if any new issues with heart  Dx chf diastolic  ----- Message -----  From: Kelvin Pedroza  Sent: 7/13/2021   2:00 PM EDT  To: Zainab Rangel MD    Daughter states her mother's feet are swelling. She has taken her blood pressure and diastolic reading has been ranging from 45-49. Patient took extra lasix yesterday to try to control the swelling. She is soaking her feet, elevating, and wearing compression socks. Please advise.      Thank you

## 2021-07-22 ENCOUNTER — HOSPITAL ENCOUNTER (OUTPATIENT)
Dept: NON INVASIVE DIAGNOSTICS | Age: 81
Discharge: HOME | End: 2021-07-22
Payer: MEDICARE

## 2021-07-22 DIAGNOSIS — I50.32 CHRONIC DIASTOLIC CONGESTIVE HEART FAILURE (HCC): ICD-10-CM

## 2021-07-22 LAB
LV EF: 58 %
LVEF MODALITY: NORMAL

## 2021-07-22 PROCEDURE — 93306 TTE W/DOPPLER COMPLETE: CPT

## 2021-07-23 ENCOUNTER — TELEPHONE (OUTPATIENT)
Dept: INTERNAL MEDICINE CLINIC | Age: 81
End: 2021-07-23

## 2021-07-23 RX ORDER — FUROSEMIDE 20 MG/1
20 TABLET ORAL DAILY PRN
Qty: 30 TABLET | Refills: 0 | Status: SHIPPED | OUTPATIENT
Start: 2021-07-23 | End: 2021-07-26

## 2021-07-23 NOTE — TELEPHONE ENCOUNTER
----- Message from Jaclyn Ruff MD sent at 7/23/2021  4:37 PM EDT -----  Contact: Ben Bruce  [phone]  Call her  ----- Message -----  From: Alf Pradeep  Sent: 7/23/2021   4:08 PM EDT  To: Jaclyn Ruff MD    Daughter of patient called. She would like you to please call her.

## 2021-07-26 ENCOUNTER — TELEPHONE (OUTPATIENT)
Dept: INTERNAL MEDICINE CLINIC | Age: 81
End: 2021-07-26

## 2021-07-26 RX ORDER — FUROSEMIDE 20 MG/1
20 TABLET ORAL DAILY PRN
Qty: 30 TABLET | Refills: 0 | Status: SHIPPED | OUTPATIENT
Start: 2021-07-26 | End: 2021-08-09

## 2021-07-26 NOTE — TELEPHONE ENCOUNTER
----- Message from Zainab Rangel MD sent at 7/26/2021  1:46 PM EDT -----  Contact: Yamilex Coy [phone]  I changed norvasc to cardura  I did not change coreg  ----- Message -----  From: Afua Speaker  Sent: 7/26/2021  12:49 PM EDT  To: Zainab Rangel MD    Pt's daughter thought you took pt off of a BP pill when you started her on coreg. States pt has been taking both. Please advise.  ----- Message -----  From: Zainab Rangel MD  Sent: 7/26/2021  12:30 PM EDT  To: Izabel Medal Dale    Nothing changed except water pill - lasix 40 mg daily  ----- Message -----  From: Devi Smith  Sent: 7/26/2021  11:38 AM EDT  To: Zainab Rangel MD    Daughter called wanting to know what are the changes in patients medication.     Please call

## 2021-08-09 RX ORDER — FUROSEMIDE 20 MG/1
20 TABLET ORAL DAILY PRN
Qty: 30 TABLET | Refills: 0 | Status: SHIPPED | OUTPATIENT
Start: 2021-08-09 | End: 2021-08-16

## 2021-08-16 RX ORDER — FUROSEMIDE 20 MG/1
20 TABLET ORAL 2 TIMES DAILY PRN
Qty: 60 TABLET | Refills: 2 | Status: SHIPPED | OUTPATIENT
Start: 2021-08-16 | End: 2022-01-31

## 2021-08-17 RX ORDER — LOSARTAN POTASSIUM AND HYDROCHLOROTHIAZIDE 25; 100 MG/1; MG/1
TABLET ORAL
Qty: 90 TABLET | Refills: 1 | Status: SHIPPED | OUTPATIENT
Start: 2021-08-17 | End: 2022-03-15

## 2021-09-13 ENCOUNTER — TELEPHONE (OUTPATIENT)
Dept: INTERNAL MEDICINE CLINIC | Age: 81
End: 2021-09-13

## 2021-09-13 RX ORDER — CARVEDILOL 12.5 MG/1
TABLET ORAL
Qty: 360 TABLET | Refills: 0 | Status: SHIPPED | OUTPATIENT
Start: 2021-09-13 | End: 2022-04-15 | Stop reason: SDUPTHER

## 2021-09-13 RX ORDER — ATORVASTATIN CALCIUM 20 MG/1
TABLET, FILM COATED ORAL
Qty: 90 TABLET | Refills: 0 | Status: SHIPPED | OUTPATIENT
Start: 2021-09-13 | End: 2021-12-16

## 2021-09-13 NOTE — TELEPHONE ENCOUNTER
----- Message from Inés Yeung sent at 9/13/2021  2:00 PM EDT -----  Contact: [phone] (H)  Pt is going out of town and will need a refill on the following medications by September 26th:    apixaban (ELIQUIS) 5 MG TABS tablet    atorvastatin (LIPITOR) 20 MG tablet    carvedilol (COREG) 12.5 MG tablet    Sent to:    [address], [address]    Thank you

## 2021-09-14 ENCOUNTER — TELEPHONE (OUTPATIENT)
Dept: INTERNAL MEDICINE CLINIC | Age: 81
End: 2021-09-14

## 2021-09-14 RX ORDER — DOXAZOSIN MESYLATE 4 MG/1
4 TABLET ORAL DAILY
Qty: 30 TABLET | Refills: 1 | Status: SHIPPED | OUTPATIENT
Start: 2021-09-14 | End: 2022-01-31

## 2021-10-19 ENCOUNTER — OFFICE VISIT (OUTPATIENT)
Dept: INTERNAL MEDICINE CLINIC | Age: 81
End: 2021-10-19

## 2021-10-19 VITALS
HEART RATE: 70 BPM | DIASTOLIC BLOOD PRESSURE: 80 MMHG | HEIGHT: 64 IN | WEIGHT: 202 LBS | SYSTOLIC BLOOD PRESSURE: 140 MMHG | BODY MASS INDEX: 34.49 KG/M2 | RESPIRATION RATE: 18 BRPM

## 2021-10-19 DIAGNOSIS — I50.32 CHRONIC DIASTOLIC CONGESTIVE HEART FAILURE (HCC): ICD-10-CM

## 2021-10-19 DIAGNOSIS — Z23 NEED FOR INFLUENZA VACCINATION: ICD-10-CM

## 2021-10-19 DIAGNOSIS — I10 BENIGN ESSENTIAL HTN: ICD-10-CM

## 2021-10-19 DIAGNOSIS — I48.21 PERMANENT ATRIAL FIBRILLATION (HCC): ICD-10-CM

## 2021-10-19 DIAGNOSIS — I35.1 MODERATE AORTIC REGURGITATION: ICD-10-CM

## 2021-10-19 DIAGNOSIS — I10 BENIGN ESSENTIAL HTN: Primary | ICD-10-CM

## 2021-10-19 DIAGNOSIS — I07.1 MODERATE TRICUSPID REGURGITATION: ICD-10-CM

## 2021-10-19 DIAGNOSIS — E78.2 MIXED HYPERLIPIDEMIA: ICD-10-CM

## 2021-10-19 LAB
BASOPHILS ABSOLUTE: 0 K/UL (ref 0–0.2)
BASOPHILS RELATIVE PERCENT: 0.9 %
EOSINOPHILS ABSOLUTE: 0 K/UL (ref 0–0.6)
EOSINOPHILS RELATIVE PERCENT: 0.1 %
HCT VFR BLD CALC: 35.1 % (ref 36–48)
HEMOGLOBIN: 11.8 G/DL (ref 12–16)
LYMPHOCYTES ABSOLUTE: 1.1 K/UL (ref 1–5.1)
LYMPHOCYTES RELATIVE PERCENT: 27 %
MCH RBC QN AUTO: 32.5 PG (ref 26–34)
MCHC RBC AUTO-ENTMCNC: 33.8 G/DL (ref 31–36)
MCV RBC AUTO: 96.4 FL (ref 80–100)
MONOCYTES ABSOLUTE: 0.4 K/UL (ref 0–1.3)
MONOCYTES RELATIVE PERCENT: 9.5 %
NEUTROPHILS ABSOLUTE: 2.5 K/UL (ref 1.7–7.7)
NEUTROPHILS RELATIVE PERCENT: 62.5 %
PDW BLD-RTO: 14.9 % (ref 12.4–15.4)
PLATELET # BLD: 180 K/UL (ref 135–450)
PMV BLD AUTO: 8.2 FL (ref 5–10.5)
RBC # BLD: 3.64 M/UL (ref 4–5.2)
WBC # BLD: 4 K/UL (ref 4–11)

## 2021-10-19 PROCEDURE — 99213 OFFICE O/P EST LOW 20 MIN: CPT | Performed by: INTERNAL MEDICINE

## 2021-10-19 PROCEDURE — 90662 IIV NO PRSV INCREASED AG IM: CPT | Performed by: INTERNAL MEDICINE

## 2021-10-19 PROCEDURE — G0008 ADMIN INFLUENZA VIRUS VAC: HCPCS | Performed by: INTERNAL MEDICINE

## 2021-10-19 NOTE — PROGRESS NOTES
Vitamins-Minerals (THERAPEUTIC MULTIVITAMIN-MINERALS) tablet Take 1 tablet by mouth daily      acetaminophen (TYLENOL) 325 MG tablet Take 650 mg by mouth every 6 hours as needed for Pain       No current facility-administered medications for this visit. Review of Systems  As above, otherwise neg    There are no changes to past medical history, family history, social history or review of systems(except as noted in the history section) since prior note (all reviewed with patient). .  Objective:   Physical Exam  Vitals:    10/19/21 1507   BP: (!) 140/80   Pulse: 70   Resp: 18         General: elderly female healthy appearing  Awake, alert and oriented. Appears to be not in any distress  Mucous Membranes:  Pink , anicteric  Neck: No JVD, no carotid bruit, no thyromegaly  Chest:  Clear to auscultation bilaterally, no added sounds  Cardiovascular:  Irregular , S1S2 heard, no murmurs or gallops  Abdomen:  Soft, undistended, non tender, no organomegaly, BS present  Extremities:.  Resolved pedal edema   Distal pulses well felt  Neurological : grossly normal       ECHO    Left ventricular systolic function is normal with ejection fraction   estimated at 55-60 %. No regional wall motion abnormalities are noted. Elevated left ventricular diastolic filling pressure: Septal E/e'' = 12.6 . The left atrium is severely dilated. Ascending aorta is mildly dilated at   3.8cm. The right atrium is severely dilated. The right ventricle is mildly enlarged. Mild anterior and posterior mitral annular calcification is present. Moderate mitral regurgitation. Aortic valve sclerosis without aortic stenosis. Moderate aortic regurgitation is present. Moderate tricuspid regurgitation. Systolic pulmonary artery pressure (SPAP) estimated at 53 mmHg (RA pressure   15 mmHg), consistent with moderate pulmonary hypertension.    9/22/2015 55-60 LAE, BRII, trace MR< mild av sclerosis, mild AI      Wt Readings from Last 3 Encounters:   10/19/21 202 lb (91.6 kg)   05/05/21 212 lb (96.2 kg)   02/08/21 203 lb (92.1 kg)         Assessment:         Diagnosis Orders   1. Benign essential HTN     2. Chronic diastolic congestive heart failure ([address]     3. Mixed hyperlipidemia     4. Permanent atrial fibrillation ([address]     5. Moderate tricuspid regurgitation     6. Moderate aortic regurgitation         Plan:           HTN - improving with current regimen - on coreg 12.5 mg bid  Hyzaar, cardura and lasix   Off norvasc for pedal edema    Moderate AR and TR   Moderate pulm HTN  Chronic diastolic CHF   - medical mx for now  - fw Dr. Svetlana Tripathi at [facility]  - on lasix 20 mg bid   Well compensated   Monitor weights and low salt diet  Can add aldactone if needed       CAD - f/w Dr. Svetlana Tripathi. On , statins- remains asymptomatic   Stress test neg 7/18      H.o CVA - 2007 - off ASA for being on eliquis . No issues     Afibb, chronic and permanent -rate cotnrolled.  On coreg bid, ELIQUIS  No bleeding issues    Hyperlipidemia - on statins- need lipids when fasting    Gout - with no recurrence - continue allopurinol     avoid falls  Had covid vaccines  For flu shot today     F/w in 3-6 months

## 2021-10-20 ENCOUNTER — TELEPHONE (OUTPATIENT)
Dept: INTERNAL MEDICINE CLINIC | Age: 81
End: 2021-10-20

## 2021-10-20 DIAGNOSIS — D64.9 MILD ANEMIA: Primary | ICD-10-CM

## 2021-10-20 LAB
A/G RATIO: 1.7 (ref 1.1–2.2)
ALBUMIN SERPL-MCNC: 4.3 G/DL (ref 3.4–5)
ALP BLD-CCNC: 74 U/L (ref 40–129)
ALT SERPL-CCNC: 13 U/L (ref 10–40)
ANION GAP SERPL CALCULATED.3IONS-SCNC: 13 MMOL/L (ref 3–16)
AST SERPL-CCNC: 25 U/L (ref 15–37)
BILIRUB SERPL-MCNC: 0.7 MG/DL (ref 0–1)
BUN BLDV-MCNC: 26 MG/DL (ref 7–20)
CALCIUM SERPL-MCNC: 9.1 MG/DL (ref 8.3–10.6)
CHLORIDE BLD-SCNC: 95 MMOL/L (ref 99–110)
CO2: 25 MMOL/L (ref 21–32)
CREAT SERPL-MCNC: 1.1 MG/DL (ref 0.6–1.2)
GFR AFRICAN AMERICAN: 58
GFR NON-AFRICAN AMERICAN: 48
GLOBULIN: 2.5 G/DL
GLUCOSE BLD-MCNC: 117 MG/DL (ref 70–99)
POTASSIUM SERPL-SCNC: 4.1 MMOL/L (ref 3.5–5.1)
SODIUM BLD-SCNC: 133 MMOL/L (ref 136–145)
TOTAL PROTEIN: 6.8 G/DL (ref 6.4–8.2)

## 2021-10-20 NOTE — TELEPHONE ENCOUNTER
----- Message from Fabienne John MD sent at 10/20/2021  4:26 PM EDT -----  Mild anemia from last time, noted  Check stool occult cards x 3  Iron studies -ferritin, iron , TIBC, b12 and folate - if unable to add .repeat blood work    Renal liver good

## 2021-10-21 ENCOUNTER — TELEPHONE (OUTPATIENT)
Dept: INTERNAL MEDICINE CLINIC | Age: 81
End: 2021-10-21

## 2021-10-21 DIAGNOSIS — D64.9 MILD ANEMIA: ICD-10-CM

## 2021-10-21 LAB
FERRITIN: 204.9 NG/ML (ref 15–150)
IRON SATURATION: 23 % (ref 15–50)
IRON: 65 UG/DL (ref 37–145)
TOTAL IRON BINDING CAPACITY: 277 UG/DL (ref 260–445)

## 2021-10-21 NOTE — TELEPHONE ENCOUNTER
----- Message from Kacy Sparrow MD sent at 10/21/2021 11:49 AM EDT -----  Yes  ----- Message -----  From: Myrna Smith  Sent: 10/21/2021   9:43 AM EDT  To: Kacy Sparrow MD    B12 and Folate unable to be added. Iron studies added. Does patient need to come back for B12 and folate?

## 2021-10-21 NOTE — TELEPHONE ENCOUNTER
----- Message from Dimitri Lopez MD sent at 10/21/2021 11:49 AM EDT -----  Yes  ----- Message -----  From: Stefano De Los Santos  Sent: 10/21/2021   9:43 AM EDT  To: Dimitri Lopez MD    B12 and Folate unable to be added. Iron studies added. Does patient need to come back for B12 and folate?

## 2021-10-22 DIAGNOSIS — D64.9 MILD ANEMIA: ICD-10-CM

## 2021-10-23 LAB
FOLATE: >20 NG/ML (ref 4.78–24.2)
VITAMIN B-12: 1178 PG/ML (ref 211–911)

## 2021-11-01 ENCOUNTER — NURSE ONLY (OUTPATIENT)
Dept: INTERNAL MEDICINE CLINIC | Age: 81
End: 2021-11-01

## 2021-11-01 DIAGNOSIS — Z12.11 COLON CANCER SCREENING: Primary | ICD-10-CM

## 2021-11-09 ENCOUNTER — TELEPHONE (OUTPATIENT)
Dept: INTERNAL MEDICINE CLINIC | Age: 81
End: 2021-11-09

## 2021-11-09 NOTE — TELEPHONE ENCOUNTER
----- Message from Pelon Cartagena MD sent at 11/9/2021 10:44 AM EST -----  Contact: Jorge Alberto Arrington [phone]  Hold BP meds today   Repeat BP couple of times daily, if not above 100 , take to ER by evening  Cut down cardura to 2 mg from tomorrow  ----- Message -----  From: Eileen Phipps  Sent: 11/9/2021  10:25 AM EST  To: Pelon Cartagena MD    [facility] called about the patient states the patient is having issues light headed, clammy her blood pressure was 145\99 and the it has dropped to 83\48 needs to be advised what they should be doing for her. Jorge Alberto Youngerier states she does have a history of strokes.                       Cyrus Santillan [address] [place] [address] [phone] - Dorothy Forrester [phone]

## 2021-12-16 RX ORDER — ATORVASTATIN CALCIUM 20 MG/1
TABLET, FILM COATED ORAL
Qty: 90 TABLET | Refills: 3 | Status: SHIPPED | OUTPATIENT
Start: 2021-12-16

## 2021-12-22 ENCOUNTER — NURSE ONLY (OUTPATIENT)
Dept: INTERNAL MEDICINE CLINIC | Age: 81
End: 2021-12-22

## 2021-12-22 VITALS — SYSTOLIC BLOOD PRESSURE: 135 MMHG | DIASTOLIC BLOOD PRESSURE: 80 MMHG

## 2021-12-22 NOTE — PROGRESS NOTES
Janett Mancuso was called today for a Blood Pressure Recheck.      Janett Mancuso was seen 10/19/2021 and her Blood Pressure was:   BP Readings from Last 1 Encounters:   12/22/21 135/80         Andrea Ortiz

## 2022-01-31 RX ORDER — DOXAZOSIN MESYLATE 4 MG/1
TABLET ORAL
Qty: 30 TABLET | Refills: 1 | Status: SHIPPED | OUTPATIENT
Start: 2022-01-31 | End: 2022-06-17 | Stop reason: ALTCHOICE

## 2022-01-31 RX ORDER — FUROSEMIDE 20 MG/1
20 TABLET ORAL 2 TIMES DAILY PRN
Qty: 60 TABLET | Refills: 1 | Status: SHIPPED | OUTPATIENT
Start: 2022-01-31 | End: 2022-05-12

## 2022-03-07 RX ORDER — TRAZODONE HYDROCHLORIDE 50 MG/1
TABLET ORAL
Qty: 90 TABLET | Refills: 0 | Status: ON HOLD | OUTPATIENT
Start: 2022-03-07 | End: 2022-06-06

## 2022-03-07 RX ORDER — ALLOPURINOL 100 MG/1
TABLET ORAL
Qty: 180 TABLET | Refills: 0 | Status: ON HOLD | OUTPATIENT
Start: 2022-03-07 | End: 2022-06-06

## 2022-03-15 RX ORDER — LOSARTAN POTASSIUM AND HYDROCHLOROTHIAZIDE 25; 100 MG/1; MG/1
TABLET ORAL
Qty: 90 TABLET | Refills: 0 | Status: ON HOLD | OUTPATIENT
Start: 2022-03-15 | End: 2022-06-13

## 2022-04-15 ENCOUNTER — TELEPHONE (OUTPATIENT)
Dept: INTERNAL MEDICINE CLINIC | Age: 82
End: 2022-04-15

## 2022-04-15 RX ORDER — CARVEDILOL 12.5 MG/1
TABLET ORAL
Qty: 120 TABLET | Refills: 0 | Status: SHIPPED | OUTPATIENT
Start: 2022-04-15 | End: 2022-04-19 | Stop reason: SDUPTHER

## 2022-04-19 RX ORDER — CARVEDILOL 12.5 MG/1
TABLET ORAL
Qty: 360 TABLET | Refills: 0 | Status: SHIPPED | OUTPATIENT
Start: 2022-04-19

## 2022-04-28 ENCOUNTER — OFFICE VISIT (OUTPATIENT)
Dept: INTERNAL MEDICINE CLINIC | Age: 82
End: 2022-04-28

## 2022-04-28 VITALS
HEIGHT: 64 IN | DIASTOLIC BLOOD PRESSURE: 75 MMHG | RESPIRATION RATE: 18 BRPM | SYSTOLIC BLOOD PRESSURE: 138 MMHG | WEIGHT: 203 LBS | HEART RATE: 60 BPM | BODY MASS INDEX: 34.66 KG/M2

## 2022-04-28 DIAGNOSIS — N18.31 STAGE 3A CHRONIC KIDNEY DISEASE (HCC): ICD-10-CM

## 2022-04-28 DIAGNOSIS — I50.32 CHRONIC DIASTOLIC CONGESTIVE HEART FAILURE (HCC): ICD-10-CM

## 2022-04-28 DIAGNOSIS — I48.21 PERMANENT ATRIAL FIBRILLATION (HCC): ICD-10-CM

## 2022-04-28 DIAGNOSIS — I27.20 PULMONARY HTN (HCC): ICD-10-CM

## 2022-04-28 DIAGNOSIS — I10 BENIGN ESSENTIAL HTN: Primary | ICD-10-CM

## 2022-04-28 PROBLEM — N18.30 CHRONIC RENAL DISEASE, STAGE III (HCC): Status: ACTIVE | Noted: 2022-04-28

## 2022-04-28 PROCEDURE — 99213 OFFICE O/P EST LOW 20 MIN: CPT | Performed by: INTERNAL MEDICINE

## 2022-04-28 NOTE — PROGRESS NOTES
Subjective:      Patient ID: Malia Saez is a 80 y.o. female. HPI     80 y.o. female with hx  Of HTN, hyperlipidemia, hyperthyroidism, AFibb on coumadin, CKD and pulmonary nodules and charles knee OA    HTN -. Currently on coreg, cardura, hyzaar and remains off norvasc for pedal edema    Hx of pulm HTN - last  echo with moderate AR and TR, moderate Pulm HTN , now on lasix 20 mg bid   Repeat echo with improved pulm HTN with diuresis and lost about 10lbs since last year     She reports no worsening chronic dyspnea.  Usually sleeps in recliner and has been compliant with her water intake     Seen Dr. Inga Hernandez at [facility]- no issues with palpiatations or exertional dyspnea  Complaint with coreg and ELIQUIS   No bleeding    Gout - No recent issues recently on allopurinol   No recurrent attacks      No more falls  No sleep issues    Allergies   Allergen Reactions    Hydrocodone Hives    Hydrocodone Bitartrate Hives    Lisinopril Other (See Comments)     Cough    Hydrocodone Rash    Naproxen Rash         Current Outpatient Medications   Medication Sig Dispense Refill    carvedilol (COREG) 12.5 MG tablet TAKE 2 TABLETS TWICE A DAY WITH MEALS 360 tablet 0    losartan-hydroCHLOROthiazide (HYZAAR) 100-25 MG per tablet TAKE 1 TABLET DAILY 90 tablet 0    allopurinol (ZYLOPRIM) 100 MG tablet TAKE 1 TABLET TWICE A  tablet 0    traZODone (DESYREL) 50 MG tablet TAKE 1 TABLET NIGHTLY 90 tablet 0    apixaban (ELIQUIS) 5 MG TABS tablet TAKE 1 TABLET TWICE A  tablet 0    furosemide (LASIX) 20 MG tablet TAKE 1 TABLET BY MOUTH 2 TIMES DAILY AS NEEDED (SWELLING) 60 tablet 1    doxazosin (CARDURA) 4 MG tablet TAKE 1 TABLET BY MOUTH EVERY DAY 30 tablet 1    atorvastatin (LIPITOR) 20 MG tablet TAKE 1 TABLET NIGHTLY 90 tablet 3    Multiple Vitamins-Minerals (THERAPEUTIC MULTIVITAMIN-MINERALS) tablet Take 1 tablet by mouth daily      acetaminophen (TYLENOL) 325 MG tablet Take 650 mg by mouth every 6 hours as needed for Pain       No current facility-administered medications for this visit. Review of Systems  As above, otherwise neg    There are no changes to past medical history, family history, social history or review of systems(except as noted in the history section) since prior note (all reviewed with patient). .  Objective:   Physical Exam  Vitals:    04/28/22 1545   BP: 138/75   Pulse: 60   Resp: 18         General: elderly female healthy appearing  Awake, alert and oriented. Appears to be not in any distress  Mucous Membranes:  Pink , anicteric  Neck: No JVD, no carotid bruit, no thyromegaly  Chest:  Clear to auscultation bilaterally, no added sounds  Cardiovascular:  Irregular , S1S2 heard, no murmurs or gallops  Abdomen:  Soft, undistended, non tender, no organomegaly, BS present  Extremities:. Trace charles pedal edema   Distal pulses well felt  Neurological : grossly normal       ECHO 2021   Left ventricular systolic function is normal with ejection fraction   estimated at 55-60 %. No regional wall motion abnormalities are noted. Elevated left ventricular diastolic filling pressure: Septal E/e'' = 12.6 . The left atrium is severely dilated. Ascending aorta is mildly dilated at   3.8cm. The right atrium is severely dilated. The right ventricle is mildly enlarged. Mild anterior and posterior mitral annular calcification is present. Moderate mitral regurgitation. Aortic valve sclerosis without aortic stenosis. Moderate aortic regurgitation is present. Moderate tricuspid regurgitation. Systolic pulmonary artery pressure (SPAP) estimated at 53 mmHg (RA pressure   15 mmHg), consistent with moderate pulmonary hypertension. 9/22/2015 55-60 LAE, BRII, trace MR< mild av sclerosis, mild AI    ECHO 1/22    Atrial fibrillation with a ventricular rate of 74 beats per minute. The left ventricular wall motion is normal.   The left atrium is dilated.    Right ventricular systolic pressure is mildly elevated at 35-45 mmHg. Overall left ventricular ejection fraction is estimated to be 55-60%. There is mild to moderate mitral regurgitation. Mild tricuspid regurgitation is present. Mild aortic regurgitation. Wt Readings from Last 3 Encounters:   04/28/22 203 lb (92.1 kg)   10/19/21 202 lb (91.6 kg)   05/05/21 212 lb (96.2 kg)         Assessment:         Diagnosis Orders   1. Benign essential HTN     2. Chronic diastolic congestive heart failure (HCC)     3. Permanent atrial fibrillation (HCC)     4. Stage 3a chronic kidney disease ([facility]     5. Pulmonary HTN (HCC)         Plan:           HTN - improving with current regimen - on coreg 12.5 mg bid  Hyzaar, cardura and lasix   Off norvasc for pedal edema    Moderate AR and TR   Moderate pulm HTN  Chronic diastolic CHF   - medical mx for now  - fw Dr. Tan Hunt at [facility]  - on lasix 20 mg bid   Well compensated   Monitor weights and low salt diet  Can add aldactone if needed       CAD - f/w Dr. Tan Hunt. On , statins- remains asymptomatic   Stress test neg 7/18      H.o CVA - 2007 - off ASA for being on eliquis . No issues     Afibb, chronic and permanent -rate cotnrolled.  On coreg bid, ELIQUIS  No bleeding issues    Hyperlipidemia - on statins- need lipids when fasting    Gout - with no recurrence - continue allopurinol     avoid falls  Had covid vaccines      F/w in 3-6 months

## 2022-05-10 ENCOUNTER — TELEPHONE (OUTPATIENT)
Dept: INTERNAL MEDICINE CLINIC | Age: 82
End: 2022-05-10

## 2022-05-10 NOTE — TELEPHONE ENCOUNTER
----- Message from Andreina Venegas sent at 5/9/2022  4:33 PM EDT -----  Contact: [phone] (H)  Waiting on Dr. Dianne Villafuerte to sign.  ----- Message -----  From: Nelly Gipson MD  Sent: 5/9/2022   4:26 PM EDT  To: Keshia Bazzi do  ----- Message -----  From: Saul Leiva  Sent: 5/9/2022   3:02 PM EDT  To: Nelly Gipson MD    Pt's daughter called and stated that she needs a new prescription for a handicap placard.     Thank you

## 2022-05-12 RX ORDER — FUROSEMIDE 20 MG/1
20 TABLET ORAL 2 TIMES DAILY PRN
Qty: 60 TABLET | Refills: 0 | Status: ON HOLD | OUTPATIENT
Start: 2022-05-12 | End: 2022-06-15 | Stop reason: HOSPADM

## 2022-06-05 ENCOUNTER — APPOINTMENT (OUTPATIENT)
Dept: GENERAL RADIOLOGY | Age: 82
DRG: 682 | End: 2022-06-05
Payer: MEDICARE

## 2022-06-05 ENCOUNTER — HOSPITAL ENCOUNTER (INPATIENT)
Age: 82
LOS: 7 days | Discharge: ANOTHER HOSPITAL | DRG: 682 | End: 2022-06-14
Attending: EMERGENCY MEDICINE | Admitting: INTERNAL MEDICINE
Payer: MEDICARE

## 2022-06-05 DIAGNOSIS — E83.42 HYPOMAGNESEMIA: ICD-10-CM

## 2022-06-05 DIAGNOSIS — M25.512 ACUTE PAIN OF LEFT SHOULDER: ICD-10-CM

## 2022-06-05 DIAGNOSIS — N17.9 AKI (ACUTE KIDNEY INJURY) (HCC): Primary | ICD-10-CM

## 2022-06-05 LAB
A/G RATIO: 1.9 (ref 1.1–2.2)
ALBUMIN SERPL-MCNC: 4 G/DL (ref 3.4–5)
ALP BLD-CCNC: 59 U/L (ref 40–129)
ALT SERPL-CCNC: 13 U/L (ref 10–40)
ANION GAP SERPL CALCULATED.3IONS-SCNC: 11 MMOL/L (ref 3–16)
AST SERPL-CCNC: 24 U/L (ref 15–37)
BACTERIA: ABNORMAL /HPF
BASOPHILS ABSOLUTE: 0 K/UL (ref 0–0.2)
BASOPHILS RELATIVE PERCENT: 0.1 %
BILIRUB SERPL-MCNC: 1.2 MG/DL (ref 0–1)
BILIRUBIN URINE: NEGATIVE
BLOOD, URINE: ABNORMAL
BUN BLDV-MCNC: 51 MG/DL (ref 7–20)
CALCIUM SERPL-MCNC: 8.7 MG/DL (ref 8.3–10.6)
CHLORIDE BLD-SCNC: 94 MMOL/L (ref 99–110)
CLARITY: CLEAR
CO2: 24 MMOL/L (ref 21–32)
COLOR: YELLOW
CREAT SERPL-MCNC: 1.7 MG/DL (ref 0.6–1.2)
EKG ATRIAL RATE: 74 BPM
EKG DIAGNOSIS: NORMAL
EKG P-R INTERVAL: 128 MS
EKG Q-T INTERVAL: 366 MS
EKG QRS DURATION: 78 MS
EKG QTC CALCULATION (BAZETT): 406 MS
EKG R AXIS: 77 DEGREES
EKG T AXIS: 65 DEGREES
EKG VENTRICULAR RATE: 74 BPM
EOSINOPHILS ABSOLUTE: 0 K/UL (ref 0–0.6)
EOSINOPHILS RELATIVE PERCENT: 0 %
EPITHELIAL CELLS, UA: ABNORMAL /HPF (ref 0–5)
GFR AFRICAN AMERICAN: 35
GFR NON-AFRICAN AMERICAN: 29
GLUCOSE BLD-MCNC: 126 MG/DL (ref 70–99)
GLUCOSE BLD-MCNC: 134 MG/DL (ref 70–99)
GLUCOSE URINE: NEGATIVE MG/DL
HCT VFR BLD CALC: 32.8 % (ref 36–48)
HEMOGLOBIN: 11.4 G/DL (ref 12–16)
INFLUENZA A: NOT DETECTED
INFLUENZA B: NOT DETECTED
KETONES, URINE: NEGATIVE MG/DL
LEUKOCYTE ESTERASE, URINE: ABNORMAL
LYMPHOCYTES ABSOLUTE: 0.3 K/UL (ref 1–5.1)
LYMPHOCYTES RELATIVE PERCENT: 3.4 %
MAGNESIUM: 1.1 MG/DL (ref 1.8–2.4)
MCH RBC QN AUTO: 33.2 PG (ref 26–34)
MCHC RBC AUTO-ENTMCNC: 34.8 G/DL (ref 31–36)
MCV RBC AUTO: 95.4 FL (ref 80–100)
MICROSCOPIC EXAMINATION: YES
MONOCYTES ABSOLUTE: 0.6 K/UL (ref 0–1.3)
MONOCYTES RELATIVE PERCENT: 6.5 %
NEUTROPHILS ABSOLUTE: 7.8 K/UL (ref 1.7–7.7)
NEUTROPHILS RELATIVE PERCENT: 90 %
NITRITE, URINE: NEGATIVE
PDW BLD-RTO: 14.3 % (ref 12.4–15.4)
PERFORMED ON: ABNORMAL
PH UA: 6 (ref 5–8)
PLATELET # BLD: 129 K/UL (ref 135–450)
PMV BLD AUTO: 7.1 FL (ref 5–10.5)
POTASSIUM REFLEX MAGNESIUM: 3.4 MMOL/L (ref 3.5–5.1)
PROTEIN UA: 100 MG/DL
RBC # BLD: 3.44 M/UL (ref 4–5.2)
RBC UA: ABNORMAL /HPF (ref 0–4)
SARS-COV-2 RNA, RT PCR: NOT DETECTED
SODIUM BLD-SCNC: 129 MMOL/L (ref 136–145)
SPECIFIC GRAVITY UA: 1.01 (ref 1–1.03)
TOTAL PROTEIN: 6.1 G/DL (ref 6.4–8.2)
TROPONIN: <0.01 NG/ML
URINE REFLEX TO CULTURE: YES
URINE TYPE: ABNORMAL
UROBILINOGEN, URINE: 0.2 E.U./DL
WBC # BLD: 8.7 K/UL (ref 4–11)
WBC UA: >100 /HPF (ref 0–5)

## 2022-06-05 PROCEDURE — 84484 ASSAY OF TROPONIN QUANT: CPT

## 2022-06-05 PROCEDURE — 85025 COMPLETE CBC W/AUTO DIFF WBC: CPT

## 2022-06-05 PROCEDURE — 6370000000 HC RX 637 (ALT 250 FOR IP): Performed by: NURSE PRACTITIONER

## 2022-06-05 PROCEDURE — 99285 EMERGENCY DEPT VISIT HI MDM: CPT

## 2022-06-05 PROCEDURE — 96361 HYDRATE IV INFUSION ADD-ON: CPT

## 2022-06-05 PROCEDURE — G0378 HOSPITAL OBSERVATION PER HR: HCPCS

## 2022-06-05 PROCEDURE — 93005 ELECTROCARDIOGRAM TRACING: CPT | Performed by: EMERGENCY MEDICINE

## 2022-06-05 PROCEDURE — 93010 ELECTROCARDIOGRAM REPORT: CPT | Performed by: INTERNAL MEDICINE

## 2022-06-05 PROCEDURE — 87636 SARSCOV2 & INF A&B AMP PRB: CPT

## 2022-06-05 PROCEDURE — 87077 CULTURE AEROBIC IDENTIFY: CPT

## 2022-06-05 PROCEDURE — 73030 X-RAY EXAM OF SHOULDER: CPT

## 2022-06-05 PROCEDURE — 71045 X-RAY EXAM CHEST 1 VIEW: CPT

## 2022-06-05 PROCEDURE — 6360000002 HC RX W HCPCS: Performed by: NURSE PRACTITIONER

## 2022-06-05 PROCEDURE — 81001 URINALYSIS AUTO W/SCOPE: CPT

## 2022-06-05 PROCEDURE — 87086 URINE CULTURE/COLONY COUNT: CPT

## 2022-06-05 PROCEDURE — 6360000002 HC RX W HCPCS: Performed by: EMERGENCY MEDICINE

## 2022-06-05 PROCEDURE — 96366 THER/PROPH/DIAG IV INF ADDON: CPT

## 2022-06-05 PROCEDURE — 80053 COMPREHEN METABOLIC PANEL: CPT

## 2022-06-05 PROCEDURE — 96365 THER/PROPH/DIAG IV INF INIT: CPT

## 2022-06-05 PROCEDURE — 2580000003 HC RX 258: Performed by: NURSE PRACTITIONER

## 2022-06-05 PROCEDURE — 83735 ASSAY OF MAGNESIUM: CPT

## 2022-06-05 PROCEDURE — 87186 SC STD MICRODIL/AGAR DIL: CPT

## 2022-06-05 PROCEDURE — 2580000003 HC RX 258: Performed by: EMERGENCY MEDICINE

## 2022-06-05 PROCEDURE — 36415 COLL VENOUS BLD VENIPUNCTURE: CPT

## 2022-06-05 RX ORDER — SODIUM CHLORIDE 0.9 % (FLUSH) 0.9 %
5-40 SYRINGE (ML) INJECTION PRN
Status: DISCONTINUED | OUTPATIENT
Start: 2022-06-05 | End: 2022-06-14 | Stop reason: HOSPADM

## 2022-06-05 RX ORDER — SODIUM CHLORIDE 9 MG/ML
INJECTION, SOLUTION INTRAVENOUS PRN
Status: DISCONTINUED | OUTPATIENT
Start: 2022-06-05 | End: 2022-06-14 | Stop reason: HOSPADM

## 2022-06-05 RX ORDER — 0.9 % SODIUM CHLORIDE 0.9 %
1000 INTRAVENOUS SOLUTION INTRAVENOUS ONCE
Status: COMPLETED | OUTPATIENT
Start: 2022-06-05 | End: 2022-06-05

## 2022-06-05 RX ORDER — ONDANSETRON 2 MG/ML
4 INJECTION INTRAMUSCULAR; INTRAVENOUS EVERY 6 HOURS PRN
Status: DISCONTINUED | OUTPATIENT
Start: 2022-06-05 | End: 2022-06-14 | Stop reason: HOSPADM

## 2022-06-05 RX ORDER — POLYETHYLENE GLYCOL 3350 17 G/17G
17 POWDER, FOR SOLUTION ORAL DAILY PRN
Status: DISCONTINUED | OUTPATIENT
Start: 2022-06-05 | End: 2022-06-14 | Stop reason: HOSPADM

## 2022-06-05 RX ORDER — ATORVASTATIN CALCIUM 10 MG/1
20 TABLET, FILM COATED ORAL NIGHTLY
Status: DISCONTINUED | OUTPATIENT
Start: 2022-06-05 | End: 2022-06-14 | Stop reason: HOSPADM

## 2022-06-05 RX ORDER — ACETAMINOPHEN 160 MG
2000 TABLET,DISINTEGRATING ORAL DAILY
COMMUNITY

## 2022-06-05 RX ORDER — ACETAMINOPHEN 325 MG/1
650 TABLET ORAL EVERY 6 HOURS PRN
Status: DISCONTINUED | OUTPATIENT
Start: 2022-06-05 | End: 2022-06-14 | Stop reason: HOSPADM

## 2022-06-05 RX ORDER — SODIUM CHLORIDE 9 MG/ML
INJECTION, SOLUTION INTRAVENOUS CONTINUOUS
Status: DISCONTINUED | OUTPATIENT
Start: 2022-06-05 | End: 2022-06-07

## 2022-06-05 RX ORDER — SODIUM CHLORIDE 0.9 % (FLUSH) 0.9 %
5-40 SYRINGE (ML) INJECTION EVERY 12 HOURS SCHEDULED
Status: DISCONTINUED | OUTPATIENT
Start: 2022-06-05 | End: 2022-06-14 | Stop reason: HOSPADM

## 2022-06-05 RX ORDER — M-VIT,TX,IRON,MINS/CALC/FOLIC 27MG-0.4MG
1 TABLET ORAL DAILY
Status: DISCONTINUED | OUTPATIENT
Start: 2022-06-05 | End: 2022-06-14 | Stop reason: HOSPADM

## 2022-06-05 RX ORDER — ALLOPURINOL 100 MG/1
100 TABLET ORAL 2 TIMES DAILY
Status: DISCONTINUED | OUTPATIENT
Start: 2022-06-05 | End: 2022-06-14 | Stop reason: HOSPADM

## 2022-06-05 RX ORDER — MAGNESIUM SULFATE 1 G/100ML
1000 INJECTION INTRAVENOUS ONCE
Status: COMPLETED | OUTPATIENT
Start: 2022-06-05 | End: 2022-06-05

## 2022-06-05 RX ORDER — CARVEDILOL 6.25 MG/1
12.5 TABLET ORAL ONCE
Status: DISCONTINUED | OUTPATIENT
Start: 2022-06-05 | End: 2022-06-13 | Stop reason: ALTCHOICE

## 2022-06-05 RX ORDER — ACETAMINOPHEN 650 MG/1
650 SUPPOSITORY RECTAL EVERY 6 HOURS PRN
Status: DISCONTINUED | OUTPATIENT
Start: 2022-06-05 | End: 2022-06-14 | Stop reason: HOSPADM

## 2022-06-05 RX ORDER — CARVEDILOL 6.25 MG/1
12.5 TABLET ORAL 2 TIMES DAILY WITH MEALS
Status: DISCONTINUED | OUTPATIENT
Start: 2022-06-05 | End: 2022-06-14 | Stop reason: HOSPADM

## 2022-06-05 RX ORDER — TRAZODONE HYDROCHLORIDE 50 MG/1
50 TABLET ORAL NIGHTLY PRN
Status: DISCONTINUED | OUTPATIENT
Start: 2022-06-05 | End: 2022-06-14 | Stop reason: HOSPADM

## 2022-06-05 RX ORDER — MAGNESIUM SULFATE IN WATER 40 MG/ML
2000 INJECTION, SOLUTION INTRAVENOUS ONCE
Status: COMPLETED | OUTPATIENT
Start: 2022-06-05 | End: 2022-06-05

## 2022-06-05 RX ORDER — ONDANSETRON 4 MG/1
4 TABLET, ORALLY DISINTEGRATING ORAL EVERY 8 HOURS PRN
Status: DISCONTINUED | OUTPATIENT
Start: 2022-06-05 | End: 2022-06-14 | Stop reason: HOSPADM

## 2022-06-05 RX ADMIN — TRAZODONE HYDROCHLORIDE 50 MG: 50 TABLET ORAL at 20:53

## 2022-06-05 RX ADMIN — SODIUM CHLORIDE 1000 ML: 9 INJECTION, SOLUTION INTRAVENOUS at 09:53

## 2022-06-05 RX ADMIN — ATORVASTATIN CALCIUM 20 MG: 10 TABLET, FILM COATED ORAL at 20:50

## 2022-06-05 RX ADMIN — SODIUM CHLORIDE: 9 INJECTION, SOLUTION INTRAVENOUS at 18:16

## 2022-06-05 RX ADMIN — ACETAMINOPHEN 650 MG: 325 TABLET ORAL at 14:35

## 2022-06-05 RX ADMIN — MAGNESIUM SULFATE HEPTAHYDRATE 1000 MG: 1 INJECTION, SOLUTION INTRAVENOUS at 11:38

## 2022-06-05 RX ADMIN — APIXABAN 2.5 MG: 5 TABLET, FILM COATED ORAL at 20:50

## 2022-06-05 RX ADMIN — ALLOPURINOL 100 MG: 100 TABLET ORAL at 20:50

## 2022-06-05 RX ADMIN — MULTIPLE VITAMINS W/ MINERALS TAB 1 TABLET: TAB at 18:17

## 2022-06-05 RX ADMIN — MAGNESIUM SULFATE HEPTAHYDRATE 2000 MG: 40 INJECTION, SOLUTION INTRAVENOUS at 18:23

## 2022-06-05 RX ADMIN — SODIUM CHLORIDE 1000 ML: 9 INJECTION, SOLUTION INTRAVENOUS at 11:37

## 2022-06-05 ASSESSMENT — ENCOUNTER SYMPTOMS
VOICE CHANGE: 0
NAUSEA: 0
FACIAL SWELLING: 0
COLOR CHANGE: 0
STRIDOR: 0
SHORTNESS OF BREATH: 1
TROUBLE SWALLOWING: 0
ABDOMINAL PAIN: 0
VOMITING: 0
WHEEZING: 0

## 2022-06-05 ASSESSMENT — PAIN - FUNCTIONAL ASSESSMENT: PAIN_FUNCTIONAL_ASSESSMENT: NONE - DENIES PAIN

## 2022-06-05 ASSESSMENT — LIFESTYLE VARIABLES
HOW MANY STANDARD DRINKS CONTAINING ALCOHOL DO YOU HAVE ON A TYPICAL DAY: 1 OR 2
HOW OFTEN DO YOU HAVE A DRINK CONTAINING ALCOHOL: NEVER

## 2022-06-05 NOTE — ED NOTES
Update given to [facility] about patient's complaints of shoulder pain     Mindy Nix RN  06/05/22 4247

## 2022-06-05 NOTE — ED PROVIDER NOTES
[facility] [place]  eMERGENCY dEPARTMENT eNCOUnter      Pt Name: Gail Dow  MRN: [id number]  [place] 1940  Date of evaluation: 6/5/2022  Provider: Michelle Osman MD    CHIEF COMPLAINT       Chief Complaint   Patient presents with    Hypotension     Pt from home home via EMS with hypotension. Pt told EMS that her BP is normally 876 systolic and today she was 794 systolic         HISTORY OF PRESENT ILLNESS   (Location/Symptom, Timing/Onset, Context/Setting, Quality, Duration, Modifying Factors, Severity)  Note limiting factors. Gail Dow is a 80 y.o. female with hx of A. fib on Eliquis as well as hypertension on losartan hydrochlorothiazide, carvedilol, and Lasix he reports he took all of her normal medications this morning occluding her antihypertensives and they noticed that her blood pressure was 738 systolic which is lower than usual.  She reports her systolic is normally 536. Patient denies any chest pain but does report mild shortness of breath. She denies any hemoptysis leg swelling fever or cough. She was her symptoms are moderate constant and unchanged. She denies any known aggravating or alleviating factors. She denies any lightheadedness or syncope. HPI    Nursing Notes were reviewed. REVIEW OFSYSTEMS    (2-9 systems for level 4, 10 or more for level 5)     Review of Systems   Constitutional: Negative for appetite change, fever and unexpected weight change. HENT: Negative for facial swelling, trouble swallowing and voice change. Eyes: Negative for visual disturbance. Respiratory: Positive for shortness of breath. Negative for wheezing and stridor. Cardiovascular: Negative for chest pain and palpitations. Gastrointestinal: Negative for abdominal pain, nausea and vomiting. Genitourinary: Negative for dysuria and vaginal bleeding. Musculoskeletal: Negative for neck pain and neck stiffness. Skin: Negative for color change and wound. Neurological: Negative for seizures and syncope. Psychiatric/Behavioral: Negative for self-injury and suicidal ideas. Except as noted above the remainder of the review of systems was reviewed and negative.        PAST MEDICAL HISTORY     Past Medical History:   Diagnosis Date    Arthritis     Atrial fibrillation ([facility] [address]     CAD (coronary artery disease)     a-fib    Cerebral artery occlusion with cerebral infarction ([facility] [address] 2007    resolved w/ rehab    CHF (congestive heart failure) ([facility] [address]     CKD (chronic kidney disease)     Cutaneous skin tags 4/8/2013    Depression     Gout     Grief reaction 4/8/2013    Hyperlipidemia     Hypertension     Obesity     Osteoarthritis     Pulmonary nodules     Unspecified cerebral artery occlusion with cerebral infarction 01/2007         SURGICAL HISTORY       Past Surgical History:   Procedure Laterality Date    CARPAL TUNNEL RELEASE Bilateral     CARPAL TUNNEL RELEASE      CATARACT REMOVAL      both    CHOLECYSTECTOMY      EYE SURGERY      JOINT REPLACEMENT Right     knee    KNEE ARTHROSCOPY Right     fluid removal    KNEE ARTHROSCOPY      RIGHT    OTHER SURGICAL HISTORY Left 02/27/2013    Left myringotomy, left PE tube insertion    OTHER SURGICAL HISTORY Bilateral 2/24/14    EXCISION OF RT NECK AND LEFT TEMPLE LESIONS    NC TOTAL KNEE ARTHROPLASTY Right 8/1/2018    RIGHT TOTAL KNEE MAKOPLASTY WITH PLATELET RICH PLASMA, ADDUCTOR CANAL BLOCK FOR PAIN CONTROL                    MAKOPLASTY [employer]  CPT CODE - 57991 performed by Melva Raza MD at [facility] Left 9/25/2019    LEFT TOTAL KNEE MAKOPLASTY WITH ADDUCTOR CANAL [facility] performed by Melva Rzaa MD at [address] [facility]       Current Discharge Medication List      CONTINUE these medications which have NOT CHANGED    Details   Cholecalciferol (VITAMIN D3) 50 MCG (2000 UT) CAPS Take 2,000 Units by mouth daily furosemide (LASIX) 20 MG tablet TAKE 1 TABLET BY MOUTH 2 TIMES DAILY AS NEEDED (SWELLING)  Qty: 60 tablet, Refills: 0      carvedilol (COREG) 12.5 MG tablet TAKE 2 TABLETS TWICE A DAY WITH MEALS  Qty: 360 tablet, Refills: 0      losartan-hydroCHLOROthiazide (HYZAAR) 100-25 MG per tablet TAKE 1 TABLET DAILY  Qty: 90 tablet, Refills: 0      allopurinol (ZYLOPRIM) 100 MG tablet TAKE 1 TABLET TWICE A DAY  Qty: 180 tablet, Refills: 0      traZODone (DESYREL) 50 MG tablet TAKE 1 TABLET NIGHTLY  Qty: 90 tablet, Refills: 0      apixaban (ELIQUIS) 5 MG TABS tablet TAKE 1 TABLET TWICE A DAY  Qty: 180 tablet, Refills: 0      doxazosin (CARDURA) 4 MG tablet TAKE 1 TABLET BY MOUTH EVERY DAY  Qty: 30 tablet, Refills: 1      atorvastatin (LIPITOR) 20 MG tablet TAKE 1 TABLET NIGHTLY  Qty: 90 tablet, Refills: 3      Multiple Vitamins-Minerals (THERAPEUTIC MULTIVITAMIN-MINERALS) tablet Take 1 tablet by mouth daily      acetaminophen (TYLENOL) 325 MG tablet Take 650 mg by mouth every 6 hours as needed for Pain             ALLERGIES     Hydrocodone, Hydrocodone bitartrate, Lisinopril, Hydrocodone, and Naproxen    FAMILY HISTORY       Family History   Problem Relation Age of Onset    Depression Mother     Arthritis Father     Asthma Father     High Blood Pressure Father     High Cholesterol Father     High Blood Pressure Sister     High Cholesterol Sister     Arthritis Sister     High Blood Pressure Brother     Hearing Loss Brother     High Cholesterol Brother     Learning Disabilities Brother     Mental Illness Brother     Asthma Brother     Arthritis Brother           SOCIAL HISTORY       Social History     Socioeconomic History    Marital status:       Spouse name: None    Number of children: None    Years of education: None    Highest education level: None   Occupational History    None   Tobacco Use    Smoking status: Never Smoker    Smokeless tobacco: Never Used   Vaping Use    Vaping Use: Never used Substance and Sexual Activity    Alcohol use: No    Drug use: No    Sexual activity: Not Currently   Other Topics Concern    None   Social History Narrative    ** Merged History Encounter **          Social Determinants of Health     Financial Resource Strain:     Difficulty of Paying Living Expenses: Not on file   Food Insecurity:     Worried About Running Out of Food in the Last Year: Not on file    Teodora of Food in the Last Year: Not on file   Transportation Needs:     Lack of Transportation (Medical): Not on file    Lack of Transportation (Non-Medical): Not on file   Physical Activity:     Days of Exercise per Week: Not on file    Minutes of Exercise per Session: Not on file   Stress:     Feeling of Stress : Not on file   Social Connections:     Frequency of Communication with Friends and Family: Not on file    Frequency of Social Gatherings with Friends and Family: Not on file    Attends Cheondo[employer] Services: Not on file    Active Member of [address] [place] [address] or Organizations: Not on file    Attends Club or Organization Meetings: Not on file    Marital Status: Not on file   Intimate Partner Violence:     Fear of Current or Ex-Partner: Not on file    Emotionally Abused: Not on file    Physically Abused: Not on file    Sexually Abused: Not on file   Housing Stability:     Unable to Pay for Housing in the Last Year: Not on file    Number of [place] in the Last Year: Not on file    Unstable Housing in the Last Year: Not on file         PHYSICAL EXAM    (up to 7 for level 4, 8 or more for level 5)     ED Triage Vitals [06/05/22 0939]   BP Temp Temp Source Heart Rate Resp SpO2 Height Weight   (!) 104/50 98.4 °F (36.9 °C) Oral 80 16 96 % 5' 4\" (1.626 m) 189 lb (85.7 kg)       Physical Exam  Vitals and nursing note reviewed. Constitutional:       Appearance: She is well-developed. She is not diaphoretic. HENT:      Head: Normocephalic and atraumatic.       Right Ear: External ear normal.      Left Ear: External ear normal.   Eyes:      Conjunctiva/sclera: Conjunctivae normal.   Neck:      Vascular: No JVD. Trachea: No tracheal deviation. Cardiovascular:      Rate and Rhythm: Normal rate. Pulmonary:      Effort: Pulmonary effort is normal. No respiratory distress. Breath sounds: Normal breath sounds. No wheezing. Comments: Lungs clear to auscultation  Abdominal:      General: There is no distension. Palpations: Abdomen is soft. Tenderness: There is no abdominal tenderness. There is no guarding or rebound. Musculoskeletal:         General: No tenderness or deformity. Normal range of motion. Cervical back: Neck supple. Skin:     General: Skin is warm and dry. Neurological:      General: No focal deficit present. Mental Status: She is alert and oriented to person, place, and time. Cranial Nerves: No cranial nerve deficit. Comments: Normal speech and mental status. Sensation intact in all 4 extremities. Symmetric smile. DIAGNOSTIC RESULTS       RADIOLOGY:     Interpretation per the Radiologist below, if available at the time of this note:    XR SHOULDER LEFT (MIN 2 VIEWS)   Preliminary Result   1. Subtle linear discontinuity at the base of the greater tuberosity with   mild sclerosis. If patient has had recent trauma, differential would include   a subacute nondisplaced fracture. 2. Mild acromioclavicular degenerative changes. XR CHEST PORTABLE   Final Result   No acute cardiopulmonary disease.          NM LUNG SCAN PERFUSION ONLY    (Results Pending)         ED BEDSIDE ULTRASOUND:   Performed by ED Physician - none    LABS:  Labs Reviewed   CBC WITH AUTO DIFFERENTIAL - Abnormal; Notable for the following components:       Result Value    RBC 3.44 (*)     Hemoglobin 11.4 (*)     Hematocrit 32.8 (*)     Platelets 364 (*)     Neutrophils Absolute 7.8 (*)     Lymphocytes Absolute 0.3 (*)     All other components within normal limits COMPREHENSIVE METABOLIC PANEL W/ REFLEX TO MG FOR LOW K - Abnormal; Notable for the following components:    Sodium 129 (*)     Potassium reflex Magnesium 3.4 (*)     Chloride 94 (*)     Glucose 126 (*)     BUN 51 (*)     CREATININE 1.7 (*)     GFR Non- 29 (*)     GFR  35 (*)     Total Protein 6.1 (*)     Total Bilirubin 1.2 (*)     All other components within normal limits   MAGNESIUM - Abnormal; Notable for the following components:    Magnesium 1.10 (*)     All other components within normal limits   COVID-19 & INFLUENZA COMBO   TROPONIN   URINALYSIS WITH REFLEX TO CULTURE       All otherlabs were within normal range or not returned as of this dictation. EMERGENCY DEPARTMENT COURSE and DIFFERENTIAL DIAGNOSIS/MDM:   Vitals:    Vitals:    06/05/22 1302 06/05/22 1332 06/05/22 1402 06/05/22 1451   BP: 119/74 127/68 (!) 140/87 108/64   Pulse: 74 89 89 74   Resp: 28 22 18 18   Temp:    99.3 °F (37.4 °C)   TempSrc:    Oral   SpO2: 98% 97% 97% 95%   Weight:    208 lb (94.3 kg)   Height:    5' 5\" (1.651 m)         Is this patient to be included in the SEP-1 Core Measure due to severe sepsis or septic shock? No   Exclusion criteria - the patient is NOT to be included for SEP-1 Core Measure due to:  2+ SIRS criteria are not met      MDM  Labs show acute kidney injury with 50% increase in creatinine from baseline as well as hypomagnesemia with magnesium of 1.1. Patient does report mild decreased appetite and reports that she has been trying to lose weight recently and denies any vomiting or diarrhea. We have discussed disposition plan with patient and family. Patient lives by herself and family for more comfortable with her being admitted. Based on lab abnormalities I feel patient is appropriate for admission for IV rehydration, electrolyte replacement, and monitoring of labs.   Patient family expressed understanding and agreement with this plan the patient was admitted for further care.    After patient was admitted but during her boarding time in the emergency department she does report continued shortness of breath and worsening left shoulder pain. Informed left shoulder is obtained and does show a subtle linear discontinue to be at the base of the greater tuberosity showing possible subacute nondisplaced fracture however patient denies recent trauma. Discussion of possible PE this had with the patient and family however CTA PE study is not appropriate at this time given patient's worsening kidney function therefore VQ scan is ordered. These new complaints and planned work-up is communicated with admitting team.    IP CONSULT TO HOSPITALIST    PROCEDURES:  Unless otherwise noted below, none     Critical Care  Performed by: Mark Perez MD  Authorized by: Mark Perez MD     Critical care provider statement:     Critical care time (minutes):  30    Critical care time was exclusive of:  Separately billable procedures and treating other patients and teaching time    Critical care was necessary to treat or prevent imminent or life-threatening deterioration of the following conditions:  Renal failure, dehydration and metabolic crisis    Critical care was time spent personally by me on the following activities:  Ordering and performing treatments and interventions, development of treatment plan with patient or surrogate, ordering and review of laboratory studies, discussions with consultants, ordering and review of radiographic studies, evaluation of patient's response to treatment, re-evaluation of patient's condition, examination of patient, review of old charts and obtaining history from patient or surrogate        FINAL IMPRESSION      1. OLY (acute kidney injury) ([facility] [address]    2. Hypomagnesemia    3.  Acute pain of left shoulder          DISPOSITION/PLAN   DISPOSITION Admitted 06/05/2022 12:24:31 PM           (Please note that portions of this note were completed with a voice recognition program.  Efforts were made to edit the dictations but occasionally words aremis-transcribed. )    Pat Perez MD (electronically signed)  Attending Emergency Physician           Pat Perez MD  06/05/22 1214       Pat Perez MD  06/05/22 [id number]

## 2022-06-05 NOTE — PROGRESS NOTES
Admit to med-surg, tele  OLY  Hypomagnesemia   Hypotension     [facility]ir[facility]  6/5/2022

## 2022-06-05 NOTE — PROGRESS NOTES
Pt alert and oriented. SR up x2,  Call light and bedside table within easy reach. Denies any needs at this time. Bedside report given to Basil Nelson RN. Care transferred.

## 2022-06-05 NOTE — ED TRIAGE NOTES
1217- Perfect serve sent to Dr. Richi Lai for admission and Kirstin Stout returned the call and spoke to

## 2022-06-05 NOTE — PROGRESS NOTES
Shift assessment complete - See flow sheet. Medications given - See [facility]         Patient with no complaints of pain at this time. Patient denies any further needs.    Bed alarm is set for patient safety, A/O with steady gait   Call light in reach

## 2022-06-06 ENCOUNTER — APPOINTMENT (OUTPATIENT)
Dept: NUCLEAR MEDICINE | Age: 82
DRG: 682 | End: 2022-06-06
Payer: MEDICARE

## 2022-06-06 LAB
A/G RATIO: 1.9 (ref 1.1–2.2)
ALBUMIN SERPL-MCNC: 3.4 G/DL (ref 3.4–5)
ALP BLD-CCNC: 48 U/L (ref 40–129)
ALT SERPL-CCNC: 11 U/L (ref 10–40)
ANION GAP SERPL CALCULATED.3IONS-SCNC: 12 MMOL/L (ref 3–16)
AST SERPL-CCNC: 19 U/L (ref 15–37)
BASOPHILS ABSOLUTE: 0 K/UL (ref 0–0.2)
BASOPHILS RELATIVE PERCENT: 0.1 %
BILIRUB SERPL-MCNC: 0.9 MG/DL (ref 0–1)
BUN BLDV-MCNC: 53 MG/DL (ref 7–20)
CALCIUM SERPL-MCNC: 8.5 MG/DL (ref 8.3–10.6)
CHLORIDE BLD-SCNC: 96 MMOL/L (ref 99–110)
CO2: 23 MMOL/L (ref 21–32)
CREAT SERPL-MCNC: 2 MG/DL (ref 0.6–1.2)
EOSINOPHILS ABSOLUTE: 0 K/UL (ref 0–0.6)
EOSINOPHILS RELATIVE PERCENT: 0.1 %
GFR AFRICAN AMERICAN: 29
GFR NON-AFRICAN AMERICAN: 24
GLUCOSE BLD-MCNC: 110 MG/DL (ref 70–99)
HCT VFR BLD CALC: 30.4 % (ref 36–48)
HEMOGLOBIN: 10.6 G/DL (ref 12–16)
LYMPHOCYTES ABSOLUTE: 0.5 K/UL (ref 1–5.1)
LYMPHOCYTES RELATIVE PERCENT: 5.1 %
MAGNESIUM: 1.8 MG/DL (ref 1.8–2.4)
MCH RBC QN AUTO: 33.1 PG (ref 26–34)
MCHC RBC AUTO-ENTMCNC: 34.8 G/DL (ref 31–36)
MCV RBC AUTO: 95.2 FL (ref 80–100)
MONOCYTES ABSOLUTE: 0.6 K/UL (ref 0–1.3)
MONOCYTES RELATIVE PERCENT: 6 %
NEUTROPHILS ABSOLUTE: 8.6 K/UL (ref 1.7–7.7)
NEUTROPHILS RELATIVE PERCENT: 88.7 %
PDW BLD-RTO: 14.8 % (ref 12.4–15.4)
PLATELET # BLD: 96 K/UL (ref 135–450)
PLATELET SLIDE REVIEW: ABNORMAL
PMV BLD AUTO: 7.4 FL (ref 5–10.5)
POTASSIUM REFLEX MAGNESIUM: 3.5 MMOL/L (ref 3.5–5.1)
RBC # BLD: 3.19 M/UL (ref 4–5.2)
SLIDE REVIEW: ABNORMAL
SODIUM BLD-SCNC: 131 MMOL/L (ref 136–145)
TOTAL PROTEIN: 5.2 G/DL (ref 6.4–8.2)
WBC # BLD: 9.7 K/UL (ref 4–11)

## 2022-06-06 PROCEDURE — G0378 HOSPITAL OBSERVATION PER HR: HCPCS

## 2022-06-06 PROCEDURE — 99223 1ST HOSP IP/OBS HIGH 75: CPT | Performed by: INTERNAL MEDICINE

## 2022-06-06 PROCEDURE — 96361 HYDRATE IV INFUSION ADD-ON: CPT

## 2022-06-06 PROCEDURE — 3430000000 HC RX DIAGNOSTIC RADIOPHARMACEUTICAL: Performed by: EMERGENCY MEDICINE

## 2022-06-06 PROCEDURE — 2580000003 HC RX 258: Performed by: INTERNAL MEDICINE

## 2022-06-06 PROCEDURE — 6360000002 HC RX W HCPCS: Performed by: INTERNAL MEDICINE

## 2022-06-06 PROCEDURE — 94640 AIRWAY INHALATION TREATMENT: CPT

## 2022-06-06 PROCEDURE — 36415 COLL VENOUS BLD VENIPUNCTURE: CPT

## 2022-06-06 PROCEDURE — 96367 TX/PROPH/DG ADDL SEQ IV INF: CPT

## 2022-06-06 PROCEDURE — A9540 TC99M MAA: HCPCS | Performed by: EMERGENCY MEDICINE

## 2022-06-06 PROCEDURE — 78582 LUNG VENTILAT&PERFUS IMAGING: CPT

## 2022-06-06 PROCEDURE — 6370000000 HC RX 637 (ALT 250 FOR IP): Performed by: INTERNAL MEDICINE

## 2022-06-06 PROCEDURE — 83735 ASSAY OF MAGNESIUM: CPT

## 2022-06-06 PROCEDURE — 6370000000 HC RX 637 (ALT 250 FOR IP): Performed by: NURSE PRACTITIONER

## 2022-06-06 PROCEDURE — A9558 XE133 XENON 10MCI: HCPCS | Performed by: EMERGENCY MEDICINE

## 2022-06-06 PROCEDURE — 80053 COMPREHEN METABOLIC PANEL: CPT

## 2022-06-06 PROCEDURE — 85025 COMPLETE CBC W/AUTO DIFF WBC: CPT

## 2022-06-06 RX ORDER — ALLOPURINOL 100 MG/1
TABLET ORAL
Qty: 180 TABLET | Refills: 0 | Status: SHIPPED | OUTPATIENT
Start: 2022-06-06 | End: 2022-09-06

## 2022-06-06 RX ORDER — IPRATROPIUM BROMIDE AND ALBUTEROL SULFATE 2.5; .5 MG/3ML; MG/3ML
1 SOLUTION RESPIRATORY (INHALATION) 4 TIMES DAILY
Status: DISCONTINUED | OUTPATIENT
Start: 2022-06-06 | End: 2022-06-09

## 2022-06-06 RX ORDER — IPRATROPIUM BROMIDE AND ALBUTEROL SULFATE 2.5; .5 MG/3ML; MG/3ML
1 SOLUTION RESPIRATORY (INHALATION) EVERY 4 HOURS PRN
Status: DISCONTINUED | OUTPATIENT
Start: 2022-06-06 | End: 2022-06-14 | Stop reason: HOSPADM

## 2022-06-06 RX ORDER — TRAZODONE HYDROCHLORIDE 50 MG/1
TABLET ORAL
Qty: 90 TABLET | Refills: 0 | Status: SHIPPED | OUTPATIENT
Start: 2022-06-06 | End: 2022-09-06

## 2022-06-06 RX ORDER — XENON XE-133 10 MCI/1
9.8 GAS RESPIRATORY (INHALATION)
Status: COMPLETED | OUTPATIENT
Start: 2022-06-06 | End: 2022-06-06

## 2022-06-06 RX ADMIN — ACETAMINOPHEN 650 MG: 325 TABLET ORAL at 15:02

## 2022-06-06 RX ADMIN — APIXABAN 2.5 MG: 5 TABLET, FILM COATED ORAL at 20:19

## 2022-06-06 RX ADMIN — Medication 6.1 MILLICURIE: at 12:15

## 2022-06-06 RX ADMIN — IPRATROPIUM BROMIDE AND ALBUTEROL SULFATE 1 AMPULE: .5; 2.5 SOLUTION RESPIRATORY (INHALATION) at 14:35

## 2022-06-06 RX ADMIN — MULTIPLE VITAMINS W/ MINERALS TAB 1 TABLET: TAB at 08:47

## 2022-06-06 RX ADMIN — APIXABAN 2.5 MG: 5 TABLET, FILM COATED ORAL at 08:46

## 2022-06-06 RX ADMIN — XENON XE-133 9.8 MILLICURIE: 10 GAS RESPIRATORY (INHALATION) at 12:00

## 2022-06-06 RX ADMIN — CARVEDILOL 12.5 MG: 6.25 TABLET, FILM COATED ORAL at 08:46

## 2022-06-06 RX ADMIN — ALLOPURINOL 100 MG: 100 TABLET ORAL at 20:19

## 2022-06-06 RX ADMIN — TRAZODONE HYDROCHLORIDE 50 MG: 50 TABLET ORAL at 20:19

## 2022-06-06 RX ADMIN — IPRATROPIUM BROMIDE AND ALBUTEROL SULFATE 1 AMPULE: .5; 2.5 SOLUTION RESPIRATORY (INHALATION) at 19:35

## 2022-06-06 RX ADMIN — CARVEDILOL 12.5 MG: 6.25 TABLET, FILM COATED ORAL at 17:20

## 2022-06-06 RX ADMIN — ALLOPURINOL 100 MG: 100 TABLET ORAL at 08:47

## 2022-06-06 RX ADMIN — ATORVASTATIN CALCIUM 20 MG: 10 TABLET, FILM COATED ORAL at 20:19

## 2022-06-06 RX ADMIN — CEFTRIAXONE SODIUM 1000 MG: 1 INJECTION, POWDER, FOR SOLUTION INTRAMUSCULAR; INTRAVENOUS at 10:53

## 2022-06-06 ASSESSMENT — PAIN SCALES - GENERAL: PAINLEVEL_OUTOF10: 0

## 2022-06-06 NOTE — H&P
[facility] Medicine History & Physical      PCP: Anurag Michael MD    Date of Admission: 6/5/2022    Date of Service: Pt seen/examined on 06/06/22     Chief Complaint:    Chief Complaint   Patient presents with    Hypotension     Pt from home home via EMS with hypotension. Pt told EMS that her BP is normally 244 systolic and today she was 801 systolic         History Of Present Illness: The patient is a 80 y.o. female PMH HTN, HLD, hyperthyroidism, afib on eliquis, CKD, CVA pulmonary nodules, moderate AR and TR, chronic diastolic CHF and bilateral knee OA who presents to [facility] with hypotension. History obtained from the patient and review of EMR. Patient states that she was feeling very weak yesterday. No true dizziness or syncope. She checked her blood pressures and systolics were around 303-271. This is low for her and she came to the emergency department. She has had some chills but no fevers. No nausea or vomiting ;  describes chronic dyspnea. She also complains of chronic left shoulder pain ongoing for years    In ED patient pt noted with OLY- creatinine 1.7, hypomagnesemia of 1.10. Pt was given 2 liter bolus in ED. Magnesium replaced. Troponin <0.01. After patient admitted she complained of shortness of breath and worsening left shoulder pain. X-ray obtained and showed subtle linear discontinuity at the base of the greater tuberosity with mild sclerosis. If the patient had recent trauma, which she denied in ED, differential would include subacute nondisplaced fracture. Mild acromioclavicular degenerative changes noted as well. VQ scan was ordered in the ED and patient was admitted for further care.        Past Medical History:        Diagnosis Date    Arthritis     Atrial fibrillation ([address]     CAD (coronary artery disease)     a-fib    Cerebral artery occlusion with cerebral infarction ([address] 2007    resolved w/ rehab    CHF (congestive heart failure) ([address]     CKD (chronic kidney disease)     Cutaneous skin tags 4/8/2013    Depression     Gout     Grief reaction 4/8/2013    Hyperlipidemia     Hypertension     Obesity     Osteoarthritis     Pulmonary nodules     Unspecified cerebral artery occlusion with cerebral infarction 01/2007       Past Surgical History:        Procedure Laterality Date    CARPAL TUNNEL RELEASE Bilateral     CARPAL TUNNEL RELEASE      CATARACT REMOVAL      both    CHOLECYSTECTOMY      EYE SURGERY      JOINT REPLACEMENT Right     knee    KNEE ARTHROSCOPY Right     fluid removal    KNEE ARTHROSCOPY      RIGHT    OTHER SURGICAL HISTORY Left 02/27/2013    Left myringotomy, left PE tube insertion    OTHER SURGICAL HISTORY Bilateral 2/24/14    EXCISION OF RT NECK AND LEFT TEMPLE LESIONS    UT TOTAL KNEE ARTHROPLASTY Right 8/1/2018    RIGHT TOTAL KNEE MAKOPLASTY WITH PLATELET RICH PLASMA, ADDUCTOR CANAL BLOCK FOR PAIN CONTROL                    MAKOPLASTY CHEPE  CPT CODE - 88138 performed by Felice Coy MD at [address] Left 9/25/2019    LEFT TOTAL KNEE MAKOPLASTY WITH ADDUCTOR CANAL BLOCK FOR PAIN CONTROL             CHEPE RAM[facility]N [facility]       Medications Prior to Admission:    Prior to Admission medications    Medication Sig Start Date End Date Taking?  Authorizing Provider   Cholecalciferol (VITAMIN D3) 50 MCG (2000 UT) CAPS Take 2,000 Units by mouth daily   Yes Historical Provider, MD   furosemide (LASIX) 20 MG tablet TAKE 1 TABLET BY MOUTH 2 TIMES DAILY AS NEEDED (SWELLING) 5/12/22   Jhonny Alexander MD   carvedilol (COREG) 12.5 MG tablet TAKE 2 TABLETS TWICE A DAY WITH MEALS  Patient taking differently: 6.25 mg 2 times daily (with meals) TAKE 2 TABLETS TWICE A DAY WITH MEALS 4/19/22   Jhonny Alexander MD   losartan-hydroCHLOROthiazide [facility]) 100-25 MG per tablet TAKE 1 TABLET DAILY 3/15/22   Jhonny Alexander MD   allopurinol (ZYLOPRIM) 100 MG tablet TAKE 1 TABLET TWICE A DAY 3/7/22   Bia Mackay MD   traZODone (DESYREL) 50 MG tablet TAKE 1 TABLET NIGHTLY 3/7/22   Bia Mackay MD   apixaban (ELIQUIS) 5 MG TABS tablet TAKE 1 TABLET TWICE A DAY 3/7/22   Bia Mackay MD   doxazosin (CARDURA) 4 MG tablet TAKE 1 TABLET BY MOUTH EVERY DAY  Patient taking differently: 2 mg 2 times daily Takes half a pill in the AM and half in the evening 1/31/22   Bia Mackay MD   atorvastatin (LIPITOR) 20 MG tablet TAKE 1 TABLET NIGHTLY 12/16/21   Bia Mackay MD   Multiple Vitamins-Minerals (THERAPEUTIC MULTIVITAMIN-MINERALS) tablet Take 1 tablet by mouth daily    Historical Provider, MD   acetaminophen (TYLENOL) 325 MG tablet Take 650 mg by mouth every 6 hours as needed for Pain    Historical Provider, MD       Allergies:  Hydrocodone, Hydrocodone bitartrate, Lisinopril, Hydrocodone, and Naproxen    Social History:  The patient currently lives home     TOBACCO:   reports that she has never smoked. She has never used smokeless tobacco.  ETOH:   reports no history of alcohol use.       Family History:   Positive as follows:        Problem Relation Age of Onset    Depression Mother     Arthritis Father     Asthma Father     High Blood Pressure Father     High Cholesterol Father     High Blood Pressure Sister     High Cholesterol Sister     Arthritis Sister     High Blood Pressure Brother     Hearing Loss Brother     High Cholesterol Brother     Learning Disabilities Brother     Mental Illness Brother     Asthma Brother     Arthritis Brother        REVIEW OF SYSTEMS:       Constitutional: Negative for fever   + weakness   HENT: Negative for sore throat   Eyes: Negative for redness   Respiratory: + dyspnea, no cough    Cardiovascular: Negative for chest pain   Gastrointestinal: Negative for vomiting, diarrhea   Genitourinary: Negative for hematuria   Musculoskeletal: Negative for arthralgias   Chronic left shoulder pain   Skin: Negative for rash Neurological: Negative for syncope   Hematological: Negative for adenopathy   Psychiatric/Behavorial: Negative for anxiety    PHYSICAL EXAM:    /63   Pulse 83   Temp 97.6 °F (36.4 °C) (Oral)   Resp 20   Ht 5' 5\" (1.651 m)   Wt 208 lb (94.3 kg)   SpO2 94%   BMI 34.61 kg/m²     Gen: No distress. Alert. Patient is awake alert oriented but looks tired and fatigued  Eyes: PERRL. No sclera icterus. No conjunctival injection. ENT: No discharge. Pharynx clear. Oral mucosa is dry  Neck: No JVD. No Carotid Bruit. Trachea midline. Resp: No accessory muscle use. No crackles. No wheezes. No rhonchi. Diminished breath sounds   CV: Irregular. No murmur. No rub. No edema. GI: Non-tender. Non-distended. No masses. No organomegaly. Normal bowel sounds. No hernia. Skin: Warm and dry. No nodule on exposed extremities. No rash on exposed extremities. M/S: No cyanosis. No joint deformity. No clubbing. Mild tenderness of left shoulder  Neuro: Awake. Grossly nonfocal    Psych: Oriented x 3. No anxiety or agitation. CBC:   Recent Labs     06/05/22  0950 06/06/22  0531   WBC 8.7 9.7   HGB 11.4* 10.6*   HCT 32.8* 30.4*   MCV 95.4 95.2   * 96*     BMP:   Recent Labs     06/05/22  0950 06/06/22  0530   * 131*   K 3.4* 3.5   CL 94* 96*   CO2 24 23   BUN 51* 53*   CREATININE 1.7* 2.0*     LIVER PROFILE:   Recent Labs     06/05/22  0950 06/06/22  0530   AST 24 19   ALT 13 11   BILITOT 1.2* 0.9   ALKPHOS 59 48     PT/INR: No results for input(s): PROTIME, INR in the last 72 hours. APTT: No results for input(s): APTT in the last 72 hours.   UA:  Recent Labs     06/05/22  1806   COLORU Yellow   PHUR 6.0   WBCUA >100*   RBCUA 5-10*   BACTERIA 4+*   CLARITYU Clear   SPECGRAV 1.010   LEUKOCYTESUR LARGE*   UROBILINOGEN 0.2   BILIRUBINUR Negative   BLOODU MODERATE*   GLUCOSEU Negative            CARDIAC ENZYMES  Recent Labs     06/05/22  0950   TROPONINI <0.01       U/A:    Lab Results   Component Value Date NITRITE positive 02/27/2017    COLORU Yellow 06/05/2022    WBCUA >100 06/05/2022    RBCUA 5-10 06/05/2022    MUCUS Rare 06/05/2018    BACTERIA 4+ 06/05/2022    CLARITYU Clear 06/05/2022    SPECGRAV 1.010 06/05/2022    LEUKOCYTESUR LARGE 06/05/2022    BLOODU MODERATE 06/05/2022    GLUCOSEU Negative 06/05/2022    GLUCOSEU NEGATIVE 04/20/2011       CULTURES   SARS-CoV-2 RNA, RT PCR NOT DETECTED      INFLUENZA A NOT DETECTED    INFLUENZA B NOT DETECTED         EKG:  I have reviewed the EKG with the following interpretation:   Atrial fibrillationBaseline artifactLow voltage QRSCannot rule out Anterior infarct , age undeterminedAbnormal ECGNo significant change was foundWhen compared with ECG of6.11.20Confirmed by RIDGE TAYLOR, [address] ([id number]) on 6/5/2022 8:54:02 PM    RADIOLOGY  XR SHOULDER LEFT (MIN 2 VIEWS)   Preliminary Result   1. Subtle linear discontinuity at the base of the greater tuberosity with   mild sclerosis. If patient has had recent trauma, differential would include   a subacute nondisplaced fracture. 2. Mild acromioclavicular degenerative changes. XR CHEST PORTABLE   Final Result   No acute cardiopulmonary disease. NM LUNG SCAN PERFUSION ONLY    (Results Pending)         Principal Problem:    OLY (acute kidney injury) ([address]  Resolved Problems:    * No resolved hospital problems. *        ASSESSMENT/PLAN:    Hypotension  - 2 liter IVF bolus given in ED  - Holding Lasix, Hyzaar and Cardura at this time  -Patient still looks dehydrated. Continue IV fluid hydration; monitor blood pressure and renal function    OLY on CKD  - baseline 0.9-1.1  - on admission 1.7---2.0 today  - likely prerenal   - holding Lasix, Hyzaar and Cardura   -Continue to hydrate gently and monitor .  monitor       Shortness of breath  - on room air  - VQ scan ordered     Left shoulder pain  - Xray showed subtle linear discontinuity at the base of the greater tuberosity with mild sclerosis- if patient has had recent trauma, differential would include a subacute nondisplaced fracture. Mild acromioclavicular degenerative changes   - no h/O trauma. Pain is chronic, over years   Left shoulder pain is chronic related to osteoarthritis    Hypomagnesemia  - 1.10 on admission  - replacement given now 1.80  - monitor      Hypokalemia  - repleted     UTI  Bacteriuria  - no dysuria. But does have weakness   - 4+ bacteria, >100 WBC 5-10 RBC, large about of leukocytes   - will start Rocephin D#1  - check cx    Chronic diastolic CHF  Moderate AR and TR  Moderate pulmonary HTN  - follows with Dr. Starla Decker at [address] [facility]  - pt is on Lasix 20 mg BID--holding at this time due to above  - appears  Dehydrated   - continue daily weights, low sodium diet, strict I/O    Atrial fibrillation  - EKG with controlled rate Afib on admission   - on eliquis   - continue coreg and monitor blood pressure     HLD  - Continue statin      Hx Hyperthyroidism    Hx of CVA- 2007  - continue Eliquis and statin        DVT Prophylaxis: Eliquis   Diet: ADULT DIET; Regular  Code Status: Full Code.            Jacinto Khan MD   6/6/2022

## 2022-06-06 NOTE — PROGRESS NOTES
Patient resting quietly in bed with eyes closed. Respirations easy and regular. Call light in reach.

## 2022-06-06 NOTE — PROGRESS NOTES
RT Inhaler-Nebulizer Bronchodilator Protocol Note    There is a bronchodilator order in the chart from a provider indicating to follow the RT Bronchodilator Protocol and there is an Initiate RT Inhaler-Nebulizer Bronchodilator Protocol order as well (see protocol at bottom of note). CXR Findings:  XR CHEST PORTABLE    Result Date: 6/5/2022  No acute cardiopulmonary disease. The findings from the last RT Protocol Assessment were as follows:   History Pulmonary Disease: (P) None or smoker <15 pack years  Respiratory Pattern: (P) Dyspnea on exertion or RR 21-25 bpm  Breath Sounds: (P) Intermittent or unilateral wheezes  Cough: (P) Strong, spontaneous, non-productive  Indication for Bronchodilator Therapy: (P) Decreased or absent breath sounds  Bronchodilator Assessment Score: (P) 6    Aerosolized bronchodilator medication orders have been revised according to the RT Inhaler-Nebulizer Bronchodilator Protocol below. Respiratory Therapist to perform RT Therapy Protocol Assessment initially then follow the protocol. Repeat RT Therapy Protocol Assessment PRN for score 0-3 or on second treatment, BID, and PRN for scores above 3. No Indications - adjust the frequency to every 6 hours PRN wheezing or bronchospasm, if no treatments needed after 48 hours then discontinue using Per Protocol order mode. If indication present, adjust the RT bronchodilator orders based on the Bronchodilator Assessment Score as indicated below. Use Inhaler orders unless patient has one or more of the following: on home nebulizer, not able to hold breath for 10 seconds, is not alert and oriented, cannot activate and use MDI correctly, or respiratory rate 25 breaths per minute or more, then use the equivalent nebulizer order(s) with same Frequency and PRN reasons based on the score. If a patient is on this medication at home then do not decrease Frequency below that used at home.     0-3 - enter or revise RT bronchodilator order(s) to equivalent RT Bronchodilator order with Frequency of every 4 hours PRN for wheezing or increased work of breathing using Per Protocol order mode. 4-6 - enter or revise RT Bronchodilator order(s) to two equivalent RT bronchodilator orders with one order with BID Frequency and one order with Frequency of every 4 hours PRN wheezing or increased work of breathing using Per Protocol order mode. 7-10 - enter or revise RT Bronchodilator order(s) to two equivalent RT bronchodilator orders with one order with TID Frequency and one order with Frequency of every 4 hours PRN wheezing or increased work of breathing using Per Protocol order mode. 11-13 - enter or revise RT Bronchodilator order(s) to one equivalent RT bronchodilator order with QID Frequency and an Albuterol order with Frequency of every 4 hours PRN wheezing or increased work of breathing using Per Protocol order mode. Greater than 13 - enter or revise RT Bronchodilator order(s) to one equivalent RT bronchodilator order with every 4 hours Frequency and an Albuterol order with Frequency of every 2 hours PRN wheezing or increased work of breathing using Per Protocol order mode.        Electronically signed by Chey Coleman RCP on 6/6/2022 at 2:42 PM

## 2022-06-06 NOTE — PLAN OF CARE
Problem: Discharge Planning  Goal: Discharge to home or other facility with appropriate resources  6/6/2022 1155 by Cony Wright RN  Outcome: Progressing  6/5/2022 2305 by Jose Linn RN  Outcome: Not Progressing  Flowsheets  Taken 6/5/2022 2045 by Jose Linn RN  Discharge to home or other facility with appropriate resources: Refer to discharge planning if patient needs post-hospital services based on physician order or complex needs related to functional status, cognitive ability or social support system  Taken 6/5/2022 1549 by Elise Barker RN  Discharge to home or other facility with appropriate resources: Refer to discharge planning if patient needs post-hospital services based on physician order or complex needs related to functional status, cognitive ability or social support system     Problem: Safety - Adult  Goal: Free from fall injury  6/6/2022 1155 by Cony Wright RN  Outcome: Progressing  Flowsheets (Taken 6/5/2022 2306 by Jose Linn RN)  Free From Fall Injury:   Instruct family/caregiver on patient safety   Based on caregiver fall risk screen, instruct family/caregiver to ask for assistance with transferring infant if caregiver noted to have fall risk factors  6/5/2022 2305 by Joes Linn RN  Outcome: Not Progressing     Problem: ABCDS Injury Assessment  Goal: Absence of physical injury  6/6/2022 1155 by Cony Wright RN  Outcome: Progressing  Flowsheets (Taken 6/5/2022 2306 by Jose Linn RN)  Absence of Physical Injury: Implement safety measures based on patient assessment  6/5/2022 2305 by Jose Linn RN  Outcome: Not Progressing

## 2022-06-06 NOTE — PROGRESS NOTES
RT Inhaler-Nebulizer Bronchodilator Protocol Note    There is a bronchodilator order in the chart from a provider indicating to follow the RT Bronchodilator Protocol and there is an Initiate RT Inhaler-Nebulizer Bronchodilator Protocol order as well (see protocol at bottom of note). CXR Findings:  XR CHEST PORTABLE    Result Date: 6/5/2022  No acute cardiopulmonary disease. The findings from the last RT Protocol Assessment were as follows:   History Pulmonary Disease: Chronic pulmonary disease  Respiratory Pattern: Dyspnea on exertion or RR 21-25 bpm  Breath Sounds: Intermittent or unilateral wheezes  Cough: Strong, spontaneous, non-productive  Indication for Bronchodilator Therapy: Decreased or absent breath sounds  Bronchodilator Assessment Score: 8    Aerosolized bronchodilator medication orders have been revised according to the RT Inhaler-Nebulizer Bronchodilator Protocol below. Respiratory Therapist to perform RT Therapy Protocol Assessment initially then follow the protocol. Repeat RT Therapy Protocol Assessment PRN for score 0-3 or on second treatment, BID, and PRN for scores above 3. No Indications - adjust the frequency to every 6 hours PRN wheezing or bronchospasm, if no treatments needed after 48 hours then discontinue using Per Protocol order mode. If indication present, adjust the RT bronchodilator orders based on the Bronchodilator Assessment Score as indicated below. Use Inhaler orders unless patient has one or more of the following: on home nebulizer, not able to hold breath for 10 seconds, is not alert and oriented, cannot activate and use MDI correctly, or respiratory rate 25 breaths per minute or more, then use the equivalent nebulizer order(s) with same Frequency and PRN reasons based on the score. If a patient is on this medication at home then do not decrease Frequency below that used at home.     0-3 - enter or revise RT bronchodilator order(s) to equivalent RT Bronchodilator order with Frequency of every 4 hours PRN for wheezing or increased work of breathing using Per Protocol order mode. 4-6 - enter or revise RT Bronchodilator order(s) to two equivalent RT bronchodilator orders with one order with BID Frequency and one order with Frequency of every 4 hours PRN wheezing or increased work of breathing using Per Protocol order mode. 7-10 - enter or revise RT Bronchodilator order(s) to two equivalent RT bronchodilator orders with one order with TID Frequency and one order with Frequency of every 4 hours PRN wheezing or increased work of breathing using Per Protocol order mode. 11-13 - enter or revise RT Bronchodilator order(s) to one equivalent RT bronchodilator order with QID Frequency and an Albuterol order with Frequency of every 4 hours PRN wheezing or increased work of breathing using Per Protocol order mode. Greater than 13 - enter or revise RT Bronchodilator order(s) to one equivalent RT bronchodilator order with every 4 hours Frequency and an Albuterol order with Frequency of every 2 hours PRN wheezing or increased work of breathing using Per Protocol order mode. Patient will benefit from treatments.     Electronically signed by Shania Rosado RCP on 6/6/2022 at 7:41 PM

## 2022-06-06 NOTE — PLAN OF CARE
Problem: Discharge Planning  Goal: Discharge to home or other facility with appropriate resources  Outcome: Not Progressing  Flowsheets  Taken 6/5/2022 2045 by Aimee Myrick RN  Discharge to home or other facility with appropriate resources: Refer to discharge planning if patient needs post-hospital services based on physician order or complex needs related to functional status, cognitive ability or social support system  Taken 6/5/2022 1549 by Laurence Lopez RN  Discharge to home or other facility with appropriate resources: Refer to discharge planning if patient needs post-hospital services based on physician order or complex needs related to functional status, cognitive ability or social support system     Problem: Safety - Adult  Goal: Free from fall injury  Outcome: Not Progressing     Problem: ABCDS Injury Assessment  Goal: Absence of physical injury  Outcome: Not Progressing

## 2022-06-06 NOTE — FLOWSHEET NOTE
06/06/22 0830   Vital Signs   Temp 97 °F (36.1 °C)   Temp Source Oral   Heart Rate 95   Heart Rate Source Monitor   Resp 18   BP (!) 110/58   BP Location Left upper arm   BP Method Automatic   MAP (Calculated) 75.33   Patient Position Semi fowlers   Level of Consciousness Alert (0)   MEWS Score 1   Oxygen Therapy   SpO2 95 %   O2 Device None (Room air)     Pt assessment completed , vss, see flow sheet. Pt alert and oriented. Pt given am medications, and denies any other needs at this time.  Dheeraj Cohen RN

## 2022-06-06 NOTE — PROGRESS NOTES
Shift assessment complete. Alert and oriented. Vital signs WNL. Patient denies any needs at this time. Bed in lowest position. Side rails up x2. Call light in reach.

## 2022-06-07 LAB
ANION GAP SERPL CALCULATED.3IONS-SCNC: 11 MMOL/L (ref 3–16)
BUN BLDV-MCNC: 58 MG/DL (ref 7–20)
CALCIUM SERPL-MCNC: 8.3 MG/DL (ref 8.3–10.6)
CHLORIDE BLD-SCNC: 99 MMOL/L (ref 99–110)
CO2: 17 MMOL/L (ref 21–32)
CREAT SERPL-MCNC: 2.1 MG/DL (ref 0.6–1.2)
GFR AFRICAN AMERICAN: 27
GFR NON-AFRICAN AMERICAN: 23
GLUCOSE BLD-MCNC: 116 MG/DL (ref 70–99)
GLUCOSE BLD-MCNC: 118 MG/DL (ref 70–99)
GLUCOSE BLD-MCNC: 153 MG/DL (ref 70–99)
HCT VFR BLD CALC: 30.9 % (ref 36–48)
HEMOGLOBIN: 9.9 G/DL (ref 12–16)
MAGNESIUM: 2 MG/DL (ref 1.8–2.4)
MCH RBC QN AUTO: 32.5 PG (ref 26–34)
MCHC RBC AUTO-ENTMCNC: 32 G/DL (ref 31–36)
MCV RBC AUTO: 101.7 FL (ref 80–100)
ORGANISM: ABNORMAL
PDW BLD-RTO: 15.4 % (ref 12.4–15.4)
PERFORMED ON: ABNORMAL
PERFORMED ON: ABNORMAL
PHOSPHORUS: 3 MG/DL (ref 2.5–4.9)
PLATELET # BLD: 87 K/UL (ref 135–450)
PMV BLD AUTO: 8.2 FL (ref 5–10.5)
POTASSIUM SERPL-SCNC: 3.2 MMOL/L (ref 3.5–5.1)
RBC # BLD: 3.04 M/UL (ref 4–5.2)
SODIUM BLD-SCNC: 127 MMOL/L (ref 136–145)
URINE CULTURE, ROUTINE: ABNORMAL
WBC # BLD: 8.9 K/UL (ref 4–11)

## 2022-06-07 PROCEDURE — 84100 ASSAY OF PHOSPHORUS: CPT

## 2022-06-07 PROCEDURE — 83735 ASSAY OF MAGNESIUM: CPT

## 2022-06-07 PROCEDURE — 6370000000 HC RX 637 (ALT 250 FOR IP): Performed by: INTERNAL MEDICINE

## 2022-06-07 PROCEDURE — 80048 BASIC METABOLIC PNL TOTAL CA: CPT

## 2022-06-07 PROCEDURE — 99232 SBSQ HOSP IP/OBS MODERATE 35: CPT | Performed by: INTERNAL MEDICINE

## 2022-06-07 PROCEDURE — 2580000003 HC RX 258: Performed by: INTERNAL MEDICINE

## 2022-06-07 PROCEDURE — 6370000000 HC RX 637 (ALT 250 FOR IP): Performed by: NURSE PRACTITIONER

## 2022-06-07 PROCEDURE — 6360000002 HC RX W HCPCS: Performed by: INTERNAL MEDICINE

## 2022-06-07 PROCEDURE — 85027 COMPLETE CBC AUTOMATED: CPT

## 2022-06-07 PROCEDURE — 94640 AIRWAY INHALATION TREATMENT: CPT

## 2022-06-07 PROCEDURE — 1200000000 HC SEMI PRIVATE

## 2022-06-07 PROCEDURE — 36415 COLL VENOUS BLD VENIPUNCTURE: CPT

## 2022-06-07 PROCEDURE — 94761 N-INVAS EAR/PLS OXIMETRY MLT: CPT

## 2022-06-07 PROCEDURE — 2580000003 HC RX 258: Performed by: NURSE PRACTITIONER

## 2022-06-07 PROCEDURE — 2500000003 HC RX 250 WO HCPCS: Performed by: INTERNAL MEDICINE

## 2022-06-07 RX ORDER — POTASSIUM CHLORIDE 7.45 MG/ML
20 INJECTION INTRAVENOUS ONCE
Status: COMPLETED | OUTPATIENT
Start: 2022-06-07 | End: 2022-06-07

## 2022-06-07 RX ORDER — POTASSIUM CHLORIDE 750 MG/1
40 TABLET, EXTENDED RELEASE ORAL ONCE
Status: COMPLETED | OUTPATIENT
Start: 2022-06-07 | End: 2022-06-07

## 2022-06-07 RX ADMIN — SODIUM CHLORIDE: 9 INJECTION, SOLUTION INTRAVENOUS at 10:31

## 2022-06-07 RX ADMIN — CEFTRIAXONE SODIUM 1000 MG: 1 INJECTION, POWDER, FOR SOLUTION INTRAMUSCULAR; INTRAVENOUS at 10:17

## 2022-06-07 RX ADMIN — ALLOPURINOL 100 MG: 100 TABLET ORAL at 20:55

## 2022-06-07 RX ADMIN — POTASSIUM CHLORIDE 40 MEQ: 750 TABLET, EXTENDED RELEASE ORAL at 10:07

## 2022-06-07 RX ADMIN — ATORVASTATIN CALCIUM 20 MG: 10 TABLET, FILM COATED ORAL at 20:55

## 2022-06-07 RX ADMIN — APIXABAN 2.5 MG: 5 TABLET, FILM COATED ORAL at 20:55

## 2022-06-07 RX ADMIN — SODIUM CHLORIDE: 9 INJECTION, SOLUTION INTRAVENOUS at 00:39

## 2022-06-07 RX ADMIN — IPRATROPIUM BROMIDE AND ALBUTEROL SULFATE 1 AMPULE: .5; 2.5 SOLUTION RESPIRATORY (INHALATION) at 08:03

## 2022-06-07 RX ADMIN — CARVEDILOL 12.5 MG: 6.25 TABLET, FILM COATED ORAL at 17:48

## 2022-06-07 RX ADMIN — POTASSIUM CHLORIDE 10 MEQ: 7.46 INJECTION, SOLUTION INTRAVENOUS at 10:33

## 2022-06-07 RX ADMIN — TRAZODONE HYDROCHLORIDE 50 MG: 50 TABLET ORAL at 20:56

## 2022-06-07 RX ADMIN — ALLOPURINOL 100 MG: 100 TABLET ORAL at 10:07

## 2022-06-07 RX ADMIN — ACETAMINOPHEN 650 MG: 325 TABLET ORAL at 14:17

## 2022-06-07 RX ADMIN — CARVEDILOL 12.5 MG: 6.25 TABLET, FILM COATED ORAL at 10:07

## 2022-06-07 RX ADMIN — APIXABAN 2.5 MG: 5 TABLET, FILM COATED ORAL at 10:07

## 2022-06-07 RX ADMIN — Medication 10 ML: at 10:17

## 2022-06-07 RX ADMIN — SODIUM BICARBONATE: 84 INJECTION, SOLUTION INTRAVENOUS at 10:14

## 2022-06-07 RX ADMIN — IPRATROPIUM BROMIDE AND ALBUTEROL SULFATE 1 AMPULE: .5; 2.5 SOLUTION RESPIRATORY (INHALATION) at 11:32

## 2022-06-07 RX ADMIN — MULTIPLE VITAMINS W/ MINERALS TAB 1 TABLET: TAB at 10:07

## 2022-06-07 RX ADMIN — IPRATROPIUM BROMIDE AND ALBUTEROL SULFATE 1 AMPULE: .5; 2.5 SOLUTION RESPIRATORY (INHALATION) at 19:55

## 2022-06-07 RX ADMIN — IPRATROPIUM BROMIDE AND ALBUTEROL SULFATE 1 AMPULE: .5; 2.5 SOLUTION RESPIRATORY (INHALATION) at 15:28

## 2022-06-07 RX ADMIN — SODIUM CHLORIDE, PRESERVATIVE FREE 10 ML: 5 INJECTION INTRAVENOUS at 10:19

## 2022-06-07 ASSESSMENT — PAIN DESCRIPTION - ORIENTATION: ORIENTATION: RIGHT

## 2022-06-07 ASSESSMENT — PAIN DESCRIPTION - LOCATION: LOCATION: ARM

## 2022-06-07 NOTE — PROGRESS NOTES
IM Progress Note    Admit Date:  6/5/2022    Subjective:  Ms. Joce Mosher feels much better today. No weakness or dizziness. Blood pressure is improved. Creatinine still elevated    Objective:   /71   Pulse 78   Temp 99.7 °F (37.6 °C) (Oral)   Resp 18   Ht 5' 5\" (1.651 m)   Wt 208 lb (94.3 kg)   SpO2 97%   BMI 34.61 kg/m²     Intake/Output Summary (Last 24 hours) at 6/7/2022 1625  Last data filed at 6/6/2022 2159  Gross per 24 hour   Intake --   Output 100 ml   Net -100 ml         Physical Exam:  General:  Awake, alert, NAD  Skin:  Warm and dry  Neck:  JVD absent. Neck supple  Chest:  Clear to auscultation, respiration easy. No wheezes, rales or rhonchi. Cardiovascular:  RRR ,S1S2 normal  Abdomen:  Soft, non tender, non distended, BS +  Extremities:  No edema. Intact peripheral pulses. Brisk cap refill, < 2 secs  Neuro: non focal      Medications:   Scheduled Meds:   cefTRIAXone (ROCEPHIN) IV  1,000 mg IntraVENous Q24H    ipratropium-albuterol  1 ampule Inhalation 4x daily    allopurinol  100 mg Oral BID    apixaban  2.5 mg Oral BID    atorvastatin  20 mg Oral Nightly    carvedilol  12.5 mg Oral BID WC    therapeutic multivitamin-minerals  1 tablet Oral Daily    sodium chloride flush  5-40 mL IntraVENous 2 times per day    carvedilol  12.5 mg Oral Once       Continuous Infusions:   IV infusion builder 75 mL/hr at 06/07/22 1014    sodium chloride 5 mL/hr at 06/07/22 1031       Data:  CBC:   Recent Labs     06/05/22  0950 06/06/22  0531 06/07/22  0534   WBC 8.7 9.7 8.9   RBC 3.44* 3.19* 3.04*   HGB 11.4* 10.6* 9.9*   HCT 32.8* 30.4* 30.9*   MCV 95.4 95.2 101.7*   RDW 14.3 14.8 15.4   * 96* 87*     BMP:   Recent Labs     06/05/22  0950 06/06/22  0530 06/07/22  0534   * 131* 127*   K 3.4* 3.5 3.2*   CL 94* 96* 99   CO2 24 23 17*   PHOS  --   --  3.0   BUN 51* 53* 58*   CREATININE 1.7* 2.0* 2.1*     BNP: No results for input(s): BNP in the last 72 hours.   PT/INR: No results for input(s): PROTIME, INR in the last 72 hours. APTT: No results for input(s): APTT in the last 72 hours. CARDIAC ENZYMES:   Recent Labs     06/05/22  0950   TROPONINI <0.01     FASTING LIPID PANEL:  Lab Results   Component Value Date    CHOL 112 02/27/2017    HDL 56 02/27/2017    TRIG 120 02/27/2017     LIVER PROFILE:   Recent Labs     06/05/22  0950 06/06/22  0530   AST 24 19   ALT 13 11   BILITOT 1.2* 0.9   ALKPHOS 59 48          Cultures    SARS-CoV-2 RNA, RT PCR NOT DETECTED        INFLUENZA A NOT DETECTED     INFLUENZA B NOT DETECTED         Radiology  NM LUNG VENT/PERFUSION (VQ)   Final Result   Low probability for pulmonary embolism. Obstructive lung disease. XR SHOULDER LEFT (MIN 2 VIEWS)   Final Result   1. Subtle linear discontinuity at the base of the greater tuberosity with   mild sclerosis. If patient has had recent trauma, differential would include   a subacute nondisplaced fracture. 2. Mild acromioclavicular degenerative changes. XR CHEST PORTABLE   Final Result   No acute cardiopulmonary disease. Assessment:  Principal Problem:    OLY (acute kidney injury) ([address]  Active Problems:    Hypotension    Atrial fibrillation, controlled ([address]    Hypomagnesemia    Arthritis of left shoulder region  Resolved Problems:    * No resolved hospital problems. *      Plan:      Hypotension  - 2 liter IVF bolus given in ED  - Holding Lasix, Hyzaar and Cardura at this time  -Continue IV hydration.  -Hypotension resolved, blood pressure stable today. Continue to hold antihypertensive medicines     OLY on CKD  - baseline 0.9-1.1  - on admission 1.7-> 2.0-> 2.1 today  - likely prerenal   - holding Lasix, Hyzaar and Cardura   -Continue to hydrate gently , change IV fluids to bicarb drip . Monitor renal function .   Consider nephrology consult if no improvement in renal function by tomorrow .      Shortness of breath  - on room air  - VQ scan negative.     Left shoulder pain  - Xray showed subtle linear discontinuity at the base of the greater tuberosity with mild sclerosis- if patient has had recent trauma, differential would include a subacute nondisplaced fracture. Mild acromioclavicular degenerative changes   - no h/O trauma. Pain is chronic, over years   Left shoulder pain is chronic related to osteoarthritis     Hypomagnesemia  Hypokalemia  - repleted      UTI  Bacteriuria  - no dysuria. But does have weakness   - 4+ bacteria, >100 WBC 5-10 RBC, large about of leukocytes   -Continue Rocephin D#2  -Urine cultures pending      Chronic diastolic CHF  Moderate AR and TR  Moderate pulmonary HTN  - follows with Dr. Minh Oviedo at [facility]  - pt is on Lasix 20 mg BID--holding at this time due to above   - continue daily weights, low sodium diet, strict I/O  Monitor closely while being hydrated with IV fluids     Atrial fibrillation  - EKG with controlled rate Afib on admission   - on eliquis   - continue coreg and monitor blood pressure      HLD  - Continue statin       Hx Hyperthyroidism     Hx of CVA- 2007  - continue Eliquis and statin         DVT Prophylaxis: Eliquis   Diet: ADULT DIET; Regular  Code Status: Full Code.       Wild Moreno MD   6/7/2022 4:25 PM

## 2022-06-07 NOTE — PROGRESS NOTES
RT Inhaler-Nebulizer Bronchodilator Protocol Note    There is a bronchodilator order in the chart from a provider indicating to follow the RT Bronchodilator Protocol and there is an Initiate RT Inhaler-Nebulizer Bronchodilator Protocol order as well (see protocol at bottom of note). CXR Findings:  XR CHEST PORTABLE    Result Date: 6/5/2022  No acute cardiopulmonary disease. The findings from the last RT Protocol Assessment were as follows:   History Pulmonary Disease: (P) Chronic pulmonary disease  Respiratory Pattern: (P) Dyspnea on exertion or RR 21-25 bpm  Breath Sounds: (P) Intermittent or unilateral wheezes  Cough: (P) Strong, spontaneous, non-productive  Indication for Bronchodilator Therapy: (P) Decreased or absent breath sounds  Bronchodilator Assessment Score: (P) 8    Aerosolized bronchodilator medication orders have been revised according to the RT Inhaler-Nebulizer Bronchodilator Protocol below. Respiratory Therapist to perform RT Therapy Protocol Assessment initially then follow the protocol. Repeat RT Therapy Protocol Assessment PRN for score 0-3 or on second treatment, BID, and PRN for scores above 3. No Indications - adjust the frequency to every 6 hours PRN wheezing or bronchospasm, if no treatments needed after 48 hours then discontinue using Per Protocol order mode. If indication present, adjust the RT bronchodilator orders based on the Bronchodilator Assessment Score as indicated below. Use Inhaler orders unless patient has one or more of the following: on home nebulizer, not able to hold breath for 10 seconds, is not alert and oriented, cannot activate and use MDI correctly, or respiratory rate 25 breaths per minute or more, then use the equivalent nebulizer order(s) with same Frequency and PRN reasons based on the score. If a patient is on this medication at home then do not decrease Frequency below that used at home.     0-3 - enter or revise RT bronchodilator order(s) to equivalent RT Bronchodilator order with Frequency of every 4 hours PRN for wheezing or increased work of breathing using Per Protocol order mode. 4-6 - enter or revise RT Bronchodilator order(s) to two equivalent RT bronchodilator orders with one order with BID Frequency and one order with Frequency of every 4 hours PRN wheezing or increased work of breathing using Per Protocol order mode. 7-10 - enter or revise RT Bronchodilator order(s) to two equivalent RT bronchodilator orders with one order with TID Frequency and one order with Frequency of every 4 hours PRN wheezing or increased work of breathing using Per Protocol order mode. 11-13 - enter or revise RT Bronchodilator order(s) to one equivalent RT bronchodilator order with QID Frequency and an Albuterol order with Frequency of every 4 hours PRN wheezing or increased work of breathing using Per Protocol order mode. Greater than 13 - enter or revise RT Bronchodilator order(s) to one equivalent RT bronchodilator order with every 4 hours Frequency and an Albuterol order with Frequency of every 2 hours PRN wheezing or increased work of breathing using Per Protocol order mode.          Electronically signed by Chey Coleman RCP on 6/7/2022 at 8:06 AM

## 2022-06-07 NOTE — CARE COORDINATION
Case Management Assessment  Initial Evaluation      Patient Name: Arnie Hernandez  YOB: 1940  Diagnosis: Hypomagnesemia [E83.42]  OLY (acute kidney injury) ([facility] [N17.9]  Date / Time: 6/5/2022  9:31 AM    Admission status/Date:6/7/2022  Chart Reviewed: Yes      Patient Interviewed: Yes   Family Interviewed:  No      Hospitalization in the last 30 days:  No      Health Care Decision Maker :   Primary Decision MakerJewels Webster - Child - [phone]    (CM - must 1st enter selection under Navigator - emergency contact- Devinhtonyn Relationship and pick relationship)   Who do you trust or have selected to make healthcare decisions for you      Met with: pt  Interview conducted  (bedside/phone):bedside    Current PCP:   Hubert Rose MD      Financial  Commercial  Precert required for SNF : Y          3 night stay required - N    ADLS  Support Systems/Care Needs: Children  Transportation: self    Meal Preparation: self    Housing  Living Arrangements: lives in 2 story house alone  Steps: ramp  Intent for return to present living arrangements: Yes  Identified Issues:     [address] : No Agency:(Services)  Type of Home Care Services: None  Passport/Waiver : No  :                      Phone Number:    Passport/Waiver Services:           [address] [place] [address] Provider: none  Equipment: pt ambulates free of device. Walker_x__Cane_x__RTS___ BSC___Shower Chair___Hospital Bed___W/C____Other________  02 at ____Liter(s)---wears(frequency)_______ [facility] ___ CPAP___ BiPap___   N/A____      Home O2 Use :  No    If No for home O2---if presently on O2 during hospitalization:  No      Community Service Affiliation  Dialysis:  No      Other Community Services:none     DISCHARGE PLAN: Explained Case Management role/services. Chart reviewed. Met with pt at bedside and she is from home alone and plans to return.  Pt is agreeable to work with PT/OT and will consider their recommendation. Will follow for home with [address] vs [facility].

## 2022-06-07 NOTE — PLAN OF CARE
Problem: Discharge Planning  Goal: Discharge to home or other facility with appropriate resources  6/6/2022 2158 by Duwayne Hatchet, RN  Outcome: Progressing  Flowsheets (Taken 6/6/2022 2000)  Discharge to home or other facility with appropriate resources: Refer to discharge planning if patient needs post-hospital services based on physician order or complex needs related to functional status, cognitive ability or social support system  6/6/2022 1155 by Jesica Rush RN  Outcome: Progressing     Problem: Safety - Adult  Goal: Free from fall injury  6/6/2022 2158 by Duwayne Hatchet, RN  Outcome: Progressing  6/6/2022 1155 by Jesica Rush RN  Outcome: Progressing  [address] (Taken 6/5/2022 2306 by Duwayne Hatchet, RN)  Free From Fall Injury:   Instruct family/caregiver on patient safety   Based on caregiver fall risk screen, instruct family/caregiver to ask for assistance with transferring infant if caregiver noted to have fall risk factors     Problem: ABCDS Injury Assessment  Goal: Absence of physical injury  6/6/2022 2158 by Duwayne Hatchet, RN  Outcome: Progressing  6/6/2022 1155 by Jesica Rush RN  Outcome: Progressing  Flowsheets (Taken 6/5/2022 2306 by Duwayne Hatchet, RN)  Absence of Physical Injury: Implement safety measures based on patient assessment

## 2022-06-07 NOTE — PROGRESS NOTES
Shift assessment complete. Alert and oriented. Patient reports general fatigue and weakness. Ambulated patient to bedside and back to bed. Patient denies any other needs at this time. Bed in lowest position. Side rails up x2. Call light in reach.

## 2022-06-07 NOTE — ACP (ADVANCE CARE PLANNING)
Advance Care Planning     General Advance Care Planning (ACP) Conversation    Date of Conversation: 6/5/2022  Conducted with: Patient with Decision Making Capacity    Healthcare Decision Maker:    Primary Decision Maker: Elizabeth Malloy Keri Worthy - [phone]  Click here to complete Healthcare Decision Makers including selection of the Healthcare Decision Maker Relationship (ie \"Primary\"). Today we documented Decision Maker(s) consistent with Legal Next of Kin hierarchy.     Content/Action Overview:    Reviewed DNR/DNI and patient elects Full Code (Attempt Resuscitation)        Length of Voluntary ACP Conversation in minutes:  <16 minutes (Non-Billable)    Rich Gunter RN

## 2022-06-08 ENCOUNTER — APPOINTMENT (OUTPATIENT)
Dept: GENERAL RADIOLOGY | Age: 82
DRG: 682 | End: 2022-06-08
Payer: MEDICARE

## 2022-06-08 LAB
ANION GAP SERPL CALCULATED.3IONS-SCNC: 13 MMOL/L (ref 3–16)
BUN BLDV-MCNC: 53 MG/DL (ref 7–20)
CALCIUM SERPL-MCNC: 8.2 MG/DL (ref 8.3–10.6)
CHLORIDE BLD-SCNC: 103 MMOL/L (ref 99–110)
CO2: 18 MMOL/L (ref 21–32)
CREAT SERPL-MCNC: 1.7 MG/DL (ref 0.6–1.2)
GFR AFRICAN AMERICAN: 35
GFR NON-AFRICAN AMERICAN: 29
GLUCOSE BLD-MCNC: 116 MG/DL (ref 70–99)
HCT VFR BLD CALC: 30.7 % (ref 36–48)
HEMOGLOBIN: 10.5 G/DL (ref 12–16)
MAGNESIUM: 1.9 MG/DL (ref 1.8–2.4)
MCH RBC QN AUTO: 33 PG (ref 26–34)
MCHC RBC AUTO-ENTMCNC: 34.2 G/DL (ref 31–36)
MCV RBC AUTO: 96.6 FL (ref 80–100)
PDW BLD-RTO: 14.9 % (ref 12.4–15.4)
PHOSPHORUS: 2.8 MG/DL (ref 2.5–4.9)
PLATELET # BLD: 91 K/UL (ref 135–450)
PMV BLD AUTO: 8.2 FL (ref 5–10.5)
POTASSIUM SERPL-SCNC: 3.6 MMOL/L (ref 3.5–5.1)
RBC # BLD: 3.18 M/UL (ref 4–5.2)
SODIUM BLD-SCNC: 134 MMOL/L (ref 136–145)
WBC # BLD: 7.2 K/UL (ref 4–11)

## 2022-06-08 PROCEDURE — 1200000000 HC SEMI PRIVATE

## 2022-06-08 PROCEDURE — 6370000000 HC RX 637 (ALT 250 FOR IP): Performed by: INTERNAL MEDICINE

## 2022-06-08 PROCEDURE — 94761 N-INVAS EAR/PLS OXIMETRY MLT: CPT

## 2022-06-08 PROCEDURE — 83735 ASSAY OF MAGNESIUM: CPT

## 2022-06-08 PROCEDURE — 85027 COMPLETE CBC AUTOMATED: CPT

## 2022-06-08 PROCEDURE — 99233 SBSQ HOSP IP/OBS HIGH 50: CPT | Performed by: INTERNAL MEDICINE

## 2022-06-08 PROCEDURE — 97535 SELF CARE MNGMENT TRAINING: CPT

## 2022-06-08 PROCEDURE — 2500000003 HC RX 250 WO HCPCS: Performed by: INTERNAL MEDICINE

## 2022-06-08 PROCEDURE — 2580000003 HC RX 258: Performed by: INTERNAL MEDICINE

## 2022-06-08 PROCEDURE — 6360000002 HC RX W HCPCS: Performed by: INTERNAL MEDICINE

## 2022-06-08 PROCEDURE — 97161 PT EVAL LOW COMPLEX 20 MIN: CPT

## 2022-06-08 PROCEDURE — 84100 ASSAY OF PHOSPHORUS: CPT

## 2022-06-08 PROCEDURE — 36415 COLL VENOUS BLD VENIPUNCTURE: CPT

## 2022-06-08 PROCEDURE — 6370000000 HC RX 637 (ALT 250 FOR IP): Performed by: NURSE PRACTITIONER

## 2022-06-08 PROCEDURE — 94640 AIRWAY INHALATION TREATMENT: CPT

## 2022-06-08 PROCEDURE — 71046 X-RAY EXAM CHEST 2 VIEWS: CPT

## 2022-06-08 PROCEDURE — 97116 GAIT TRAINING THERAPY: CPT

## 2022-06-08 PROCEDURE — 97165 OT EVAL LOW COMPLEX 30 MIN: CPT

## 2022-06-08 PROCEDURE — 80048 BASIC METABOLIC PNL TOTAL CA: CPT

## 2022-06-08 PROCEDURE — 2580000003 HC RX 258: Performed by: NURSE PRACTITIONER

## 2022-06-08 RX ORDER — DIPHENHYDRAMINE HCL 25 MG
25 TABLET ORAL EVERY 6 HOURS PRN
Status: DISCONTINUED | OUTPATIENT
Start: 2022-06-08 | End: 2022-06-14 | Stop reason: HOSPADM

## 2022-06-08 RX ORDER — VITAMIN B COMPLEX
2000 TABLET ORAL DAILY
Status: DISCONTINUED | OUTPATIENT
Start: 2022-06-08 | End: 2022-06-14 | Stop reason: HOSPADM

## 2022-06-08 RX ORDER — FUROSEMIDE 10 MG/ML
20 INJECTION INTRAMUSCULAR; INTRAVENOUS ONCE
Status: COMPLETED | OUTPATIENT
Start: 2022-06-08 | End: 2022-06-08

## 2022-06-08 RX ORDER — DOXAZOSIN 2 MG/1
2 TABLET ORAL 2 TIMES DAILY
Status: DISCONTINUED | OUTPATIENT
Start: 2022-06-08 | End: 2022-06-14 | Stop reason: HOSPADM

## 2022-06-08 RX ORDER — FUROSEMIDE 20 MG/1
20 TABLET ORAL 2 TIMES DAILY
Status: DISCONTINUED | OUTPATIENT
Start: 2022-06-08 | End: 2022-06-14 | Stop reason: HOSPADM

## 2022-06-08 RX ADMIN — APIXABAN 2.5 MG: 5 TABLET, FILM COATED ORAL at 08:36

## 2022-06-08 RX ADMIN — FUROSEMIDE 20 MG: 20 TABLET ORAL at 18:12

## 2022-06-08 RX ADMIN — ALLOPURINOL 100 MG: 100 TABLET ORAL at 22:37

## 2022-06-08 RX ADMIN — CARVEDILOL 12.5 MG: 6.25 TABLET, FILM COATED ORAL at 17:15

## 2022-06-08 RX ADMIN — IPRATROPIUM BROMIDE AND ALBUTEROL SULFATE 1 AMPULE: .5; 2.5 SOLUTION RESPIRATORY (INHALATION) at 19:19

## 2022-06-08 RX ADMIN — Medication 2000 UNITS: at 13:44

## 2022-06-08 RX ADMIN — ALLOPURINOL 100 MG: 100 TABLET ORAL at 08:36

## 2022-06-08 RX ADMIN — DEXTROSE MONOHYDRATE 500 MG: 50 INJECTION, SOLUTION INTRAVENOUS at 13:49

## 2022-06-08 RX ADMIN — TRAZODONE HYDROCHLORIDE 50 MG: 50 TABLET ORAL at 22:37

## 2022-06-08 RX ADMIN — APIXABAN 2.5 MG: 5 TABLET, FILM COATED ORAL at 22:37

## 2022-06-08 RX ADMIN — IPRATROPIUM BROMIDE AND ALBUTEROL SULFATE 1 AMPULE: .5; 2.5 SOLUTION RESPIRATORY (INHALATION) at 11:23

## 2022-06-08 RX ADMIN — MULTIPLE VITAMINS W/ MINERALS TAB 1 TABLET: TAB at 08:36

## 2022-06-08 RX ADMIN — IPRATROPIUM BROMIDE AND ALBUTEROL SULFATE 1 AMPULE: .5; 2.5 SOLUTION RESPIRATORY (INHALATION) at 07:28

## 2022-06-08 RX ADMIN — ATORVASTATIN CALCIUM 20 MG: 10 TABLET, FILM COATED ORAL at 22:37

## 2022-06-08 RX ADMIN — DOXAZOSIN 2 MG: 2 TABLET ORAL at 17:15

## 2022-06-08 RX ADMIN — CARVEDILOL 12.5 MG: 6.25 TABLET, FILM COATED ORAL at 08:36

## 2022-06-08 RX ADMIN — SODIUM BICARBONATE: 84 INJECTION, SOLUTION INTRAVENOUS at 05:10

## 2022-06-08 RX ADMIN — CEFTRIAXONE SODIUM 1000 MG: 1 INJECTION, POWDER, FOR SOLUTION INTRAMUSCULAR; INTRAVENOUS at 08:41

## 2022-06-08 RX ADMIN — IPRATROPIUM BROMIDE AND ALBUTEROL SULFATE 1 AMPULE: .5; 2.5 SOLUTION RESPIRATORY (INHALATION) at 14:51

## 2022-06-08 RX ADMIN — FUROSEMIDE 20 MG: 10 INJECTION, SOLUTION INTRAMUSCULAR; INTRAVENOUS at 13:51

## 2022-06-08 RX ADMIN — SODIUM CHLORIDE, PRESERVATIVE FREE 10 ML: 5 INJECTION INTRAVENOUS at 22:36

## 2022-06-08 NOTE — PROGRESS NOTES
Physician Progress Note      PATIENT:               Gael Don  CSN #:                  [id number]  :                       1940  ADMIT DATE:       2022 9:31 AM  DISCH DATE:  RESPONDING  PROVIDER #:        Marissa Corona MD          QUERY TEXT:    Patient admitted with OLY and dehydration, noted to have atrial fibrillation   and is maintained on Eliquis. Hx of CVA. If possible, please document in   progress notes and discharge summary if you are evaluating and/or treating any   of the following: The medical record reflects the following:  Risk Factors: advanced age, afib, CVA, HLD, CHF  Clinical Indicators: Atrial fibrillation - EKG with controlled rate Afib on   admission  Treatment: Eliquis    Thank you for your assistance,  Dea Neil RN,BSN,CCDS,CRCR  Options provided:  -- Secondary hypercoagulable state in a patient with atrial fibrillation  -- Other - I will add my own diagnosis  -- Disagree - Not applicable / Not valid  -- Disagree - Clinically unable to determine / Unknown  -- Refer to Clinical Documentation Reviewer    PROVIDER RESPONSE TEXT:    This patient has secondary hypercoagulable state related to atrial   fibrillation.     Query created by: Adalid Montenegro on 2022 7:19 AM      Electronically signed by:  Marissa Corona MD 2022 9:27 AM

## 2022-06-08 NOTE — PROGRESS NOTES
Shift assessment complete. See doc flow. Nightly medications given see MAR. A/O x4. IVF infusing. Assisted pt to [facility] pivot w/ gate belt. Urine output along w/ a small formed BM. Patient with no complaints at this time. Bed alarm in use. Call light and bedside table within easy reach.

## 2022-06-08 NOTE — CARE COORDINATION
INTERDISCIPLINARY PLAN OF CARE CONFERENCE    Date/Time: 6/8/2022 3:10 PM  Completed by: Milka Jeffries RN, Case Management      Patient Name:  Edison Klinefelter  YOB: 1940  Admitting Diagnosis: Hypomagnesemia [E83.42]  OLY (acute kidney injury) ([facility] [N17.9]     Admit Date/Time:  6/5/2022  9:31 AM    Chart reviewed. Interdisciplinary team contacted or reviewed plan related to patient progress and discharge plans. Disciplines included Case Management, Nursing, and Dietitian. Current Status: Stable  PT/OT recommendation for discharge plan of care:  Home 24 hr assist and with home PT     Expected D/C Disposition:  Home  Confirmed plan with patient    Met with: patient  Discharge Plan Comments:  Reviewed chart and met with pt at bedside. Plan remains to return home at [place]. States can have 24/7 assist as granddau  Can stay with her. Await therapy eval. Will follow.     Home O2 in place on admit: No  Pt informed of need to bring portable home O2 tank on day of discharge for nursing to connect prior to leaving:  Not Indicated  Verbalized agreement/Understanding:  Not Indicated

## 2022-06-08 NOTE — PROGRESS NOTES
Inpatient Occupational Therapy  Evaluation and Treatment    Unit: [address]  Date:  6/8/2022  Patient Name:    Ulises Lynn  Admitting diagnosis:  Hypomagnesemia [E83.42]  OLY (acute kidney injury) ([facility] [N17.9]  Admit Date:  6/5/2022  Precautions/Restrictions/WB Status/ Lines/ Wounds/ Oxygen: fall risk, IV, bed/chair alarm and telemetry    Treatment Time:  9:08-9:33  Treatment Number: 1     Billable Treatment Time: 15 minutes   Total Treatment Time: 25     minutes    Patient Goals for Therapy:  \" to go home\"      Discharge Recommendations: Home with 24/7 assist and home therapy  DME needs for discharge: Needs Met       Therapy recommendations for staff:   Assist of 1 with use of rolling walker (RW) for all transfers within room    History of Present Illness: 80 y.o. female with hx of A. fib on Eliquis as well as hypertension on losartan hydrochlorothiazide, carvedilol, and Lasix he reports he took all of her normal medications this morning occluding her antihypertensives and they noticed that her blood pressure was 215 systolic which is lower than usual.  She reports her systolic is normally 119. Patient denies any chest pain but does report mild shortness of breath. She denies any hemoptysis leg swelling fever or cough. She was her symptoms are moderate constant and unchanged. She denies any known aggravating or alleviating factors. She denies any lightheadedness or syncope. Home Health S4 Level Recommendation:  Level 1 Standard  AM-PAC Score:      Preadmission Environment    Pt. Lives Alone has family that lives nearby   Home environment:  two story home  Steps to enter first floor:   ramp    Steps to second floor: Full flight of 12-13  Bathroom:  Tub/Shower unit, Grab bars and Shower Chair comfort height toilet  Equipment owned:  [employer] and [employer] (RW)     Preadmission Status / PLOF:  History of falls   No  Pt. Able to drive   Yes  Pt Fully independent with ADL's  Yes  Pt.  Required assistance from family for: Independent PTA    Pt. Fully independent for transfers and gait and walked with: No Device    Pain  No  Rating:NA  Location:NA   Pain Medicine Status: No request made      Cognition    A&O x4   Able to follow 2 step commands    Subjective  Patient lying supine in bed with no family present   Pt agreeable to this OT eval & tx. Upper Extremity ROM:    WFL,  pt able to perform all bed mobility, transfers, and gait without ROM limitation. Upper Extremity Strength:    BUE strength WFL, but not formally assessed w/ MMT    Upper Extremity Sensation    WFL    Upper Extremity Proprioception:  WFL    Coordination and Tone  WFL    Balance  Functional Sitting Balance:  WFL  Functional Standing Balance:Impaired CGA using RW     Bed mobility:    Supine to sit:   SBA using bedrail with bed flat   Sit to supine:   Not Tested  Rolling:    SBA  Scooting in sitting:  SBA  Scooting to head of bed:   Not Tested    Bridging:   Not Tested    Transfers:    Sit to stand:  CGA  Stand to sit:  CGA  Bed to chair:   CGA and with use of RW  Standard toilet: Not Tested  Bed to [facility]:  Not Tested    Dressing:      UE:   Not Tested  LE:    Not Tested    Bathing:    UE:  Not Tested  LE:  Not Tested    Eating:   Supervision    Toileting:  Not Tested    Grooming: Min A brush hair only due to tangles in pt hair    Activity Tolerance   Pt completed therapy session with No adverse symptoms noted w/activity  SpO2: 95% after supine to sit on RA   HR: 80  BP: 124/79 sitting EOB     Positioning Needs:   Up in chair, call light and needs in reach. Exercise / Activities Initiated:   N/A    Patient/Family Education:   Role of OT  Recommendations for DC  Safe RW use/hand placement    Assessment of Deficits: Pt seen for Occupational therapy evaluation in acute care setting. Pt demonstrated decreased Activity tolerance, ADLs, IADLs, Balance , Bathing, Bed mobility, Dressing, Safety Awareness, Strength and Transfers.  Pt functioning below baseline and will likely benefit from skilled occupational therapy services to maximize safety and independence. Goal(s) : To be met in 3 Visits:  1). Bed to toilet/BSC: Supervision    To be met in 5 Visits:  1). Supine to/from Sit:  Supervision  2). Upper Body Bathing:   Supervision  3). Lower Body Bathing:   Supervision  4). Upper Body Dressing:  Supervision  5). Lower Body Dressing:  Supervision  6). Pt to demonstrate UE exs x 15 reps with minimal cues    Rehabilitation Potential:  Good for goals listed above. Strengths for achieving goals include: Pt motivated, PLOF, Family Support and Pt cooperative  Barriers to achieving goals include:  Weakness     Plan: To be seen 3-5 x/wk while in acute care setting for therapeutic exercises, bed mobility, transfers, dressing, bathing, family/patient education, ADL/IADL retraining, energy conservation training.      Cathy Block OTR/L #[id number]          If patient discharges from this facility prior to next visit, this note will serve as the Discharge Summary

## 2022-06-08 NOTE — FLOWSHEET NOTE
06/08/22 0830   Vital Signs   Temp 98.5 °F (36.9 °C)   Temp Source Oral   Heart Rate 78   Heart Rate Source Monitor   Resp 16   BP (!) 140/74   BP Location Right upper arm   BP Method Automatic   MAP (Calculated) 96   Patient Position Semi fowlers   Level of Consciousness Alert (0)   MEWS Score 1   Pain Assessment   Pain Assessment None - Denies Pain   Oxygen Therapy   SpO2 96 %   O2 Device None (Room air)     Pt assessment completed, vss, see flow sheet. Pt alert and oriented x 4. Pt given all medications per orders. Pt denies any other needs at this time.  Power Kruger RN

## 2022-06-08 NOTE — PROGRESS NOTES
Patient resting, eyes closed, respirations witnessed as e/e. No signs of distress. Bed alarm in place. Call light and bedside table is easy reach.

## 2022-06-08 NOTE — PROGRESS NOTES
Inpatient Physical Therapy Evaluation and Treatment    Unit: [facility]  Date:  6/8/2022  Patient Name:    Negin Muhammad  Admitting diagnosis:  Hypomagnesemia [E83.42]  OLY (acute kidney injury) ([facility] [N17.9]  Admit Date:  6/5/2022  Precautions/Restrictions/WB Status/ Lines/ Wounds/ Oxygen: Fall risk, Bed/chair alarm, Lines -IV, [place] (hard of hearing) and Telemetry    Treatment Time: 0910 - 3267  Treatment Number:  1   Timed Code Treatment Minutes: 13 minutes  Total Treatment Minutes:  23  minutes    Patient Goals for Therapy: \" Go home \"          Discharge Recommendations: Home 24 hr assist and with home PT   DME needs for discharge: Needs Met (recommended to use RW for all functional mobility)       Therapy recommendation for EMS Transport: can transport by wheelchair    Therapy recommendations for staff:   Assist of 1 with use of rolling walker (RW) and gait belt for all transfers and ambulation to/from chair  to/from bathroom  within room    History of Present Illness: H & P as per Su Herrera MD's note dated 6/6/2022  The patient is a 80 y.o. female PMH HTN, HLD, hyperthyroidism, afib on eliquis, CKD, CVA pulmonary nodules, moderate AR and TR, chronic diastolic CHF and bilateral knee OA who presents to [facility] with hypotension. History obtained from the patient and review of EMR. Patient states that she was feeling very weak yesterday. No true dizziness or syncope. She checked her blood pressures and systolics were around 854-400. This is low for her and she came to the emergency department. She has had some chills but no fevers. No nausea or vomiting ;  describes chronic dyspnea. She also complains of chronic left shoulder pain ongoing for years     In ED patient pt noted with OLY- creatinine 1.7, hypomagnesemia of 1.10. Pt was given 2 liter bolus in ED. Magnesium replaced. Troponin <0.01. After patient admitted she complained of shortness of breath and worsening left shoulder pain.  X-ray obtained and showed subtle linear discontinuity at the base of the greater tuberosity with mild sclerosis. If the patient had recent trauma, which she denied in ED, differential would include subacute nondisplaced fracture. Mild acromioclavicular degenerative changes noted as well. VQ scan was ordered in the ED and patient was admitted for further care. Home Health S4 Level Recommendation:  Level 3 Safety  AM-PAC Mobility Score    AM-PAC Inpatient Mobility Raw Score : 20       Preadmission Environment    Pt. Lives Alone has family that lives nearby (daughter can provide 24hr assist)  Home environment:    two story home  Steps to enter first floor:   ramp            Steps to second floor: Full flight of 12-13 (patient can stay on main level)  Bathroom:       Tub/Shower unit, Grab bars and Shower Chair comfort height toilet  Equipment owned:      [employer] and [address] [place] [address] ()      Preadmission Status / PLOF:  History of falls             No  Pt. Able to drive          Yes  Pt Fully independent with ADL's         Yes  Pt. Required assistance from family for: Independent PTA    Pt. Fully independent for transfers and gait and walked with: No Device    Pain   No    Cognition    A&O x4   Able to follow 2 step commands    Subjective  Patient lying supine in bed with no family present. Pt agreeable to this PT eval & tx. Upper Extremity ROM/Strength  Please see OT evaluation.       Lower Extremity ROM / Strength   AROM WFL: Yes  ROM limitations: N/A    Strength Assessment (measured on a 0-5 scale):  R LE   Quad   4+   Ant Tib  4+   Hamstring 4+   Iliopsoas 4+  L LE  Quad   4+   Ant Tib  4+   Hamstring 4+   Iliopsoas 4+    Lower Extremity Sensation    WFL    Lower Extremity Proprioception:   WFL    Coordination and Tone  WFL    Balance  Sitting:  Fair +; SBA  Comments:     Standing: Fair -; CGA  Comments: ~5 min    Bed Mobility   Supine to Sit:    SBA  Sit to Supine:   Not Tested  Rolling:   Not Tested  Scooting in sitting: SBA  Scooting in supine:  Not Tested    Transfer Training     Sit to stand:   CGA  Stand to sit:   CGA  Bed to Chair:   CGA with use of gait belt and rolling walker (RW)    Gait gait completed as indicated below  Distance:      40 ft  Deviations (firm surface/linoleum):  decreased beverly, increased YNAA, staggers, shuffles, decreased step length bilaterally and decreased stance time bilaterally  Assistive Device Used:    gait belt and rolling walker (RW)  Level of Assist:    CGA  Comment: Patient showed decreased beverly and showed improved balance and stability during ambulation with RW. Stair Training deferred, pt does not have stairs in home environment    Activity Tolerance   Pt completed therapy session with No adverse symptoms noted w/activity  SpO2: 95% after supine to sit on RA   HR: 80  BP: 124/79 sitting EOB     Positioning Needs   Pt up in chair, alarm set, positioned in proper neutral alignment and pressure relief provided. Call light provided and all needs within reach    Exercises Initiated  all completed bilaterally unless indicated  Ankle Pumps x 10 reps    Other  None. Patient/Family Education   Pt educated on role of inpatient PT, POC, importance of continued activity, DC recommendations, safety awareness, transfer techniques and calling for assist with mobility. Assessment  Pt seen for Physical Therapy evaluation in acute care setting. Pt demonstrated decreased Activity tolerance, Balance, Safety and Strength as well as decreased independence with Ambulation, Bed Mobility  and Transfers. Recommending Home 24 hr assist and with home PT upon discharge as patient functioning below baseline level and would benefit from continued therapy services    Goals : To be met in 3 visits:  1). Independent with LE Ex x 10 reps    To be met in 6 visits:  1). Supine to/from sit: Modified Independent  2). Sit to/from stand: Modified Independent  3).   Bed to chair: Modified Independent  4). Gait: Ambulate 150 ft.  with  Modified Independent and use of LRAD (least restrictive assistive device)  5). Tolerate B LE exercises 3 sets of 10-15 reps      Rehabilitation Potential: Good  Strengths for achieving goals include:   Pt motivated, PLOF and Pt cooperative   Barriers to achieving goals include:    No Barriers    Plan    To be seen 3-5 x / week  while in acute care setting for therapeutic exercises, bed mobility, transfers, progressive gait training, balance training, and family/patient education. Signature: Maranda Solano MS PT, # [id number]    If patient discharges from this facility prior to next visit, this note will serve as the Discharge Summary.

## 2022-06-08 NOTE — PROGRESS NOTES
IM Progress Note    Admit Date:  6/5/2022       Patient admitted with OLY, weakness, dyspnea, hypotension. Creatinine now starting to improve with IV hydration. Most of her antihypertensive medications were held. O2 sat stable on room air, VQ scan negative. Subjective:  Ms. Jesusita Gonzales states that she feels better , however still gets dyspneic with minimal activity . Maintaining her O2 sats . Starting to cough up some sputum , she is having some intermittent wheezing . Renal function improving . Blood Pressures are starting to trend up . [place] She is sitting up on the chair today, she worked with therapy      Objective:   BP (!) 140/74   Pulse 72   Temp 98.5 °F (36.9 °C) (Oral)   Resp 16   Ht 5' 5\" (1.651 m)   Wt 207 lb 12.8 oz (94.3 kg)   SpO2 95%   BMI 34.58 kg/m²       Intake/Output Summary (Last 24 hours) at 6/8/2022 1213  Last data filed at 6/7/2022 2046  Gross per 24 hour   Intake 180 ml   Output --   Net 180 ml         Physical Exam:  General:  Awake, alert, NAD  Sitting up on the chair. Skin:  Warm and dry  Neck:  JVD absent. Neck supple  Chest: Diminished breath sounds with bilateral crackles  Cardiovascular:  RRR ,S1S2 normal  Abdomen:  Soft, non tender, non distended, BS +  Extremities:  No edema. Intact peripheral pulses.  Brisk cap refill, < 2 secs  Neuro: non focal      Medications:   Scheduled Meds:   furosemide  20 mg IntraVENous Once    [START ON 6/9/2022] cefTRIAXone (ROCEPHIN) IV  1,000 mg IntraVENous Q24H    azithromycin (ZITHROMAX) 500 mg IVPB  500 mg IntraVENous Q24H    Vitamin D3  2,000 Units Oral Daily    doxazosin  2 mg Oral BID    furosemide  20 mg Oral BID    ipratropium-albuterol  1 ampule Inhalation 4x daily    allopurinol  100 mg Oral BID    apixaban  2.5 mg Oral BID    atorvastatin  20 mg Oral Nightly    carvedilol  12.5 mg Oral BID WC    therapeutic multivitamin-minerals  1 tablet Oral Daily    sodium chloride flush  5-40 mL IntraVENous 2 times per day  carvedilol  12.5 mg Oral Once       Continuous Infusions:   sodium chloride 5 mL/hr at 06/07/22 1031       Data:  CBC:   Recent Labs     06/06/22  0531 06/07/22  0534 06/08/22  0606   WBC 9.7 8.9 7.2   RBC 3.19* 3.04* 3.18*   HGB 10.6* 9.9* 10.5*   HCT 30.4* 30.9* 30.7*   MCV 95.2 101.7* 96.6   RDW 14.8 15.4 14.9   PLT 96* 87* 91*     BMP:   Recent Labs     06/06/22  0530 06/07/22  0534 06/08/22  0606   * 127* 134*   K 3.5 3.2* 3.6   CL 96* 99 103   CO2 23 17* 18*   PHOS  --  3.0 2.8   BUN 53* 58* 53*   CREATININE 2.0* 2.1* 1.7*     BNP: No results for input(s): BNP in the last 72 hours. PT/INR: No results for input(s): PROTIME, INR in the last 72 hours. APTT: No results for input(s): APTT in the last 72 hours. CARDIAC ENZYMES:   No results for input(s): CKMB, CKMBINDEX, TROPONINI in the last 72 hours. Invalid input(s): CKTOTAL;3  FASTING LIPID PANEL:  Lab Results   Component Value Date    CHOL 112 02/27/2017    HDL 56 02/27/2017    TRIG 120 02/27/2017     LIVER PROFILE:   Recent Labs     06/06/22  0530   AST 19   ALT 11   BILITOT 0.9   ALKPHOS 48          Cultures    SARS-CoV-2 RNA, RT PCR NOT DETECTED        INFLUENZA A NOT DETECTED     INFLUENZA B NOT DETECTED         Radiology  NM LUNG VENT/PERFUSION (VQ)   Final Result   Low probability for pulmonary embolism. Obstructive lung disease. XR SHOULDER LEFT (MIN 2 VIEWS)   Final Result   1. Subtle linear discontinuity at the base of the greater tuberosity with   mild sclerosis. If patient has had recent trauma, differential would include   a subacute nondisplaced fracture. 2. Mild acromioclavicular degenerative changes. XR CHEST PORTABLE   Final Result   No acute cardiopulmonary disease.          XR CHEST (2 VW)    (Results Pending)         Assessment:  Principal Problem:    OLY (acute kidney injury) ([address]  Active Problems:    Hypotension    Atrial fibrillation, controlled ([facility]    Hypomagnesemia    Arthritis of left shoulder region  Resolved Problems:    * No resolved hospital problems. *      Plan:      Hypotension  History of hypertension  -S/p IV fluid bolus in the ED , continued on IV fluids for the last couple of days . Hypotension is resolved now,  Blood pressure starting to trend up .   - we were Holding Lasix, Hyzaar and Cardura. Resume Lasix and Cardura today. Continued on Coreg. Hold Hyzaar. Monitor blood pressure and renal function closely.   -Stop IV fluids     OLY on CKD  -Likely prerenal , patient appears dehydrated on admission . given IV fluids . - crtn at baseline 0.9-1.1. crtn  on admission 1.7-> 2.0-> 2.1-> creatinine is down to 1.7 today  -Stop IV fluids. She starting to have some crackles on exam.  Resume Lasix. Continue to hold Hyzaar. And monitor renal function closely     Dyspnea  -Possible bronchitis. Patient is starting to cough up some sputum now  -Oxygen saturation stable on room air, she has some wheezing.  -Continue DuoNebs, and empiric antibiotics Rocephin and Zithromax. Check chest x-ray again. - VQ scan negative.     Left shoulder pain  - Xray showed subtle linear discontinuity at the base of the greater tuberosity with mild sclerosis- if patient has had recent trauma, differential would include a subacute nondisplaced fracture. Mild acromioclavicular degenerative changes   - no h/O trauma. Pain is chronic, over years   Left shoulder pain is chronic related to osteoarthritis     Hypomagnesemia  Hypokalemia  - repleted      UTI  -Urine cultures growing E. Coli. Continue Rocephin      Chronic diastolic CHF  Moderate AR and TR  Moderate pulmonary HTN  - follows with Dr. Silverio Hollis at [facility]  - pt is on Lasix 20 mg BID--held Lasix on admission due to OLY and dehydration  . Starting to sound fluid overloaded. .  Stop IV fluids and resume lasix . Check chest x-ray.  .  - continue daily weights, low sodium diet, strict I/O     Atrial fibrillation  - EKG with controlled rate Afib on admission   - on eliquis    - continue coreg and monitor blood pressure      HLD  - Continue statin       Hx Hyperthyroidism     Hx of CVA- 2007  - continue Eliquis and statin         DVT Prophylaxis: Eliquis   Diet: ADULT DIET; Regular  Code Status: Full Code. PLAN OF CARE WAS DISCUSSED IN DETAIL WITH PATIENT AND PATIENT'S DAUGHTER AT BEDSIDE. Total time today 36 minutes. > 50 % time dominated by coordination of care and D/W family          Marcella Hargrove MD   6/8/2022 12:13 PM     Addendum. Patient developed allergic reaction to IV zithromax  With local itching and rash . Bria  Stop Zithromax and use Benadryl as needed  .        Marcella Hargrove MD

## 2022-06-08 NOTE — PROGRESS NOTES
RT Inhaler-Nebulizer Bronchodilator Protocol Note    There is a bronchodilator order in the chart from a provider indicating to follow the RT Bronchodilator Protocol and there is an Initiate RT Inhaler-Nebulizer Bronchodilator Protocol order as well (see protocol at bottom of note). CXR Findings:  No results found. The findings from the last RT Protocol Assessment were as follows:   History Pulmonary Disease: Chronic pulmonary disease  Respiratory Pattern: Dyspnea on exertion or RR 21-25 bpm  Breath Sounds: Slightly diminished and/or crackles  Cough: Strong, spontaneous, non-productive  Indication for Bronchodilator Therapy: Decreased or absent breath sounds  Bronchodilator Assessment Score: 6    Aerosolized bronchodilator medication orders have been revised according to the RT Inhaler-Nebulizer Bronchodilator Protocol below. Respiratory Therapist to perform RT Therapy Protocol Assessment initially then follow the protocol. Repeat RT Therapy Protocol Assessment PRN for score 0-3 or on second treatment, BID, and PRN for scores above 3. No Indications - adjust the frequency to every 6 hours PRN wheezing or bronchospasm, if no treatments needed after 48 hours then discontinue using Per Protocol order mode. If indication present, adjust the RT bronchodilator orders based on the Bronchodilator Assessment Score as indicated below. Use Inhaler orders unless patient has one or more of the following: on home nebulizer, not able to hold breath for 10 seconds, is not alert and oriented, cannot activate and use MDI correctly, or respiratory rate 25 breaths per minute or more, then use the equivalent nebulizer order(s) with same Frequency and PRN reasons based on the score. If a patient is on this medication at home then do not decrease Frequency below that used at home.     0-3 - enter or revise RT bronchodilator order(s) to equivalent RT Bronchodilator order with Frequency of every 4 hours PRN for wheezing or increased work of breathing using Per Protocol order mode. 4-6 - enter or revise RT Bronchodilator order(s) to two equivalent RT bronchodilator orders with one order with BID Frequency and one order with Frequency of every 4 hours PRN wheezing or increased work of breathing using Per Protocol order mode. 7-10 - enter or revise RT Bronchodilator order(s) to two equivalent RT bronchodilator orders with one order with TID Frequency and one order with Frequency of every 4 hours PRN wheezing or increased work of breathing using Per Protocol order mode. 11-13 - enter or revise RT Bronchodilator order(s) to one equivalent RT bronchodilator order with QID Frequency and an Albuterol order with Frequency of every 4 hours PRN wheezing or increased work of breathing using Per Protocol order mode. Greater than 13 - enter or revise RT Bronchodilator order(s) to one equivalent RT bronchodilator order with every 4 hours Frequency and an Albuterol order with Frequency of every 2 hours PRN wheezing or increased work of breathing using Per Protocol order mode. Patient will benefit from treatments.      Electronically signed by Matilde Villanueva RCP on 6/8/2022 at 7:23 PM

## 2022-06-08 NOTE — PROGRESS NOTES
RT Inhaler-Nebulizer Bronchodilator Protocol Note    There is a bronchodilator order in the chart from a provider indicating to follow the RT Bronchodilator Protocol and there is an Initiate RT Inhaler-Nebulizer Bronchodilator Protocol order as well (see protocol at bottom of note). CXR Findings:  No results found. The findings from the last RT Protocol Assessment were as follows:   History Pulmonary Disease: Chronic pulmonary disease  Respiratory Pattern: Dyspnea on exertion or RR 21-25 bpm  Breath Sounds: Intermittent or unilateral wheezes  Cough: Strong, spontaneous, non-productive  Indication for Bronchodilator Therapy: Decreased or absent breath sounds  Bronchodilator Assessment Score: 8    Aerosolized bronchodilator medication orders have been revised according to the RT Inhaler-Nebulizer Bronchodilator Protocol below. Respiratory Therapist to perform RT Therapy Protocol Assessment initially then follow the protocol. Repeat RT Therapy Protocol Assessment PRN for score 0-3 or on second treatment, BID, and PRN for scores above 3. No Indications - adjust the frequency to every 6 hours PRN wheezing or bronchospasm, if no treatments needed after 48 hours then discontinue using Per Protocol order mode. If indication present, adjust the RT bronchodilator orders based on the Bronchodilator Assessment Score as indicated below. Use Inhaler orders unless patient has one or more of the following: on home nebulizer, not able to hold breath for 10 seconds, is not alert and oriented, cannot activate and use MDI correctly, or respiratory rate 25 breaths per minute or more, then use the equivalent nebulizer order(s) with same Frequency and PRN reasons based on the score. If a patient is on this medication at home then do not decrease Frequency below that used at home.     0-3 - enter or revise RT bronchodilator order(s) to equivalent RT Bronchodilator order with Frequency of every 4 hours PRN for wheezing or increased work of breathing using Per Protocol order mode. 4-6 - enter or revise RT Bronchodilator order(s) to two equivalent RT bronchodilator orders with one order with BID Frequency and one order with Frequency of every 4 hours PRN wheezing or increased work of breathing using Per Protocol order mode. 7-10 - enter or revise RT Bronchodilator order(s) to two equivalent RT bronchodilator orders with one order with TID Frequency and one order with Frequency of every 4 hours PRN wheezing or increased work of breathing using Per Protocol order mode. 11-13 - enter or revise RT Bronchodilator order(s) to one equivalent RT bronchodilator order with QID Frequency and an Albuterol order with Frequency of every 4 hours PRN wheezing or increased work of breathing using Per Protocol order mode. Greater than 13 - enter or revise RT Bronchodilator order(s) to one equivalent RT bronchodilator order with every 4 hours Frequency and an Albuterol order with Frequency of every 2 hours PRN wheezing or increased work of breathing using Per Protocol order mode. PATIENT WILL BENEFIT FROM TREATMENTS.      Electronically signed by Sherif Cardoso RCP on 6/7/2022 at 8:00 PM

## 2022-06-08 NOTE — PROGRESS NOTES
RT Inhaler-Nebulizer Bronchodilator Protocol Note    There is a bronchodilator order in the chart from a provider indicating to follow the RT Bronchodilator Protocol and there is an Initiate RT Inhaler-Nebulizer Bronchodilator Protocol order as well (see protocol at bottom of note). CXR Findings:  No results found. The findings from the last RT Protocol Assessment were as follows:   History Pulmonary Disease: Chronic pulmonary disease  Respiratory Pattern: Dyspnea on exertion or RR 21-25 bpm  Breath Sounds: Intermittent or unilateral wheezes  Cough: Strong, spontaneous, non-productive  Indication for Bronchodilator Therapy: Decreased or absent breath sounds  Bronchodilator Assessment Score: 8    Aerosolized bronchodilator medication orders have been revised according to the RT Inhaler-Nebulizer Bronchodilator Protocol below. Respiratory Therapist to perform RT Therapy Protocol Assessment initially then follow the protocol. Repeat RT Therapy Protocol Assessment PRN for score 0-3 or on second treatment, BID, and PRN for scores above 3. No Indications - adjust the frequency to every 6 hours PRN wheezing or bronchospasm, if no treatments needed after 48 hours then discontinue using Per Protocol order mode. If indication present, adjust the RT bronchodilator orders based on the Bronchodilator Assessment Score as indicated below. Use Inhaler orders unless patient has one or more of the following: on home nebulizer, not able to hold breath for 10 seconds, is not alert and oriented, cannot activate and use MDI correctly, or respiratory rate 25 breaths per minute or more, then use the equivalent nebulizer order(s) with same Frequency and PRN reasons based on the score. If a patient is on this medication at home then do not decrease Frequency below that used at home.     0-3 - enter or revise RT bronchodilator order(s) to equivalent RT Bronchodilator order with Frequency of every 4 hours PRN for wheezing or increased work of breathing using Per Protocol order mode. 4-6 - enter or revise RT Bronchodilator order(s) to two equivalent RT bronchodilator orders with one order with BID Frequency and one order with Frequency of every 4 hours PRN wheezing or increased work of breathing using Per Protocol order mode. 7-10 - enter or revise RT Bronchodilator order(s) to two equivalent RT bronchodilator orders with one order with TID Frequency and one order with Frequency of every 4 hours PRN wheezing or increased work of breathing using Per Protocol order mode. 11-13 - enter or revise RT Bronchodilator order(s) to one equivalent RT bronchodilator order with QID Frequency and an Albuterol order with Frequency of every 4 hours PRN wheezing or increased work of breathing using Per Protocol order mode. Greater than 13 - enter or revise RT Bronchodilator order(s) to one equivalent RT bronchodilator order with every 4 hours Frequency and an Albuterol order with Frequency of every 2 hours PRN wheezing or increased work of breathing using Per Protocol order mode. RT to enter RT Home Evaluation for COPD & MDI Assessment order using Per Protocol order mode.     Electronically signed by Rashmi Villarreal RCP on 6/8/2022 at 10:22 AM

## 2022-06-09 LAB
ANION GAP SERPL CALCULATED.3IONS-SCNC: 11 MMOL/L (ref 3–16)
BUN BLDV-MCNC: 47 MG/DL (ref 7–20)
CALCIUM SERPL-MCNC: 8.6 MG/DL (ref 8.3–10.6)
CHLORIDE BLD-SCNC: 100 MMOL/L (ref 99–110)
CO2: 20 MMOL/L (ref 21–32)
CREAT SERPL-MCNC: 1.4 MG/DL (ref 0.6–1.2)
GFR AFRICAN AMERICAN: 44
GFR NON-AFRICAN AMERICAN: 36
GLUCOSE BLD-MCNC: 127 MG/DL (ref 70–99)
HCT VFR BLD CALC: 28.1 % (ref 36–48)
HEMOGLOBIN: 9.8 G/DL (ref 12–16)
LV EF: 55 %
LVEF MODALITY: NORMAL
MAGNESIUM: 1.6 MG/DL (ref 1.8–2.4)
MCH RBC QN AUTO: 32.9 PG (ref 26–34)
MCHC RBC AUTO-ENTMCNC: 34.9 G/DL (ref 31–36)
MCV RBC AUTO: 94.3 FL (ref 80–100)
PDW BLD-RTO: 14.7 % (ref 12.4–15.4)
PHOSPHORUS: 2.5 MG/DL (ref 2.5–4.9)
PLATELET # BLD: 110 K/UL (ref 135–450)
PMV BLD AUTO: 7.8 FL (ref 5–10.5)
POTASSIUM SERPL-SCNC: 3.4 MMOL/L (ref 3.5–5.1)
PRO-BNP: ABNORMAL PG/ML (ref 0–449)
RBC # BLD: 2.98 M/UL (ref 4–5.2)
SODIUM BLD-SCNC: 131 MMOL/L (ref 136–145)
WBC # BLD: 7.7 K/UL (ref 4–11)

## 2022-06-09 PROCEDURE — 36415 COLL VENOUS BLD VENIPUNCTURE: CPT

## 2022-06-09 PROCEDURE — 1200000000 HC SEMI PRIVATE

## 2022-06-09 PROCEDURE — 97535 SELF CARE MNGMENT TRAINING: CPT

## 2022-06-09 PROCEDURE — 83880 ASSAY OF NATRIURETIC PEPTIDE: CPT

## 2022-06-09 PROCEDURE — 84100 ASSAY OF PHOSPHORUS: CPT

## 2022-06-09 PROCEDURE — 2580000003 HC RX 258: Performed by: INTERNAL MEDICINE

## 2022-06-09 PROCEDURE — 6370000000 HC RX 637 (ALT 250 FOR IP): Performed by: INTERNAL MEDICINE

## 2022-06-09 PROCEDURE — 97116 GAIT TRAINING THERAPY: CPT

## 2022-06-09 PROCEDURE — 97530 THERAPEUTIC ACTIVITIES: CPT

## 2022-06-09 PROCEDURE — 94761 N-INVAS EAR/PLS OXIMETRY MLT: CPT

## 2022-06-09 PROCEDURE — 80048 BASIC METABOLIC PNL TOTAL CA: CPT

## 2022-06-09 PROCEDURE — 85027 COMPLETE CBC AUTOMATED: CPT

## 2022-06-09 PROCEDURE — 93306 TTE W/DOPPLER COMPLETE: CPT

## 2022-06-09 PROCEDURE — 6370000000 HC RX 637 (ALT 250 FOR IP): Performed by: NURSE PRACTITIONER

## 2022-06-09 PROCEDURE — 94640 AIRWAY INHALATION TREATMENT: CPT

## 2022-06-09 PROCEDURE — 6360000002 HC RX W HCPCS: Performed by: INTERNAL MEDICINE

## 2022-06-09 PROCEDURE — 97110 THERAPEUTIC EXERCISES: CPT

## 2022-06-09 PROCEDURE — 83735 ASSAY OF MAGNESIUM: CPT

## 2022-06-09 PROCEDURE — 2580000003 HC RX 258: Performed by: NURSE PRACTITIONER

## 2022-06-09 PROCEDURE — 99233 SBSQ HOSP IP/OBS HIGH 50: CPT | Performed by: INTERNAL MEDICINE

## 2022-06-09 RX ORDER — POTASSIUM CHLORIDE 20 MEQ/1
20 TABLET, EXTENDED RELEASE ORAL 2 TIMES DAILY WITH MEALS
Status: DISCONTINUED | OUTPATIENT
Start: 2022-06-09 | End: 2022-06-14 | Stop reason: HOSPADM

## 2022-06-09 RX ORDER — ALBUTEROL SULFATE 90 UG/1
2 AEROSOL, METERED RESPIRATORY (INHALATION) 3 TIMES DAILY
Status: DISCONTINUED | OUTPATIENT
Start: 2022-06-10 | End: 2022-06-14 | Stop reason: HOSPADM

## 2022-06-09 RX ORDER — MAGNESIUM SULFATE 1 G/100ML
1000 INJECTION INTRAVENOUS ONCE
Status: COMPLETED | OUTPATIENT
Start: 2022-06-09 | End: 2022-06-09

## 2022-06-09 RX ORDER — ALBUTEROL SULFATE 90 UG/1
2 AEROSOL, METERED RESPIRATORY (INHALATION) 4 TIMES DAILY
Status: DISCONTINUED | OUTPATIENT
Start: 2022-06-09 | End: 2022-06-09

## 2022-06-09 RX ORDER — ALBUTEROL SULFATE 90 UG/1
2 AEROSOL, METERED RESPIRATORY (INHALATION) 3 TIMES DAILY
Status: DISCONTINUED | OUTPATIENT
Start: 2022-06-09 | End: 2022-06-09

## 2022-06-09 RX ORDER — LANOLIN ALCOHOL/MO/W.PET/CERES
400 CREAM (GRAM) TOPICAL DAILY
Status: DISCONTINUED | OUTPATIENT
Start: 2022-06-09 | End: 2022-06-10

## 2022-06-09 RX ORDER — GUAIFENESIN 600 MG/1
600 TABLET, EXTENDED RELEASE ORAL 2 TIMES DAILY
Status: DISCONTINUED | OUTPATIENT
Start: 2022-06-09 | End: 2022-06-14 | Stop reason: HOSPADM

## 2022-06-09 RX ORDER — IPRATROPIUM BROMIDE AND ALBUTEROL SULFATE 2.5; .5 MG/3ML; MG/3ML
1 SOLUTION RESPIRATORY (INHALATION) 2 TIMES DAILY
Status: DISCONTINUED | OUTPATIENT
Start: 2022-06-09 | End: 2022-06-09

## 2022-06-09 RX ADMIN — CARVEDILOL 12.5 MG: 6.25 TABLET, FILM COATED ORAL at 17:04

## 2022-06-09 RX ADMIN — DOXAZOSIN 2 MG: 2 TABLET ORAL at 08:24

## 2022-06-09 RX ADMIN — POTASSIUM CHLORIDE 20 MEQ: 1500 TABLET, EXTENDED RELEASE ORAL at 10:19

## 2022-06-09 RX ADMIN — APIXABAN 2.5 MG: 5 TABLET, FILM COATED ORAL at 22:44

## 2022-06-09 RX ADMIN — MULTIPLE VITAMINS W/ MINERALS TAB 1 TABLET: TAB at 08:23

## 2022-06-09 RX ADMIN — GUAIFENESIN 600 MG: 600 TABLET, EXTENDED RELEASE ORAL at 22:44

## 2022-06-09 RX ADMIN — POTASSIUM CHLORIDE 20 MEQ: 1500 TABLET, EXTENDED RELEASE ORAL at 17:04

## 2022-06-09 RX ADMIN — FUROSEMIDE 20 MG: 20 TABLET ORAL at 17:04

## 2022-06-09 RX ADMIN — MAGNESIUM SULFATE HEPTAHYDRATE 1000 MG: 1 INJECTION, SOLUTION INTRAVENOUS at 10:24

## 2022-06-09 RX ADMIN — GUAIFENESIN 600 MG: 600 TABLET, EXTENDED RELEASE ORAL at 13:09

## 2022-06-09 RX ADMIN — CEFTRIAXONE SODIUM 1000 MG: 1 INJECTION, POWDER, FOR SOLUTION INTRAMUSCULAR; INTRAVENOUS at 08:30

## 2022-06-09 RX ADMIN — SODIUM CHLORIDE, PRESERVATIVE FREE 10 ML: 5 INJECTION INTRAVENOUS at 22:43

## 2022-06-09 RX ADMIN — FUROSEMIDE 20 MG: 20 TABLET ORAL at 08:23

## 2022-06-09 RX ADMIN — CARVEDILOL 12.5 MG: 6.25 TABLET, FILM COATED ORAL at 08:23

## 2022-06-09 RX ADMIN — DOXAZOSIN 2 MG: 2 TABLET ORAL at 17:04

## 2022-06-09 RX ADMIN — IPRATROPIUM BROMIDE AND ALBUTEROL SULFATE 1 AMPULE: .5; 2.5 SOLUTION RESPIRATORY (INHALATION) at 07:57

## 2022-06-09 RX ADMIN — ALLOPURINOL 100 MG: 100 TABLET ORAL at 22:44

## 2022-06-09 RX ADMIN — ATORVASTATIN CALCIUM 20 MG: 10 TABLET, FILM COATED ORAL at 22:44

## 2022-06-09 RX ADMIN — Medication 400 MG: at 10:19

## 2022-06-09 RX ADMIN — Medication 2 PUFF: at 19:15

## 2022-06-09 RX ADMIN — Medication 2 PUFF: at 15:03

## 2022-06-09 RX ADMIN — ALLOPURINOL 100 MG: 100 TABLET ORAL at 08:23

## 2022-06-09 RX ADMIN — TRAZODONE HYDROCHLORIDE 50 MG: 50 TABLET ORAL at 22:44

## 2022-06-09 RX ADMIN — Medication 2000 UNITS: at 08:23

## 2022-06-09 RX ADMIN — SODIUM CHLORIDE, PRESERVATIVE FREE 10 ML: 5 INJECTION INTRAVENOUS at 08:27

## 2022-06-09 RX ADMIN — APIXABAN 2.5 MG: 5 TABLET, FILM COATED ORAL at 08:23

## 2022-06-09 NOTE — PROGRESS NOTES
Inpatient Physical Therapy Daily Treatment Note    Unit: 2 [place]  Date:  6/9/2022  Patient Name:    Danae Turcios  Admitting diagnosis:  Hypomagnesemia [E83.42]  OLY (acute kidney injury) ([facility] [N17.9]  Admit Date:  6/5/2022  Precautions/Restrictions:  Fall risk, Bed/chair alarm, Lines -IV, [place] (hard of hearing) and Telemetry      Discharge Recommendations: SNF (Patient doesn't have home 24hr assist)  DME needs for discharge: Needs Met (recommended to use RW for all functional mobility)       Therapy recommendation for EMS Transport: can transport by wheelchair    Therapy recommendations for staff:   Assist of 1 with use of rolling walker (RW) and gait belt for all transfers and ambulation to/from chair  to/from bathroom    History of Present Illness: H & P as per Baudilio Gibson MD's note dated 6/6/2022  The patient is a 81 y.o. female PMH HTN, HLD, hyperthyroidism, afib on eliquis, CKD, CVA pulmonary nodules, moderate AR and TR, chronic diastolic CHF and bilateral knee OA who presents to [facility] with hypotension. History obtained from the patient and review of EMR. Patient states that she was feeling very weak yesterday.  No true dizziness or syncope.  She checked her blood pressures and systolics were around 573-609.  XJOD is low for her and she came to the emergency department.  She has had some chills but no fevers.  No nausea or vomiting ;  describes chronic dyspnea.  She also complains of chronic left shoulder pain ongoing for years  In ED patient pt noted with OLY- creatinine 1.7, hypomagnesemia of 1.10.  Pt was given 2 liter bolus in ED. Magnesium replaced. Troponin <0.01. After patient admitted she complained of shortness of breath and worsening left shoulder pain. X-ray obtained and showed subtle linear discontinuity at the base of the greater tuberosity with mild sclerosis.  If the patient had recent trauma, which she denied in ED, differential would include subacute nondisplaced fracture.  Mild acromioclavicular degenerative changes noted as well.  VQ scan was ordered in the ED and patient was admitted for further care. Home Health S4 Level Recommendation: Level 3 Safety  AM-PAC Mobility Score   AM-PAC Inpatient Mobility Raw Score : 20     Treatment Time: 1350 - 1415  Treatment number: 2  Timed Code Treatment Minutes: 25 minutes  Total Treatment Minutes:  25  minutes    Cognition    A&O Person, Place and Situation   Able to follow 2 step commands    Subjective  Patient sitting up in chair with no family present   Pt agreeable to this PT tx. Pain   No  Location: N/A  Rating:    NA/10  Pain Medicine Status: Denies need     Bed Mobility   Supine to Sit:    CGA  Sit to Supine:   CGA  Rolling:   CGA  Scooting:   CGA in sitting and supine (while scooting in supine patient presented with moderate SOB)    Transfer Training     Sit to stand:   CGA  Stand to sit:   CGA  Bed to Chair:   CGA with use of gait belt and rolling walker (RW)    Gait Training gait completed as indicated below  Distance:      40 ft  Deviations (firm surface/linoleum):  decreased beverly, increased YANA, decreased step length bilaterally and decreased stance time bilaterally  Assistive Device Used:    gait belt and rolling walker (RW)  Level of Assist:    CGA  Comment: Patient presented with very slow walking speed and presented with moderate SOB. Patient recovered to baseline after sitting down. Stair Training deferred, pt does not have stairs in the home environment    Therapeutic Exercise all completed bilaterally unless indicated  Ankle Pumps x 10 reps  Heel slides x 10 reps  marching x 10 reps    Balance  Sitting:  Fair ; CGA  Comments:     Standing: Fair -; CGA  Comments: ~3 min    Patient Education      Role of PT, POC, Discharge recommendations, DC recommendations, safety awareness, transfer techniques, pursed lip breathing, energy conservation, pacing activity and calling for assist with mobility.      Positioning Needs Pt in bed, alarm set, positioned in proper neutral alignment and pressure relief provided. Call light provided and all needs within reach. Patient needed to be in the bed for ECHO. ROM Measurements N/A  Knee Flexion:  Knee Extension:     Activity Tolerance   Pt completed therapy session with SOB noted with all functional mobility. SpO2: 92 - 96% on RA at rest and with functional mobility    Other  N/A    Assessment :  Patient needed regular sitting rest breaks throughout the session. As per the new information given by the patient daughter is unable to provide 24hr assist upon discharge hence SNF placement will be beneficial.   Recommending SNF upon discharge as patient functioning well below baseline, demonstrates good rehab potential and unable to return home due to limited or no family support, burden of care beyond caregiver ability, home environment not conducive to patient recovery and limited safety awareness. Goals (all goals ongoing unless otherwise indicated)  To be met in 3 visits:  1). Independent with LE Ex x 10 reps     To be met in 6 visits:  1). Supine to/from sit: Modified Independent  2). Sit to/from stand: Modified Independent  3). Bed to chair: Modified Independent  4). Gait: Ambulate 150 ft.  with  Modified Independent and use of LRAD (least restrictive assistive device)  5). Tolerate B LE exercises 3 sets of 10-15 reps    Plan   Continue with plan of care. Signature: Josee Maki MS PT, # [id number]    If patient discharges from this facility prior to next visit, this note will serve as the Discharge Summary.

## 2022-06-09 NOTE — PROGRESS NOTES
Shift assessment complete. See doc flow. Nightly medications given see MAR. A/O x4. Patient with no complaints at this time. Bed alarm in place. Call light and bedside table within easy reach.

## 2022-06-09 NOTE — PROGRESS NOTES
Progress Note    Admit Date:  6/5/2022       Patient admitted with LOY, weakness, dyspnea, hypotension. Creatinine now starting to improve with IV hydration. Most of her antihypertensive medications were held. O2 sat stable on room air, VQ scan negative. Subjective:  6/8  Ms. Michelle Slade states that she feels better , however still gets dyspneic with minimal activity . Maintaining her O2 sats . Starting to cough up some sputum , she is having some intermittent wheezing . Renal function improving . Blood Pressures are starting to trend up . Misty Bintamaribell She is sitting up on the chair today, she worked with therapy      6/9  Patient looks better today. I discussed with patient's nurse and with therapist.    She did walk  well with therapy yesterday. they did not notice any dyspnea;  nursing staff has not noticed any wheezing. O2 sats have remained stable on RA. on telemetry patient is in A. fib rate controlled. Renal function is improved today. Patient's daughter however is uncomfortable about her going home today, still worried about patient's dyspnea. .  Patient does not appear to be in any distress on. Does not have any wheezing. .  Have a cough. .  Zithromax was attempted yesterday but patient had an allergic response to Zithromax and hence it was stopped. Objective:   BP (!) 146/78   Pulse 78   Temp 97 °F (36.1 °C) (Oral)   Resp 18   Ht 5' 5\" (1.651 m)   Wt 207 lb 12.8 oz (94.3 kg)   SpO2 97%   BMI 34.58 kg/m²       Intake/Output Summary (Last 24 hours) at 6/9/2022 1245  Last data filed at 6/8/2022 2233  Gross per 24 hour   Intake 420 ml   Output 200 ml   Net 220 ml         Physical Exam:  General:  Awake, alert, NAD  Skin:  Warm and dry  Neck:  JVD absent. Neck supple  Chest: Diminished breath sounds , no wheezes rales or rhonchi  Cardiovascular:  RRR ,S1S2 normal  Abdomen:  Soft, non tender, non distended, BS +  Extremities:  No edema. Intact peripheral pulses.  Brisk cap refill, < 2 secs  Neuro: non focal      Medications:   Scheduled Meds:   potassium chloride  20 mEq Oral BID     magnesium oxide  400 mg Oral Daily    guaiFENesin  600 mg Oral BID    albuterol sulfate HFA  2 puff Inhalation TID    cefTRIAXone (ROCEPHIN) IV  1,000 mg IntraVENous Q24H    Vitamin D  2,000 Units Oral Daily    doxazosin  2 mg Oral BID    furosemide  20 mg Oral BID    allopurinol  100 mg Oral BID    apixaban  2.5 mg Oral BID    atorvastatin  20 mg Oral Nightly    carvedilol  12.5 mg Oral BID     therapeutic multivitamin-minerals  1 tablet Oral Daily    sodium chloride flush  5-40 mL IntraVENous 2 times per day    carvedilol  12.5 mg Oral Once       Continuous Infusions:   sodium chloride 5 mL/hr at 06/07/22 1031       Data:  CBC:   Recent Labs     06/07/22  0534 06/08/22  0606 06/09/22  0716   WBC 8.9 7.2 7.7   RBC 3.04* 3.18* 2.98*   HGB 9.9* 10.5* 9.8*   HCT 30.9* 30.7* 28.1*   .7* 96.6 94.3   RDW 15.4 14.9 14.7   PLT 87* 91* 110*     BMP:   Recent Labs     06/07/22  0534 06/08/22  0606 06/09/22  0716   * 134* 131*   K 3.2* 3.6 3.4*   CL 99 103 100   CO2 17* 18* 20*   PHOS 3.0 2.8 2.5   BUN 58* 53* 47*   CREATININE 2.1* 1.7* 1.4*     BNP: No results for input(s): BNP in the last 72 hours. PT/INR: No results for input(s): PROTIME, INR in the last 72 hours. APTT: No results for input(s): APTT in the last 72 hours. CARDIAC ENZYMES:   No results for input(s): CKMB, CKMBINDEX, TROPONINI in the last 72 hours. Invalid input(s): CKTOTAL;3  FASTING LIPID PANEL:  Lab Results   Component Value Date    CHOL 112 02/27/2017    HDL 56 02/27/2017    TRIG 120 02/27/2017     LIVER PROFILE:   No results for input(s): AST, ALT, ALB, BILIDIR, BILITOT, ALKPHOS in the last 72 hours. Cultures    SARS-CoV-2 RNA, RT PCR NOT DETECTED        INFLUENZA A NOT DETECTED     INFLUENZA B NOT DETECTED         Radiology  XR CHEST (2 VW)   Final Result   Stable cardiomegaly.          NM LUNG VENT/PERFUSION (VQ)   Final Result   Low probability for pulmonary embolism. Obstructive lung disease. XR SHOULDER LEFT (MIN 2 VIEWS)   Final Result   1. Subtle linear discontinuity at the base of the greater tuberosity with   mild sclerosis. If patient has had recent trauma, differential would include   a subacute nondisplaced fracture. 2. Mild acromioclavicular degenerative changes. XR CHEST PORTABLE   Final Result   No acute cardiopulmonary disease. Assessment:  Principal Problem:    OLY (acute kidney injury) ([address]  Active Problems:    Hypotension    Atrial fibrillation, controlled ([address]    Hypomagnesemia    Arthritis of left shoulder region  Resolved Problems:    * No resolved hospital problems. *      Plan:      Hypotension  History of hypertension  -S/p IV fluid bolus in the ED , continued on IV fluids for the last couple of days. Hypotension is resolved now,  Blood pressure starting to trend up. We were Holding Lasix, Hyzaar and Cardura. --> Resumed on Lasix and Cardura on 6/8. Continued on Coreg. Hold Hyzaar. Stopped IV fluids on 6/8  Monitor blood pressure and renal function closely.   -Blood pressure and heart rate stable today. Function stable today. Continue Lasix Cardura and Coreg. Hypomagnesemia  Hypokalemia  - repleted  Potassium and mag level low again today we will replete some more       OLY on CKD  -Likely prerenal , patient appears dehydrated on admission . given IV fluids . - crtn at baseline 0.9-1.1. crtn  on admission 1.7-> 2.0-> 2.1-> creatinine is down to 1.7-> 1.4 today  -S/p IV fluid hydration; resumed on Lasix only. Continue to hold Hyzaar. Renal function stable and improving. .       Dyspnea  -Possible bronchitis. Patient is starting to cough up some sputum now  -Oxygen saturation stable on room air,.  -She had some wheezing  -Has been getting DuoNebs-we will switch her to albuterol inhalers.   - on empiric antibiotics Rocephin-continue.  - add mucinex   -We tried giving her Zithromax yesterday but she had an allergic response to this with a local itching and rash-Zithromax has been discontinued.   -Chest x-ray with stable cardiomegaly, no infiltrates5  - VQ scan -nwas done on admission and it was negative  Daughter still concerned about patient's dyspnea, patient's last echocardiogram was in 2021. she  does  have some valvular regurgitation, EF  normal.  We will recheck echocardiogram.    check BNP    Chronic diastolic CHF  Moderate AR and TR  Moderate pulmonary HTN  - follows with Dr. Lili Fuentes at [facility]  - pt is on Lasix 20 mg BID--held Lasix on admission due to OLY and dehydration. Lasix has been resumed now. Monitor closely . - continue daily weights, low sodium diet, strict I/O. Check echocardiogram.    Left shoulder pain  - Xray showed subtle linear discontinuity at the base of the greater tuberosity with mild sclerosis- if patient has had recent trauma, differential would include a subacute nondisplaced fracture. Mild acromioclavicular degenerative changes   - no h/O trauma. Pain is chronic, over years   Left shoulder pain is chronic related to osteoarthritis    UTI  -Urine cultures growing E. Coli. Continue Rocephin     Atrial fibrillation  - EKG with controlled rate Afib on admission   - on eliquis    - continue coreg and monitor blood pressure      HLD  - Continue statin       Hx Hyperthyroidism     Hx of CVA- 2007  - continue Eliquis and statin         DVT Prophylaxis: Eliquis   Diet: ADULT DIET; Regular  Code Status: Full Code. PLAN OF CARE WAS DISCUSSED IN DETAIL WITH PATIENT AND PATIENT'S DAUGHTER AT BEDSIDE. Discussed with patient's nurse in detail    Total time today 40  minutes.  > 50 % time dominated by coordination of care and D/W family          Su Herrera MD   6/9/2022 12:45 PM

## 2022-06-09 NOTE — CARE COORDINATION
INTERDISCIPLINARY PLAN OF CARE CONFERENCE    Date/Time: 6/9/2022 4:15 PM  Completed by: Jennifer López RN, Case Management      Patient Name:  Ulises Lynn  YOB: 1940  Admitting Diagnosis: Hypomagnesemia [E83.42]  OLY (acute kidney injury) ([facility] [N17.9]     Admit Date/Time:  6/5/2022  9:31 AM    Chart reviewed. Interdisciplinary team contacted or reviewed plan related to patient progress and discharge plans. Disciplines included Case Management, Nursing, and Dietitian. Current Status: Stable  PT/OT recommendation for discharge plan of care: SNF (Patient doesn't have home 24hr assist)    Expected D/C Disposition:  TBD  Confirmed plan with patient Yes   Met with: patient  Discharge Plan Comments:  Reviewed chart and met with pt as pt now does not have 24/7 assist at home. Discussed rehab and pt is unsure but states if does need SNF would like [facility]. Left VM re: referral for intake at [facility] and await return call.      Home O2 in place on admit: No  Pt informed of need to bring portable home O2 tank on day of discharge for nursing to connect prior to leaving:  Not Indicated  Verbalized agreement/Understanding:  Not Indicated

## 2022-06-09 NOTE — PROGRESS NOTES
RT Inhaler-Nebulizer Bronchodilator Protocol Note    There is a bronchodilator order in the chart from a provider indicating to follow the RT Bronchodilator Protocol and there is an Initiate RT Inhaler-Nebulizer Bronchodilator Protocol order as well (see protocol at bottom of note). CXR Findings:  No results found. The findings from the last RT Protocol Assessment were as follows:   History Pulmonary Disease: (P) Chronic pulmonary disease  Respiratory Pattern: (P) Dyspnea on exertion or RR 21-25 bpm  Breath Sounds: (P) Slightly diminished and/or crackles  Cough: (P) Strong, spontaneous, non-productive  Indication for Bronchodilator Therapy: (P) Decreased or absent breath sounds  Bronchodilator Assessment Score: (P) 6    Aerosolized bronchodilator medication orders have been revised according to the RT Inhaler-Nebulizer Bronchodilator Protocol below. Respiratory Therapist to perform RT Therapy Protocol Assessment initially then follow the protocol. Repeat RT Therapy Protocol Assessment PRN for score 0-3 or on second treatment, BID, and PRN for scores above 3. No Indications - adjust the frequency to every 6 hours PRN wheezing or bronchospasm, if no treatments needed after 48 hours then discontinue using Per Protocol order mode. If indication present, adjust the RT bronchodilator orders based on the Bronchodilator Assessment Score as indicated below. Use Inhaler orders unless patient has one or more of the following: on home nebulizer, not able to hold breath for 10 seconds, is not alert and oriented, cannot activate and use MDI correctly, or respiratory rate 25 breaths per minute or more, then use the equivalent nebulizer order(s) with same Frequency and PRN reasons based on the score. If a patient is on this medication at home then do not decrease Frequency below that used at home.     0-3 - enter or revise RT bronchodilator order(s) to equivalent RT Bronchodilator order with Frequency of every 4 hours PRN for wheezing or increased work of breathing using Per Protocol order mode. 4-6 - enter or revise RT Bronchodilator order(s) to two equivalent RT bronchodilator orders with one order with BID Frequency and one order with Frequency of every 4 hours PRN wheezing or increased work of breathing using Per Protocol order mode. 7-10 - enter or revise RT Bronchodilator order(s) to two equivalent RT bronchodilator orders with one order with TID Frequency and one order with Frequency of every 4 hours PRN wheezing or increased work of breathing using Per Protocol order mode. 11-13 - enter or revise RT Bronchodilator order(s) to one equivalent RT bronchodilator order with QID Frequency and an Albuterol order with Frequency of every 4 hours PRN wheezing or increased work of breathing using Per Protocol order mode. Greater than 13 - enter or revise RT Bronchodilator order(s) to one equivalent RT bronchodilator order with every 4 hours Frequency and an Albuterol order with Frequency of every 2 hours PRN wheezing or increased work of breathing using Per Protocol order mode.          Electronically signed by Kevin Greco RCP on 6/9/2022 at 8:03 AM

## 2022-06-09 NOTE — FLOWSHEET NOTE
06/09/22 0815   Vital Signs   Temp 97 °F (36.1 °C)   Temp Source Oral   Heart Rate 78   Heart Rate Source Monitor   Resp 18   BP (!) 146/78   BP Method Automatic   MAP (Calculated) 100.67   Patient Position Semi fowlers   Level of Consciousness Alert (0)   MEWS Score 1   Oxygen Therapy   SpO2 97 %   O2 Device None (Room air)     Pt assessment completed, vss, see flow sheet. Pt alert and oriented x 4. Pt given an medications. Pt denies any needs at this time.   Parmjit Garcia RN

## 2022-06-09 NOTE — PLAN OF CARE
Problem: Discharge Planning  Goal: Discharge to home or other facility with appropriate resources  6/8/2022 2220 by Janell Brown RN  Outcome: Progressing  6/8/2022 1329 by Kimberly De Luna RN  Outcome: Progressing     Problem: Safety - Adult  Goal: Free from fall injury  6/8/2022 2220 by Janell Brown RN  Outcome: Progressing  6/8/2022 1329 by Kimberly De Luna RN  Outcome: Progressing     Problem: ABCDS Injury Assessment  Goal: Absence of physical injury  6/8/2022 2220 by Janell Brown RN  Outcome: Progressing  6/8/2022 1329 by Kimberly De Luna RN  Outcome: Progressing     Problem: Chronic Conditions and Co-morbidities  Goal: Patient's chronic conditions and co-morbidity symptoms are monitored and maintained or improved  6/8/2022 2220 by Janell Brown RN  Outcome: Progressing  6/8/2022 1329 by Kimberly De Luna RN  Outcome: Progressing

## 2022-06-09 NOTE — PROGRESS NOTES
Occupational Therapy Daily Treatment Note    Unit: 2 [place]  Date:  6/9/2022  Patient Name:    Esperanza Veras  Admitting diagnosis:  Hypomagnesemia [E83.42]  OL[facility] [N17.9]  Admit Date:  6/5/2022  Precautions/Restrictions:  fall risk, IV, bed/chair alarm and telemetry        Discharge Recommendations: SNF  DME needs for discharge: defer to facility     Will need 24 hour assist/supervision and HHOT if going home, patient unsure if family can provide this 24 hour assist/supervision but states she will call them. Therapy recommendations for staff:   Assist of 1 (contact guard assist) with use of rolling walker (RW) and gait belt for all transfers and ambulation within room    AM-PAC Score: AM-PAC Inpatient Daily Activity Raw Score: 19  Home Health S4 Level: Level 1- Standard if going home with 24 hour assist       Treatment Time:  0380-1719  Treatment number:  2    Total Treatment Time:   38 minutes    History of Present Illness: per Dr Elba Rachel H&P 6/6/22:  \"The patient is a 80 y.o. female PMH HTN, HLD, hyperthyroidism, afib on eliquis, CKD, CVA pulmonary nodules, moderate AR and TR, chronic diastolic CHF and bilateral knee OA who presents to [facility] with hypotension. History obtained from the patient and review of EMR. Patient states that she was feeling very weak yesterday. No true dizziness or syncope. She checked her blood pressures and systolics were around 056-693. This is low for her and she came to the emergency department. She has had some chills but no fevers. No nausea or vomiting ;  describes chronic dyspnea. She also complains of chronic left shoulder pain ongoing for years     In ED patient pt noted with OLY- creatinine 1.7, hypomagnesemia of 1.10. Pt was given 2 liter bolus in ED. Magnesium replaced. Troponin <0.01. After patient admitted she complained of shortness of breath and worsening left shoulder pain.  X-ray obtained and showed subtle linear discontinuity at the base of the greater tuberosity with mild sclerosis. If the patient had recent trauma, which she denied in ED, differential would include subacute nondisplaced fracture. Mild acromioclavicular degenerative changes noted as well. VQ scan was ordered in the ED and patient was admitted for further care. \"    Subjective:  Patient in bed and agreeable to therapy with encouragement. Pain   No - reports being very cold  Rating: NA  Location:  Pain Medicine Status: No request made      Bed Mobility:   Supine to Sit:  Mod A  Sit to Supine:  Not Tested  Rolling:           Not Tested  Scooting:        Min A seated scoot to EOB    Transfer Training:   Sit to stand:   Min A initially, improved to CGA on subsequent trials  Stand to sit:  CGA and VC for hand placement  Bed to Chair:  Min A, with use of RW and VC for hand placement  Bed to [facility]:   Not Tested  Standard toilet:   Not Tested    Activity Tolerance   Pt completed therapy session with Dizziness noted with supine to sit  SpO2: 99% on RA  HR: 79  BP: 147/85    Up in chair after ADL, SOB noted:  SpO2: 95%  HR: 83  BP: 153/76    Balance  Sitting Balance :     SBA  Standing Balance   CGA with RW    ADL Training:   Upper body dressing: Mod A  Upper body bathing:  Min A to wash back, cues to address all other areas   Lower body dressing:  Not Tested  Lower body bathing:  Min A for thoroughness with wiping backside, cues to address all areas within reach (declined need to wash feet/change socks)  Toileting:   Not Tested  Grooming/Hygiene:  Min A wash hair with shampoo cap, SBA comb hair  Feeding :   Not Tested    Therapeutic Exercise:   N/A    Patient Education:   Role of OT  Recommendations for DC  Energy conservation techniques  Safe RW use/hand placement    Positioning Needs:   Up in chair, call light and needs in reach. Alarm Set   Warm blankets provided for comfort.     Family Present:  No    Assessment: Limited by feeling more weak today, dizzy with initial position change (supine to sit) and SOB with exertion during ADLs. Continue. GOALS  To be met in 3 Visits:  1). Bed to toilet/BSC: Supervision     To be met in 5 Visits:  1). Supine to/from Sit:             Supervision  2). Upper Body Bathing:         Supervision  3). Lower Body Bathing:         Supervision  4). Upper Body Dressing:       Supervision  5). Lower Body Dressing:       Supervision  6).  Pt to demonstrate UE exs x 15 reps with minimal cues    Plan: cont with [address] [place], OTR/L 4801        If patient discharges from this facility prior to next visit, this note will serve as the Discharge Summary

## 2022-06-09 NOTE — PLAN OF CARE
Problem: Discharge Planning  Goal: Discharge to home or other facility with appropriate resources  6/9/2022 1048 by Tran Plaza RN  Outcome: Progressing  6/8/2022 2220 by Julio C Ray RN  Outcome: Progressing     Problem: Safety - Adult  Goal: Free from fall injury  6/9/2022 1048 by Tran Plaza RN  Outcome: Progressing  6/8/2022 2220 by Julio C Ray RN  Outcome: Progressing     Problem: ABCDS Injury Assessment  Goal: Absence of physical injury  6/9/2022 1048 by Tran Plaza RN  Outcome: Progressing  6/8/2022 2220 by Julio C Ray RN  Outcome: Progressing     Problem: Chronic Conditions and Co-morbidities  Goal: Patient's chronic conditions and co-morbidity symptoms are monitored and maintained or improved  6/9/2022 1048 by Tran Plaza RN  Outcome: Progressing  6/8/2022 2220 by Julio C Ray RN  Outcome: Progressing

## 2022-06-09 NOTE — PROGRESS NOTES
RT Inhaler-Nebulizer Bronchodilator Protocol Note    There is a bronchodilator order in the chart from a provider indicating to follow the RT Bronchodilator Protocol and there is an Initiate RT Inhaler-Nebulizer Bronchodilator Protocol order as well (see protocol at bottom of note). CXR Findings:  No results found. The findings from the last RT Protocol Assessment were as follows:   History Pulmonary Disease: Chronic pulmonary disease  Respiratory Pattern: Dyspnea on exertion or RR 21-25 bpm  Breath Sounds: Slightly diminished and/or crackles  Cough: Strong, spontaneous, non-productive  Indication for Bronchodilator Therapy: Decreased or absent breath sounds  Bronchodilator Assessment Score: 6    Aerosolized bronchodilator medication orders have been revised according to the RT Inhaler-Nebulizer Bronchodilator Protocol below. Respiratory Therapist to perform RT Therapy Protocol Assessment initially then follow the protocol. Repeat RT Therapy Protocol Assessment PRN for score 0-3 or on second treatment, BID, and PRN for scores above 3. No Indications - adjust the frequency to every 6 hours PRN wheezing or bronchospasm, if no treatments needed after 48 hours then discontinue using Per Protocol order mode. If indication present, adjust the RT bronchodilator orders based on the Bronchodilator Assessment Score as indicated below. Use Inhaler orders unless patient has one or more of the following: on home nebulizer, not able to hold breath for 10 seconds, is not alert and oriented, cannot activate and use MDI correctly, or respiratory rate 25 breaths per minute or more, then use the equivalent nebulizer order(s) with same Frequency and PRN reasons based on the score. If a patient is on this medication at home then do not decrease Frequency below that used at home.     0-3 - enter or revise RT bronchodilator order(s) to equivalent RT Bronchodilator order with Frequency of every 4 hours PRN for wheezing or increased work of breathing using Per Protocol order mode. 4-6 - enter or revise RT Bronchodilator order(s) to two equivalent RT bronchodilator orders with one order with BID Frequency and one order with Frequency of every 4 hours PRN wheezing or increased work of breathing using Per Protocol order mode. 7-10 - enter or revise RT Bronchodilator order(s) to two equivalent RT bronchodilator orders with one order with TID Frequency and one order with Frequency of every 4 hours PRN wheezing or increased work of breathing using Per Protocol order mode. 11-13 - enter or revise RT Bronchodilator order(s) to one equivalent RT bronchodilator order with QID Frequency and an Albuterol order with Frequency of every 4 hours PRN wheezing or increased work of breathing using Per Protocol order mode. Greater than 13 - enter or revise RT Bronchodilator order(s) to one equivalent RT bronchodilator order with every 4 hours Frequency and an Albuterol order with Frequency of every 2 hours PRN wheezing or increased work of breathing using Per Protocol order mode.          Electronically signed by Ina Olivia RCP on 6/9/2022 at 7:19 PM

## 2022-06-10 LAB
ANION GAP SERPL CALCULATED.3IONS-SCNC: 11 MMOL/L (ref 3–16)
BUN BLDV-MCNC: 42 MG/DL (ref 7–20)
CALCIUM SERPL-MCNC: 8.2 MG/DL (ref 8.3–10.6)
CHLORIDE BLD-SCNC: 97 MMOL/L (ref 99–110)
CO2: 22 MMOL/L (ref 21–32)
CREAT SERPL-MCNC: 1.4 MG/DL (ref 0.6–1.2)
GFR AFRICAN AMERICAN: 44
GFR NON-AFRICAN AMERICAN: 36
GLUCOSE BLD-MCNC: 133 MG/DL (ref 70–99)
HCT VFR BLD CALC: 28.7 % (ref 36–48)
HEMOGLOBIN: 9.9 G/DL (ref 12–16)
MAGNESIUM: 1.4 MG/DL (ref 1.8–2.4)
MCH RBC QN AUTO: 32.8 PG (ref 26–34)
MCHC RBC AUTO-ENTMCNC: 34.4 G/DL (ref 31–36)
MCV RBC AUTO: 95.3 FL (ref 80–100)
PDW BLD-RTO: 14.5 % (ref 12.4–15.4)
PHOSPHORUS: 2.4 MG/DL (ref 2.5–4.9)
PLATELET # BLD: 116 K/UL (ref 135–450)
PMV BLD AUTO: 7.7 FL (ref 5–10.5)
POTASSIUM SERPL-SCNC: 3.5 MMOL/L (ref 3.5–5.1)
PRO-BNP: ABNORMAL PG/ML (ref 0–449)
RBC # BLD: 3.01 M/UL (ref 4–5.2)
SODIUM BLD-SCNC: 130 MMOL/L (ref 136–145)
WBC # BLD: 9.6 K/UL (ref 4–11)

## 2022-06-10 PROCEDURE — 6360000002 HC RX W HCPCS: Performed by: INTERNAL MEDICINE

## 2022-06-10 PROCEDURE — 6370000000 HC RX 637 (ALT 250 FOR IP): Performed by: NURSE PRACTITIONER

## 2022-06-10 PROCEDURE — 84100 ASSAY OF PHOSPHORUS: CPT

## 2022-06-10 PROCEDURE — 99223 1ST HOSP IP/OBS HIGH 75: CPT | Performed by: INTERNAL MEDICINE

## 2022-06-10 PROCEDURE — 99233 SBSQ HOSP IP/OBS HIGH 50: CPT | Performed by: INTERNAL MEDICINE

## 2022-06-10 PROCEDURE — 83735 ASSAY OF MAGNESIUM: CPT

## 2022-06-10 PROCEDURE — 80048 BASIC METABOLIC PNL TOTAL CA: CPT

## 2022-06-10 PROCEDURE — 6370000000 HC RX 637 (ALT 250 FOR IP): Performed by: INTERNAL MEDICINE

## 2022-06-10 PROCEDURE — 2580000003 HC RX 258: Performed by: INTERNAL MEDICINE

## 2022-06-10 PROCEDURE — 97110 THERAPEUTIC EXERCISES: CPT

## 2022-06-10 PROCEDURE — 2580000003 HC RX 258: Performed by: NURSE PRACTITIONER

## 2022-06-10 PROCEDURE — 36415 COLL VENOUS BLD VENIPUNCTURE: CPT

## 2022-06-10 PROCEDURE — 83880 ASSAY OF NATRIURETIC PEPTIDE: CPT

## 2022-06-10 PROCEDURE — 85027 COMPLETE CBC AUTOMATED: CPT

## 2022-06-10 PROCEDURE — 94640 AIRWAY INHALATION TREATMENT: CPT

## 2022-06-10 PROCEDURE — 1200000000 HC SEMI PRIVATE

## 2022-06-10 RX ORDER — FUROSEMIDE 10 MG/ML
20 INJECTION INTRAMUSCULAR; INTRAVENOUS ONCE
Status: COMPLETED | OUTPATIENT
Start: 2022-06-10 | End: 2022-06-10

## 2022-06-10 RX ORDER — MAGNESIUM SULFATE IN WATER 40 MG/ML
4000 INJECTION, SOLUTION INTRAVENOUS ONCE
Status: COMPLETED | OUTPATIENT
Start: 2022-06-10 | End: 2022-06-10

## 2022-06-10 RX ORDER — LANOLIN ALCOHOL/MO/W.PET/CERES
400 CREAM (GRAM) TOPICAL 2 TIMES DAILY
Status: DISCONTINUED | OUTPATIENT
Start: 2022-06-10 | End: 2022-06-13

## 2022-06-10 RX ADMIN — ALLOPURINOL 100 MG: 100 TABLET ORAL at 09:26

## 2022-06-10 RX ADMIN — Medication 2 PUFF: at 11:45

## 2022-06-10 RX ADMIN — Medication 2000 UNITS: at 09:27

## 2022-06-10 RX ADMIN — POTASSIUM & SODIUM PHOSPHATES POWDER PACK 280-160-250 MG 250 MG: 280-160-250 PACK at 10:16

## 2022-06-10 RX ADMIN — CEFTRIAXONE SODIUM 1000 MG: 1 INJECTION, POWDER, FOR SOLUTION INTRAMUSCULAR; INTRAVENOUS at 09:26

## 2022-06-10 RX ADMIN — GUAIFENESIN 600 MG: 600 TABLET, EXTENDED RELEASE ORAL at 09:26

## 2022-06-10 RX ADMIN — Medication 400 MG: at 21:27

## 2022-06-10 RX ADMIN — APIXABAN 2.5 MG: 5 TABLET, FILM COATED ORAL at 21:27

## 2022-06-10 RX ADMIN — SODIUM CHLORIDE, PRESERVATIVE FREE 10 ML: 5 INJECTION INTRAVENOUS at 21:29

## 2022-06-10 RX ADMIN — CARVEDILOL 12.5 MG: 6.25 TABLET, FILM COATED ORAL at 09:27

## 2022-06-10 RX ADMIN — Medication 400 MG: at 09:27

## 2022-06-10 RX ADMIN — POTASSIUM CHLORIDE 20 MEQ: 1500 TABLET, EXTENDED RELEASE ORAL at 09:27

## 2022-06-10 RX ADMIN — SODIUM CHLORIDE, PRESERVATIVE FREE 10 ML: 5 INJECTION INTRAVENOUS at 09:26

## 2022-06-10 RX ADMIN — GUAIFENESIN 600 MG: 600 TABLET, EXTENDED RELEASE ORAL at 21:27

## 2022-06-10 RX ADMIN — TRAZODONE HYDROCHLORIDE 50 MG: 50 TABLET ORAL at 21:35

## 2022-06-10 RX ADMIN — Medication 2 PUFF: at 07:45

## 2022-06-10 RX ADMIN — MAGNESIUM SULFATE HEPTAHYDRATE 4000 MG: 40 INJECTION, SOLUTION INTRAVENOUS at 10:19

## 2022-06-10 RX ADMIN — DOXAZOSIN 2 MG: 2 TABLET ORAL at 15:22

## 2022-06-10 RX ADMIN — Medication 2 PUFF: at 19:15

## 2022-06-10 RX ADMIN — APIXABAN 2.5 MG: 5 TABLET, FILM COATED ORAL at 09:26

## 2022-06-10 RX ADMIN — DOXAZOSIN 2 MG: 2 TABLET ORAL at 09:26

## 2022-06-10 RX ADMIN — MULTIPLE VITAMINS W/ MINERALS TAB 1 TABLET: TAB at 09:27

## 2022-06-10 RX ADMIN — FUROSEMIDE 20 MG: 20 TABLET ORAL at 17:00

## 2022-06-10 RX ADMIN — ATORVASTATIN CALCIUM 20 MG: 10 TABLET, FILM COATED ORAL at 21:27

## 2022-06-10 RX ADMIN — ALLOPURINOL 100 MG: 100 TABLET ORAL at 21:27

## 2022-06-10 RX ADMIN — FUROSEMIDE 20 MG: 10 INJECTION, SOLUTION INTRAMUSCULAR; INTRAVENOUS at 12:04

## 2022-06-10 RX ADMIN — CARVEDILOL 12.5 MG: 6.25 TABLET, FILM COATED ORAL at 17:00

## 2022-06-10 RX ADMIN — FUROSEMIDE 20 MG: 20 TABLET ORAL at 09:26

## 2022-06-10 RX ADMIN — POTASSIUM CHLORIDE 20 MEQ: 1500 TABLET, EXTENDED RELEASE ORAL at 17:00

## 2022-06-10 NOTE — CARE COORDINATION
INTERDISCIPLINARY PLAN OF CARE CONFERENCE    Date/Time: 6/10/2022 2:52 PM  Completed by: Tom Barrow RN, Case Management      Patient Name:  Albina Colunga  YOB: 1940  Admitting Diagnosis: Hypomagnesemia [E83.42]  OYL (acute kidney injury) ([facility] [address] [N17.9]     Admit Date/Time:  6/5/2022  9:31 AM    Chart reviewed. Interdisciplinary team contacted or reviewed plan related to patient progress and discharge plans. Disciplines included Case Management, Nursing, and Dietitian. Current Status: Stable  PT/OT recommendation for discharge plan of care:SNF (Patient doesn't have home 24hr assist)    Expected D/C Disposition:  Home  Confirmed plan with patient Yes   Met with: patient  Discharge Plan Comments:  Reviewed chart and met with pt. States is now planning to go home with stephon who will assist with needs. Pt gives writer permission to call her stephon to discuss. Left VM for stephon Robet Player to confirms that she will provide care at [facility] and await  Return call. Will cont to follow.     Home O2 in place on admit: No  Pt informed of need to bring portable home O2 tank on day of discharge for nursing to connect prior to leaving:  Not Indicated  Verbalized agreement/Understanding:  Not Indicated

## 2022-06-10 NOTE — PROGRESS NOTES
06/10/22 1900   RT Protocol   History Pulmonary Disease 2   Respiratory pattern 2   Breath sounds 2   Cough 0   Indications for Bronchodilator Therapy Decreased or absent breath sounds   Bronchodilator Assessment Score 6   RT Inhaler-Nebulizer Bronchodilator Protocol Note    There is a bronchodilator order in the chart from a provider indicating to follow the RT Bronchodilator Protocol and there is an Initiate RT Inhaler-Nebulizer Bronchodilator Protocol order as well (see protocol at bottom of note). CXR Findings:  No results found. The findings from the last RT Protocol Assessment were as follows:   History Pulmonary Disease: Chronic pulmonary disease  Respiratory Pattern: Dyspnea on exertion or RR 21-25 bpm  Breath Sounds: Slightly diminished and/or crackles  Cough: Strong, spontaneous, non-productive  Indication for Bronchodilator Therapy: Decreased or absent breath sounds  Bronchodilator Assessment Score: 6    Aerosolized bronchodilator medication orders have been revised according to the RT Inhaler-Nebulizer Bronchodilator Protocol below. Respiratory Therapist to perform RT Therapy Protocol Assessment initially then follow the protocol. Repeat RT Therapy Protocol Assessment PRN for score 0-3 or on second treatment, BID, and PRN for scores above 3. No Indications - adjust the frequency to every 6 hours PRN wheezing or bronchospasm, if no treatments needed after 48 hours then discontinue using Per Protocol order mode. If indication present, adjust the RT bronchodilator orders based on the Bronchodilator Assessment Score as indicated below. Use Inhaler orders unless patient has one or more of the following: on home nebulizer, not able to hold breath for 10 seconds, is not alert and oriented, cannot activate and use MDI correctly, or respiratory rate 25 breaths per minute or more, then use the equivalent nebulizer order(s) with same Frequency and PRN reasons based on the score.   If a patient is on this medication at home then do not decrease Frequency below that used at home. 0-3 - enter or revise RT bronchodilator order(s) to equivalent RT Bronchodilator order with Frequency of every 4 hours PRN for wheezing or increased work of breathing using Per Protocol order mode. 4-6 - enter or revise RT Bronchodilator order(s) to two equivalent RT bronchodilator orders with one order with BID Frequency and one order with Frequency of every 4 hours PRN wheezing or increased work of breathing using Per Protocol order mode. 7-10 - enter or revise RT Bronchodilator order(s) to two equivalent RT bronchodilator orders with one order with TID Frequency and one order with Frequency of every 4 hours PRN wheezing or increased work of breathing using Per Protocol order mode. 11-13 - enter or revise RT Bronchodilator order(s) to one equivalent RT bronchodilator order with QID Frequency and an Albuterol order with Frequency of every 4 hours PRN wheezing or increased work of breathing using Per Protocol order mode. Greater than 13 - enter or revise RT Bronchodilator order(s) to one equivalent RT bronchodilator order with every 4 hours Frequency and an Albuterol order with Frequency of every 2 hours PRN wheezing or increased work of breathing using Per Protocol order mode.      Electronically signed by Jany Saavedra RCP on 6/10/2022 at 7:27 PM

## 2022-06-10 NOTE — PROGRESS NOTES
Shift assessment complete. See doc flow. Nightly medications given see MAR. A/O x4. Patient still with wheezing and ocassional cough. Ambulates with a gate belt, walker and stand-by assist. Bed alarm in place. Call light and bedside table within easy reach.

## 2022-06-10 NOTE — PLAN OF CARE
Problem: Discharge Planning  Goal: Discharge to home or other facility with appropriate resources  6/10/2022 0429 by Vilma Guerrero RN  Outcome: Progressing  6/9/2022 2235 by Vilma Guerrero RN  Outcome: Progressing     Problem: Safety - Adult  Goal: Free from fall injury  6/10/2022 0429 by Vilma Guerrero RN  Outcome: Progressing  6/9/2022 2235 by Vilma Guerrero RN  Outcome: Progressing     Problem: ABCDS Injury Assessment  Goal: Absence of physical injury  6/10/2022 0429 by Vilma Guerrero RN  Outcome: Progressing  6/9/2022 2235 by Vilma Guerrero RN  Outcome: Progressing     Problem: Chronic Conditions and Co-morbidities  Goal: Patient's chronic conditions and co-morbidity symptoms are monitored and maintained or improved  6/10/2022 0429 by Vilma Guerrero RN  Outcome: Progressing  6/9/2022 2235 by Vilma Guerrero RN  Outcome: Progressing

## 2022-06-10 NOTE — CONSULTS
[address] [facility]  [phone]        Reason for Consultation/Chief Complaint: \"I have been having low BP and SOB. \"  Consulted for valvular heart disease  Last seen by Dr. Marcellus Vaca with [facility] July 2021    History of Present Illness:  Tianna Gilbert is a 80 y.o. patient who presented to [facility] with c/o low BP and SOB. She has PMH moderate MR, chronic AFIB s/p DCCV 6/15/2011 on eliquis, HTN, HLD, CRI, and gout. Most recent cardiac Cath 1/12/2004-unable to review. Most recent Cortney-Myoview 7/11/2018 noted EF=71% and a moderate sized fixed defect mid portion lateral wall but resolved with attenuation correction. Most recent Echo 1/27/2022 noted afib; EI=68-03%;  RV systolic [id number]-61-69 mmHg; mild to mod MR; mild AI/TR. Now admitted 6/5/22 with OLY, weakness, dyspnea, and hypotension. She c/o low BP last weekend and worsened SOB x 1 week. She feels SOB all the time with rest and exertion. Reported mild cough with yellow sputum last few days. Workup in the ED noted labs: , K+ 3.4, BUN/Cr 51/1.7, mag 1.1, 1.4 now, H/H 11.4/32.8, 9.9/28.7, BNP 14,672, Berhane <0.01 x1. Note CXR stable CM; no acute cardiopulmonary process. Note EKG showed Atrial fibrillation Baseline artifact Low voltage 74 bpm. V/Q scan 6/6/22 low prob for PE; obstructive lung disease. Most Echo 6/9/2022 EF=55%; severe ROSALVA; severe MR; SPAP estimated 67 mmHG; c/w severe pulm HTN. Patient with no c/o CP, palpitations, dizziness, edema, or orthopnea/PND. I have been asked to provide consultation regarding further management and testing.       Past Medical History:   has a past medical history of Arthritis, Atrial fibrillation ([facility] [address] CAD (coronary artery disease), Cerebral artery occlusion with cerebral infarction [facility]), CHF (congestive heart failure) ([facility] [address] CKD (chronic kidney disease), Cutaneous skin tags, Depression, Gout, Grief reaction, Hyperlipidemia, Hypertension, Obesity, Osteoarthritis, Pulmonary nodules, and Unspecified cerebral artery occlusion with cerebral infarction. Surgical History:   has a past surgical history that includes Carpal tunnel release (Bilateral); Knee arthroscopy (Right); Carpal tunnel release; Knee arthroscopy; Cataract removal; other surgical history (Left, 02/27/2013); Cholecystectomy; other surgical history (Bilateral, 2/24/14); pr total knee arthroplasty (Right, 8/1/2018); joint replacement (Right); eye surgery; and Total knee arthroplasty (Left, 9/25/2019). Social History:   reports that she has never smoked. She has never used smokeless tobacco. She reports that she does not drink alcohol and does not use drugs. Family History:  family history includes Arthritis in her brother, father, and sister; Asthma in her brother and father; Depression in her mother; Hearing Loss in her brother; High Blood Pressure in her brother, father, and sister; High Cholesterol in her brother, father, and sister; Learning Disabilities in her brother; Mental Illness in her brother. Home Medications:  Were reviewed and are listed in nursing record. and/or listed below  Prior to Admission medications    Medication Sig Start Date End Date Taking?  Authorizing Provider   Cholecalciferol (VITAMIN D3) 50 MCG (2000 [facility]) CAPS Take 2,000 Units by mouth daily   Yes Historical Provider, MD   traZODone (DESYREL) 50 MG tablet TAKE 1 TABLET NIGHTLY 6/6/22   Xiao Larsen MD   allopurinol (ZYLOPRIM) 100 MG tablet TAKE 1 TABLET TWICE A DAY 6/6/22   Xiao Larsen MD   apixaban (ELIQUIS) 5 MG TABS tablet TAKE 1 TABLET TWICE A DAY 6/6/22   Xiao Larsen MD   furosemide (LASIX) 20 MG tablet TAKE 1 TABLET BY MOUTH 2 TIMES DAILY AS NEEDED (SWELLING) 5/12/22   Xiao Larsen MD   carvedilol (COREG) 12.5 MG tablet TAKE 2 TABLETS TWICE A DAY WITH MEALS  Patient taking differently: 6.25 mg 2 times daily (with meals) TAKE 2 TABLETS TWICE A DAY WITH MEALS 4/19/22   Xiao Larsen MD Pulses: 2+ and symmetric   Skin: Skin color, texture, turgor normal, no rashes or lesions   Pysch: Normal mood and affect   Neurologic: Normal gross motor and sensory exam.         Labs  CBC:   Lab Results   Component Value Date    WBC 9.6 06/10/2022    RBC 3.01 06/10/2022    HGB 9.9 06/10/2022    HCT 28.7 06/10/2022    MCV 95.3 06/10/2022    RDW 14.5 06/10/2022     06/10/2022     CMP:    Lab Results   Component Value Date     06/10/2022    K 3.5 06/10/2022    K 3.5 06/06/2022    CL 97 06/10/2022    CO2 22 06/10/2022    BUN 42 06/10/2022    CREATININE 1.4 06/10/2022    GFRAA 44 06/10/2022    GFRAA >60 06/10/2013    AGRATIO 1.9 06/06/2022    LABGLOM 36 06/10/2022    LABGLOM 51 08/20/2015    GLUCOSE 133 06/10/2022    PROT 5.2 06/06/2022    PROT 7.8 11/01/2012    CALCIUM 8.2 06/10/2022    BILITOT 0.9 06/06/2022    ALKPHOS 48 06/06/2022    AST 19 06/06/2022    ALT 11 06/06/2022     PT/INR:  No results found for: PTINR  Lab Results   Component Value Date    CKTOTAL 68 07/09/2013    TROPONINI <0.01 06/05/2022       EKG:  I have reviewed EKG with the following interpretation:  Impression:  See HPI    Assessment:  Rosario May is a 80 y.o. patient who presented to [facility] with c/o low BP and SOB. She has PMH moderate MR, chronic AFIB s/p DCCV 6/15/2011 on eliquis, HTN, HLD, CRI, and gout. Most recent cardiac Cath 1/12/2004-unable to review. Most recent Cortney-Myoview 7/11/2018 noted EF=71% and a moderate sized fixed defect mid portion lateral wall but resolved with attenuation correction. Most recent Echo 1/27/2022 noted afib; TT=01-07%;  RV systolic QUGSCEAW-80-36 mmHg; mild to mod MR; mild AI/TR. Now admitted 6/5/22 with OLY, weakness, dyspnea, and hypotension. She c/o low BP last weekend and worsened SOB x 1 week. She feels SOB all the time with rest and exertion. Reported mild cough with yellow sputum last few days.  Workup in the ED noted labs: , K+ 3.4, BUN/Cr 51/1.7, Mg+ 1.1, 1.4 now, H/H 11. 4/32.8, 9.9/28.7, BNP 14,672, Berhane <0.01 x1. Note CXR stable CM; no acute cardiopulmonary process. Note EKG showed Atrial fibrillation Baseline artifact Low voltage 74 bpm. V/Q scan 6/6/22 low prob for PE; obstructive lung disease. Most Echo 6/9/2022 EF=55%; severe ROSALVA; severe MR; SPAP estimated 67 mmHG; c/w severe pulm HTN. Diagnosis of acute CHF likely valvular etiology, severe MR, severe TR, and severe pulm HTN in elderly female with chronic afib. Recs:  1. Continue eliquis 2.5mg BID for AC of afib  2. Continue lipitor 20mg qd, coreg 12.5mg BID, cardura 2mg BID  3. Continue lasix 20mg qd, KCL 20mEq BID, and KPhos packet  4. Continue Mg oxide 400mg BID  5. Replete lytes properly. Mg+ 4gm IV x 1 given  6. Hold hyzaar with acute on CRI. 7. I had long d/w her and likely need for [facility] and RHC/LHC for evaluation for possible MVR. She wants to think about it and not certain about surgery. She would be potentially higher risk given multiple med problems and pulm HTN. I would treat CHF currently and then d/c for f/u OV with regular cards Dr. Rishi Pierce to discuss and decide if will pursue further evaluation and possible MVR. I called and left VM on his cell phone about her case and ECHO findings.          Patient Active Problem List   Diagnosis    MR (mitral regurgitation)    TI (tricuspid incompetence)    Atrial fibrillation ([address]    Long term current use of anticoagulant therapy    Seborrheic keratosis    Actinic keratoses    Cough due to ACE inhibitor    Rotator cuff (capsule) sprain    Primary localized osteoarthrosis, lower leg    Chronic kidney disease, stage III (moderate)    Essential hypertension    Localized osteoarthritis of right knee    Primary localized osteoarthritis of right knee    Mixed hyperlipidemia    Atrial fibrillation, chronic (HCC)    Chronic ischemic heart disease    Gout    Primary localized osteoarthritis of left knee    Status post total left knee replacement    Chronic diastolic congestive heart failure (HCC)    Chronic renal disease, stage III ([address] [135468]    OLY (acute kidney injury) ([address]    Hypotension    Atrial fibrillation, controlled ([address]    Hypomagnesemia    Arthritis of left shoulder region    Dyspnea        Thank you for allowing to us to participate in the care or Rico Shone. Further evaluation will be based upon the patient's clinical course and testing results.

## 2022-06-10 NOTE — PROGRESS NOTES
Occupational Therapy Daily Treatment Note    Unit: 2 [place]  Date:  6/10/2022  Patient Name:    Jefry Nolasco  Admitting diagnosis:  Hypomagnesemia [E83.42]  OLY [facility] [N17.9]  Admit Date:  6/5/2022  Precautions/Restrictions:  fall risk, IV, bed/chair alarm and telemetry        Discharge Recommendations: SNF  DME needs for discharge: defer to facility     Will need 24 hour assist/supervision and HHOT if going home, patient unsure if family can provide this 24 hour assist/supervision but states she will call them. Therapy recommendations for staff:   Assist of 1 (contact guard assist) with use of rolling walker (RW) and gait belt for all transfers and ambulation within room    AM-PAC Score: AM-PAC Inpatient Daily Activity Raw Score: 19  Home Health S4 Level: Level 1- Standard if going home with 24 hour assist       Treatment Time:  3027-4619  Treatment number:  3    Total Treatment Time:   15 minutes    History of Present Illness: per Dr Vandana Fenton H&P 6/6/22:  \"The patient is a 80 y.o. female PMH HTN, HLD, hyperthyroidism, afib on eliquis, CKD, CVA pulmonary nodules, moderate AR and TR, chronic diastolic CHF and bilateral knee OA who presents to [facility] with hypotension. History obtained from the patient and review of EMR. Patient states that she was feeling very weak yesterday. No true dizziness or syncope. She checked her blood pressures and systolics were around [phone]. This is low for her and she came to the emergency department. She has had some chills but no fevers. No nausea or vomiting ;  describes chronic dyspnea. She also complains of chronic left shoulder pain ongoing for years     In ED patient pt noted with OLY- creatinine 1.7, hypomagnesemia of 1.10. Pt was given 2 liter bolus in ED. Magnesium replaced. Troponin <0.01. After patient admitted she complained of shortness of breath and worsening left shoulder pain.  X-ray obtained and showed subtle linear discontinuity at the base of the greater tuberosity with mild sclerosis. If the patient had recent trauma, which she denied in ED, differential would include subacute nondisplaced fracture. Mild acromioclavicular degenerative changes noted as well. VQ scan was ordered in the ED and patient was admitted for further care. \"    Subjective:  Patient up in chair and agreeable to UB exercise. Patient states not feeling well today    Pain   No   Rating: NA  Location:  Pain Medicine Status: No request made      Bed Mobility:   Supine to Sit:  Not Tested  Sit to Supine:  Not Tested  Rolling:           Not Tested  Scooting:        Not Tested     Transfer Training: Pt reports just getting to chair with nursing  Sit to stand:   Not Tested   Stand to sit:  Not Tested  Bed to Chair:  Not Tested  Bed to [facility]:   Not Tested  Standard toilet:   Not Tested    Activity Tolerance   Pt completed therapy session with SOB noted with activity  SpO2: 96% on RA  HR: 60-80 with activity    Balance  Sitting Balance :     SBA  Standing Balance   Not Tested     ADL Training: Pt reports already getting ADL with nursing today, and declined need at this time  Upper body dressing:  Not Tested  Upper body bathing:  Not Tested   Lower body dressing:  Not Tested  Lower body bathing:  Not Tested   Toileting:   Not Tested  Grooming/Hygiene:  Not Tested   Feeding :   Not Tested    Therapeutic Exercise: Issued yellow theraband  Elbow flex/ext  Shoulder flex/ext:  x10  Shoulder horizontal abd/add:  x10  Scapular retraction:  x10  Scapular protraction:  x10  shoulder elevation:  x10    Patient Education:   Role of OT  Recommendations for DC  Energy conservation techniques  Safe RW use/hand placement    Positioning Needs:   Up in chair, call light and needs in reach. Alarm Set       Family Present:  No    Assessment: Patient already up to chair with nursing prior to therapy session. Patient reports fatigue today and declined ADL need.  Patient agreeable to UB exercise with yellow theraband. Patient SOB during exercise with stable vitals during exercise and rest breaks. Recommend continued acute OT and therapy at SNF or home. GOALS  To be met in 3 Visits:  1). Bed to toilet/BSC: Supervision     To be met in 5 Visits:  1). Supine to/from Sit:             Supervision  2). Upper Body Bathing:         Supervision  3). Lower Body Bathing:         Supervision  4). Upper Body Dressing:       Supervision  5). Lower Body Dressing:       Supervision  6).  Pt to demonstrate UE exs x 15 reps with minimal cues    Plan: cont with POC    Amparo Lopez, OTR/L [id number]      If patient discharges from this facility prior to next visit, this note will serve as the Discharge Summary

## 2022-06-10 NOTE — PROGRESS NOTES
Per Dr. Prasad Slot nursing order, attempted to call Dr. Golden Del Cid office [facility]) but appointments are only able to be scheduled during regular business hours. If pt is still here on Monday the RN will need to try then.

## 2022-06-10 NOTE — PROGRESS NOTES
Consult has been called to Dr. Nereyda Oswald on 6/10/22. Spoke with crys.  9:53 AM    Kayley Chance  6/10/2022

## 2022-06-10 NOTE — PLAN OF CARE
Problem: Discharge Planning  Goal: Discharge to home or other facility with appropriate resources  6/9/2022 2235 by Yaquelin Thomson RN  Outcome: Progressing  6/9/2022 1048 by Lexi Barron RN  Outcome: Progressing     Problem: Safety - Adult  Goal: Free from fall injury  6/9/2022 2235 by Yaquelin Thomson RN  Outcome: Progressing  6/9/2022 1048 by Lexi Barron RN  Outcome: Progressing     Problem: ABCDS Injury Assessment  Goal: Absence of physical injury  6/9/2022 2235 by Yaquelin Thomson RN  Outcome: Progressing  6/9/2022 1048 by Lexi Barron RN  Outcome: Progressing     Problem: Chronic Conditions and Co-morbidities  Goal: Patient's chronic conditions and co-morbidity symptoms are monitored and maintained or improved  6/9/2022 2235 by Yaquelin Thomson RN  Outcome: Progressing  6/9/2022 1048 by Lexi Barron RN  Outcome: Progressing

## 2022-06-10 NOTE — PROGRESS NOTES
RT Inhaler-Nebulizer Bronchodilator Protocol Note    There is a bronchodilator order in the chart from a provider indicating to follow the RT Bronchodilator Protocol and there is an Initiate RT Inhaler-Nebulizer Bronchodilator Protocol order as well (see protocol at bottom of note). CXR Findings:  No results found. The findings from the last RT Protocol Assessment were as follows:   History Pulmonary Disease: Chronic pulmonary disease  Respiratory Pattern: Dyspnea on exertion or RR 21-25 bpm  Breath Sounds: Slightly diminished and/or crackles  Cough: Strong, spontaneous, non-productive  Indication for Bronchodilator Therapy: Decreased or absent breath sounds  Bronchodilator Assessment Score: 6    Aerosolized bronchodilator medication orders have been revised according to the RT Inhaler-Nebulizer Bronchodilator Protocol below. Respiratory Therapist to perform RT Therapy Protocol Assessment initially then follow the protocol. Repeat RT Therapy Protocol Assessment PRN for score 0-3 or on second treatment, BID, and PRN for scores above 3. No Indications - adjust the frequency to every 6 hours PRN wheezing or bronchospasm, if no treatments needed after 48 hours then discontinue using Per Protocol order mode. If indication present, adjust the RT bronchodilator orders based on the Bronchodilator Assessment Score as indicated below. Use Inhaler orders unless patient has one or more of the following: on home nebulizer, not able to hold breath for 10 seconds, is not alert and oriented, cannot activate and use MDI correctly, or respiratory rate 25 breaths per minute or more, then use the equivalent nebulizer order(s) with same Frequency and PRN reasons based on the score. If a patient is on this medication at home then do not decrease Frequency below that used at home.     0-3 - enter or revise RT bronchodilator order(s) to equivalent RT Bronchodilator order with Frequency of every 4 hours PRN for wheezing or increased work of breathing using Per Protocol order mode. 4-6 - enter or revise RT Bronchodilator order(s) to two equivalent RT bronchodilator orders with one order with BID Frequency and one order with Frequency of every 4 hours PRN wheezing or increased work of breathing using Per Protocol order mode. 7-10 - enter or revise RT Bronchodilator order(s) to two equivalent RT bronchodilator orders with one order with TID Frequency and one order with Frequency of every 4 hours PRN wheezing or increased work of breathing using Per Protocol order mode. 11-13 - enter or revise RT Bronchodilator order(s) to one equivalent RT bronchodilator order with QID Frequency and an Albuterol order with Frequency of every 4 hours PRN wheezing or increased work of breathing using Per Protocol order mode. Greater than 13 - enter or revise RT Bronchodilator order(s) to one equivalent RT bronchodilator order with every 4 hours Frequency and an Albuterol order with Frequency of every 2 hours PRN wheezing or increased work of breathing using Per Protocol order mode. RT to enter RT Home Evaluation for COPD & MDI Assessment order using Per Protocol order mode.     Electronically signed by Katalina De La Fuente RCP on 6/10/2022 at 7:48 AM

## 2022-06-10 NOTE — PROGRESS NOTES
Pt a/o. Am assessment completed see flow sheet. Pt denies acute pain at this time, no needs voiced. Call light within reach.

## 2022-06-11 LAB
ANION GAP SERPL CALCULATED.3IONS-SCNC: 10 MMOL/L (ref 3–16)
BUN BLDV-MCNC: 38 MG/DL (ref 7–20)
CALCIUM SERPL-MCNC: 8.5 MG/DL (ref 8.3–10.6)
CHLORIDE BLD-SCNC: 98 MMOL/L (ref 99–110)
CO2: 24 MMOL/L (ref 21–32)
CREAT SERPL-MCNC: 1.3 MG/DL (ref 0.6–1.2)
GFR AFRICAN AMERICAN: 48
GFR NON-AFRICAN AMERICAN: 39
GLUCOSE BLD-MCNC: 126 MG/DL (ref 70–99)
HCT VFR BLD CALC: 29 % (ref 36–48)
HEMOGLOBIN: 10 G/DL (ref 12–16)
MAGNESIUM: 1.8 MG/DL (ref 1.8–2.4)
MCH RBC QN AUTO: 32.5 PG (ref 26–34)
MCHC RBC AUTO-ENTMCNC: 34.5 G/DL (ref 31–36)
MCV RBC AUTO: 94.2 FL (ref 80–100)
PDW BLD-RTO: 14.6 % (ref 12.4–15.4)
PHOSPHORUS: 2.7 MG/DL (ref 2.5–4.9)
PLATELET # BLD: 152 K/UL (ref 135–450)
PMV BLD AUTO: 7.3 FL (ref 5–10.5)
POTASSIUM SERPL-SCNC: 3.8 MMOL/L (ref 3.5–5.1)
RBC # BLD: 3.07 M/UL (ref 4–5.2)
SODIUM BLD-SCNC: 132 MMOL/L (ref 136–145)
WBC # BLD: 10 K/UL (ref 4–11)

## 2022-06-11 PROCEDURE — 2580000003 HC RX 258: Performed by: INTERNAL MEDICINE

## 2022-06-11 PROCEDURE — 94640 AIRWAY INHALATION TREATMENT: CPT

## 2022-06-11 PROCEDURE — 94761 N-INVAS EAR/PLS OXIMETRY MLT: CPT

## 2022-06-11 PROCEDURE — 2580000003 HC RX 258: Performed by: NURSE PRACTITIONER

## 2022-06-11 PROCEDURE — 80048 BASIC METABOLIC PNL TOTAL CA: CPT

## 2022-06-11 PROCEDURE — 6360000002 HC RX W HCPCS: Performed by: INTERNAL MEDICINE

## 2022-06-11 PROCEDURE — 83735 ASSAY OF MAGNESIUM: CPT

## 2022-06-11 PROCEDURE — 36415 COLL VENOUS BLD VENIPUNCTURE: CPT

## 2022-06-11 PROCEDURE — 6370000000 HC RX 637 (ALT 250 FOR IP): Performed by: INTERNAL MEDICINE

## 2022-06-11 PROCEDURE — 85027 COMPLETE CBC AUTOMATED: CPT

## 2022-06-11 PROCEDURE — 99232 SBSQ HOSP IP/OBS MODERATE 35: CPT | Performed by: INTERNAL MEDICINE

## 2022-06-11 PROCEDURE — 6370000000 HC RX 637 (ALT 250 FOR IP): Performed by: NURSE PRACTITIONER

## 2022-06-11 PROCEDURE — 99233 SBSQ HOSP IP/OBS HIGH 50: CPT | Performed by: INTERNAL MEDICINE

## 2022-06-11 PROCEDURE — 84100 ASSAY OF PHOSPHORUS: CPT

## 2022-06-11 PROCEDURE — 1200000000 HC SEMI PRIVATE

## 2022-06-11 RX ORDER — FUROSEMIDE 10 MG/ML
20 INJECTION INTRAMUSCULAR; INTRAVENOUS ONCE
Status: COMPLETED | OUTPATIENT
Start: 2022-06-11 | End: 2022-06-11

## 2022-06-11 RX ADMIN — CEFTRIAXONE SODIUM 1000 MG: 1 INJECTION, POWDER, FOR SOLUTION INTRAMUSCULAR; INTRAVENOUS at 09:08

## 2022-06-11 RX ADMIN — APIXABAN 2.5 MG: 5 TABLET, FILM COATED ORAL at 08:59

## 2022-06-11 RX ADMIN — SODIUM CHLORIDE, PRESERVATIVE FREE 10 ML: 5 INJECTION INTRAVENOUS at 09:00

## 2022-06-11 RX ADMIN — SODIUM CHLORIDE 25 ML: 9 INJECTION, SOLUTION INTRAVENOUS at 09:07

## 2022-06-11 RX ADMIN — ALLOPURINOL 100 MG: 100 TABLET ORAL at 08:59

## 2022-06-11 RX ADMIN — APIXABAN 2.5 MG: 5 TABLET, FILM COATED ORAL at 20:36

## 2022-06-11 RX ADMIN — CARVEDILOL 12.5 MG: 6.25 TABLET, FILM COATED ORAL at 08:59

## 2022-06-11 RX ADMIN — TRAZODONE HYDROCHLORIDE 50 MG: 50 TABLET ORAL at 20:36

## 2022-06-11 RX ADMIN — POTASSIUM CHLORIDE 20 MEQ: 1500 TABLET, EXTENDED RELEASE ORAL at 16:44

## 2022-06-11 RX ADMIN — SODIUM CHLORIDE, PRESERVATIVE FREE 10 ML: 5 INJECTION INTRAVENOUS at 20:38

## 2022-06-11 RX ADMIN — DOXAZOSIN 2 MG: 2 TABLET ORAL at 16:44

## 2022-06-11 RX ADMIN — FUROSEMIDE 20 MG: 10 INJECTION, SOLUTION INTRAMUSCULAR; INTRAVENOUS at 13:37

## 2022-06-11 RX ADMIN — Medication 400 MG: at 09:00

## 2022-06-11 RX ADMIN — POTASSIUM CHLORIDE 20 MEQ: 1500 TABLET, EXTENDED RELEASE ORAL at 09:00

## 2022-06-11 RX ADMIN — Medication 2 PUFF: at 19:11

## 2022-06-11 RX ADMIN — ATORVASTATIN CALCIUM 20 MG: 10 TABLET, FILM COATED ORAL at 20:36

## 2022-06-11 RX ADMIN — FUROSEMIDE 20 MG: 20 TABLET ORAL at 16:43

## 2022-06-11 RX ADMIN — POTASSIUM & SODIUM PHOSPHATES POWDER PACK 280-160-250 MG 250 MG: 280-160-250 PACK at 09:00

## 2022-06-11 RX ADMIN — Medication 2000 UNITS: at 08:59

## 2022-06-11 RX ADMIN — Medication 2 PUFF: at 08:28

## 2022-06-11 RX ADMIN — Medication 400 MG: at 20:36

## 2022-06-11 RX ADMIN — DOXAZOSIN 2 MG: 2 TABLET ORAL at 08:59

## 2022-06-11 RX ADMIN — Medication 2 PUFF: at 15:11

## 2022-06-11 RX ADMIN — MULTIPLE VITAMINS W/ MINERALS TAB 1 TABLET: TAB at 08:59

## 2022-06-11 RX ADMIN — ALLOPURINOL 100 MG: 100 TABLET ORAL at 20:36

## 2022-06-11 RX ADMIN — GUAIFENESIN 600 MG: 600 TABLET, EXTENDED RELEASE ORAL at 20:36

## 2022-06-11 RX ADMIN — GUAIFENESIN 600 MG: 600 TABLET, EXTENDED RELEASE ORAL at 08:59

## 2022-06-11 RX ADMIN — CARVEDILOL 12.5 MG: 6.25 TABLET, FILM COATED ORAL at 16:43

## 2022-06-11 RX ADMIN — FUROSEMIDE 20 MG: 20 TABLET ORAL at 08:59

## 2022-06-11 NOTE — FLOWSHEET NOTE
06/11/22 0855   Vital Signs   Temp 97.3 °F (36.3 °C)   Temp Source Oral   Heart Rate 78   Heart Rate Source Monitor   Resp 16   BP (!) 140/74   MAP (Calculated) 96   Patient Position High fowlers   Level of Consciousness Alert (0)   MEWS Score 1   Pain Assessment   Pain Assessment None - Denies Pain   Opioid-Induced Sedation   POSS Score 1   Oxygen Therapy   SpO2 95 %   O2 Device None (Room air)   Assessment complete and morning medications administered

## 2022-06-11 NOTE — PLAN OF CARE
Problem: Safety - Adult  Goal: Free from fall injury  Outcome: Progressing  Flowsheets (Taken 6/11/2022 1503)  Free From Fall Injury:   Instruct family/caregiver on patient safety   Based on caregiver fall risk screen, instruct family/caregiver to ask for assistance with transferring infant if caregiver noted to have fall risk factors     Problem: ABCDS Injury Assessment  Goal: Absence of physical injury  Outcome: Progressing  Flowsheets (Taken 6/11/2022 1503)  Absence of Physical Injury: Implement safety measures based on patient assessment     Problem: Chronic Conditions and Co-morbidities  Goal: Patient's chronic conditions and co-morbidity symptoms are monitored and maintained or improved  Outcome: Progressing

## 2022-06-11 NOTE — PROGRESS NOTES
[address]   Cardiology Progress Note     Admit Date: 6/5/2022     Reason for follow up: Shortness of breath    HPI and Interval History: 80 y.o. female presented with shortness of breath. She has been admitted with generalized weakness, shortness of breath, hypertension, OLY. BNP of 14,000, troponin negative. Found to have severe MR/TR and also moderate AI. History of persistent Afib on anticoagulation with Eliquis, HTN, HLD, CKD, gout. Patient seen and examined. Clinical notes reviewed. Telemetry reviewed. Continues feeling bad. She states that she lives by herself at home. Assessment:   Chronic permanent atrial fibrillation  Severe mitral regurgitation  Severe tricuspid regurgitation  Moderate aortic regurgitation  Pulmonary hypertension  HFpEF  OLY  Hyponatremia  Hypotension  Hypomagnesemia  Hypokalemia  UTI   History of CVA    Plan:   Multivalvular heart disease. Echo reported with severe MR/TR and moderate AI   EF is normal.   On room air. Needs [facility] and [facility] and evaluation for valve surgery. Plan for transfer to [facility] on Monday for further evaluation.    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, acute, systolic (HCC) [Q09.24, T07]      Priority: Medium    Dyspnea [R06.00]      Priority: Medium    Hypotension [I95.9]      Priority: Medium    Atrial fibrillation, controlled ([address] [I48.91]      Priority: Medium    Hypomagnesemia [E83.42]      Priority: Medium    Arthritis of left shoulder region [M19.012]      Priority: Medium    OLY (acute kidney injury) ([facility] [address] [N17.9] 06/05/2022     Priority: Medium    Severe mitral regurgitation [I34.0] 10/11/2011     Priority: Medium    Severe tricuspid regurgitation [I07.1] 10/11/2011 Priority: Medium       Diagnostic studies:     Echo 2022   Left ventricular systolic function is normal with ejection fraction   estimated at 55 %. Inferior wall hypokinesis. All remaining wall segments appear normal in function. Left ventricular size is mildly increased. Elevated left ventricular diastolic filling pressure: Septal E/e'' = 13.7 . The left atrium is severely dilated. The right atrium is severely dilated. Mild anterior and posterior mitral annular calcification is present. Severe mitral regurgitation. Moderate aortic regurgitation is present. Severe tricuspid regurgitation. Systolic pulmonary artery pressure (SPAP) estimated at 67 mmHg (RA pressure   15 mmHg), consistent with severe pulmonary hypertension. 2021 55-60%, DD, LAE< BRII, RVE, Asc Ao 3.8, MAC, Mod MR, AI and TR,   SPAP 53 mmHg. I independently reviewed the cardiac diagnostic studies, ECG and relevant imaging studies. Physical Examination:  Vitals:    22 0519   BP: 138/77   Pulse: 76   Resp: 18   Temp: 98.1 °F (36.7 °C)   SpO2: 96%      In: -   Out: 1050    Wt Readings from Last 3 Encounters:   22 215 lb 12.8 oz (97.9 kg)   22 203 lb (92.1 kg)   10/19/21 202 lb (91.6 kg)     Temp  Av.9 °F (36.6 °C)  Min: 97 °F (36.1 °C)  Max: 98.7 °F (37.1 °C)  Pulse  Av.8  Min: 76  Max: 82  BP  Min: 127/75  Max: 143/77  SpO2  Av.2 %  Min: 94 %  Max: 96 %    Intake/Output Summary (Last 24 hours) at 2022 0726  Last data filed at 2022 0519  Gross per 24 hour   Intake --   Output 1050 ml   Net -1050 ml       I independently reviewed all cardiac tracing from cardiac telemetry. · Constitutional: Oriented. No distress. · Head: Normocephalic and atraumatic. · Mouth/Throat: Oropharynx is clear and moist.   · Eyes: Conjunctivae normal. EOM are normal.   · Neck: Neck supple. No JVD present.     · Cardiovascular: Normal rate, regular rhythm, L4&W6.  + systolic murmur · Pulmonary/Chest: Bilateral respiratory sounds. No rhonchi. · Abdominal: Soft. No tenderness. · Musculoskeletal: No tenderness. No edema    · Lymphadenopathy: Has no cervical adenopathy. · Neurological: Alert and oriented. Follows command, No Gross deficit   · Skin: Skin is warm, No rash noted. · Psychiatric: Has a normal behavior     Scheduled Meds:   potassium & sodium phosphates  1 packet Oral Daily    magnesium oxide  400 mg Oral BID    potassium chloride  20 mEq Oral BID WC    guaiFENesin  600 mg Oral BID    albuterol sulfate HFA  2 puff Inhalation TID    cefTRIAXone (ROCEPHIN) IV  1,000 mg IntraVENous Q24H    Vitamin D  2,000 Units Oral Daily    doxazosin  2 mg Oral BID    furosemide  20 mg Oral BID    allopurinol  100 mg Oral BID    apixaban  2.5 mg Oral BID    atorvastatin  20 mg Oral Nightly    carvedilol  12.5 mg Oral BID WC    therapeutic multivitamin-minerals  1 tablet Oral Daily    sodium chloride flush  5-40 mL IntraVENous 2 times per day    carvedilol  12.5 mg Oral Once     Continuous Infusions:   sodium chloride 5 mL/hr at 06/07/22 1031     PRN Meds:diphenhydrAMINE, ipratropium-albuterol, traZODone, sodium chloride flush, sodium chloride, ondansetron **OR** ondansetron, polyethylene glycol, acetaminophen **OR** acetaminophen     Prior to Admission medications    Medication Sig Start Date End Date Taking?  Authorizing Provider   Cholecalciferol (VITAMIN D3) 50 MCG (2000 UT) CAPS Take 2,000 Units by mouth daily   Yes Historical Provider, MD   traZODone (DESYREL) 50 MG tablet TAKE 1 TABLET NIGHTLY 6/6/22   Latha Presley MD   allopurinol (ZYLOPRIM) 100 MG tablet TAKE 1 TABLET TWICE A DAY 6/6/22   Latha Presley MD   apixaban (ELIQUIS) 5 MG TABS tablet TAKE 1 TABLET TWICE A DAY 6/6/22   Latha Presley MD   furosemide (LASIX) 20 MG tablet TAKE 1 TABLET BY MOUTH 2 TIMES DAILY AS NEEDED (SWELLING) 5/12/22   Latha Presley MD   carvedilol (COREG) 12.5 MG tablet TAKE 2 TABLETS TWICE A DAY WITH MEALS  Patient taking differently: 6.25 mg 2 times daily (with meals) TAKE 2 TABLETS TWICE A DAY WITH MEALS 4/19/22   Celina Hernandes MD   losartan-hydroCHLOROthiazide [facility]) 100-25 MG per tablet TAKE 1 TABLET DAILY 3/15/22   Celina Hernandes MD   doxazosin (CARDURA) 4 MG tablet TAKE 1 TABLET BY MOUTH EVERY DAY  Patient taking differently: 2 mg 2 times daily Takes half a pill in the AM and half in the evening 1/31/22   Celina Hernandes MD   atorvastatin (LIPITOR) 20 MG tablet TAKE 1 TABLET NIGHTLY 12/16/21   Celina Hernandes MD   Multiple Vitamins-Minerals (THERAPEUTIC MULTIVITAMIN-MINERALS) tablet Take 1 tablet by mouth daily    Historical Provider, MD   acetaminophen (TYLENOL) 325 MG tablet Take 650 mg by mouth every 6 hours as needed for Pain    Historical Provider, MD       Review of System:  [x] Full ROS obtained and negative except as mentioned in HPI    Relevant and available labs, and cardiovascular diagnostics reviewed. Reviewed. Recent Labs     06/09/22  0716 06/10/22  0450 06/11/22  0628   * 130* 132*   K 3.4* 3.5 3.8    97* 98*   CO2 20* 22 24   PHOS 2.5 2.4* 2.7   BUN 47* 42* 38*   CREATININE 1.4* 1.4* 1.3*     Recent Labs     06/09/22  0716 06/10/22  0450 06/11/22  0628   WBC 7.7 9.6 10.0   HGB 9.8* 9.9* 10.0*   HCT 28.1* 28.7* 29.0*   MCV 94.3 95.3 94.2   * 116* 152     Estimated Creatinine Clearance: 39 mL/min (A) (based on SCr of 1.3 mg/dL (H)). Lab Results   Component Value Date     01/12/2014     06/07/2013     10/11/2011       I independently reviewed all cardiac tracing from cardiac telemetry. I independently reviewed relevant and available cardiac diagnostic tests ECG, CXR, Echo, Stress test, Device interrogation, Holter, CT scan. Outside medical records via Care everywhere reviewed and summarized in H&P above.    Complex medical condition with multiple medical problems affecting prognosis and outcome of interventions  Severe exacerbation of underlying medical condition requiring hospitalization and at risk of decompensation. Thank you for allowing me to participate in the care of Jefry Nolasco     All questions and concerns were addressed to the patient/family. Alternatives to my treatment were discussed. I have discussed the above stated plan and the patient verbalized understanding and agreed with the plan. NOTE: This report was transcribed using voice recognition software. Every effort was made to ensure accuracy, however, inadvertent computerized transcription errors may be present.      Stalin Milligan MD, MPH  [facility]   Office: [phone]  Fax: [phone]

## 2022-06-11 NOTE — PROGRESS NOTES
06/11/22 1900   RT Protocol   History Pulmonary Disease 2   Respiratory pattern 0   Breath sounds 2   Cough 0   Indications for Bronchodilator Therapy Decreased or absent breath sounds   Bronchodilator Assessment Score 4   RT Inhaler-Nebulizer Bronchodilator Protocol Note    There is a bronchodilator order in the chart from a provider indicating to follow the RT Bronchodilator Protocol and there is an Initiate RT Inhaler-Nebulizer Bronchodilator Protocol order as well (see protocol at bottom of note). CXR Findings:  No results found. The findings from the last RT Protocol Assessment were as follows:   History Pulmonary Disease: Chronic pulmonary disease  Respiratory Pattern: Regular pattern and RR 12-20 bpm  Breath Sounds: Slightly diminished and/or crackles  Cough: Strong, spontaneous, non-productive  Indication for Bronchodilator Therapy: Decreased or absent breath sounds  Bronchodilator Assessment Score: 4    Aerosolized bronchodilator medication orders have been revised according to the RT Inhaler-Nebulizer Bronchodilator Protocol below. Respiratory Therapist to perform RT Therapy Protocol Assessment initially then follow the protocol. Repeat RT Therapy Protocol Assessment PRN for score 0-3 or on second treatment, BID, and PRN for scores above 3. No Indications - adjust the frequency to every 6 hours PRN wheezing or bronchospasm, if no treatments needed after 48 hours then discontinue using Per Protocol order mode. If indication present, adjust the RT bronchodilator orders based on the Bronchodilator Assessment Score as indicated below. Use Inhaler orders unless patient has one or more of the following: on home nebulizer, not able to hold breath for 10 seconds, is not alert and oriented, cannot activate and use MDI correctly, or respiratory rate 25 breaths per minute or more, then use the equivalent nebulizer order(s) with same Frequency and PRN reasons based on the score.   If a patient is on this medication at home then do not decrease Frequency below that used at home. 0-3 - enter or revise RT bronchodilator order(s) to equivalent RT Bronchodilator order with Frequency of every 4 hours PRN for wheezing or increased work of breathing using Per Protocol order mode. 4-6 - enter or revise RT Bronchodilator order(s) to two equivalent RT bronchodilator orders with one order with BID Frequency and one order with Frequency of every 4 hours PRN wheezing or increased work of breathing using Per Protocol order mode. 7-10 - enter or revise RT Bronchodilator order(s) to two equivalent RT bronchodilator orders with one order with TID Frequency and one order with Frequency of every 4 hours PRN wheezing or increased work of breathing using Per Protocol order mode. 11-13 - enter or revise RT Bronchodilator order(s) to one equivalent RT bronchodilator order with QID Frequency and an Albuterol order with Frequency of every 4 hours PRN wheezing or increased work of breathing using Per Protocol order mode. Greater than 13 - enter or revise RT Bronchodilator order(s) to one equivalent RT bronchodilator order with every 4 hours Frequency and an Albuterol order with Frequency of every 2 hours PRN wheezing or increased work of breathing using Per Protocol order mode.      Electronically signed by Melania Zavaleta RCP on 6/11/2022 at 7:15 PM

## 2022-06-11 NOTE — PROGRESS NOTES
IM Progress Note    Admit Date:  6/5/2022       Patient admitted with OLY, weakness, dyspnea, hypotension. Creatinine now starting to improve with IV hydration. Most of her antihypertensive medications were held. O2 sat stable on room air, VQ scan negative. Patient complained of persistent dyspnea. IV fluids stopped and resumed on low-dose Lasix as at home. Creatinine has remained stable. Echocardiogram repeated and shows worsening of her valvular heart disease. Subjective:  Ms. Avelina Kaur is looking and feeling a little better today. No dyspnea at rest .less dyspneic with activity . Starting to diurese with Lasix . Renal function stable. Telemetry A. fib is rate controlled      Objective:   BP (!) 140/74   Pulse 78   Temp 97.3 °F (36.3 °C) (Oral)   Resp 16   Ht 5' 5\" (1.651 m)   Wt 215 lb 12.8 oz (97.9 kg)   SpO2 95%   BMI 35.91 kg/m²       Intake/Output Summary (Last 24 hours) at 6/11/2022 1240  Last data filed at 6/11/2022 0519  Gross per 24 hour   Intake --   Output 1050 ml   Net -1050 ml         Physical Exam:  General:  Awake, alert, NAD  Patient appears ill and fatigued  Skin:  Warm and dry  Neck:  JVD absent. Neck supple  Chest: Diminished breath sounds. No wheezes or crackles  Cardiovascular: irregular,S1S2 normal  Abdomen:  Soft, non tender, non distended, BS +  Extremities:  No edema. Intact peripheral pulses.  Brisk cap refill, < 2 secs  Neuro: non focal      Medications:   Scheduled Meds:   furosemide  20 mg IntraVENous Once    potassium & sodium phosphates  1 packet Oral Daily    magnesium oxide  400 mg Oral BID    potassium chloride  20 mEq Oral BID     guaiFENesin  600 mg Oral BID    albuterol sulfate HFA  2 puff Inhalation TID    Vitamin D  2,000 Units Oral Daily    doxazosin  2 mg Oral BID    furosemide  20 mg Oral BID    allopurinol  100 mg Oral BID    apixaban  2.5 mg Oral BID    atorvastatin  20 mg Oral Nightly    carvedilol  12.5 mg Oral BID     therapeutic multivitamin-minerals  1 tablet Oral Daily    sodium chloride flush  5-40 mL IntraVENous 2 times per day    carvedilol  12.5 mg Oral Once       Continuous Infusions:   sodium chloride 25 mL (06/11/22 0907)       Data:  CBC:   Recent Labs     06/09/22  0716 06/10/22  0450 06/11/22  0628   WBC 7.7 9.6 10.0   RBC 2.98* 3.01* 3.07*   HGB 9.8* 9.9* 10.0*   HCT 28.1* 28.7* 29.0*   MCV 94.3 95.3 94.2   RDW 14.7 14.5 14.6   * 116* 152     BMP:   Recent Labs     06/09/22  0716 06/10/22  0450 06/11/22  0628   * 130* 132*   K 3.4* 3.5 3.8    97* 98*   CO2 20* 22 24   PHOS 2.5 2.4* 2.7   BUN 47* 42* 38*   CREATININE 1.4* 1.4* 1.3*     BNP: No results for input(s): BNP in the last 72 hours. PT/INR: No results for input(s): PROTIME, INR in the last 72 hours. APTT: No results for input(s): APTT in the last 72 hours. CARDIAC ENZYMES:   No results for input(s): CKMB, CKMBINDEX, TROPONINI in the last 72 hours. Invalid input(s): CKTOTAL;3  FASTING LIPID PANEL:  Lab Results   Component Value Date    CHOL 112 02/27/2017    HDL 56 02/27/2017    TRIG 120 02/27/2017     LIVER PROFILE:   No results for input(s): AST, ALT, ALB, BILIDIR, BILITOT, ALKPHOS in the last 72 hours. Cultures    SARS-CoV-2 RNA, RT PCR NOT DETECTED        INFLUENZA A NOT DETECTED     INFLUENZA B NOT DETECTED         Radiology  XR CHEST (2 VW)   Final Result   Stable cardiomegaly. NM LUNG VENT/PERFUSION (VQ)   Final Result   Low probability for pulmonary embolism. Obstructive lung disease. XR SHOULDER LEFT (MIN 2 VIEWS)   Final Result   1. Subtle linear discontinuity at the base of the greater tuberosity with   mild sclerosis. If patient has had recent trauma, differential would include   a subacute nondisplaced fracture. 2. Mild acromioclavicular degenerative changes. XR CHEST PORTABLE   Final Result   No acute cardiopulmonary disease.               ECHO   Summary   Left ventricular systolic function is normal with ejection fraction   estimated at 55 %. Inferior wall hypokinesis. All remaining wall segments appear normal in function. Left ventricular size is mildly increased. Elevated left ventricular diastolic filling pressure: Septal E/e'' = 13.7 . The left atrium is severely dilated. The right atrium is severely dilated. Mild anterior and posterior mitral annular calcification is present. Severe mitral regurgitation. Moderate aortic regurgitation is present. Severe tricuspid regurgitation. Systolic pulmonary artery pressure (SPAP) estimated at 67 mmHg (RA pressure   15 mmHg), consistent with severe pulmonary hypertension. 7- 55-60%, DD, LAE< BRII, RVE, Asc Ao 3.8, MAC, Mod MR, AI and TR,   SPAP 53 mmHg. 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 *      Plan:      Hypotension  History of hypertension  -Patient presented with weakness and hypotension . s/p IV fluid bolus in the ED , continued on IV fluids on admission for a couple of days. Hypotension resolved. She has been off IV fluids since 6/8. -   Blood pressure starting to trend up. Resumed on Lasix and Cardura on 6/8. Continued on Coreg. Continue to hold Hyzaar. Monitor blood pressure and renal function closely.   -Blood pressure and heart rate stable today. Renal function stable today. Continue Lasix, Cardura and Coreg. Hypomagnesemia  Hypokalemia  Hypophosphatemia  -Repleted and corrected now. OLY on CKD  -Likely prerenal , patient appeareddehydrated on admission . given IV fluids .   - crtn at baseline 0.9-1.1. crtn  on admission 1.7-> 2.0-> 2.1. Patient was admitted for couple of days  > creatinine is down to 1.7-> 1.4  -> Resumed on Lasix now creatinine stable at one-point. -S/p IV fluid hydration; resumed on Lasix only. Continue to hold Hyzaar. Renal function stable and improving. OLY resolved       Dyspnea  -Likely from valvular heart disease, and decompensated CHF. See below.  -Possible bronchitis. Patient is starting to cough up some sputum -continued on antibiotics Rocephin.  -Oxygen saturation stable on room air  -VQ scan was negative. -Chest x-ray with stable cardiomegaly, no infiltrates  -She had some wheezing-continue inhaled bronchodilators and Mucinex . -  We tried giving her Zithromax yesterday but she had an allergic response to this with a local itching and rash-Zithromax has been discontinued. Acute on Chronic diastolic CHF  Valvular heart disease . Echocardiogram repeated this admission shows worsening regurgitation . She now has severe MR and TR , moderate AR . She had moderate pulmonary hypertension in 2021 and now has severe pulmonary hypertension . EF normal.  Inferior wall hypokinesis  - follows with Dr. Queta Zhou at [facility] cardiology  - pt is on Lasix 20 mg BID--held Lasix on admission due to OLY and dehydration. Oral Lasix has been resumed for 3 days now. Patient still very symptomatic, proBNP elevated at 10,000. give IV Lasix  Daily as Needed. Monitoring symptoms and BNP. BNP 10,000-> 14,000 now. -Monitor daily weights  weight  Was trending up 208-> 217-> IV Lasix given   weight is down 2 pounds to 215 pounds today. Redosed with IV Lasix  - continue daily weights, low sodium diet, strict I/O. Repeat BNP  We will repeat dose of IV Lasix today    Left shoulder pain  - Xray showed subtle linear discontinuity at the base of the greater tuberosity with mild sclerosis- if patient has had recent trauma, differential would include a subacute nondisplaced fracture. Mild acromioclavicular degenerative changes   - no h/O trauma. Pain is chronic, over years   Left shoulder pain is chronic related to osteoarthritis    UTI  -Urine cultures growing E. Coli. Continue Rocephin     Atrial fibrillation  - EKG with controlled rate Afib on admission   - on eliquis    - continue coreg and monitor blood pressure      HLD  - Continue statin       Hx Hyperthyroidism     Hx of CVA- 2007  - continue Eliquis and statin         DVT Prophylaxis: Eliquis   ADULT DIET; Regular; Low Fat/Low Chol/High Fiber/2 gm Na  Code Status: Full Code.      PLAN OF CARE discussed in detail with patient, and patient's dtr       Mikie Landis MD   6/11/2022 12:40 PM

## 2022-06-12 LAB
ANION GAP SERPL CALCULATED.3IONS-SCNC: 11 MMOL/L (ref 3–16)
BUN BLDV-MCNC: 35 MG/DL (ref 7–20)
CALCIUM SERPL-MCNC: 8.6 MG/DL (ref 8.3–10.6)
CHLORIDE BLD-SCNC: 96 MMOL/L (ref 99–110)
CO2: 26 MMOL/L (ref 21–32)
CREAT SERPL-MCNC: 1.3 MG/DL (ref 0.6–1.2)
GFR AFRICAN AMERICAN: 48
GFR NON-AFRICAN AMERICAN: 39
GLUCOSE BLD-MCNC: 96 MG/DL (ref 70–99)
HCT VFR BLD CALC: 29 % (ref 36–48)
HEMOGLOBIN: 9.8 G/DL (ref 12–16)
MAGNESIUM: 1.7 MG/DL (ref 1.8–2.4)
MCH RBC QN AUTO: 31.6 PG (ref 26–34)
MCHC RBC AUTO-ENTMCNC: 33.7 G/DL (ref 31–36)
MCV RBC AUTO: 93.9 FL (ref 80–100)
PDW BLD-RTO: 14.8 % (ref 12.4–15.4)
PHOSPHORUS: 3.7 MG/DL (ref 2.5–4.9)
PLATELET # BLD: 201 K/UL (ref 135–450)
PMV BLD AUTO: 8 FL (ref 5–10.5)
POTASSIUM SERPL-SCNC: 4.3 MMOL/L (ref 3.5–5.1)
PRO-BNP: 8157 PG/ML (ref 0–449)
RBC # BLD: 3.09 M/UL (ref 4–5.2)
SODIUM BLD-SCNC: 133 MMOL/L (ref 136–145)
WBC # BLD: 9.1 K/UL (ref 4–11)

## 2022-06-12 PROCEDURE — 99232 SBSQ HOSP IP/OBS MODERATE 35: CPT | Performed by: INTERNAL MEDICINE

## 2022-06-12 PROCEDURE — 2580000003 HC RX 258: Performed by: NURSE PRACTITIONER

## 2022-06-12 PROCEDURE — 6370000000 HC RX 637 (ALT 250 FOR IP): Performed by: INTERNAL MEDICINE

## 2022-06-12 PROCEDURE — 36415 COLL VENOUS BLD VENIPUNCTURE: CPT

## 2022-06-12 PROCEDURE — 6370000000 HC RX 637 (ALT 250 FOR IP): Performed by: NURSE PRACTITIONER

## 2022-06-12 PROCEDURE — 1200000000 HC SEMI PRIVATE

## 2022-06-12 PROCEDURE — 83880 ASSAY OF NATRIURETIC PEPTIDE: CPT

## 2022-06-12 PROCEDURE — 83735 ASSAY OF MAGNESIUM: CPT

## 2022-06-12 PROCEDURE — 85027 COMPLETE CBC AUTOMATED: CPT

## 2022-06-12 PROCEDURE — 94761 N-INVAS EAR/PLS OXIMETRY MLT: CPT

## 2022-06-12 PROCEDURE — 84100 ASSAY OF PHOSPHORUS: CPT

## 2022-06-12 PROCEDURE — 94640 AIRWAY INHALATION TREATMENT: CPT

## 2022-06-12 PROCEDURE — 99233 SBSQ HOSP IP/OBS HIGH 50: CPT | Performed by: INTERNAL MEDICINE

## 2022-06-12 PROCEDURE — 80048 BASIC METABOLIC PNL TOTAL CA: CPT

## 2022-06-12 RX ADMIN — FUROSEMIDE 20 MG: 20 TABLET ORAL at 09:17

## 2022-06-12 RX ADMIN — Medication 2 PUFF: at 20:03

## 2022-06-12 RX ADMIN — Medication 2 PUFF: at 07:39

## 2022-06-12 RX ADMIN — ALLOPURINOL 100 MG: 100 TABLET ORAL at 09:17

## 2022-06-12 RX ADMIN — SODIUM CHLORIDE, PRESERVATIVE FREE 10 ML: 5 INJECTION INTRAVENOUS at 09:17

## 2022-06-12 RX ADMIN — CARVEDILOL 12.5 MG: 6.25 TABLET, FILM COATED ORAL at 09:16

## 2022-06-12 RX ADMIN — POTASSIUM & SODIUM PHOSPHATES POWDER PACK 280-160-250 MG 250 MG: 280-160-250 PACK at 09:17

## 2022-06-12 RX ADMIN — Medication 400 MG: at 20:44

## 2022-06-12 RX ADMIN — DOXAZOSIN 2 MG: 2 TABLET ORAL at 09:17

## 2022-06-12 RX ADMIN — APIXABAN 2.5 MG: 5 TABLET, FILM COATED ORAL at 20:44

## 2022-06-12 RX ADMIN — APIXABAN 2.5 MG: 5 TABLET, FILM COATED ORAL at 09:16

## 2022-06-12 RX ADMIN — Medication 2 PUFF: at 15:09

## 2022-06-12 RX ADMIN — GUAIFENESIN 600 MG: 600 TABLET, EXTENDED RELEASE ORAL at 20:44

## 2022-06-12 RX ADMIN — ALLOPURINOL 100 MG: 100 TABLET ORAL at 20:44

## 2022-06-12 RX ADMIN — ATORVASTATIN CALCIUM 20 MG: 10 TABLET, FILM COATED ORAL at 20:44

## 2022-06-12 RX ADMIN — GUAIFENESIN 600 MG: 600 TABLET, EXTENDED RELEASE ORAL at 09:16

## 2022-06-12 RX ADMIN — CARVEDILOL 12.5 MG: 6.25 TABLET, FILM COATED ORAL at 17:03

## 2022-06-12 RX ADMIN — DOXAZOSIN 2 MG: 2 TABLET ORAL at 17:03

## 2022-06-12 RX ADMIN — MULTIPLE VITAMINS W/ MINERALS TAB 1 TABLET: TAB at 09:16

## 2022-06-12 RX ADMIN — Medication 2000 UNITS: at 09:16

## 2022-06-12 RX ADMIN — FUROSEMIDE 20 MG: 20 TABLET ORAL at 17:03

## 2022-06-12 RX ADMIN — Medication 400 MG: at 09:17

## 2022-06-12 RX ADMIN — POTASSIUM CHLORIDE 20 MEQ: 1500 TABLET, EXTENDED RELEASE ORAL at 09:16

## 2022-06-12 RX ADMIN — TRAZODONE HYDROCHLORIDE 50 MG: 50 TABLET ORAL at 20:48

## 2022-06-12 RX ADMIN — POTASSIUM CHLORIDE 20 MEQ: 1500 TABLET, EXTENDED RELEASE ORAL at 17:03

## 2022-06-12 ASSESSMENT — PAIN SCALES - GENERAL: PAINLEVEL_OUTOF10: 0

## 2022-06-12 NOTE — PLAN OF CARE
Problem: Safety - Adult  Goal: Free from fall injury  6/12/2022 1147 by Margy Cody RN  Outcome: Progressing  Flowsheets (Taken 6/12/2022 1146)  Free From Fall Injury:   Instruct family/caregiver on patient safety   Based on caregiver fall risk screen, instruct family/caregiver to ask for assistance with transferring infant if caregiver noted to have fall risk factors  6/12/2022 0154 by Cecille Berman RN  Outcome: Progressing     Problem: ABCDS Injury Assessment  Goal: Absence of physical injury  6/12/2022 1147 by Margy Cody RN  Outcome: Progressing  Flowsheets (Taken 6/12/2022 1146)  Absence of Physical Injury: Implement safety measures based on patient assessment  6/12/2022 0154 by Cecille Berman RN  Outcome: Progressing     Problem: Skin/Tissue Integrity  Goal: Absence of new skin breakdown  Description: 1. Monitor for areas of redness and/or skin breakdown  2. Assess vascular access sites hourly  3. Every 4-6 hours minimum:  Change oxygen saturation probe site  4. Every 4-6 hours:  If on nasal continuous positive airway pressure, respiratory therapy assess nares and determine need for appliance change or resting period.   6/12/2022 1147 by Margy Cody RN  Outcome: Progressing  6/12/2022 0154 by Cecille Berman RN  Outcome: Progressing

## 2022-06-12 NOTE — PROGRESS NOTES
[facility]   Cardiology Progress Note     Admit Date: 6/5/2022     Reason for follow up: Shortness of breath    HPI and Interval History: 80 y.o. female presented with shortness of breath. She has been admitted with generalized weakness, shortness of breath, hypertension, OLY. BNP of 14,000, troponin negative. Found to have severe MR/TR and also moderate AI. History of persistent Afib on anticoagulation with Eliquis, HTN, HLD, CKD, gout. Patient seen and examined. Clinical notes reviewed. Telemetry reviewed. Today she states that she is feeling better. Her breathing has slightly improved. She is sitting in a chair. Reports no chest pain. She would like to proceed with work-up for her severe valvular insufficiencies. Assessment:   Chronic permanent atrial fibrillation  Severe mitral regurgitation  Severe tricuspid regurgitation  Moderate aortic regurgitation  Pulmonary hypertension  HFpEF  OLY  Hyponatremia  Hypotension  Hypomagnesemia  Hypokalemia  UTI   History of CVA    Plan:   Multivalvular heart disease. Echo reported with severe MR/TR and moderate AI   EF is normal.   Patient states that she lives independently by herself. She would like to proceed with work-up. Needs [facility] and [facility]HC and evaluation for valve surgery. Plan for transfer to [facility] on Monday for further evaluation. Keep n.p.o. after midnight.     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 · Constitutional: Oriented. No distress. · Head: Normocephalic and atraumatic. · Mouth/Throat: Oropharynx is clear and moist.   · Eyes: Conjunctivae normal. EOM are normal.   · Neck: Neck supple. No JVD present. · Cardiovascular: Normal rate, regular rhythm, N3&U9.  + systolic murmur   · Pulmonary/Chest: Bilateral respiratory sounds. No rhonchi. · Abdominal: Soft. No tenderness. · Musculoskeletal: No tenderness. No edema    · Lymphadenopathy: Has no cervical adenopathy. · Neurological: Alert and oriented. Follows command, No Gross deficit   · Skin: Skin is warm, No rash noted. · Psychiatric: Has a normal behavior     Scheduled Meds:   potassium & sodium phosphates  1 packet Oral Daily    magnesium oxide  400 mg Oral BID    potassium chloride  20 mEq Oral BID WC    guaiFENesin  600 mg Oral BID    albuterol sulfate HFA  2 puff Inhalation TID    Vitamin D  2,000 Units Oral Daily    doxazosin  2 mg Oral BID    furosemide  20 mg Oral BID    allopurinol  100 mg Oral BID    apixaban  2.5 mg Oral BID    atorvastatin  20 mg Oral Nightly    carvedilol  12.5 mg Oral BID     therapeutic multivitamin-minerals  1 tablet Oral Daily    sodium chloride flush  5-40 mL IntraVENous 2 times per day    carvedilol  12.5 mg Oral Once     Continuous Infusions:   sodium chloride 25 mL (06/11/22 0907)     PRN Meds:diphenhydrAMINE, ipratropium-albuterol, traZODone, sodium chloride flush, sodium chloride, ondansetron **OR** ondansetron, polyethylene glycol, acetaminophen **OR** acetaminophen     Prior to Admission medications    Medication Sig Start Date End Date Taking?  Authorizing Provider   Cholecalciferol (VITAMIN D3) 50 MCG (2000 UT) CAPS Take 2,000 Units by mouth daily   Yes Historical Provider, MD   traZODone (DESYREL) 50 MG tablet TAKE 1 TABLET NIGHTLY 6/6/22   Maria D Andres MD   allopurinol (ZYLOPRIM) 100 MG tablet TAKE 1 TABLET TWICE A DAY 6/6/22   Maria D Andres MD apixaban (ELIQUIS) 5 MG TABS tablet TAKE 1 TABLET TWICE A DAY 6/6/22   Mela Slade MD   furosemide (LASIX) 20 MG tablet TAKE 1 TABLET BY MOUTH 2 TIMES DAILY AS NEEDED (SWELLING) 5/12/22   Mela Slade MD   carvedilol (COREG) 12.5 MG tablet TAKE 2 TABLETS TWICE A DAY WITH MEALS  Patient taking differently: 6.25 mg 2 times daily (with meals) TAKE 2 TABLETS TWICE A DAY WITH MEALS 4/19/22   Mela Slade MD   losartan-hydroCHLOROthiazide [facility]) 100-25 MG per tablet TAKE 1 TABLET DAILY 3/15/22   Mela Slade MD   doxazosin (CARDURA) 4 MG tablet TAKE 1 TABLET BY MOUTH EVERY DAY  Patient taking differently: 2 mg 2 times daily Takes half a pill in the AM and half in the evening 1/31/22   Mela Slade MD   atorvastatin (LIPITOR) 20 MG tablet TAKE 1 TABLET NIGHTLY 12/16/21   Mela Slade MD   Multiple Vitamins-Minerals (THERAPEUTIC MULTIVITAMIN-MINERALS) tablet Take 1 tablet by mouth daily    Historical Provider, MD   acetaminophen (TYLENOL) 325 MG tablet Take 650 mg by mouth every 6 hours as needed for Pain    Historical Provider, MD       Review of System:  [x] Full ROS obtained and negative except as mentioned in HPI    Relevant and available labs, and cardiovascular diagnostics reviewed. Reviewed. Recent Labs     06/10/22  0450 06/11/22  0628 06/12/22  0531   * 132* 133*   K 3.5 3.8 4.3   CL 97* 98* 96*   CO2 22 24 26   PHOS 2.4* 2.7 3.7   BUN 42* 38* 35*   CREATININE 1.4* 1.3* 1.3*     Recent Labs     06/10/22  0450 06/11/22  0628 06/12/22  0531   WBC 9.6 10.0 9.1   HGB 9.9* 10.0* 9.8*   HCT 28.7* 29.0* 29.0*   MCV 95.3 94.2 93.9   * 152 201     Estimated Creatinine Clearance: 39 mL/min (A) (based on SCr of 1.3 mg/dL (H)). Lab Results   Component Value Date     01/12/2014     06/07/2013     10/11/2011       I independently reviewed all cardiac tracing from cardiac telemetry.     I independently reviewed relevant and available cardiac diagnostic tests ECG, CXR, Echo, Stress test, Device interrogation, Holter, CT scan. Outside medical records via Care everywhere reviewed and summarized in H&P above. Complex medical condition with multiple medical problems affecting prognosis and outcome of interventions  Severe exacerbation of underlying medical condition requiring hospitalization and at risk of decompensation. Thank you for allowing me to participate in the care of Marshal Ybarra     All questions and concerns were addressed to the patient/family. Alternatives to my treatment were discussed. I have discussed the above stated plan and the patient verbalized understanding and agreed with the plan. NOTE: This report was transcribed using voice recognition software. Every effort was made to ensure accuracy, however, inadvertent computerized transcription errors may be present.      Álvaro Zelaya MD, MPH  [facility]   Office: [phone]  Fax: [phone]

## 2022-06-12 NOTE — FLOWSHEET NOTE
06/12/22 0900   Vital Signs   Temp 97.2 °F (36.2 °C)   Temp Source Oral   Heart Rate 86   Heart Rate Source Monitor   Resp 16   /72   BP Location Right upper arm   BP Method Automatic   MAP (Calculated) 90   Patient Position Up in chair   Level of Consciousness Alert (0)   MEWS Score 1   Pain Assessment   Pain Assessment None - Denies Pain   Opioid-Induced Sedation   POSS Score 1   Oxygen Therapy   SpO2 95 %   O2 Device None (Room air)   Assessment complete and morning medications administered

## 2022-06-12 NOTE — PROGRESS NOTES
RT Inhaler-Nebulizer Bronchodilator Protocol Note    There is a bronchodilator order in the chart from a provider indicating to follow the RT Bronchodilator Protocol and there is an Initiate RT Inhaler-Nebulizer Bronchodilator Protocol order as well (see protocol at bottom of note). CXR Findings:  No results found. The findings from the last RT Protocol Assessment were as follows:   History Pulmonary Disease: Chronic pulmonary disease  Respiratory Pattern: Regular pattern and RR 12-20 bpm  Breath Sounds: Clear breath sounds  Cough: Strong, spontaneous, non-productive  Indication for Bronchodilator Therapy: Decreased or absent breath sounds  Bronchodilator Assessment Score: 2    Aerosolized bronchodilator medication orders have been revised according to the RT Inhaler-Nebulizer Bronchodilator Protocol below. Respiratory Therapist to perform RT Therapy Protocol Assessment initially then follow the protocol. Repeat RT Therapy Protocol Assessment PRN for score 0-3 or on second treatment, BID, and PRN for scores above 3. No Indications - adjust the frequency to every 6 hours PRN wheezing or bronchospasm, if no treatments needed after 48 hours then discontinue using Per Protocol order mode. If indication present, adjust the RT bronchodilator orders based on the Bronchodilator Assessment Score as indicated below. Use Inhaler orders unless patient has one or more of the following: on home nebulizer, not able to hold breath for 10 seconds, is not alert and oriented, cannot activate and use MDI correctly, or respiratory rate 25 breaths per minute or more, then use the equivalent nebulizer order(s) with same Frequency and PRN reasons based on the score. If a patient is on this medication at home then do not decrease Frequency below that used at home.     0-3 - enter or revise RT bronchodilator order(s) to equivalent RT Bronchodilator order with Frequency of every 4 hours PRN for wheezing or increased work of breathing using Per Protocol order mode. 4-6 - enter or revise RT Bronchodilator order(s) to two equivalent RT bronchodilator orders with one order with BID Frequency and one order with Frequency of every 4 hours PRN wheezing or increased work of breathing using Per Protocol order mode. 7-10 - enter or revise RT Bronchodilator order(s) to two equivalent RT bronchodilator orders with one order with TID Frequency and one order with Frequency of every 4 hours PRN wheezing or increased work of breathing using Per Protocol order mode. 11-13 - enter or revise RT Bronchodilator order(s) to one equivalent RT bronchodilator order with QID Frequency and an Albuterol order with Frequency of every 4 hours PRN wheezing or increased work of breathing using Per Protocol order mode. Greater than 13 - enter or revise RT Bronchodilator order(s) to one equivalent RT bronchodilator order with every 4 hours Frequency and an Albuterol order with Frequency of every 2 hours PRN wheezing or increased work of breathing using Per Protocol order mode. RT to enter RT Home Evaluation for COPD & MDI Assessment order using Per Protocol order mode.     Electronically signed by Jody Wynn RCP on 6/12/2022 at 7:41 AM

## 2022-06-12 NOTE — PLAN OF CARE
Problem: Discharge Planning  Goal: Discharge to home or other facility with appropriate resources  6/12/2022 0154 by Kay Isabel RN  Outcome: Progressing  6/11/2022 1504 by Michelle Doe RN  Outcome: Progressing     Problem: Safety - Adult  Goal: Free from fall injury  6/12/2022 0154 by Kay Isabel RN  Outcome: Progressing  6/11/2022 1504 by Michelle Doe RN  Outcome: Progressing  Flowsheets (Taken 6/11/2022 1503)  Free From Fall Injury:   Instruct family/caregiver on patient safety   Based on caregiver fall risk screen, instruct family/caregiver to ask for assistance with transferring infant if caregiver noted to have fall risk factors     Problem: ABCDS Injury Assessment  Goal: Absence of physical injury  6/12/2022 0154 by Kay Isabel RN  Outcome: Progressing  6/11/2022 1504 by Michelle Doe RN  Outcome: Progressing  Flowsheets (Taken 6/11/2022 1503)  Absence of Physical Injury: Implement safety measures based on patient assessment     Problem: Chronic Conditions and Co-morbidities  Goal: Patient's chronic conditions and co-morbidity symptoms are monitored and maintained or improved  6/12/2022 0154 by Kay Isabel RN  Outcome: Progressing  6/11/2022 1504 by Michelle Doe RN  Outcome: Progressing     Problem: Skin/Tissue Integrity  Goal: Absence of new skin breakdown  Description: 1. Monitor for areas of redness and/or skin breakdown  2. Assess vascular access sites hourly  3. Every 4-6 hours minimum:  Change oxygen saturation probe site  4. Every 4-6 hours:  If on nasal continuous positive airway pressure, respiratory therapy assess nares and determine need for appliance change or resting period.   6/12/2022 0154 by Kay Isabel RN  Outcome: Progressing  6/11/2022 1504 by Michelle Doe RN  Outcome: Progressing

## 2022-06-12 NOTE — PROGRESS NOTES
IM Progress Note    Admit Date:  6/5/2022       Patient admitted with OLY, weakness, dyspnea, hypotension. Creatinine now starting to improve with IV hydration. Most of her antihypertensive medications were held. O2 sat stable on room air, VQ scan negative. Patient complained of persistent dyspnea. IV fluids stopped and resumed on low-dose Lasix as at home. Creatinine has remained stable. Echocardiogram repeated and shows worsening of her valvular heart disease. Subjective:  Ms. Thiago Landeros is looking and feeling a little better today. No dyspnea at rest .less dyspneic with activity . Starting to diurese with Lasix . Renal function stable. Telemetry A. fib is rate controlled      Objective:   /72   Pulse 86   Temp 97.2 °F (36.2 °C) (Oral)   Resp 16   Ht 5' 5\" (1.651 m)   Wt 211 lb 8 oz (95.9 kg)   SpO2 95%   BMI 35.20 kg/m²       Intake/Output Summary (Last 24 hours) at 6/12/2022 1209  Last data filed at 6/12/2022 0606  Gross per 24 hour   Intake --   Output 1675 ml   Net -1675 ml         Physical Exam:  General:  Awake, alert, NAD  Patient appears ill and fatigued  Skin:  Warm and dry  Neck:  JVD absent. Neck supple  Chest: Diminished breath sounds. No wheezes or crackles  Cardiovascular: irregular,S1S2 normal  Abdomen:  Soft, non tender, non distended, BS +  Extremities:  No edema. Intact peripheral pulses.  Brisk cap refill, < 2 secs  Neuro: non focal      Medications:   Scheduled Meds:   potassium & sodium phosphates  1 packet Oral Daily    magnesium oxide  400 mg Oral BID    potassium chloride  20 mEq Oral BID     guaiFENesin  600 mg Oral BID    albuterol sulfate HFA  2 puff Inhalation TID    Vitamin D  2,000 Units Oral Daily    doxazosin  2 mg Oral BID    furosemide  20 mg Oral BID    allopurinol  100 mg Oral BID    apixaban  2.5 mg Oral BID    atorvastatin  20 mg Oral Nightly    carvedilol  12.5 mg Oral BID     therapeutic multivitamin-minerals  1 tablet Oral Daily    sodium chloride flush  5-40 mL IntraVENous 2 times per day    carvedilol  12.5 mg Oral Once       Continuous Infusions:   sodium chloride 25 mL (06/11/22 0907)       Data:  CBC:   Recent Labs     06/10/22  0450 06/11/22  0628 06/12/22  0531   WBC 9.6 10.0 9.1   RBC 3.01* 3.07* 3.09*   HGB 9.9* 10.0* 9.8*   HCT 28.7* 29.0* 29.0*   MCV 95.3 94.2 93.9   RDW 14.5 14.6 14.8   * 152 201     BMP:   Recent Labs     06/10/22  0450 06/11/22  0628 06/12/22  0531   * 132* 133*   K 3.5 3.8 4.3   CL 97* 98* 96*   CO2 22 24 26   PHOS 2.4* 2.7 3.7   BUN 42* 38* 35*   CREATININE 1.4* 1.3* 1.3*     BNP: No results for input(s): BNP in the last 72 hours. PT/INR: No results for input(s): PROTIME, INR in the last 72 hours. APTT: No results for input(s): APTT in the last 72 hours. CARDIAC ENZYMES:   No results for input(s): CKMB, CKMBINDEX, TROPONINI in the last 72 hours. Invalid input(s): CKTOTAL;3  FASTING LIPID PANEL:  Lab Results   Component Value Date    CHOL 112 02/27/2017    HDL 56 02/27/2017    TRIG 120 02/27/2017     LIVER PROFILE:   No results for input(s): AST, ALT, ALB, BILIDIR, BILITOT, ALKPHOS in the last 72 hours. Cultures    SARS-CoV-2 RNA, RT PCR NOT DETECTED        INFLUENZA A NOT DETECTED     INFLUENZA B NOT DETECTED         Radiology  XR CHEST (2 VW)   Final Result   Stable cardiomegaly. NM LUNG VENT/PERFUSION (VQ)   Final Result   Low probability for pulmonary embolism. Obstructive lung disease. XR SHOULDER LEFT (MIN 2 VIEWS)   Final Result   1. Subtle linear discontinuity at the base of the greater tuberosity with   mild sclerosis. If patient has had recent trauma, differential would include   a subacute nondisplaced fracture. 2. Mild acromioclavicular degenerative changes. XR CHEST PORTABLE   Final Result   No acute cardiopulmonary disease.               ECHO   Summary   Left ventricular systolic function is normal with ejection fraction estimated at 55 %. Inferior wall hypokinesis. All remaining wall segments appear normal in function. Left ventricular size is mildly increased. Elevated left ventricular diastolic filling pressure: Septal E/e'' = 13.7 . The left atrium is severely dilated. The right atrium is severely dilated. Mild anterior and posterior mitral annular calcification is present. Severe mitral regurgitation. Moderate aortic regurgitation is present. Severe tricuspid regurgitation. Systolic pulmonary artery pressure (SPAP) estimated at 67 mmHg (RA pressure   15 mmHg), consistent with severe pulmonary hypertension. 7- 55-60%, DD, LAE< BRII, RVE, Asc Ao 3.8, MAC, Mod MR, AI and TR,   SPAP 53 mmHg. 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 *      Plan:      Hypotension  History of hypertension  -Patient presented with weakness and hypotension . s/p IV fluid bolus in the ED , continued on IV fluids on admission for a couple of days. Hypotension resolved. She has been off IV fluids since 6/8. -   Blood pressure starting to trend up. Resumed on Lasix and Cardura on 6/8. Continued on Coreg. Continue to hold Hyzaar. Monitor blood pressure and renal function closely.   -Blood pressure and heart rate stable today. Renal function stable today. Continue Lasix, Cardura and Coreg. Hypomagnesemia  Hypokalemia  Hypophosphatemia  -Repleted and corrected now. OLY on CKD  -Likely prerenal , patient appeared dehydrated on admission . given IV fluids . - crtn at baseline 0.9-1.1. crtn  on admission 1.7-> 2.0-> 2.1.   Hydrated for a couple of days   > creatinine is down to 1.7-> 1.4-> 1.3. Resumed on Lasix now,  creatinine stable. - Continue to hold Hyzaar. Renal function stable and improving. OLY resolved     Dyspnea  -Likely from valvular heart disease, and decompensated CHF. See below.  -Possible bronchitis. Patient is starting to cough up some sputum , continued on antibiotics Rocephin.  -Oxygen saturation stable on room air  -VQ scan was negative. -Chest x-ray with stable cardiomegaly, no infiltrates  -She had some wheezing-continue inhaled bronchodilators and Mucinex . -  We tried giving her Zithromax yesterday but she had an allergic response to this with a local itching and rash-Zithromax has been discontinued. Acute on Chronic diastolic CHF  Valvular heart disease . Echocardiogram repeated this admission shows worsening regurgitation . She now has severe MR and TR , moderate AR . She had moderate pulmonary hypertension in 2021 and now has severe pulmonary hypertension . EF normal.  Inferior wall hypokinesis  - follows with Dr. Shayna Waller at [facility] cardiology  - pt is on Lasix 20 mg BID--held Lasix on admission due to OLY and dehydration. Oral Lasix has been resumed now. Patient still very symptomatic, proBNP elevated at 10,000. given IV Lasix  Daily  For 2 days   Monitoring symptoms and BNP. BNP 10,000-> 14,000--> 8157 now. -Monitor daily weights  weight was trending up 208-> 217-> IV Lasix given   weight is down to ->  215-> 211 pounds today. - continue daily weights, low sodium diet, strict I/O.   -->    cardiologist has discussed with pt.   plan is for [facility]/ left and right heart cath to further evaluate manage valvular heart disease     Left shoulder pain  - Xray showed subtle linear discontinuity at the base of the greater tuberosity with mild sclerosis- if patient has had recent trauma, differential would include a subacute nondisplaced fracture.  Mild acromioclavicular degenerative changes   - no h/O trauma. Pain is chronic, over years   Left shoulder pain is chronic related to osteoarthritis    UTI  -Urine cultures growing E. Coli. Continue Rocephin     Atrial fibrillation  - EKG with controlled rate Afib on admission   - on eliquis    - continue coreg and monitor blood pressure      HLD  - Continue statin       Hx Hyperthyroidism     Hx of CVA- 2007  - continue Eliquis and statin         DVT Prophylaxis: Eliquis   ADULT DIET; Regular; Low Fat/Low Chol/High Fiber/2 gm Na  Diet NPO  Code Status: Full Code.       Plan is to transfer to [facility] tomorrow for further evaluation of her valvular heart disease    Keep n.p.o. after midnight      Thierry Gomez MD   6/12/2022 12:09 PM

## 2022-06-13 LAB
ANION GAP SERPL CALCULATED.3IONS-SCNC: 13 MMOL/L (ref 3–16)
BUN BLDV-MCNC: 30 MG/DL (ref 7–20)
CALCIUM SERPL-MCNC: 8.4 MG/DL (ref 8.3–10.6)
CHLORIDE BLD-SCNC: 97 MMOL/L (ref 99–110)
CO2: 23 MMOL/L (ref 21–32)
CREAT SERPL-MCNC: 1.1 MG/DL (ref 0.6–1.2)
GFR AFRICAN AMERICAN: 58
GFR NON-AFRICAN AMERICAN: 48
GLUCOSE BLD-MCNC: 123 MG/DL (ref 70–99)
HCT VFR BLD CALC: 28.5 % (ref 36–48)
HEMOGLOBIN: 9.8 G/DL (ref 12–16)
MAGNESIUM: 1.3 MG/DL (ref 1.8–2.4)
MCH RBC QN AUTO: 32.2 PG (ref 26–34)
MCHC RBC AUTO-ENTMCNC: 34.3 G/DL (ref 31–36)
MCV RBC AUTO: 93.9 FL (ref 80–100)
PDW BLD-RTO: 14.6 % (ref 12.4–15.4)
PHOSPHORUS: 3.8 MG/DL (ref 2.5–4.9)
PLATELET # BLD: 217 K/UL (ref 135–450)
PMV BLD AUTO: 7.5 FL (ref 5–10.5)
POTASSIUM SERPL-SCNC: 4.4 MMOL/L (ref 3.5–5.1)
RBC # BLD: 3.04 M/UL (ref 4–5.2)
SODIUM BLD-SCNC: 133 MMOL/L (ref 136–145)
WBC # BLD: 8.7 K/UL (ref 4–11)

## 2022-06-13 PROCEDURE — 84100 ASSAY OF PHOSPHORUS: CPT

## 2022-06-13 PROCEDURE — 83735 ASSAY OF MAGNESIUM: CPT

## 2022-06-13 PROCEDURE — 85027 COMPLETE CBC AUTOMATED: CPT

## 2022-06-13 PROCEDURE — 94640 AIRWAY INHALATION TREATMENT: CPT

## 2022-06-13 PROCEDURE — 6360000002 HC RX W HCPCS: Performed by: INTERNAL MEDICINE

## 2022-06-13 PROCEDURE — 99233 SBSQ HOSP IP/OBS HIGH 50: CPT | Performed by: INTERNAL MEDICINE

## 2022-06-13 PROCEDURE — 94761 N-INVAS EAR/PLS OXIMETRY MLT: CPT

## 2022-06-13 PROCEDURE — 80048 BASIC METABOLIC PNL TOTAL CA: CPT

## 2022-06-13 PROCEDURE — 6370000000 HC RX 637 (ALT 250 FOR IP): Performed by: INTERNAL MEDICINE

## 2022-06-13 PROCEDURE — 36415 COLL VENOUS BLD VENIPUNCTURE: CPT

## 2022-06-13 PROCEDURE — 99232 SBSQ HOSP IP/OBS MODERATE 35: CPT | Performed by: INTERNAL MEDICINE

## 2022-06-13 PROCEDURE — 1200000000 HC SEMI PRIVATE

## 2022-06-13 PROCEDURE — 6370000000 HC RX 637 (ALT 250 FOR IP): Performed by: NURSE PRACTITIONER

## 2022-06-13 PROCEDURE — 2580000003 HC RX 258: Performed by: NURSE PRACTITIONER

## 2022-06-13 RX ORDER — SODIUM CHLORIDE 0.9 % (FLUSH) 0.9 %
5-40 SYRINGE (ML) INJECTION EVERY 12 HOURS SCHEDULED
Status: CANCELLED | OUTPATIENT
Start: 2022-06-13

## 2022-06-13 RX ORDER — CARVEDILOL 6.25 MG/1
12.5 TABLET ORAL 2 TIMES DAILY WITH MEALS
Status: CANCELLED | OUTPATIENT
Start: 2022-06-13

## 2022-06-13 RX ORDER — DOXAZOSIN 2 MG/1
2 TABLET ORAL 2 TIMES DAILY
Status: CANCELLED | OUTPATIENT
Start: 2022-06-13

## 2022-06-13 RX ORDER — SODIUM CHLORIDE 0.9 % (FLUSH) 0.9 %
5-40 SYRINGE (ML) INJECTION PRN
Status: CANCELLED | OUTPATIENT
Start: 2022-06-13

## 2022-06-13 RX ORDER — POLYETHYLENE GLYCOL 3350 17 G/17G
17 POWDER, FOR SOLUTION ORAL DAILY PRN
Status: CANCELLED | OUTPATIENT
Start: 2022-06-13

## 2022-06-13 RX ORDER — ONDANSETRON 4 MG/1
4 TABLET, ORALLY DISINTEGRATING ORAL EVERY 8 HOURS PRN
Status: CANCELLED | OUTPATIENT
Start: 2022-06-13

## 2022-06-13 RX ORDER — VITAMIN B COMPLEX
2000 TABLET ORAL DAILY
Status: CANCELLED | OUTPATIENT
Start: 2022-06-13

## 2022-06-13 RX ORDER — TRAZODONE HYDROCHLORIDE 50 MG/1
50 TABLET ORAL NIGHTLY PRN
Status: CANCELLED | OUTPATIENT
Start: 2022-06-13

## 2022-06-13 RX ORDER — GUAIFENESIN 600 MG/1
600 TABLET, EXTENDED RELEASE ORAL 2 TIMES DAILY
Status: CANCELLED | OUTPATIENT
Start: 2022-06-13

## 2022-06-13 RX ORDER — SODIUM CHLORIDE 9 MG/ML
INJECTION, SOLUTION INTRAVENOUS PRN
Status: CANCELLED | OUTPATIENT
Start: 2022-06-13

## 2022-06-13 RX ORDER — FUROSEMIDE 20 MG/1
20 TABLET ORAL 2 TIMES DAILY
Status: CANCELLED | OUTPATIENT
Start: 2022-06-13

## 2022-06-13 RX ORDER — ALBUTEROL SULFATE 90 UG/1
2 AEROSOL, METERED RESPIRATORY (INHALATION) 3 TIMES DAILY
Status: CANCELLED | OUTPATIENT
Start: 2022-06-13

## 2022-06-13 RX ORDER — ATORVASTATIN CALCIUM 10 MG/1
20 TABLET, FILM COATED ORAL NIGHTLY
Status: CANCELLED | OUTPATIENT
Start: 2022-06-13

## 2022-06-13 RX ORDER — MAGNESIUM SULFATE IN WATER 40 MG/ML
4000 INJECTION, SOLUTION INTRAVENOUS ONCE
Status: COMPLETED | OUTPATIENT
Start: 2022-06-13 | End: 2022-06-13

## 2022-06-13 RX ORDER — IPRATROPIUM BROMIDE AND ALBUTEROL SULFATE 2.5; .5 MG/3ML; MG/3ML
1 SOLUTION RESPIRATORY (INHALATION) EVERY 4 HOURS PRN
Status: CANCELLED | OUTPATIENT
Start: 2022-06-13

## 2022-06-13 RX ORDER — ACETAMINOPHEN 325 MG/1
650 TABLET ORAL EVERY 6 HOURS PRN
Status: CANCELLED | OUTPATIENT
Start: 2022-06-13

## 2022-06-13 RX ORDER — POTASSIUM CHLORIDE 20 MEQ/1
20 TABLET, EXTENDED RELEASE ORAL 2 TIMES DAILY WITH MEALS
Status: CANCELLED | OUTPATIENT
Start: 2022-06-13

## 2022-06-13 RX ORDER — ALLOPURINOL 100 MG/1
100 TABLET ORAL 2 TIMES DAILY
Status: CANCELLED | OUTPATIENT
Start: 2022-06-13

## 2022-06-13 RX ORDER — ONDANSETRON 2 MG/ML
4 INJECTION INTRAMUSCULAR; INTRAVENOUS EVERY 6 HOURS PRN
Status: CANCELLED | OUTPATIENT
Start: 2022-06-13

## 2022-06-13 RX ORDER — DIPHENHYDRAMINE HCL 25 MG
25 TABLET ORAL EVERY 6 HOURS PRN
Status: CANCELLED | OUTPATIENT
Start: 2022-06-13

## 2022-06-13 RX ORDER — ACETAMINOPHEN 650 MG/1
650 SUPPOSITORY RECTAL EVERY 6 HOURS PRN
Status: CANCELLED | OUTPATIENT
Start: 2022-06-13

## 2022-06-13 RX ORDER — M-VIT,TX,IRON,MINS/CALC/FOLIC 27MG-0.4MG
1 TABLET ORAL DAILY
Status: CANCELLED | OUTPATIENT
Start: 2022-06-13

## 2022-06-13 RX ORDER — LOSARTAN POTASSIUM AND HYDROCHLOROTHIAZIDE 25; 100 MG/1; MG/1
TABLET ORAL
Qty: 90 TABLET | Refills: 0 | Status: ON HOLD | OUTPATIENT
Start: 2022-06-13 | End: 2022-06-15 | Stop reason: HOSPADM

## 2022-06-13 RX ADMIN — FUROSEMIDE 20 MG: 20 TABLET ORAL at 16:17

## 2022-06-13 RX ADMIN — POTASSIUM CHLORIDE 20 MEQ: 1500 TABLET, EXTENDED RELEASE ORAL at 16:18

## 2022-06-13 RX ADMIN — Medication 2 PUFF: at 15:12

## 2022-06-13 RX ADMIN — DOXAZOSIN 2 MG: 2 TABLET ORAL at 16:17

## 2022-06-13 RX ADMIN — SODIUM CHLORIDE, PRESERVATIVE FREE 10 ML: 5 INJECTION INTRAVENOUS at 08:39

## 2022-06-13 RX ADMIN — Medication 2 PUFF: at 18:55

## 2022-06-13 RX ADMIN — SODIUM CHLORIDE, PRESERVATIVE FREE 10 ML: 5 INJECTION INTRAVENOUS at 22:41

## 2022-06-13 RX ADMIN — ATORVASTATIN CALCIUM 20 MG: 10 TABLET, FILM COATED ORAL at 22:42

## 2022-06-13 RX ADMIN — TRAZODONE HYDROCHLORIDE 50 MG: 50 TABLET ORAL at 22:44

## 2022-06-13 RX ADMIN — GUAIFENESIN 600 MG: 600 TABLET, EXTENDED RELEASE ORAL at 22:42

## 2022-06-13 RX ADMIN — Medication 2 PUFF: at 08:22

## 2022-06-13 RX ADMIN — CARVEDILOL 12.5 MG: 6.25 TABLET, FILM COATED ORAL at 16:18

## 2022-06-13 RX ADMIN — ALLOPURINOL 100 MG: 100 TABLET ORAL at 22:42

## 2022-06-13 RX ADMIN — SODIUM CHLORIDE 25 ML: 9 INJECTION, SOLUTION INTRAVENOUS at 11:30

## 2022-06-13 RX ADMIN — MAGNESIUM SULFATE HEPTAHYDRATE 4000 MG: 40 INJECTION, SOLUTION INTRAVENOUS at 11:32

## 2022-06-13 NOTE — PROGRESS NOTES
RT Inhaler-Nebulizer Bronchodilator Protocol Note    There is a bronchodilator order in the chart from a provider indicating to follow the RT Bronchodilator Protocol and there is an Initiate RT Inhaler-Nebulizer Bronchodilator Protocol order as well (see protocol at bottom of note). CXR Findings:  No results found. The findings from the last RT Protocol Assessment were as follows:   History Pulmonary Disease: (P) Chronic pulmonary disease  Respiratory Pattern: (P) Regular pattern and RR 12-20 bpm  Breath Sounds: (P) Slightly diminished and/or crackles  Cough: (P) Strong, spontaneous, non-productive  Indication for Bronchodilator Therapy: (P) Decreased or absent breath sounds  Bronchodilator Assessment Score: (P) 4    Aerosolized bronchodilator medication orders have been revised according to the RT Inhaler-Nebulizer Bronchodilator Protocol below. Respiratory Therapist to perform RT Therapy Protocol Assessment initially then follow the protocol. Repeat RT Therapy Protocol Assessment PRN for score 0-3 or on second treatment, BID, and PRN for scores above 3. No Indications - adjust the frequency to every 6 hours PRN wheezing or bronchospasm, if no treatments needed after 48 hours then discontinue using Per Protocol order mode. If indication present, adjust the RT bronchodilator orders based on the Bronchodilator Assessment Score as indicated below. Use Inhaler orders unless patient has one or more of the following: on home nebulizer, not able to hold breath for 10 seconds, is not alert and oriented, cannot activate and use MDI correctly, or respiratory rate 25 breaths per minute or more, then use the equivalent nebulizer order(s) with same Frequency and PRN reasons based on the score. If a patient is on this medication at home then do not decrease Frequency below that used at home.     0-3 - enter or revise RT bronchodilator order(s) to equivalent RT Bronchodilator order with Frequency of every 4 hours PRN for wheezing or increased work of breathing using Per Protocol order mode. 4-6 - enter or revise RT Bronchodilator order(s) to two equivalent RT bronchodilator orders with one order with BID Frequency and one order with Frequency of every 4 hours PRN wheezing or increased work of breathing using Per Protocol order mode. 7-10 - enter or revise RT Bronchodilator order(s) to two equivalent RT bronchodilator orders with one order with TID Frequency and one order with Frequency of every 4 hours PRN wheezing or increased work of breathing using Per Protocol order mode. 11-13 - enter or revise RT Bronchodilator order(s) to one equivalent RT bronchodilator order with QID Frequency and an Albuterol order with Frequency of every 4 hours PRN wheezing or increased work of breathing using Per Protocol order mode. Greater than 13 - enter or revise RT Bronchodilator order(s) to one equivalent RT bronchodilator order with every 4 hours Frequency and an Albuterol order with Frequency of every 2 hours PRN wheezing or increased work of breathing using Per Protocol order mode.          Electronically signed by Jaja Ghosh RCP on 6/13/2022 at 7:46 PM

## 2022-06-13 NOTE — PROGRESS NOTES
Patient resting, eyes closed, respirations witnessed as e/e. No signs of distress. NPO. Patient being transferred to [facility] in the morning for a [facility] and a Heart Cath. Bed alarm in place. Call light and bedside table is easy reach.

## 2022-06-13 NOTE — PROGRESS NOTES
06/12/22 2000   RT Protocol   History Pulmonary Disease 2   Respiratory pattern 0   Breath sounds 0   Cough 0   Indications for Bronchodilator Therapy Decreased or absent breath sounds   Bronchodilator Assessment Score 2   RT Inhaler-Nebulizer Bronchodilator Protocol Note    There is a bronchodilator order in the chart from a provider indicating to follow the RT Bronchodilator Protocol and there is an Initiate RT Inhaler-Nebulizer Bronchodilator Protocol order as well (see protocol at bottom of note). CXR Findings:  No results found. The findings from the last RT Protocol Assessment were as follows:   History Pulmonary Disease: Chronic pulmonary disease  Respiratory Pattern: Regular pattern and RR 12-20 bpm  Breath Sounds: Clear breath sounds  Cough: Strong, spontaneous, non-productive  Indication for Bronchodilator Therapy: Decreased or absent breath sounds  Bronchodilator Assessment Score: 2    Aerosolized bronchodilator medication orders have been revised according to the RT Inhaler-Nebulizer Bronchodilator Protocol below. Respiratory Therapist to perform RT Therapy Protocol Assessment initially then follow the protocol. Repeat RT Therapy Protocol Assessment PRN for score 0-3 or on second treatment, BID, and PRN for scores above 3. No Indications - adjust the frequency to every 6 hours PRN wheezing or bronchospasm, if no treatments needed after 48 hours then discontinue using Per Protocol order mode. If indication present, adjust the RT bronchodilator orders based on the Bronchodilator Assessment Score as indicated below. Use Inhaler orders unless patient has one or more of the following: on home nebulizer, not able to hold breath for 10 seconds, is not alert and oriented, cannot activate and use MDI correctly, or respiratory rate 25 breaths per minute or more, then use the equivalent nebulizer order(s) with same Frequency and PRN reasons based on the score.   If a patient is on this medication at home then do not decrease Frequency below that used at home. 0-3 - enter or revise RT bronchodilator order(s) to equivalent RT Bronchodilator order with Frequency of every 4 hours PRN for wheezing or increased work of breathing using Per Protocol order mode. 4-6 - enter or revise RT Bronchodilator order(s) to two equivalent RT bronchodilator orders with one order with BID Frequency and one order with Frequency of every 4 hours PRN wheezing or increased work of breathing using Per Protocol order mode. 7-10 - enter or revise RT Bronchodilator order(s) to two equivalent RT bronchodilator orders with one order with TID Frequency and one order with Frequency of every 4 hours PRN wheezing or increased work of breathing using Per Protocol order mode. 11-13 - enter or revise RT Bronchodilator order(s) to one equivalent RT bronchodilator order with QID Frequency and an Albuterol order with Frequency of every 4 hours PRN wheezing or increased work of breathing using Per Protocol order mode. Greater than 13 - enter or revise RT Bronchodilator order(s) to one equivalent RT bronchodilator order with every 4 hours Frequency and an Albuterol order with Frequency of every 2 hours PRN wheezing or increased work of breathing using Per Protocol order mode.          Electronically signed by Aspen Davis RCP on 6/12/2022 at 8:58 PM

## 2022-06-13 NOTE — CARE COORDINATION
INTERDISCIPLINARY PLAN OF CARE CONFERENCE    Date/Time: 6/13/2022 11:38 AM  Completed by: Shirley Lewis RN, Case Management      Patient Name:  Salima Cornell  YOB: 1940  Admitting Diagnosis: Hypomagnesemia [E83.42]  OLY (acute kidney injury) ([facility] [address] [N17.9]     Admit Date/Time:  6/5/2022  9:31 AM    Chart reviewed. Interdisciplinary team contacted or reviewed plan related to patient progress and discharge plans. Disciplines included Case Management, Nursing, and Dietitian. Current Status:INPT  PT/OT recommendation for discharge plan of care: n/a    Expected D/C Disposition:  Home  Confirmed plan with patient and/or family Yes confirmed with: (name) with pt  Met with: pt at bedside  Discharge Plan Comments: Pt plans to return home at discharge and denies needs. Pt ad dtr at bedside state that pt will transfer to [facility] for cardiac cath on 6/14 and this was confirmed by Dr. Sarah Herrmann.     Home O2 in place on admit: No  Pt informed of need to bring portable home O2 tank on day of discharge for nursing to connect prior to leaving:  Not Indicated  Verbalized agreement/Understanding:  Not Indicated

## 2022-06-13 NOTE — PROGRESS NOTES
IM Progress Note    Admit Date:  6/5/2022       Patient admitted with OLY, weakness, dyspnea, hypotension. Creatinine now starting to improve with IV hydration. Most of her antihypertensive medications were held. O2 sat stable on room air, VQ scan negative. Patient complained of persistent dyspnea. IV fluids stopped and resumed on low-dose Lasix as at home. Creatinine has remained stable. Echocardiogram repeated and shows worsening of her valvular heart disease. Subjective:  Ms. Joce Mosher is looking and feeling a little better today. No dyspnea at rest .less dyspneic with activity . Starting to diurese with Lasix . Renal function stable. Telemetry A. fib is rate controlled      Objective:   BP (!) 148/71   Pulse 75   Temp 97.3 °F (36.3 °C) (Oral)   Resp 16   Ht 5' 5\" (1.651 m)   Wt 211 lb 8 oz (95.9 kg)   SpO2 95%   BMI 35.20 kg/m²       Intake/Output Summary (Last 24 hours) at 6/13/2022 0910  Last data filed at 6/12/2022 1344  Gross per 24 hour   Intake 240 ml   Output --   Net 240 ml         Physical Exam:  General:  Awake, alert, NAD  Patient appears ill and fatigued  Skin:  Warm and dry  Neck:  JVD absent. Neck supple  Chest: Diminished breath sounds. No wheezes or crackles  Cardiovascular: irregular,S1S2 normal  Abdomen:  Soft, non tender, non distended, BS +  Extremities:  No edema. Intact peripheral pulses.  Brisk cap refill, < 2 secs  Neuro: non focal      Medications:   Scheduled Meds:   potassium & sodium phosphates  1 packet Oral Daily    magnesium oxide  400 mg Oral BID    potassium chloride  20 mEq Oral BID     guaiFENesin  600 mg Oral BID    albuterol sulfate HFA  2 puff Inhalation TID    Vitamin D  2,000 Units Oral Daily    doxazosin  2 mg Oral BID    furosemide  20 mg Oral BID    allopurinol  100 mg Oral BID    apixaban  2.5 mg Oral BID    atorvastatin  20 mg Oral Nightly    carvedilol  12.5 mg Oral BID     therapeutic multivitamin-minerals  1 tablet Oral Daily    sodium chloride flush  5-40 mL IntraVENous 2 times per day    carvedilol  12.5 mg Oral Once       Continuous Infusions:   sodium chloride 25 mL (06/11/22 0907)       Data:  CBC:   Recent Labs     06/11/22 0628 06/12/22  0531 06/13/22  0539   WBC 10.0 9.1 8.7   RBC 3.07* 3.09* 3.04*   HGB 10.0* 9.8* 9.8*   HCT 29.0* 29.0* 28.5*   MCV 94.2 93.9 93.9   RDW 14.6 14.8 14.6    201 217     BMP:   Recent Labs     06/11/22 0628 06/12/22  0531 06/13/22  0539   * 133* 133*   K 3.8 4.3 4.4   CL 98* 96* 97*   CO2 24 26 23   PHOS 2.7 3.7 3.8   BUN 38* 35* 30*   CREATININE 1.3* 1.3* 1.1     BNP: No results for input(s): BNP in the last 72 hours. PT/INR: No results for input(s): PROTIME, INR in the last 72 hours. APTT: No results for input(s): APTT in the last 72 hours. CARDIAC ENZYMES:   No results for input(s): CKMB, CKMBINDEX, TROPONINI in the last 72 hours. Invalid input(s): CKTOTAL;3  FASTING LIPID PANEL:  Lab Results   Component Value Date    CHOL 112 02/27/2017    HDL 56 02/27/2017    TRIG 120 02/27/2017     LIVER PROFILE:   No results for input(s): AST, ALT, ALB, BILIDIR, BILITOT, ALKPHOS in the last 72 hours. Cultures    SARS-CoV-2 RNA, RT PCR NOT DETECTED        INFLUENZA A NOT DETECTED     INFLUENZA B NOT DETECTED         Radiology  XR CHEST (2 VW)   Final Result   Stable cardiomegaly. NM LUNG VENT/PERFUSION (VQ)   Final Result   Low probability for pulmonary embolism. Obstructive lung disease. XR SHOULDER LEFT (MIN 2 VIEWS)   Final Result   1. Subtle linear discontinuity at the base of the greater tuberosity with   mild sclerosis. If patient has had recent trauma, differential would include   a subacute nondisplaced fracture. 2. Mild acromioclavicular degenerative changes. XR CHEST PORTABLE   Final Result   No acute cardiopulmonary disease.               ECHO   Summary   Left ventricular systolic function is normal with ejection fraction   estimated at 55 %. Inferior wall hypokinesis. All remaining wall segments appear normal in function. Left ventricular size is mildly increased. Elevated left ventricular diastolic filling pressure: Septal E/e'' = 13.7 . The left atrium is severely dilated. The right atrium is severely dilated. Mild anterior and posterior mitral annular calcification is present. Severe mitral regurgitation. Moderate aortic regurgitation is present. Severe tricuspid regurgitation. Systolic pulmonary artery pressure (SPAP) estimated at 67 mmHg (RA pressure   15 mmHg), consistent with severe pulmonary hypertension. 7- 55-60%, DD, LAE< BRII, RVE, Asc Ao 3.8, MAC, Mod MR, AI and TR,   SPAP 53 mmHg. 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 *      Plan:      Hypotension  History of hypertension  -Patient presented with weakness and hypotension . s/p IV fluid bolus in the ED , continued on IV fluids on admission for a couple of days. Hypotension resolved. She has been off IV fluids since 6/8. -   Blood pressure starting to trend up. Resumed on Lasix and Cardura on 6/8. Continued on Coreg. Continue to hold Hyzaar. Monitor blood pressure and renal function closely.   -Blood pressure and heart rate stable today. Renal function stable today. Continue Lasix, Cardura and Coreg. Hypomagnesemia  Hypokalemia  Hypophosphatemia  Being repleted     OLY on CKD  -Likely prerenal , patient appeared dehydrated on admission . given IV fluids . - crtn at baseline 0.9-1.1. crtn  on admission 1.7-> 2.0-> 2.1.   Hydrated for a couple of days   > creatinine is down to 1.7-> 1.4-> 1.3. Resumed on Lasix now,  creatinine stable. - Continue to hold Hyzaar. Renal function stable and improving. OLY resolved     Dyspnea  -Likely from valvular heart disease, and decompensated CHF. See below.  -Possible bronchitis. Patient is starting to cough up some sputum , finished  antibiotics Rocephin.  -Oxygen saturation stable on room air  -VQ scan was negative. -Chest x-ray with stable cardiomegaly, no infiltrates  -She had some wheezing-continue inhaled bronchodilators and Mucinex . -  We tried giving her Zithromax yesterday but she had an allergic response to this with a local itching and rash-Zithromax has been discontinued. Acute on Chronic diastolic CHF  Valvular heart disease . Echocardiogram repeated this admission shows worsening regurgitation . She now has severe MR and TR , moderate AR . She had moderate pulmonary hypertension in 2021 and now has severe pulmonary hypertension . EF normal.  Inferior wall hypokinesis  - follows with Dr. Marcus Worthy at [facility] cardiology  - pt is on Lasix 20 mg BID--held Lasix on admission due to OLY and dehydration. Oral Lasix has been resumed now. Patient still very symptomatic, proBNP elevated at 10,000. given IV Lasix  Daily  For 2 days   Monitoring symptoms and BNP. BNP 10,000-> 14,000--> 8157 now. -Monitor daily weights  weight was trending up 208-> 217-> IV Lasix given   weight is down to ->  215-> 211 pounds today. - continue daily weights, low sodium diet, strict I/O.   -->    cardiologist has discussed with pt.   plan is for [facility]/ left and right heart cath to further evaluate manage valvular heart disease     Left shoulder pain  - Xray showed subtle linear discontinuity at the base of the greater tuberosity with mild sclerosis- if patient has had recent trauma, differential would include a subacute nondisplaced fracture. Mild acromioclavicular degenerative changes   - no h/O trauma. Pain is chronic, over years   Left shoulder pain is chronic related to osteoarthritis    UTI  -Urine cultures growing E. Coli. Continue Rocephin     Atrial fibrillation  - EKG with controlled rate Afib on admission   - on eliquis    - continue coreg and monitor blood pressure      HLD  - Continue statin       Hx Hyperthyroidism     Hx of CVA- 2007  - continue Eliquis and statin         DVT Prophylaxis: eliquis held today for procedure tomorrow  Diet NPO  Code Status: Full Code.       Plan is to transfer to [facility] tomorrow for further evaluation of her valvular heart disease        Bela Olsen MD   6/13/2022 9:10 AM

## 2022-06-13 NOTE — PLAN OF CARE
Problem: Discharge Planning  Goal: Discharge to home or other facility with appropriate resources  Outcome: Progressing     Problem: Safety - Adult  Goal: Free from fall injury  Outcome: Progressing  Flowsheets (Taken 6/13/2022 1237)  Free From Fall Injury:   Instruct family/caregiver on patient safety   Based on caregiver fall risk screen, instruct family/caregiver to ask for assistance with transferring infant if caregiver noted to have fall risk factors     Problem: ABCDS Injury Assessment  Goal: Absence of physical injury  Outcome: Progressing  Flowsheets (Taken 6/13/2022 1237)  Absence of Physical Injury: Implement safety measures based on patient assessment

## 2022-06-13 NOTE — FLOWSHEET NOTE
06/12/22 2041   Vital Signs   Temp 97.2 °F (36.2 °C)   Temp Source Oral   Heart Rate 58   Resp 16   /77   BP Location Left upper arm   MAP (Calculated) 91   Level of Consciousness Alert (0)   MEWS Score 1   Pain Assessment   Pain Assessment 0-10   Pain Level 0   Oxygen Therapy   SpO2 98 %   O2 Device None (Room air)   Pt awake, alert and oriented times 4. Aware of NPO after MN status. PT given trazodone po with evening meds.  Shilpa Laughlin RN

## 2022-06-13 NOTE — PROGRESS NOTES
[address]   Progress Note  Cardiology      HPI: Ms. Dylan York is being seen today for f/u CHF and severe valvular disease. She feels better overall and denies specific complaints. Daughter at bedside      Physical Examination:    Vitals:    06/13/22 0830   BP: (!) 148/71   Pulse: 75   Resp: 16   Temp: 97.3 °F (36.3 °C)   SpO2: 95%          Constitutional and General Appearance: NAD   Respiratory:  · Normal excursion and expansion without use of accessory muscles  · Resp Auscultation: Diminished bs bases  Cardiovascular:  · The apical impulses not displaced  · Heart tones are crisp and normal  · Cervical veins are not engorged  · The carotid upstroke is normal in amplitude and contour without delay or bruit  · Normal S1S2, No S3, +soft TRISHA at USB  · Peripheral pulses are symmetrical and full  · There is no clubbing, cyanosis of the extremities. · 1+ BLE edema  · Pedal Pulses: 2+ and equal   Abdomen:  · No masses or tenderness  · Liver/Spleen: No Abnormalities Noted  Neurological/Psychiatric:  · Alert and oriented in all spheres  · Moves all extremities well  · No abnormalities of mood, affect, memory, mentation, or behavior are noted  Skin: warm and dry        Lab Results   Component Value Date    WBC 8.7 06/13/2022    HGB 9.8 (L) 06/13/2022    HCT 28.5 (L) 06/13/2022    MCV 93.9 06/13/2022     06/13/2022     Lab Results   Component Value Date    CREATININE 1.1 06/13/2022    BUN 30 (H) 06/13/2022     (L) 06/13/2022    K 4.4 06/13/2022    CL 97 (L) 06/13/2022    CO2 23 06/13/2022     Lab Results   Component Value Date    INR 1.04 09/25/2019    PROTIME 11.9 09/25/2019        ECHO 6/5/22 Summary   Left ventricular systolic function is normal with ejection fraction   estimated at 55 %. Inferior wall hypokinesis. All remaining wall segments appear normal in function. Left ventricular size is mildly increased. Elevated left ventricular diastolic filling pressure: Septal E/e'' = 13.7 .    The left atrium is severely dilated. The right atrium is severely dilated. Mild anterior and posterior mitral annular calcification is present. Severe mitral regurgitation. Moderate aortic regurgitation is present. Severe tricuspid regurgitation. Systolic pulmonary artery pressure (SPAP) estimated at 67 mmHg (RA pressure   15 mmHg), consistent with severe pulmonary hypertension. 7- 55-60%, DD, LAE< BRII, RVE, Asc Ao 3.8, MAC, Mod MR, AI and TR,   SPAP 53 mmHg.     Assessment:  Danae Turcios is a 80 y.o. patient who presented to [facility] with c/o low BP and SOB. She has PMH moderate MR, chronic AFIB s/p DCCV 6/15/2011 on eliquis, HTN, HLD, CRI, and gout. Most recent cardiac Cath 1/12/2004-unable to review. Most recent [facility]-Myoview 7/11/2018 noted EF=71% and a moderate sized fixed defect mid portion lateral wall but resolved with attenuation correction. Most recent Echo 1/27/2022 noted afib; MQ=89-01%;  RV systolic [id number]-62-78 mmHg; mild to mod MR; mild AI/TR. Now admitted 6/5/22 with OLY, weakness, dyspnea, and hypotension. She c/o low BP last weekend and worsened SOB x 1 week. She feels SOB all the time with rest and exertion. Reported mild cough with yellow sputum last few days. Workup in the ED noted labs: , K+ 3.4, BUN/Cr 51/1.7, Mg+ 1.1, 1.4 now, H/H 11.4/32.8, 9.9/28.7, BNP 14,672, Berhane <0.01 x1. Note CXR stable CM; no acute cardiopulmonary process. Note EKG showed Atrial fibrillation Baseline artifact Low voltage 74 bpm. V/Q scan 6/6/22 low prob for PE; obstructive lung disease. Most Echo 6/9/2022 EF=55%; severe ROSALVA; severe MR; SPAP estimated 67 mmHG; c/w severe pulm HTN. Diagnosis of acute CHF likely valvular etiology, severe MR, severe TR, and severe pulm HTN in elderly female with chronic afib.     Recs:  1. Hold eliquis 2.5mg BID for pending cath. 2. Continue lipitor 20mg qd, coreg 12.5mg BID, cardura 2mg BID  3.  Continue lasix 20mg BID, KCL 20mEq

## 2022-06-14 ENCOUNTER — ANESTHESIA (OUTPATIENT)
Dept: CARDIAC CATH/INVASIVE PROCEDURES | Age: 82
DRG: 286 | End: 2022-06-14
Payer: MEDICARE

## 2022-06-14 ENCOUNTER — HOSPITAL ENCOUNTER (INPATIENT)
Dept: CARDIAC CATH/INVASIVE PROCEDURES | Age: 82
LOS: 1 days | Discharge: HOME | DRG: 286 | End: 2022-06-15
Attending: INTERNAL MEDICINE
Payer: MEDICARE

## 2022-06-14 ENCOUNTER — ANESTHESIA EVENT (OUTPATIENT)
Dept: CARDIAC CATH/INVASIVE PROCEDURES | Age: 82
DRG: 286 | End: 2022-06-14
Payer: MEDICARE

## 2022-06-14 VITALS
BODY MASS INDEX: 35.24 KG/M2 | DIASTOLIC BLOOD PRESSURE: 68 MMHG | HEIGHT: 65 IN | SYSTOLIC BLOOD PRESSURE: 134 MMHG | RESPIRATION RATE: 16 BRPM | WEIGHT: 211.5 LBS | HEART RATE: 68 BPM | OXYGEN SATURATION: 95 % | TEMPERATURE: 97.7 F

## 2022-06-14 LAB
ANION GAP SERPL CALCULATED.3IONS-SCNC: 10 MMOL/L (ref 3–16)
ANION GAP SERPL CALCULATED.3IONS-SCNC: 11 MMOL/L (ref 3–16)
BUN BLDV-MCNC: 27 MG/DL (ref 7–20)
BUN BLDV-MCNC: 28 MG/DL (ref 7–20)
CALCIUM SERPL-MCNC: 8.5 MG/DL (ref 8.3–10.6)
CALCIUM SERPL-MCNC: 8.8 MG/DL (ref 8.3–10.6)
CHLORIDE BLD-SCNC: 94 MMOL/L (ref 99–110)
CHLORIDE BLD-SCNC: 96 MMOL/L (ref 99–110)
CO2: 25 MMOL/L (ref 21–32)
CO2: 25 MMOL/L (ref 21–32)
CREAT SERPL-MCNC: 1.1 MG/DL (ref 0.6–1.2)
CREAT SERPL-MCNC: 1.2 MG/DL (ref 0.6–1.2)
GFR AFRICAN AMERICAN: 52
GFR AFRICAN AMERICAN: 58
GFR NON-AFRICAN AMERICAN: 43
GFR NON-AFRICAN AMERICAN: 48
GLUCOSE BLD-MCNC: 101 MG/DL (ref 70–99)
GLUCOSE BLD-MCNC: 125 MG/DL (ref 70–99)
HCT VFR BLD CALC: 29.3 % (ref 36–48)
HCT VFR BLD CALC: 31.5 % (ref 36–48)
HEMOGLOBIN: 10.6 G/DL (ref 12–16)
HEMOGLOBIN: 9.9 G/DL (ref 12–16)
LV EF: 58 %
LV EF: 58 %
LVEF MODALITY: NORMAL
LVEF MODALITY: NORMAL
MAGNESIUM: 1.8 MG/DL (ref 1.8–2.4)
MCH RBC QN AUTO: 31.7 PG (ref 26–34)
MCH RBC QN AUTO: 32.2 PG (ref 26–34)
MCHC RBC AUTO-ENTMCNC: 33.7 G/DL (ref 31–36)
MCHC RBC AUTO-ENTMCNC: 33.7 G/DL (ref 31–36)
MCV RBC AUTO: 94.1 FL (ref 80–100)
MCV RBC AUTO: 95.7 FL (ref 80–100)
PDW BLD-RTO: 14.3 % (ref 12.4–15.4)
PDW BLD-RTO: 14.7 % (ref 12.4–15.4)
PHOSPHORUS: 3.5 MG/DL (ref 2.5–4.9)
PLATELET # BLD: 234 K/UL (ref 135–450)
PLATELET # BLD: 265 K/UL (ref 135–450)
PMV BLD AUTO: 6.9 FL (ref 5–10.5)
PMV BLD AUTO: 7.2 FL (ref 5–10.5)
POTASSIUM SERPL-SCNC: 4.7 MMOL/L (ref 3.5–5.1)
POTASSIUM SERPL-SCNC: 4.8 MMOL/L (ref 3.5–5.1)
RBC # BLD: 3.11 M/UL (ref 4–5.2)
RBC # BLD: 3.29 M/UL (ref 4–5.2)
SODIUM BLD-SCNC: 129 MMOL/L (ref 136–145)
SODIUM BLD-SCNC: 132 MMOL/L (ref 136–145)
WBC # BLD: 7.7 K/UL (ref 4–11)
WBC # BLD: 7.7 K/UL (ref 4–11)

## 2022-06-14 PROCEDURE — 93325 DOPPLER ECHO COLOR FLOW MAPG: CPT | Performed by: INTERNAL MEDICINE

## 2022-06-14 PROCEDURE — 6370000000 HC RX 637 (ALT 250 FOR IP): Performed by: INTERNAL MEDICINE

## 2022-06-14 PROCEDURE — C1769 GUIDE WIRE: HCPCS

## 2022-06-14 PROCEDURE — 1200000000 HC SEMI PRIVATE

## 2022-06-14 PROCEDURE — 96375 TX/PRO/DX INJ NEW DRUG ADDON: CPT

## 2022-06-14 PROCEDURE — 99152 MOD SED SAME PHYS/QHP 5/>YRS: CPT | Performed by: INTERNAL MEDICINE

## 2022-06-14 PROCEDURE — 94761 N-INVAS EAR/PLS OXIMETRY MLT: CPT

## 2022-06-14 PROCEDURE — 94640 AIRWAY INHALATION TREATMENT: CPT

## 2022-06-14 PROCEDURE — 2580000003 HC RX 258: Performed by: INTERNAL MEDICINE

## 2022-06-14 PROCEDURE — 84100 ASSAY OF PHOSPHORUS: CPT

## 2022-06-14 PROCEDURE — 93315 ECHO TRANSESOPHAGEAL: CPT

## 2022-06-14 PROCEDURE — B2151ZZ FLUOROSCOPY OF LEFT HEART USING LOW OSMOLAR CONTRAST: ICD-10-PCS | Performed by: INTERNAL MEDICINE

## 2022-06-14 PROCEDURE — 96374 THER/PROPH/DIAG INJ IV PUSH: CPT

## 2022-06-14 PROCEDURE — 6360000002 HC RX W HCPCS: Performed by: NURSE ANESTHETIST, CERTIFIED REGISTERED

## 2022-06-14 PROCEDURE — 85027 COMPLETE CBC AUTOMATED: CPT

## 2022-06-14 PROCEDURE — 99223 1ST HOSP IP/OBS HIGH 75: CPT | Performed by: INTERNAL MEDICINE

## 2022-06-14 PROCEDURE — G0378 HOSPITAL OBSERVATION PER HR: HCPCS

## 2022-06-14 PROCEDURE — 93458 L HRT ARTERY/VENTRICLE ANGIO: CPT | Performed by: INTERNAL MEDICINE

## 2022-06-14 PROCEDURE — 93320 DOPPLER ECHO COMPLETE: CPT

## 2022-06-14 PROCEDURE — 6370000000 HC RX 637 (ALT 250 FOR IP)

## 2022-06-14 PROCEDURE — 2500000003 HC RX 250 WO HCPCS

## 2022-06-14 PROCEDURE — 83735 ASSAY OF MAGNESIUM: CPT

## 2022-06-14 PROCEDURE — 4A023N7 MEASUREMENT OF CARDIAC SAMPLING AND PRESSURE, LEFT HEART, PERCUTANEOUS APPROACH: ICD-10-PCS | Performed by: INTERNAL MEDICINE

## 2022-06-14 PROCEDURE — 36415 COLL VENOUS BLD VENIPUNCTURE: CPT

## 2022-06-14 PROCEDURE — 99239 HOSP IP/OBS DSCHRG MGMT >30: CPT | Performed by: INTERNAL MEDICINE

## 2022-06-14 PROCEDURE — 2500000003 HC RX 250 WO HCPCS: Performed by: NURSE ANESTHETIST, CERTIFIED REGISTERED

## 2022-06-14 PROCEDURE — 3700000000 HC ANESTHESIA ATTENDED CARE

## 2022-06-14 PROCEDURE — 80048 BASIC METABOLIC PNL TOTAL CA: CPT

## 2022-06-14 PROCEDURE — 93325 DOPPLER ECHO COLOR FLOW MAPG: CPT

## 2022-06-14 PROCEDURE — 93458 L HRT ARTERY/VENTRICLE ANGIO: CPT

## 2022-06-14 PROCEDURE — B24BZZ4 ULTRASONOGRAPHY OF HEART WITH AORTA, TRANSESOPHAGEAL: ICD-10-PCS | Performed by: INTERNAL MEDICINE

## 2022-06-14 PROCEDURE — C1894 INTRO/SHEATH, NON-LASER: HCPCS

## 2022-06-14 PROCEDURE — B2111ZZ FLUOROSCOPY OF MULTIPLE CORONARY ARTERIES USING LOW OSMOLAR CONTRAST: ICD-10-PCS | Performed by: INTERNAL MEDICINE

## 2022-06-14 PROCEDURE — 2709999900 HC NON-CHARGEABLE SUPPLY

## 2022-06-14 PROCEDURE — 3700000001 HC ADD 15 MINUTES (ANESTHESIA)

## 2022-06-14 PROCEDURE — 6360000004 HC RX CONTRAST MEDICATION

## 2022-06-14 PROCEDURE — 6360000002 HC RX W HCPCS

## 2022-06-14 PROCEDURE — C1887 CATHETER, GUIDING: HCPCS

## 2022-06-14 RX ORDER — OXYCODONE HYDROCHLORIDE 5 MG/1
10 TABLET ORAL PRN
Status: DISCONTINUED | OUTPATIENT
Start: 2022-06-14 | End: 2022-06-14

## 2022-06-14 RX ORDER — ACETAMINOPHEN 650 MG/1
650 SUPPOSITORY RECTAL EVERY 6 HOURS PRN
Status: DISCONTINUED | OUTPATIENT
Start: 2022-06-14 | End: 2022-06-15 | Stop reason: HOSPADM

## 2022-06-14 RX ORDER — POLYETHYLENE GLYCOL 3350 17 G/17G
17 POWDER, FOR SOLUTION ORAL DAILY PRN
Status: DISCONTINUED | OUTPATIENT
Start: 2022-06-14 | End: 2022-06-15 | Stop reason: HOSPADM

## 2022-06-14 RX ORDER — VITAMIN B COMPLEX
2000 TABLET ORAL DAILY
Status: DISCONTINUED | OUTPATIENT
Start: 2022-06-14 | End: 2022-06-15 | Stop reason: HOSPADM

## 2022-06-14 RX ORDER — SODIUM CHLORIDE 9 MG/ML
25 INJECTION, SOLUTION INTRAVENOUS PRN
Status: DISCONTINUED | OUTPATIENT
Start: 2022-06-14 | End: 2022-06-14

## 2022-06-14 RX ORDER — GUAIFENESIN 600 MG/1
600 TABLET, EXTENDED RELEASE ORAL 2 TIMES DAILY
Status: DISCONTINUED | OUTPATIENT
Start: 2022-06-14 | End: 2022-06-15 | Stop reason: HOSPADM

## 2022-06-14 RX ORDER — SODIUM CHLORIDE 0.9 % (FLUSH) 0.9 %
5-40 SYRINGE (ML) INJECTION PRN
Status: DISCONTINUED | OUTPATIENT
Start: 2022-06-14 | End: 2022-06-15 | Stop reason: HOSPADM

## 2022-06-14 RX ORDER — ALBUTEROL SULFATE 90 UG/1
2 AEROSOL, METERED RESPIRATORY (INHALATION) 3 TIMES DAILY
Status: DISCONTINUED | OUTPATIENT
Start: 2022-06-14 | End: 2022-06-14

## 2022-06-14 RX ORDER — SODIUM CHLORIDE 0.9 % (FLUSH) 0.9 %
5-40 SYRINGE (ML) INJECTION EVERY 12 HOURS SCHEDULED
Status: DISCONTINUED | OUTPATIENT
Start: 2022-06-14 | End: 2022-06-14

## 2022-06-14 RX ORDER — SODIUM CHLORIDE 0.9 % (FLUSH) 0.9 %
5-40 SYRINGE (ML) INJECTION EVERY 12 HOURS SCHEDULED
Status: DISCONTINUED | OUTPATIENT
Start: 2022-06-14 | End: 2022-06-15 | Stop reason: HOSPADM

## 2022-06-14 RX ORDER — MIDAZOLAM HYDROCHLORIDE 1 MG/ML
INJECTION INTRAMUSCULAR; INTRAVENOUS
Status: COMPLETED | OUTPATIENT
Start: 2022-06-14 | End: 2022-06-14

## 2022-06-14 RX ORDER — OXYCODONE HYDROCHLORIDE 5 MG/1
5 TABLET ORAL PRN
Status: DISCONTINUED | OUTPATIENT
Start: 2022-06-14 | End: 2022-06-14

## 2022-06-14 RX ORDER — ACETAMINOPHEN 325 MG/1
650 TABLET ORAL EVERY 6 HOURS PRN
Status: DISCONTINUED | OUTPATIENT
Start: 2022-06-14 | End: 2022-06-15 | Stop reason: HOSPADM

## 2022-06-14 RX ORDER — TRAZODONE HYDROCHLORIDE 50 MG/1
50 TABLET ORAL NIGHTLY PRN
Status: DISCONTINUED | OUTPATIENT
Start: 2022-06-14 | End: 2022-06-15 | Stop reason: HOSPADM

## 2022-06-14 RX ORDER — SODIUM CHLORIDE 0.9 % (FLUSH) 0.9 %
5-40 SYRINGE (ML) INJECTION PRN
Status: DISCONTINUED | OUTPATIENT
Start: 2022-06-14 | End: 2022-06-14

## 2022-06-14 RX ORDER — MEPERIDINE HYDROCHLORIDE 50 MG/ML
12.5 INJECTION INTRAMUSCULAR; INTRAVENOUS; SUBCUTANEOUS EVERY 5 MIN PRN
Status: DISCONTINUED | OUTPATIENT
Start: 2022-06-14 | End: 2022-06-14

## 2022-06-14 RX ORDER — HYDRALAZINE HYDROCHLORIDE 20 MG/ML
10 INJECTION INTRAMUSCULAR; INTRAVENOUS EVERY 10 MIN PRN
Status: DISCONTINUED | OUTPATIENT
Start: 2022-06-14 | End: 2022-06-15 | Stop reason: HOSPADM

## 2022-06-14 RX ORDER — SODIUM CHLORIDE 9 MG/ML
INJECTION, SOLUTION INTRAVENOUS PRN
Status: DISCONTINUED | OUTPATIENT
Start: 2022-06-14 | End: 2022-06-15 | Stop reason: HOSPADM

## 2022-06-14 RX ORDER — ONDANSETRON 2 MG/ML
4 INJECTION INTRAMUSCULAR; INTRAVENOUS
Status: DISCONTINUED | OUTPATIENT
Start: 2022-06-14 | End: 2022-06-14

## 2022-06-14 RX ORDER — M-VIT,TX,IRON,MINS/CALC/FOLIC 27MG-0.4MG
1 TABLET ORAL DAILY
Status: DISCONTINUED | OUTPATIENT
Start: 2022-06-14 | End: 2022-06-15 | Stop reason: HOSPADM

## 2022-06-14 RX ORDER — FENTANYL CITRATE 50 UG/ML
INJECTION, SOLUTION INTRAMUSCULAR; INTRAVENOUS
Status: COMPLETED | OUTPATIENT
Start: 2022-06-14 | End: 2022-06-14

## 2022-06-14 RX ORDER — ALLOPURINOL 100 MG/1
100 TABLET ORAL 2 TIMES DAILY
Status: DISCONTINUED | OUTPATIENT
Start: 2022-06-14 | End: 2022-06-15 | Stop reason: HOSPADM

## 2022-06-14 RX ORDER — POTASSIUM CHLORIDE 20 MEQ/1
20 TABLET, EXTENDED RELEASE ORAL 2 TIMES DAILY WITH MEALS
Status: DISCONTINUED | OUTPATIENT
Start: 2022-06-14 | End: 2022-06-15 | Stop reason: HOSPADM

## 2022-06-14 RX ORDER — CARVEDILOL 6.25 MG/1
12.5 TABLET ORAL 2 TIMES DAILY WITH MEALS
Status: DISCONTINUED | OUTPATIENT
Start: 2022-06-14 | End: 2022-06-15 | Stop reason: HOSPADM

## 2022-06-14 RX ORDER — FUROSEMIDE 20 MG/1
20 TABLET ORAL 2 TIMES DAILY
Status: DISCONTINUED | OUTPATIENT
Start: 2022-06-14 | End: 2022-06-15 | Stop reason: HOSPADM

## 2022-06-14 RX ORDER — LIDOCAINE HYDROCHLORIDE 10 MG/ML
INJECTION, SOLUTION INFILTRATION; PERINEURAL PRN
Status: DISCONTINUED | OUTPATIENT
Start: 2022-06-14 | End: 2022-06-14 | Stop reason: SDUPTHER

## 2022-06-14 RX ORDER — DOXAZOSIN 2 MG/1
2 TABLET ORAL 2 TIMES DAILY
Status: DISCONTINUED | OUTPATIENT
Start: 2022-06-14 | End: 2022-06-15 | Stop reason: HOSPADM

## 2022-06-14 RX ORDER — ONDANSETRON 2 MG/ML
4 INJECTION INTRAMUSCULAR; INTRAVENOUS EVERY 6 HOURS PRN
Status: DISCONTINUED | OUTPATIENT
Start: 2022-06-14 | End: 2022-06-15 | Stop reason: HOSPADM

## 2022-06-14 RX ORDER — DIPHENHYDRAMINE HCL 25 MG
25 TABLET ORAL EVERY 6 HOURS PRN
Status: DISCONTINUED | OUTPATIENT
Start: 2022-06-14 | End: 2022-06-15 | Stop reason: HOSPADM

## 2022-06-14 RX ORDER — ALBUTEROL SULFATE 90 UG/1
2 AEROSOL, METERED RESPIRATORY (INHALATION) EVERY 4 HOURS PRN
Status: DISCONTINUED | OUTPATIENT
Start: 2022-06-14 | End: 2022-06-15 | Stop reason: HOSPADM

## 2022-06-14 RX ORDER — ONDANSETRON 4 MG/1
4 TABLET, ORALLY DISINTEGRATING ORAL EVERY 8 HOURS PRN
Status: DISCONTINUED | OUTPATIENT
Start: 2022-06-14 | End: 2022-06-15 | Stop reason: HOSPADM

## 2022-06-14 RX ORDER — IPRATROPIUM BROMIDE AND ALBUTEROL SULFATE 2.5; .5 MG/3ML; MG/3ML
1 SOLUTION RESPIRATORY (INHALATION) EVERY 4 HOURS PRN
Status: DISCONTINUED | OUTPATIENT
Start: 2022-06-14 | End: 2022-06-15 | Stop reason: HOSPADM

## 2022-06-14 RX ORDER — PROPOFOL 10 MG/ML
INJECTION, EMULSION INTRAVENOUS PRN
Status: DISCONTINUED | OUTPATIENT
Start: 2022-06-14 | End: 2022-06-14 | Stop reason: SDUPTHER

## 2022-06-14 RX ORDER — ATORVASTATIN CALCIUM 10 MG/1
20 TABLET, FILM COATED ORAL NIGHTLY
Status: DISCONTINUED | OUTPATIENT
Start: 2022-06-14 | End: 2022-06-15 | Stop reason: HOSPADM

## 2022-06-14 RX ORDER — SODIUM CHLORIDE 9 MG/ML
INJECTION, SOLUTION INTRAVENOUS CONTINUOUS
Status: ACTIVE | OUTPATIENT
Start: 2022-06-14 | End: 2022-06-14

## 2022-06-14 RX ADMIN — LIDOCAINE HYDROCHLORIDE 60 MG: 10 INJECTION, SOLUTION INFILTRATION; PERINEURAL at 10:25

## 2022-06-14 RX ADMIN — PROPOFOL 10 MG: 10 INJECTION, EMULSION INTRAVENOUS at 10:31

## 2022-06-14 RX ADMIN — Medication 2 PUFF: at 20:20

## 2022-06-14 RX ADMIN — ATORVASTATIN CALCIUM 20 MG: 10 TABLET, FILM COATED ORAL at 20:31

## 2022-06-14 RX ADMIN — PROPOFOL 10 MG: 10 INJECTION, EMULSION INTRAVENOUS at 10:30

## 2022-06-14 RX ADMIN — ALLOPURINOL 100 MG: 100 TABLET ORAL at 20:31

## 2022-06-14 RX ADMIN — GUAIFENESIN 600 MG: 600 TABLET, EXTENDED RELEASE ORAL at 20:31

## 2022-06-14 RX ADMIN — TRAZODONE HYDROCHLORIDE 50 MG: 50 TABLET ORAL at 22:12

## 2022-06-14 RX ADMIN — PROPOFOL 10 MG: 10 INJECTION, EMULSION INTRAVENOUS at 10:28

## 2022-06-14 RX ADMIN — FENTANYL CITRATE 25 MCG: 50 INJECTION, SOLUTION INTRAMUSCULAR; INTRAVENOUS at 11:23

## 2022-06-14 RX ADMIN — MIDAZOLAM HYDROCHLORIDE 0.5 MG: 1 INJECTION INTRAMUSCULAR; INTRAVENOUS at 11:23

## 2022-06-14 RX ADMIN — PROPOFOL 10 MG: 10 INJECTION, EMULSION INTRAVENOUS at 10:26

## 2022-06-14 RX ADMIN — PROPOFOL 10 MG: 10 INJECTION, EMULSION INTRAVENOUS at 10:32

## 2022-06-14 RX ADMIN — Medication 2 PUFF: at 07:51

## 2022-06-14 RX ADMIN — PROPOFOL 30 MG: 10 INJECTION, EMULSION INTRAVENOUS at 10:25

## 2022-06-14 RX ADMIN — SODIUM CHLORIDE, PRESERVATIVE FREE 10 ML: 5 INJECTION INTRAVENOUS at 20:32

## 2022-06-14 RX ADMIN — APIXABAN 2.5 MG: 5 TABLET, FILM COATED ORAL at 20:31

## 2022-06-14 RX ADMIN — PROPOFOL 10 MG: 10 INJECTION, EMULSION INTRAVENOUS at 10:27

## 2022-06-14 RX ADMIN — PROPOFOL 10 MG: 10 INJECTION, EMULSION INTRAVENOUS at 10:34

## 2022-06-14 ASSESSMENT — ENCOUNTER SYMPTOMS: SHORTNESS OF BREATH: 1

## 2022-06-14 ASSESSMENT — LIFESTYLE VARIABLES: SMOKING_STATUS: 0

## 2022-06-14 NOTE — ANESTHESIA PRE PROCEDURE
Department of Anesthesiology  Preprocedure Note       Name:  Reyna Vasquez   Age:  80 y.o.  :  1940                                          MRN:  [id number]         Date:  2022      Surgeon: * No surgeons listed *    Procedure: * No procedures listed *    Medications prior to admission:   Prior to Admission medications    Medication Sig Start Date End Date Taking? Authorizing Provider   losartan-hydroCHLOROthiazide (HYZAAR) 100-25 MG per tablet TAKE 1 TABLET DAILY 22   Yamilka Velázquez MD   traZODone (DESYREL) 50 MG tablet TAKE 1 TABLET NIGHTLY 22   Yamilka Velázquez MD   allopurinol (ZYLOPRIM) 100 MG tablet TAKE 1 TABLET TWICE A DAY 22   Yamilka Velázquez MD   apixaban (ELIQUIS) 5 MG TABS tablet TAKE 1 TABLET TWICE A DAY 22   Yamilka Velázquez MD   Cholecalciferol (VITAMIN D3) 50 MCG (2000 UT) CAPS Take 2,000 Units by mouth daily    Historical Provider, MD   furosemide (LASIX) 20 MG tablet TAKE 1 TABLET BY MOUTH 2 TIMES DAILY AS NEEDED (SWELLING) 22   Yamilka Velázquez MD   carvedilol (COREG) 12.5 MG tablet TAKE 2 TABLETS TWICE A DAY WITH MEALS  Patient taking differently: 6.25 mg 2 times daily (with meals) TAKE 2 TABLETS TWICE A DAY WITH MEALS 22   Yamilka Velázquez MD   doxazosin (CARDURA) 4 MG tablet TAKE 1 TABLET BY MOUTH EVERY DAY  Patient taking differently: 2 mg 2 times daily Takes half a pill in the AM and half in the evening 22   Yamilka Velázquez MD   atorvastatin (LIPITOR) 20 MG tablet TAKE 1 TABLET NIGHTLY 21   Yamilka Velázquez MD   Multiple Vitamins-Minerals (THERAPEUTIC MULTIVITAMIN-MINERALS) tablet Take 1 tablet by mouth daily    Historical Provider, MD   acetaminophen (TYLENOL) 325 MG tablet Take 650 mg by mouth every 6 hours as needed for Pain    Historical Provider, MD       Current medications:    No current facility-administered medications for this encounter. Allergies:     Allergies   Allergen Reactions    Zithromax [Azithromycin] Rash     Pt was started on IV Zithromax and 25 min into infusion started itching and had a rash on her arm.     Hydrocodone Hives    Hydrocodone Bitartrate Hives    Lisinopril Other (See Comments)     Cough    Hydrocodone Rash    Naproxen Rash       Problem List:    Patient Active Problem List   Diagnosis Code    Severe mitral regurgitation I34.0    Severe tricuspid regurgitation I07.1    Atrial fibrillation ([facility]) I48.91    Long term current use of anticoagulant therapy Z79.01    Seborrheic keratosis L82.1    Actinic keratoses L57.0    Cough due to ACE inhibitor R05.8, T46.4X5A    Rotator cuff (capsule) sprain S43.429A    Primary localized osteoarthrosis, lower leg M17.10    Chronic kidney disease, stage III (moderate) N18.30    Essential hypertension I10    Localized osteoarthritis of right knee M17.11    Primary localized osteoarthritis of right knee M17.11    Mixed hyperlipidemia E78.2    Atrial fibrillation, chronic ([facility]) I48.20    Chronic ischemic heart disease I25.9    Gout M10.9    Primary localized osteoarthritis of left knee M17.12    Status post total left knee replacement Z96.652    Chronic diastolic congestive heart failure ([facility]) I50.32    Chronic renal disease, stage III ([facility]) [619513] N18.30    OLY (acute kidney injury) ([facility]) N17.9    Hypotension I95.9    Atrial fibrillation, controlled ([facility]) I48.91    Hypomagnesemia E83.42    Arthritis of left shoulder region M19.012    Dyspnea R06.00    Heart failure due to valvular disease, acute, systolic ([facility]) Y34.64, I23    Valvular heart disease I38       Past Medical History:        Diagnosis Date    Arthritis     Atrial fibrillation ([address]     CAD (coronary artery disease)     a-fib    Cerebral artery occlusion with cerebral infarction ([address] 2007    resolved w/ rehab    CHF (congestive heart failure) (HCC)     CKD (chronic kidney disease)     Cutaneous skin tags 4/8/2013    Depression     Gout  Grief reaction 4/8/2013    Hyperlipidemia     Hypertension     Obesity     Osteoarthritis     Pulmonary nodules     Unspecified cerebral artery occlusion with cerebral infarction 01/2007       Past Surgical History:        Procedure Laterality Date    CARPAL TUNNEL RELEASE Bilateral     CARPAL TUNNEL RELEASE      CATARACT REMOVAL      both    CHOLECYSTECTOMY      EYE SURGERY      JOINT REPLACEMENT Right     knee    KNEE ARTHROSCOPY Right     fluid removal    KNEE ARTHROSCOPY      RIGHT    OTHER SURGICAL HISTORY Left 02/27/2013    Left myringotomy, left PE tube insertion    OTHER SURGICAL HISTORY Bilateral 2/24/14    EXCISION OF RT NECK AND LEFT TEMPLE LESIONS    MA TOTAL KNEE ARTHROPLASTY Right 8/1/2018    RIGHT TOTAL KNEE MAKOPLASTY WITH PLATELET RICH PLASMA, ADDUCTOR CANAL BLOCK FOR PAIN CONTROL                    MAKOPLASTY CHEPE  CPT CODE - 80815 performed by Landy Dunlap MD at [place] [address] Left 9/25/2019    LEFT TOTAL KNEE MAKOPLASTY WITH ADDUCTOR CANAL BLOCK FOR PAIN CONTROL             CHEPE RAMÓN performed by Landy Dunlap MD at [place] [address]       Social History:    Social History     Tobacco Use    Smoking status: Never Smoker    Smokeless tobacco: Never Used   Substance Use Topics    Alcohol use: No                                Counseling given: Not Answered      Vital Signs (Current): There were no vitals filed for this visit.                                            BP Readings from Last 3 Encounters:   06/14/22 134/68   04/28/22 138/75   12/22/21 135/80       NPO Status:  mn+, see mar                                                                               BMI:   Wt Readings from Last 3 Encounters:   06/12/22 211 lb 8 oz (95.9 kg)   04/28/22 203 lb (92.1 kg)   10/19/21 202 lb (91.6 kg)     There is no height or weight on file to calculate BMI.    CBC:   Lab Results   Component Value Date    WBC 7.7 06/14/2022    RBC 3.11 06/14/2022    HGB 9.9 06/14/2022    HCT 29.3 06/14/2022    MCV 94.1 06/14/2022    RDW 14.7 06/14/2022     06/14/2022       CMP:   Lab Results   Component Value Date     06/14/2022    K 4.8 06/14/2022    K 3.5 06/06/2022    CL 94 06/14/2022    CO2 25 06/14/2022    BUN 28 06/14/2022    CREATININE 1.1 06/14/2022    GFRAA 58 06/14/2022    GFRAA >60 06/10/2013    AGRATIO 1.9 06/06/2022    LABGLOM 48 06/14/2022    LABGLOM 51 08/20/2015    GLUCOSE 101 06/14/2022    PROT 5.2 06/06/2022    PROT 7.8 11/01/2012    CALCIUM 8.5 06/14/2022    BILITOT 0.9 06/06/2022    ALKPHOS 48 06/06/2022    AST 19 06/06/2022    ALT 11 06/06/2022       POC Tests: No results for input(s): POCGLU, POCNA, POCK, POCCL, POCBUN, POCHEMO, POCHCT in the last 72 hours. Coags:   Lab Results   Component Value Date    PROTIME 11.9 09/25/2019    PROTIME 20.4 12/29/2009    INR 1.04 09/25/2019    APTT 32.5 09/11/2019       HCG (If Applicable): No results found for: PREGTESTUR, PREGSERUM, HCG, HCGQUANT     ABGs: No results found for: PHART, PO2ART, NPN4RQQ, FDG9VVD, BEART, L8MOGPHL     Type & Screen (If Applicable):  No results found for: LABABO, LABRH    Drug/Infectious Status (If Applicable):  No results found for: HIV, HEPCAB    COVID-19 Screening (If Applicable):   Lab Results   Component Value Date    COVID19 NOT DETECTED 06/05/2022           Anesthesia Evaluation  Patient summary reviewed no history of anesthetic complications:   Airway: Mallampati: III  TM distance: <3 FB   Neck ROM: limited  Mouth opening: > = 3 FB   Dental:    (+) upper dentures and lower dentures      Pulmonary: breath sounds clear to auscultation  (+) shortness of breath (chf/af):      (-) COPD, asthma, recent URI, sleep apnea and not a current smoker          Patient did not smoke on day of surgery.                  Cardiovascular:    (+) hypertension:, valvular problems/murmurs (ar and tr):, dysrhythmias: atrial fibrillation, CHF: diastolic, pulmonary hypertension (by echo):, hyperlipidemia    (-) pacemaker, past MI, CAD, CABG/stent and  angina    ECG reviewed  Rhythm: irregular  Rate: normal  Echocardiogram reviewed         Beta Blocker:  Dose within 24 Hrs         Neuro/Psych:   (+) CVA:, TIA (2011, L sided weaknees;ue/le, resolved), depression/anxiety  (hx dep.)   (-) seizures           GI/Hepatic/Renal:   (+) renal disease: CRI,      (-) GERD, liver disease, bowel prep and no morbid obesity       Endo/Other:    (+) blood dyscrasia (eliquis /  anemia): arthritis (and gout): OA., no malignancy/cancer. (-) diabetes mellitus, hypothyroidism, hyperthyroidism, no malignancy/cancer               Abdominal:       Abdomen: soft. Vascular: negative vascular ROS. Other Findings:           Anesthesia Plan      TIVA     ASA 3       Induction: intravenous. Anesthetic plan and risks discussed with patient and child/children. Plan discussed with CRNA. This pre-anesthesia assessment may be used as a history and physical.    DOS STAFF ADDENDUM:    Pt seen and examined, chart reviewed (including anesthesia, drug and allergy history). No interval changes to history and physical examination. Anesthetic plan, risks, benefits, alternatives, and personnel involved discussed with patient. Patient verbalized an understanding and agrees to proceed.       uDke Turpin MD  June 14, 2022  10:16 AM    Duke Turpin MD   6/14/2022

## 2022-06-14 NOTE — ANESTHESIA POSTPROCEDURE EVALUATION
Department of Anesthesiology  Postprocedure Note    Patient: Eileen Morgan  MRN: [id number]  YOB: 1940  Date of evaluation: 6/14/2022  Time:  7:45 PM     Procedure Summary     Date: 06/14/22 Room / Location: [facility] Cardiac Cath Lab    Anesthesia Start: 1021 Anesthesia Stop: 1042    Procedure: HEART CATH L & R Diagnosis: Severe mitral regurgitation    Scheduled Providers:  Responsible Provider: Meka Willett MD    Anesthesia Type: TIVA ASA Status: 3          Anesthesia Type: No value filed. Syd Phase I:      Syd Phase II:      Last vitals: Reviewed and per EMR flowsheets.        Anesthesia Post Evaluation    Comments: Postoperative Anesthesia Note    Name:    Eileen Morgan  MRN:      [id number]    Patient Vitals in the past 12 hrs:  06/14/22 1844, BP:(!) 157/74, Temp:98.1 °F (36.7 °C), Temp src:Oral, Pulse:66, Resp:18, SpO2:98 %  06/14/22 1830, Weight:213 lb 9.6 oz (96.9 kg)     LABS:    CBC  Lab Results       Component                Value               Date/Time                  WBC                      7.7                 06/14/2022 06:14 AM        HGB                      9.9 (L)             06/14/2022 06:14 AM        HCT                      29.3 (L)            06/14/2022 06:14 AM        PLT                      234                 06/14/2022 06:14 AM   RENAL  Lab Results       Component                Value               Date/Time                  NA                       129 (L)             06/14/2022 06:14 AM        K                        4.8                 06/14/2022 06:14 AM        K                        3.5                 06/06/2022 05:30 AM        CL                       94 (L)              06/14/2022 06:14 AM        CO2                      25                  06/14/2022 06:14 AM        BUN                      28 (H)              06/14/2022 06:14 AM        CREATININE               1.1                 06/14/2022 06:14 AM        GLUCOSE                  101 (H) 06/14/2022 06:14 AM   KERRY  Lab Results       Component                Value               Date/Time                  PROTIME                  11.9                09/25/2019 07:19 AM        PROTIME                  20.4 (H)            12/29/2009 11:55 AM        INR                      1.04                09/25/2019 07:19 AM        APTT                     32.5                09/11/2019 01:22 PM     Intake & Output:  No intake/output data recorded. Nausea & Vomiting:  No    Level of Consciousness:  Awake    Pain Assessment:  Adequate analgesia    Anesthesia Complications:  No apparent anesthetic complications    SUMMARY      Vital signs stable  OK to discharge from Stage I post anesthesia care.   Care transferred from Anesthesiology department on discharge from perioperative area

## 2022-06-14 NOTE — PLAN OF CARE
Problem: Discharge Planning  Goal: Discharge to home or other facility with appropriate resources  6/13/2022 2235 by Aiden Navas RN  Outcome: Progressing  6/13/2022 1238 by Dana Hewitt RN  Outcome: Progressing     Problem: Safety - Adult  Goal: Free from fall injury  6/13/2022 2235 by Aiden Navas RN  Outcome: Progressing  6/13/2022 1238 by Dana Hewitt RN  Outcome: Progressing  Flowsheets (Taken 6/13/2022 1237)  Free From Fall Injury:   Instruct family/caregiver on patient safety   Based on caregiver fall risk screen, instruct family/caregiver to ask for assistance with transferring infant if caregiver noted to have fall risk factors     Problem: ABCDS Injury Assessment  Goal: Absence of physical injury  6/13/2022 2235 by Aiden Navas RN  Outcome: Progressing  6/13/2022 1238 by Dana Hewitt RN  Outcome: Progressing  Flowsheets (Taken 6/13/2022 1237)  Absence of Physical Injury: Implement safety measures based on patient assessment     Problem: Chronic Conditions and Co-morbidities  Goal: Patient's chronic conditions and co-morbidity symptoms are monitored and maintained or improved  6/13/2022 2235 by Aiden Navas RN  Outcome: Progressing  6/13/2022 1238 by Dana Hewitt RN  Outcome: Progressing     Problem: Skin/Tissue Integrity  Goal: Absence of new skin breakdown  Description: 1. Monitor for areas of redness and/or skin breakdown  2. Assess vascular access sites hourly  3. Every 4-6 hours minimum:  Change oxygen saturation probe site  4. Every 4-6 hours:  If on nasal continuous positive airway pressure, respiratory therapy assess nares and determine need for appliance change or resting period.   6/13/2022 2235 by Aiden Navas RN  Outcome: Progressing  6/13/2022 1238 by Dana Hewitt RN  Outcome: Progressing     Problem: Pain  Goal: Verbalizes/displays adequate comfort level or baseline comfort level  6/13/2022 2235 by Aiden Navas RN  Outcome: Progressing  6/13/2022 1238 by Subhash Marie, RN  Outcome: Progressing

## 2022-06-14 NOTE — H&P
Internal Medicine History and Physical    Pt evaluated on:  6/15/2022    CHIEF COMPLAINT:  Transfer for cardiac cath, shortness of breath    History of Present Illness: This is a 80 y.o. WF with PMHx sig for HTN, hyperlipidemia, afib on eliquis, CKDII, CVA, chronic diastolic CHF and moderate aortic and tricuspid regurgitation who presented to [facility] on 6/5 with hypotension. Pt felt weak and dizzy. She did have acute on chronic renal failure with creatinine of 1.7 and baseline of 1.2 and mag level of 1.1. Pt renal function corrected after holding ARB. Pt dyspnea was thought likely to be from valvular heart disease and decompensated CHF. Pt did have allergic response to azithromycin. An ECHO showed worsening mitral and tricuspid regurgitation and pt transferred to [facility] for cardiac cath to evaluate and manage valvular heart disease. PT was also found to have UTI which was treated with ceftriaxone. Cardiac cath found mild non-obstructive CAD, normal EF at 55-60% and SAN[place]ORD showed mild to moderate aortic regurg and severe biatrial enlargement. Medical mangement was indicated.     Past Medical History:   Diagnosis Date    Arthritis     Atrial fibrillation ([address]     CAD (coronary artery disease)     a-fib    Cerebral artery occlusion with cerebral infarction ([address] 2007    resolved w/ rehab    CHF (congestive heart failure) ([address]     CKD (chronic kidney disease)     Cutaneous skin tags 4/8/2013    Depression     Gout     Grief reaction 4/8/2013    Hyperlipidemia     Hypertension     Obesity     Osteoarthritis     Pulmonary nodules     Unspecified cerebral artery occlusion with cerebral infarction 01/2007     Active Ambulatory Problems     Diagnosis Date Noted    Heart failure due to valvular disease ([address] 10/10/2011    Severe mitral regurgitation 10/11/2011    Severe tricuspid regurgitation 10/11/2011    Atrial fibrillation ([address] 11/30/2012    Long term current use of anticoagulant therapy 11/30/2012    Seborrheic keratosis 04/08/2013    Actinic keratoses 04/08/2013    Cough due to ACE inhibitor 04/08/2013    Rotator cuff (capsule) sprain 04/14/2014    Primary localized osteoarthrosis, lower leg 06/13/2014    Chronic kidney disease, stage III (moderate) 08/13/2014    Essential hypertension 08/13/2014    Localized osteoarthritis of right knee 08/01/2018    Primary localized osteoarthritis of right knee 08/01/2018    Mixed hyperlipidemia 06/03/2019    Atrial fibrillation, chronic ([address] 02/10/2011    Chronic ischemic heart disease 02/27/2004    Gout 06/03/2019    Primary localized osteoarthritis of left knee 09/25/2019    Status post total left knee replacement 09/25/2019    Chronic diastolic congestive heart failure ([address] 04/28/2022    Chronic renal disease, stage III [facility]) [211465] 04/28/2022    OLY (acute kidney injury) ([address] 06/05/2022    Hypotension     Atrial fibrillation, controlled ([address]     Hypomagnesemia     Arthritis of left shoulder region     Dyspnea     Heart failure due to valvular disease, acute, systolic ([address]     Valvular heart disease      Resolved Ambulatory Problems     Diagnosis Date Noted    Bradycardia 10/10/2011    HTN (hypertension) 10/10/2011    CKD (chronic kidney disease) 10/11/2011    Preop examination 02/25/2013    Grief reaction 04/08/2013    Cutaneous skin tags 04/08/2013    Acute gastroenteritis 06/08/2013    Mastoiditis 06/08/2013    Nausea vomiting and diarrhea 07/20/2013    Acute kidney injury ([address] 07/20/2013    Lung nodule 07/20/2013    Hyponatremia 07/20/2013    Hyperthyroidism 07/21/2013    Dyspnea 01/12/2014    Other affections of shoulder region, not elsewhere classified 04/14/2014    Gout attack 12/05/2014    Pain in joint, upper arm 12/05/2014    Status post total right knee replacement 08/06/2018    Rectus sheath hematoma 08/09/2018    Abdominal pain 08/09/2018    Abdominal hematoma      Past Medical History:   Diagnosis Date    Arthritis     CAD (coronary artery disease)     Cerebral artery occlusion with cerebral infarction [facility]) 2007    [facility]) [facility]     Depression     Hyperlipidemia     Hypertension     Obesity     Osteoarthritis     Pulmonary nodules     Unspecified cerebral artery occlusion with cerebral infarction 01/2007         Past Medical History:   Diagnosis Date    Arthritis     Atrial fibrillation ([facility]     CAD (coronary artery disease)     a-fib    Cerebral artery occlusion with cerebral infarction ([facility] 2007    resolved w/ rehab    CHF (congestive heart failure) ([facility]     CKD (chronic kidney disease)     Cutaneous skin tags 4/8/2013    Depression     Gout     Grief reaction 4/8/2013    Hyperlipidemia     Hypertension     Obesity     Osteoarthritis     Pulmonary nodules     Unspecified cerebral artery occlusion with cerebral infarction 01/2007     Past Surgical History:   Procedure Laterality Date    CARPAL TUNNEL RELEASE Bilateral     CARPAL TUNNEL RELEASE      CATARACT REMOVAL      both    CHOLECYSTECTOMY      EYE SURGERY      JOINT REPLACEMENT Right     knee    KNEE ARTHROSCOPY Right     fluid removal    KNEE ARTHROSCOPY      RIGHT    OTHER SURGICAL HISTORY Left 02/27/2013    Left myringotomy, left PE tube insertion    OTHER SURGICAL HISTORY Bilateral 2/24/14    EXCISION OF RT NECK AND LEFT TEMPLE LESIONS    MS TOTAL KNEE ARTHROPLASTY Right 8/1/2018    RIGHT TOTAL KNEE MAKOPLASTY WITH PLATELET RICH PLASMA, ADDUCTOR CANAL BLOCK FOR PAIN CONTROL                    MAKOPLASTY CHEPE  CPT CODE - 63130 performed by Kallie Bush MD at [facility] Left 9/25/2019    LEFT TOTAL KNEE MAKOPLASTY WITH ADDUCTOR CANAL BLOCK FOR PAIN CONTROL             CHEPE RAMÓN performed by Kallie Bush MD at [place] [address]         Medications Prior to Admission:    Medications Prior to Admission: losartan-hydroCHLOROthiazide chloride flush 0.9 % injection 5-40 mL  5-40 mL IntraVENous 2 times per day Mary Sam MD        sodium chloride flush 0.9 % injection 5-40 mL  5-40 mL IntraVENous PRN Mary Sam MD        0.9 % sodium chloride infusion   IntraVENous PRN Mary Sam MD        acetaminophen (TYLENOL) tablet 650 mg  650 mg Oral Q6H PRN Mary Sam MD        Or   [facility] acetaminophen (TYLENOL) suppository 650 mg  650 mg Rectal Q6H PRN Mary Sam MD        ondansetron (ZOFRAN-ODT) disintegrating tablet 4 mg  4 mg Oral Q8H PRN Mary Sam MD        Or    [facility] injection 4 mg  4 mg IntraVENous Q6H PRN Mary Sam MD        polyethylene glycol (GLYCOLAX) packet 17 g  17 g Oral Daily PRN Mary Sam MD        sodium chloride flush 0.9 % injection 5-40 mL  5-40 mL IntraVENous 2 times per day Mary Sam MD        sodium chloride flush 0.9 % injection 5-40 mL  5-40 mL IntraVENous PRN Mary Sam MD        albuterol sulfate HFA (PROVENTIL;VENTOLIN;PROAIR) 108 (90 Base) MCG/ACT inhaler 2 puff  2 puff Inhalation TID Mary Sam MD        allopurinol (ZYLOPRIM) tablet 100 mg  100 mg Oral BID Mary Sam MD        apixaban [facility]) tablet 2.5 mg  2.5 mg Oral BID Mary Sam MD        atorvastatin (LIPITOR) tablet 20 mg  20 mg Oral Nightly Mary Sam MD        carvedilol (COREG) tablet 12.5 mg  12.5 mg Oral BID  Mary Sam MD        diphenhydrAMINE (BENADRYL) tablet 25 mg  25 mg Oral Q6H PRN Mary Sam MD        doxazosin (CARDURA) tablet 2 mg  2 mg Oral BID Mary Sam MD        furosemide (LASIX) tablet 20 mg  20 mg Oral BID Mary Sam MD        guaiFENesin [facility]) extended release tablet 600 mg  600 mg Oral BID Mary Sam MD        ipratropium-albuterol (DUONEB) nebulizer solution 1 ampule  1 ampule Inhalation Q4H PRN Mary Sam MD        potassium & sodium phosphates (PHOS-NAK) 280-160-250 MG packet 250 mg  1 packet Oral Daily Bipin Edouard MD        potassium chloride (KLOR-CON M) extended release tablet 20 mEq  20 mEq Oral BID WC Bipin Edouard MD        therapeutic multivitamin-minerals 1 tablet  1 tablet Oral Daily Bipin Edouard MD        traZODone (DESYREL) tablet 50 mg  50 mg Oral Nightly PRN Bipin Edouard MD        Vitamin D (CHOLECALCIFEROL) tablet 2,000 Units  2,000 Units Oral Daily Bipin Edouard MD           Prior to Admission medications    Medication Sig Start Date End Date Taking?  Authorizing Provider   losartan-hydroCHLOROthiazide (HYZAAR) 100-25 MG per tablet TAKE 1 TABLET DAILY 6/13/22   Tim Cartagena MD   traZODone (DESYREL) 50 MG tablet TAKE 1 TABLET NIGHTLY 6/6/22   Tim Cartagena MD   allopurinol (ZYLOPRIM) 100 MG tablet TAKE 1 TABLET TWICE A DAY 6/6/22   Tim Cartagena MD   apixaban (ELIQUIS) 5 MG TABS tablet TAKE 1 TABLET TWICE A DAY 6/6/22   Tim Cartagena MD   Cholecalciferol (VITAMIN D3) 50 MCG (2000 UT) CAPS Take 2,000 Units by mouth daily    Historical Provider, MD   furosemide (LASIX) 20 MG tablet TAKE 1 TABLET BY MOUTH 2 TIMES DAILY AS NEEDED (SWELLING) 5/12/22   Tim Cartagena MD   carvedilol (COREG) 12.5 MG tablet TAKE 2 TABLETS TWICE A DAY WITH MEALS  Patient taking differently: 6.25 mg 2 times daily (with meals) TAKE 2 TABLETS TWICE A DAY WITH MEALS 4/19/22   Tim Cartagena MD   doxazosin (CARDURA) 4 MG tablet TAKE 1 TABLET BY MOUTH EVERY DAY  Patient taking differently: 2 mg 2 times daily Takes half a pill in the AM and half in the evening 1/31/22   Tim Cartagena MD   atorvastatin (LIPITOR) 20 MG tablet TAKE 1 TABLET NIGHTLY 12/16/21   Tim Cartagena MD   Multiple Vitamins-Minerals (THERAPEUTIC MULTIVITAMIN-MINERALS) tablet Take 1 tablet by mouth daily    Historical Provider, MD   acetaminophen (TYLENOL) 325 MG tablet Take 650 mg by mouth every 6 hours as needed for Pain    Historical Provider, MD         Allergies:    Zithromax [azithromycin], Hydrocodone, Hydrocodone bitartrate, Lisinopril, Hydrocodone, and Naproxen    Social History:      reports that she has never smoked. She has never used smokeless tobacco. She reports that she does not drink alcohol and does not use drugs. Family History:   Family History   Problem Relation Age of Onset    Depression Mother     Arthritis Father     Asthma Father     High Blood Pressure Father     High Cholesterol Father     High Blood Pressure Sister     High Cholesterol Sister     Arthritis Sister     High Blood Pressure Brother     Hearing Loss Brother     High Cholesterol Brother     Learning Disabilities Brother     Mental Illness Brother     Asthma Brother     Arthritis Brother           REVIEW OF SYSTEMS:  As above in the HPI. All other review of systems were asked and were negative except for shortness of breath with ambulation. Pt very sleepy during interview, but denies chest pain. .\Pt seen in PACU after cardiac cath    PHYSICAL EXAM:    Vitals:  T 98.1  P 66  R 18  /74  General:  Sleepy  HEENT:  Normocephalic, atraumatic. Pupils equal, round, reactive to light. No scleral icterus. No conjunctival injection. Normal lips, teeth, and gums. No nasal discharge. Neck:  Supple  Heart:  Normal s1/s2, RRR, no murmurs, gallops, or rubs. trace leg edema  Lungs:  diminished bilaterally, no wheeze, no rales, no rhonchi, no use of accessory muscles  Abd: bowel sounds present, soft, nontender, nondistended, no masses  Extrem:  No clubbing, cyanosis,  trace edema, 2+ pedal pulses, brisk capillary refill  Skin:  Warm and dry, no open lesions or rash,  normal color/perfusion  Psych:  A&O x 3, appropriate mood and affect. Easily wakens, but falls back to sleep  Neuro: grossly intact, moves all four extremities.      Breast: deferred  Rectal: deferred  Genitalia:  deferred    LABS:  Lab Results   Component Value Date    WBC 7.7 06/14/2022    RBC 3.11 06/14/2022    HGB 9.9 06/14/2022    HCT 29.3 06/14/2022    MCV 94.1 06/14/2022    MCH 31.7 06/14/2022    MCHC 33.7 06/14/2022    RDW 14.7 06/14/2022     06/14/2022    MPV 6.9 06/14/2022     Lab Results   Component Value Date     06/14/2022    K 4.8 06/14/2022    K 3.5 06/06/2022    CL 94 06/14/2022    CO2 25 06/14/2022    BUN 28 06/14/2022    CREATININE 1.1 06/14/2022    GFRAA 58 06/14/2022    GFRAA >60 06/10/2013    AGRATIO 1.9 06/06/2022    LABGLOM 48 06/14/2022    LABGLOM 51 08/20/2015    GLUCOSE 101 06/14/2022    PROT 5.2 06/06/2022    PROT 7.8 11/01/2012    LABALBU 3.4 06/06/2022    CALCIUM 8.5 06/14/2022    BILITOT 0.9 06/06/2022    ALKPHOS 48 06/06/2022    AST 19 06/06/2022    ALT 11 06/06/2022     Lab Results   Component Value Date    PROTIME 11.9 09/25/2019    PROTIME 20.4 12/29/2009    INR 1.04 09/25/2019     No results for input(s): CKTOTAL, CKMB, CKMBINDEX, TROPONINI in the last 72 hours.   Lab Results   Component Value Date    NITRU Negative 06/05/2022    COLORU Yellow 06/05/2022    PHUR 6.0 06/05/2022    LABCAST 1-3 Fine Gran. 01/12/2014    WBCUA >100 06/05/2022    RBCUA 5-10 06/05/2022    MUCUS Rare 06/05/2018    BACTERIA 4+ 06/05/2022    CLARITYU Clear 06/05/2022    SPECGRAV 1.010 06/05/2022    LEUKOCYTESUR LARGE 06/05/2022    UROBILINOGEN 0.2 06/05/2022    BILIRUBINUR Negative 06/05/2022    BILIRUBINUR neg 08/04/2016    BILIRUBINUR NEGATIVE 04/20/2011    BLOODU MODERATE 06/05/2022    GLUCOSEU Negative 06/05/2022    GLUCOSEU NEGATIVE 04/20/2011    KETUA Negative 06/05/2022     Lab Results   Component Value Date    MG 1.80 06/14/2022    PHOS 3.5 06/14/2022     Lab Results   Component Value Date    LABA1C 5.1 09/11/2019     Lab Results   Component Value Date    TSH 1.19 08/20/2015    TSH 1.67 02/06/2014    IMWTJPIW19 1178 10/22/2021    FOLATE >20.00 10/22/2021    FERRITIN 204.9 10/19/2021    IRON 65 10/19/2021    TIBC 277 10/19/2021     Lab Results   Component Value Date    TRIG 120 02/27/2017    HDL 56 02/27/2017    LDLCALC 32 02/27/2017    LABVLDL 24 02/27/2017     Lab Results   Component Value Date    LIPASE 14.0 08/09/2018     Lab Results   Component Value Date     01/12/2014     Lab Results   Component Value Date    LACTA 0.7 07/19/2013     No results found for: PHART, PH, YOS6RKV, PCO2, PO2ART, PO2, RZT7LVS, HCO3, BEART, BE, THGBART, THB, OZJ5SLH, Y6ICIEZI, O2SAT  No results found for: LABAMPH, BARBSCNU, LABBENZ, CANNAB, COCAINESCRN, LABMETH, OPIATESCREENURINE, PHENCYCLIDINESCREENURINE, PPXUR, ETOH  Lab Results   Component Value Date    DDIMER 267 01/12/2014     Lab Results   Component Value Date    VITD25 41.1 08/04/2016           RADIOLOGY:  ECHO Transesophageal    Result Date: 6/14/2022  Valdo Harper MD     6/14/2022 11:45 AM    Brief cardiology procedure report:  Full details of the procedure findings are available in CPACS. Patient: Cristiano Sumner Date: 6/14/2022 MRN: [id number] Procedure performed: Transesophageal echocardiogram Findings: Normal left ventricular function with ejection fraction estimated at about 55%. The patient has moderate mitral regurgitation, mild to moderate aortic regurgitation There is no evidence for an intracardiac shunt or intracardiac thrombus. There is no evidence for cardiac source of embolus. Severe biatrial enlargement Indication: Present on Admission:  Severe mitral regurgitation  Valvular heart disease  Heart failure due to valvular disease ([address]  Dyspnea  Long term current use of anticoagulant therapy Sedation: The procedure was completed under the direction of anesthesia service. The patient was given deep intravenous sedation. The patient was monitored throughout the course of the procedure which included measurement of the patient's blood pressure, heart rate, SpO2, and clinical condition. Procedural technique: Transesophageal probe was advanced through the oropharynx into the esophagus without difficulty. Multiple images were obtained including 2-dimensional and color Doppler. Complications: There were no procedural complications. Patient was transferred to the appropriate level of care post procedure. The findings were discussed with available patient representatives/family. All questions were answered. ECHO Complete 2D W Doppler W Color    Result Date: 6/9/2022  Transthoracic Echocardiography Report (TTE)  Demographics   Patient Name       Fito Yun   Date of Study      06/09/2022        Gender              Female   Patient Number     [id number]        Date of Birth       1940   Visit Number       [id number]         Age                 80 year(s)   Accession Number   [id number]        Room Number         0216   Corporate ID       [id number]          [address] [facility]   Ordering Physician Eliane Land MD Interpreting        Benedict Albert,                                       Physician           MD, [facility]  Procedure Type of Study   TTE procedure:ECHOCARDIOGRAM COMPLETE 2D W DOPPLER W COLOR. Procedure Date Date: 06/09/2022 Start: 02:13 PM Study Location: [address] [place] [address] - Echo Lab Technical Quality: Adequate visualization Indications:Dyspnea/SOB. Patient Status: Routine Height: 65 inches Weight: 207 pounds BSA: 2.01 m2 BMI: 34.45 kg/m2 BP: 153/76 mmHg  Conclusions   Summary  Left ventricular systolic function is normal with ejection fraction  estimated at 55 %. Inferior wall hypokinesis. All remaining wall segments appear normal in function. Left ventricular size is mildly increased. Elevated left ventricular diastolic filling pressure: Septal E/e'' = 13.7 . The left atrium is severely dilated. The right atrium is severely dilated. Mild anterior and posterior mitral annular calcification is present. Severe mitral regurgitation. Moderate aortic regurgitation is present. Severe tricuspid regurgitation.   Systolic pulmonary artery pressure (SPAP) estimated at 67 mmHg (RA pressure  15 mmHg), consistent with severe pulmonary hypertension. 7- 55-60%, DD, LAE< BRII, RVE, Asc Ao 3.8, MAC, Mod MR, AI and TR,  SPAP 53 mmHg. Signature   ------------------------------------------------------------------  Electronically signed by Zach Irving MD, [facility] (Interpreting  physician) on 06/09/2022 at 03:57 PM  ------------------------------------------------------------------   Findings   Left Ventricle  Left ventricular systolic function is normal with ejection fraction  estimated at 55 %. There is possible mild inferior wall hypokinesis. All remaining wall segments appear normal in function. Left ventricular size is mildly increased. Normal left ventricular wall thickness. Elevated left ventricular diastolic filling pressure: Septal E/e'' = 13.7 . Mitral Valve  Mild anterior and posterior mitral annular calcification is present. The mitral valve leaflets are normal in appearance and mobility. No evidence of mitral valve stenosis. Severe mitral regurgitation. Left Atrium  The left atrium is severely dilated. Aortic Valve  The aortic valve appears tricuspid . No evidence of aortic valve stenosis. Moderate aortic regurgitation is present. Aorta  The aortic root is normal in size. Right Ventricle  The right ventricle is normal in size and function. Tricuspid Valve  Tricuspid valve is structurally normal.  Tricuspid valve leaflet mobility is normal.  Severe tricuspid regurgitation. Systolic pulmonary artery pressure (SPAP) estimated at 67 mmHg (RA pressure  15 mmHg), consistent with severe pulmonary hypertension. Right Atrium  The right atrium is severely dilated. Pulmonic Valve  The pulmonic valve is normal in structure. Pericardial Effusion  No pericardial effusion noted. Pleural Effusion  No pleural effusion. Miscellaneous  IVC size is dilated (>2.1 cm) and collapses < 50% with respiration  consistent with elevated RA pressure (15 mmHg).   No obvious masses, thrombi, or vegetations are noted. M-Mode/2D Measurements (cm)   LV Diastolic Dimension: 5.3 cm LV Systolic Dimension: 6.72 cm  LV Septum Diastolic: 6.58 cm  LV PW Diastolic: 9.19 cm       AO Root Dimension: 2.9 cm                                  LA Area: 25.9 cm2                                 LA volume/Index: 121.67 ml /61 ml/m2  Doppler Measurements   AV Peak Velocity: 198 cm/s     MV Peak E-Wave: 157 cm/s  AV Peak Gradient: 15.68 mmHg  AV Mean Gradient: 7 mmHg       MV Mean Gradient: 4 mmHg  LVOT Peak Velocity: 107 cm/s   MV Max P mmHg                                 MV Vmax:547 cm/s  TR Velocity:361 cm/s           MV VTI:29.6 cm/s  TR Gradient:52.13 mmHg  Estimated RVSP: 67.13 mmHg  E' Septal Velocity: 11.5 cm/s  Area (PISA): 0.21 cm2  E' Lateral Velocity: 17.3 cm/s  E/E' ratio: 11.4  PV Peak Velocity: 138 cm/s  PV Peak Gradient: 7.62 mmHg   Aortic Valve   Peak Velocity: 198 cm/s   Mean Velocity: 125 cm/s  Peak Gradient: 15.68 mmHg Mean Gradient: 7 mmHg  AV VTI: 34.3 cm  AI P1/2t: 673 msec  Aorta   Aortic Root: 2.9 cm      XR CHEST (2 VW)    Result Date: 2022  EXAMINATION: TWO XRAY VIEWS OF THE CHEST 2022 12:54 pm COMPARISON: 2022 HISTORY: ORDERING SYSTEM PROVIDED HISTORY: SOB TECHNOLOGIST PROVIDED HISTORY: Reason for exam:->SOB Reason for Exam: short of breath FINDINGS: There is chronic scarring within the medial left lung base and lingula. There is no acute lobar consolidation, pneumothorax or effusion. The heart is enlarged with a stable appearance of pulmonary vasculature. Stable cardiomegaly. NM LUNG VENT/PERFUSION (VQ)    Result Date: 2022  EXAMINATION: NUCLEAR MEDICINE VENTILATION PERFUSION SCAN. 2022 TECHNIQUE: 9.8 millicuries of MCSIJ-460 was administered via mask prior to planar imaging of the lungs in anterior and posterior projections.   Then, 6.1 millicuries of Tc 77E MAA was administered intravenously prior to planar imaging of the lungs in multiple projections. COMPARISON: Chest radiograph 06/05/2022. HISTORY: ORDERING SYSTEM PROVIDED HISTORY: SOB, tachypnea, OLY TECHNOLOGIST PROVIDED HISTORY: Reason for exam:->SOB, tachypnea, OLY FINDINGS: VENTILATION: Xenon distributes to the lungs bilaterally. Greater activity is seen within the right lung than the left lung. Decreased activity is seen within the right apex and within portions of the left upper, mid, and lower lung zones. Bilateral xenon retention is demonstrated, left greater than right. PERFUSION: Heterogeneity of perfusion is demonstrated. Greater activity is seen within the right lung than the left lung. Decreased activity at the right apex and within portions of the left upper, mid, and lower lung zones appears largely matched to the ventilation pattern. Low probability for pulmonary embolism. Obstructive lung disease. XR SHOULDER LEFT (MIN 2 VIEWS)    Result Date: 6/6/2022  EXAMINATION: THREE XRAY VIEWS OF THE LEFT SHOULDER 6/5/2022 2:01 pm COMPARISON: None. HISTORY: ORDERING SYSTEM PROVIDED HISTORY: left shoulder pain TECHNOLOGIST PROVIDED HISTORY: Reason for exam:->left shoulder pain Reason for Exam: Lt. shoulder pain FINDINGS: Left shoulder: Subtle cortical discontinuity at the base of the greater tuberosity with mild sclerosis. Humeral head aligns normally with the glenoid fossa. Mild acromioclavicular degenerative changes. Coracoclavicular and acromioclavicular interval are within normal range. Cardiac silhouette is mildly enlarged. Opacity in the bases bilaterally, not present on recent chest radiograph, likely represent atelectasis. 1. Subtle linear discontinuity at the base of the greater tuberosity with mild sclerosis. If patient has had recent trauma, differential would include a subacute nondisplaced fracture. 2. Mild acromioclavicular degenerative changes.      XR CHEST PORTABLE    Result Date: 6/5/2022  EXAMINATION: ONE XRAY VIEW OF THE CHEST 6/5/2022 9:53 am valvular disease ([facility]  Resolved Problems:    * No resolved hospital problems. *      PLAN:    1.  Valvular heart disease - mild - will use medical management only  2. Dyspnea - ongoing - consider pulm consult if not improving - CXR shows chronic scarring  3.  afib - currently rate controlled - continue with eliquis and coreg  4. CKDII at baseline      DVT prophylaxis Yes:  eliquis  GI prophylaxis none  Antibiotic prophylaxis:  No    Code status Full code    Pt can likely be discharged with medical management tomorrow since no evidence requiring further inpatient stay has been found.     Please note that over 50 minutes was spent in evaluating the patient, review of records and results, discussion with staff/family, etc.    Electronically signed by Gil Tapia MD on 6/14/2022 at 1:01 PM

## 2022-06-14 NOTE — PLAN OF CARE
Problem: Discharge Planning  Goal: Discharge to home or other facility with appropriate resources  6/14/2022 0818 by Monse Dhillon RN  Outcome: Completed  6/13/2022 2235 by Lindsey Davis RN  Outcome: Progressing     Problem: Safety - Adult  Goal: Free from fall injury  6/14/2022 0818 by Monse Dhillon RN  Outcome: Completed  6/13/2022 2235 by Lindsey Davis RN  Outcome: Progressing     Problem: ABCDS Injury Assessment  Goal: Absence of physical injury  6/14/2022 0818 by Monse Dhillon RN  Outcome: Completed  6/13/2022 2235 by Lindsey Davis RN  Outcome: Progressing     Problem: Chronic Conditions and Co-morbidities  Goal: Patient's chronic conditions and co-morbidity symptoms are monitored and maintained or improved  6/14/2022 0818 by Monse Dhillon RN  Outcome: Completed  6/13/2022 2235 by Lindsey Davis RN  Outcome: Progressing     Problem: Skin/Tissue Integrity  Goal: Absence of new skin breakdown  6/14/2022 0818 by Monse Dhillon RN  Outcome: Completed  6/13/2022 2235 by Lindsey Davis RN  Outcome: Progressing     Problem: Pain  Goal: Verbalizes/displays adequate comfort level or baseline comfort level  6/14/2022 0818 by Monse Dhillon RN  Outcome: Completed  6/13/2022 2235 by Lindsey Davis RN  Outcome: Progressing

## 2022-06-14 NOTE — PROCEDURES
[address]       Cardiac Catheterization Lab Report    PATIENT: Angel Ferrari  DATE: 2022  MRN: [id number]  CSN: [id number]  : 1940      Performing Physician: Dianne Haley MD, [address], [facility]  Primary Care Physician: Celina Hernandes MD  Admitting Provider: Behzad López MD    Procedures Performed:   1. Left heart catheterization  2. Selective left and right coronary angiogram  3. Left ventriculography     Procedure Findings:  1. Mild non-obstructive coronary artery disease. 2. Normal left ventricular function with EF estimated at 55-60%  3. 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 ([address]    Severe mitral regurgitation    Severe tricuspid regurgitation    Atrial fibrillation ([address]    Long term current use of anticoagulant therapy    Seborrheic keratosis    Actinic keratoses    Cough due to ACE inhibitor    Rotator cuff (capsule) sprain    Primary localized osteoarthrosis, lower leg    Chronic kidney disease, stage III (moderate)    Essential hypertension    Localized osteoarthritis of right knee    Primary localized osteoarthritis of right knee    Mixed hyperlipidemia    Atrial fibrillation, chronic (HCC)    Chronic ischemic heart disease    Gout    Primary localized osteoarthritis of left knee    Status post total left knee replacement    Chronic diastolic congestive heart failure (HCC)    Chronic renal disease, stage III ([address] [251703]    OLY (acute kidney injury) ([address]    Hypotension    Atrial fibrillation, controlled ([address]    Hypomagnesemia    Arthritis of left shoulder region    Dyspnea    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  If the patient was able to provide written consent, it was obtained. The patient's vitals were monitored through out the procedure. The patient was sedated using the appropriate levels of sedation and ASA guidelines. The appropriate access site area was prepped and drapped in a sterile fashion. The area was anesthetized with 2% lidocaine. Using the modified Seldinger technique, an arterial sheath was introduced into the arterial access site using a single anterior wall puncture. The sheath was flushed and prepped in usual fashion. Catheters used during the procedure included 5 [place] TIG 4.0 catheter. The catheters were advanced and removed over a .035\" wire, into the appropriate positions. Multiple angiographic views were obtained. An LV gram was obtained. Findings:    1. Left main coronary artery was normal. It gave off the left anterior descending artery and left circumflex. 2. Left anterior descending artery has mild atherosclerotic disease. It was moderate in size. It gave off septal perforators and a moderate sized diagonal branch. The LAD covered the entire apex of the left ventricle. 3. Left circumflex has mild atherosclerotic disease. It was moderate in size. There was a moderate sized obtuse marginal branch. 4. Right coronary artery has mild atherosclerotic disease. It was moderate in size and was the dominant artery. 5. Left ventriculogram showed normal LVEF at 55-60%. Wall motion was grossly normal.  Unable to fully evaluate mitral regurgitation based upon the study. There were no vitals taken for this visit. The access site was controlled with manual pressure and/or appropriate closure device. Moderate Conscious Sedation Details: An independent trained observer pushed medications at my direction. We monitoring the patient's level of consciousness and vital signs/physiologic status throughout the procedure.   CPT codes 10672 and 16707      Start time: 5369  Stop time: 1122  ASA class: 3    Sedation totals:  Versad - 0.5mg  Fentanyl - 25mcg    EBL - minimal <5 cc blood loss    The patient was monitored continuously with the ECG, pulse oximetry, blood pressure, and direct observation. CONCLUSIONS:    1. Mild non-obstructive coronary artery disease with preserved left ventricular function    ASSESSMENT/RECOMMENDATIONS:    1.   Medical management of the patient's underlying coronary disease and valvular disorders seen on transesophageal echocardiogram.      Francoise Edmond MD, BRENT, [facility], [address] [place] [address]  [phone] Main central office  [phone] [facility] office  6/14/2022  11:45 AM

## 2022-06-14 NOTE — PROGRESS NOTES
Patient admitted to room 219 from cath lab. Patient oriented to room, call light, bed rails, phone, lights and bathroom. Patient instructed about the schedule of the day including: vital sign frequency, lab draws, possible tests, frequency of MD and staff rounds, including RN/MD rounding together at bedside, daily weights, and I &O's. Patient instructed about prescribed diet, how to use 14315, and television. Bed alarm in place, patient aware of placement and reason. Telemetry box in place, patient aware of placement and reason. Bed locked, in lowest position, side rails up 2/4, call light within reach. Will continue to monitor.

## 2022-06-14 NOTE — PROCEDURES
Brief cardiology procedure report:   Full details of the procedure findings are available in [address]. Patient: Bertha Proctor  Date: 6/14/2022  MRN: [id number]        Procedure performed: Transesophageal echocardiogram    Findings:  Normal left ventricular function with ejection fraction estimated at about 55%. The patient has moderate mitral regurgitation, mild to moderate aortic regurgitation  There is no evidence for an intracardiac shunt or intracardiac thrombus. There is no evidence for cardiac source of embolus. 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 The patient was given deep intravenous sedation. The patient was monitored throughout the course of the procedure which included measurement of the patient's blood pressure, heart rate, SpO2, and clinical condition. Procedural technique: Transesophageal probe was advanced through the oropharynx into the esophagus without difficulty. Multiple images were obtained including 2-dimensional and color Doppler. Complications: There were no procedural complications. Patient was transferred to the appropriate level of care post procedure. The findings were discussed with available patient representatives/family. All questions were answered.

## 2022-06-14 NOTE — PROGRESS NOTES
Shift assessment complete. See doc flow. Nightly medications given, Eliquis held see MAR. A/O x4. Patient with no complaints at this time. Patient NPO at midnight. Patient being transferred to [facility] tomorrow morning for a [facility] and heart Cath. Bed alarm in place. Call light and bedside table within easy reach.

## 2022-06-14 NOTE — POST SEDATION
Patient:  Angel Ferrari   :   1940    A pre-sedation re-evaluation was performed immediately at the end of the procedure. Procedure:  Cardiac cath  Medications: Procedural sedation with minimal conscious sedation  Complications: None  Estimated Blood Loss: none  Specimens: Were not obtained        [place] Medication and Procedural Reconciliation:  I agree that the documented medications and procedures performed are true. The medications were given under my order. The procedures were performed under my direct supervision.

## 2022-06-14 NOTE — DISCHARGE SUMMARY
Name:  Richard Christopher  Room:  0216/0216-01  MRN:    [id number]    Discharge Summary      This discharge summary is in conjunction with a complete physical exam done on the day of discharge. Attending Physician: Dr. Tobi Perez  Discharging Physician: Dr. Carolin Greco: 6/5/2022  Discharge:  6/14/2022    HPI:    The patient is a 80 y.o. female PMH HTN, HLD, hyperthyroidism, afib on eliquis, CKD, CVA pulmonary nodules, moderate AR and TR, chronic diastolic CHF and bilateral knee OA who presents to [facility] with hypotension. History obtained from the patient and review of EMR. Patient states that she was feeling very weak yesterday. No true dizziness or syncope. She checked her blood pressures and systolics were around 850-491. This is low for her and she came to the emergency department. She has had some chills but no fevers. No nausea or vomiting ;  describes chronic dyspnea. She also complains of chronic left shoulder pain ongoing for years     In ED patient pt noted with OLY- creatinine 1.7, hypomagnesemia of 1.10. Pt was given 2 liter bolus in ED. Magnesium replaced. Troponin <0.01. After patient admitted she complained of shortness of breath and worsening left shoulder pain. X-ray obtained and showed subtle linear discontinuity at the base of the greater tuberosity with mild sclerosis. If the patient had recent trauma, which she denied in ED, differential would include subacute nondisplaced fracture. Mild acromioclavicular degenerative changes noted as well. VQ scan was ordered in the ED and patient was admitted for further care. Diagnoses this Admission and Hospital Course   Hypotension  History of hypertension  -Patient presented with weakness and hypotension . s/p IV fluid bolus in the ED, continued on IV fluids on admission for a couple of days. Hypotension resolved. She has been off IV fluids since 6/8. -   Blood pressure starting to trend up.   Resumed on Lasix and Cardura on 6/8.  Continued on Coreg. Continued to hold Hyzaar. Monitored blood pressure and renal function closely.   -Blood pressure and heart rate stable today. Renal function stable today. Continued Lasix, Cardura and Coreg. Hypomagnesemia  Hypokalemia  Hypophosphatemia  Being repleted      OLY on CKD  -Likely prerenal , patient appeared dehydrated on admission . given IV fluids . - crtn at baseline 0.9-1.1. crtn  on admission 1.7-> 2.0-> 2.1. Hydrated for a couple of days   > creatinine is down to 1.7-> 1.4-> 1.3. Resumed on Lasix now,  creatinine stable. - Continued to hold Hyzaar. Renal function stable and improving. OLY resolved     Dyspnea  -Likely from valvular heart disease, and decompensated CHF. See below.  -Possible bronchitis. Patient is starting to cough up some sputum , finished  antibiotics Rocephin.  -Oxygen saturation stable on room air  -VQ scan was negative. -Chest x-ray with stable cardiomegaly, no infiltrates  -She had some wheezing-continue inhaled bronchodilators and Mucinex . -  We tried giving her Zithromax yesterday but she had an allergic response to this with a local itching and rash-Zithromax has been discontinued.         Acute on Chronic diastolic CHF  Valvular heart disease . Echocardiogram repeated this admission shows worsening regurgitation . She now has severe MR and TR , moderate AR . She had moderate pulmonary hypertension in 2021 and now has severe pulmonary hypertension . EF normal.  Inferior wall hypokinesis  - follows with Dr. Starla Decker at [facility] cardiology  - pt is on Lasix 20 mg BID--held Lasix on admission due to OLY and dehydration. Oral Lasix has been resumed now. Patient still very symptomatic, proBNP elevated at 10,000. given IV Lasix  Daily  For 2 days   Monitoring symptoms and BNP. BNP 10,000-> 14,000--> 8157 now.    -Monitor daily weights  weight was trending up 208-> 217-> IV Lasix given   weight is down to ->  215-> 211 pounds today. - continue daily weights, low sodium diet, strict I/O.   -->    cardiologist has discussed with pt.   plan is for [facility]/ left and right heart cath to further evaluate manage valvular heart disease      Left shoulder pain  - Xray showed subtle linear discontinuity at the base of the greater tuberosity with mild sclerosis- if patient has had recent trauma, differential would include a subacute nondisplaced fracture. Mild acromioclavicular degenerative changes   - no h/O trauma. Pain is chronic, over years   Left shoulder pain is chronic related to osteoarthritis     UTI  -Urine cultures growing E. Coli. Continued Rocephin     Atrial fibrillation  - EKG with controlled rate Afib on admission   - on eliquis  (held for procedure)  - continued coreg and monitor blood pressure      HLD  - Continued statin       Hx Hyperthyroidism     Hx of CVA- 2007  - continued statin   Held Eliquis        DVT Prophylaxis: eliquis held today for procedure tomorrow  Diet NPO  Code Status: Full Code.        Transferred to [facility] today for further evaluation of her valvular heart disease    Procedures (Please Review Full Report for Details)  N/A    Consults    Cardiology       Physical Exam at Discharge:    /68   Pulse 68   Temp 97.7 °F (36.5 °C) (Oral)   Resp 16   Ht 5' 5\" (1.651 m)   Wt 211 lb 8 oz (95.9 kg)   SpO2 95%   BMI 35.20 kg/m²     General:  Awake, alert, NAD  Patient appears ill and fatigued  Skin:  Warm and dry  Neck:  JVD absent. Neck supple  Chest: Diminished breath sounds. No wheezes or crackles  Cardiovascular: irregular,S1S2 normal  Abdomen:  Soft, non tender, non distended, BS +  Extremities:  No edema. Intact peripheral pulses.  Brisk cap refill, < 2 secs  Neuro: non focal    CBC: Recent Labs     06/12/22  0531 06/13/22  0539 06/14/22  0614   WBC 9.1 8.7 7.7   HGB 9.8* 9.8* 9.9*   HCT 29.0* 28.5* 29.3*   MCV 93.9 93.9 94.1    217 234     BMP:   Recent Labs     06/12/22  0531 06/13/22  0539 06/14/22  0614   * 133* 129*   K 4.3 4.4 4.8   CL 96* 97* 94*   CO2 26 23 25   PHOS 3.7 3.8 3.5   BUN 35* 30* 28*   CREATININE 1.3* 1.1 1.1       U/A:    Lab Results   Component Value Date    NITRITE positive 02/27/2017    COLORU Yellow 06/05/2022    WBCUA >100 06/05/2022    RBCUA 5-10 06/05/2022    MUCUS Rare 06/05/2018    BACTERIA 4+ 06/05/2022    CLARITYU Clear 06/05/2022    SPECGRAV 1.010 06/05/2022    LEUKOCYTESUR LARGE 06/05/2022    BLOODU MODERATE 06/05/2022    GLUCOSEU Negative 06/05/2022    GLUCOSEU NEGATIVE 04/20/2011         CULTURES   SARS-CoV-2 RNA, RT PCR NOT DETECTED        INFLUENZA A NOT DETECTED     INFLUENZA B NOT DETECTED            RADIOLOGY  XR CHEST (2 VW)   Final Result   Stable cardiomegaly. NM LUNG VENT/PERFUSION (VQ)   Final Result   Low probability for pulmonary embolism. Obstructive lung disease. XR SHOULDER LEFT (MIN 2 VIEWS)   Final Result   1. Subtle linear discontinuity at the base of the greater tuberosity with   mild sclerosis. If patient has had recent trauma, differential would include   a subacute nondisplaced fracture. 2. Mild acromioclavicular degenerative changes. XR CHEST PORTABLE   Final Result   No acute cardiopulmonary disease.              ECHO   Summary   Left ventricular systolic function is normal with ejection fraction   estimated at 55 %. Inferior wall hypokinesis.   All remaining wall segments appear normal in function.   Left ventricular size is mildly increased.   Elevated left ventricular diastolic filling pressure: Septal E/e'' = 13.7 .   The left atrium is severely dilated.   The right atrium is severely dilated.   Mild anterior and posterior mitral annular calcification is present.   Severe mitral regurgitation.   Moderate aortic regurgitation is present.   Severe tricuspid regurgitation.   Systolic pulmonary artery pressure (SPAP) estimated at 67 mmHg (RA pressure   15 mmHg), consistent with severe pulmonary hypertension.   7- 55-60%, DD, LAE< BRII, RVE, Asc Ao 3.8, MAC, Mod MR, AI and TR,   SPAP 53 mmHg. Discharge Medications     Medication List      ASK your doctor about these medications    acetaminophen 325 MG tablet  Commonly known as: TYLENOL     allopurinol 100 MG tablet  Commonly known as: ZYLOPRIM  TAKE 1 TABLET TWICE A DAY  Ask about: Which instructions should I use? apixaban 5 MG Tabs tablet  Commonly known as: Eliquis  TAKE 1 TABLET TWICE A DAY  Ask about: Which instructions should I use? atorvastatin 20 MG tablet  Commonly known as: LIPITOR  TAKE 1 TABLET NIGHTLY     carvedilol 12.5 MG tablet  Commonly known as: COREG  TAKE 2 TABLETS TWICE A DAY WITH MEALS     doxazosin 4 MG tablet  Commonly known as: CARDURA  TAKE 1 TABLET BY MOUTH EVERY DAY     furosemide 20 MG tablet  Commonly known as: LASIX  TAKE 1 TABLET BY MOUTH 2 TIMES DAILY AS NEEDED (SWELLING)     losartan-hydroCHLOROthiazide 100-25 MG per tablet  Commonly known as: HYZAAR  TAKE 1 TABLET DAILY  Ask about: Which instructions should I use? therapeutic multivitamin-minerals tablet     traZODone 50 MG tablet  Commonly known as: DESYREL  TAKE 1 TABLET NIGHTLY  Ask about: Which instructions should I use? Vitamin D3 50 MCG (2000 UT) Caps           Where to Get Your Medications      These medications were sent to [address] [place] [address]    Phone: [phone]   · allopurinol 100 MG tablet  · apixaban 5 MG Tabs tablet  · losartan-hydroCHLOROthiazide 100-25 MG per tablet  · traZODone 50 MG tablet           Discharged in stable condition to [facility]    CECILIO Bonilla.

## 2022-06-14 NOTE — CONSULTS
[address] [facility]   Cardiovascular Evaluation    PATIENT: Edison Klinefelter  DATE: 2022  MRN: [id number]  CSN: [id number]  : 1940    Primary Care Doctor/Referring provider: Humberto Gore MD, Marialuisa Mathews MD     Reason for evaluation/Chief complaint:   Severe mitral valvular regurgitation evaluation. Subjective:    History of present illness on initial date of evaluation:   Edison Klinefelter is a 80 y.o. patient who presents as a referral from [facility] due to concerns for possible severe mitral valvular regurgitation. Patient was admitted to the hospital [facility] with a several week history of progressive decline and weakness. She was initially admitted with hypotension attributed possible to infectious issues. Her hospital course was complicated by the diagnosis of worsening to regurgitation and concerns for possible treatment needs. Patient was referred to [facility] for interventional cardiology consultation regarding candidacy for mitral valve evaluation including transesophageal echocardiogram, left heart catheterization and right heart catheterization. Patient was seen this morning with daughter at bedside. Patient is very weak and unable to give detail information regarding history of present illness. Daughter is at bedside and states that her mom has not been doing well. They were told that she might need mitral valve surgery and that is why they are here at [facility] today. Patient has poor functional capacity does not appear to be able to do activities of daily living at this time. She is significant fatigue and frailty. She denies any chest pain. .        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 ([address]    Severe mitral regurgitation    Severe tricuspid regurgitation    Atrial fibrillation ([address]    Long term current use of anticoagulant therapy    Seborrheic keratosis    Actinic keratoses    Cough due to ACE inhibitor    Rotator cuff (capsule) sprain    Primary localized osteoarthrosis, lower leg    Chronic kidney disease, stage III (moderate)    Essential hypertension    Localized osteoarthritis of right knee    Primary localized osteoarthritis of right knee    Mixed hyperlipidemia    Atrial fibrillation, chronic (HCC)    Chronic ischemic heart disease    Gout    Primary localized osteoarthritis of left knee    Status post total left knee replacement    Chronic diastolic congestive heart failure (HCC)    Chronic renal disease, stage III ([facility]) [095893]    OLY (acute kidney injury) ([facility])    Hypotension    Atrial fibrillation, controlled ([facility])    Hypomagnesemia    Arthritis of left shoulder region    Dyspnea    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 EKG:  ECHO:   STRESS TEST:  CATH:  BYPASS:  VASCULAR:    Past Medical History:   has a past medical history of Arthritis, Atrial fibrillation ([address] CAD (coronary artery disease), Cerebral artery occlusion with cerebral infarction ([address] CHF (congestive heart failure) ([address] CKD (chronic kidney disease), Cutaneous skin tags, Depression, Gout, Grief reaction, Hyperlipidemia, Hypertension, Obesity, Osteoarthritis, Pulmonary nodules, and Unspecified cerebral artery occlusion with cerebral infarction. Surgical History:   has a past surgical history that includes Carpal tunnel release (Bilateral); Knee arthroscopy (Right); Carpal tunnel release; Knee arthroscopy; Cataract removal; other surgical history (Left, 02/27/2013); Cholecystectomy; other surgical history (Bilateral, 2/24/14); pr total knee arthroplasty (Right, 8/1/2018); joint replacement (Right); eye surgery; and Total knee arthroplasty (Left, 9/25/2019). Social History:   reports that she has never smoked.  She has never used smokeless tobacco. She reports that she does not drink alcohol and does not use drugs. Family History:  No evidence for sudden cardiac death or premature CAD    Medications:  Reviewed and are listed in nursing record. and/or listed below  Outpatient Medications:  Prior to Admission medications    Medication Sig Start Date End Date Taking? Authorizing Provider   losartan-hydroCHLOROthiazide (HYZAAR) 100-25 MG per tablet TAKE 1 TABLET DAILY 6/13/22   Mela Slade MD   traZODone (DESYREL) 50 MG tablet TAKE 1 TABLET NIGHTLY 6/6/22   Mela Slade MD   allopurinol (ZYLOPRIM) 100 MG tablet TAKE 1 TABLET TWICE A DAY 6/6/22   Mela Slade MD   apixaban (ELIQUIS) 5 MG TABS tablet TAKE 1 TABLET TWICE A DAY 6/6/22   Mela Slade MD   Cholecalciferol (VITAMIN D3) 50 MCG (2000 UT) CAPS Take 2,000 Units by mouth daily    Historical Provider, MD   furosemide (LASIX) 20 MG tablet TAKE 1 TABLET BY MOUTH 2 TIMES DAILY AS NEEDED (SWELLING) 5/12/22   Mela Slade MD   carvedilol (COREG) 12.5 MG tablet TAKE 2 TABLETS TWICE A DAY WITH MEALS  Patient taking differently: 6.25 mg 2 times daily (with meals) TAKE 2 TABLETS TWICE A DAY WITH MEALS 4/19/22   Mela Slade MD   doxazosin (CARDURA) 4 MG tablet TAKE 1 TABLET BY MOUTH EVERY DAY  Patient taking differently: 2 mg 2 times daily Takes half a pill in the AM and half in the evening 1/31/22   Mela Slade MD   atorvastatin (LIPITOR) 20 MG tablet TAKE 1 TABLET NIGHTLY 12/16/21   Mela Slade MD   Multiple Vitamins-Minerals (THERAPEUTIC MULTIVITAMIN-MINERALS) tablet Take 1 tablet by mouth daily    Historical Provider, MD   acetaminophen (TYLENOL) 325 MG tablet Take 650 mg by mouth every 6 hours as needed for Pain    Historical Provider, MD       In-patient schedule medications:        Infusion Medications:         Allergies:  Zithromax [azithromycin], Hydrocodone, Hydrocodone bitartrate, Lisinopril, Hydrocodone, and Naproxen Review of Systems:   14 point review of systems unable to be completed due to patient condition/cooperation. All available positives mentioned in history of present illness. Physical Examination:    [unfilled]  There were no vitals taken for this visit. Wt Readings from Last 3 Encounters:   06/12/22 211 lb 8 oz (95.9 kg)   04/28/22 203 lb (92.1 kg)   10/19/21 202 lb (91.6 kg)     No intake or output data in the 24 hours ending 06/14/22 1047    General Appearance:  Alert, cooperative, no distress, appears stated age   Head:  Normocephalic, without obvious abnormality, atraumatic   Eyes:  PERRL, conjunctiva/corneas clear       Nose: Nares normal, no drainage or sinus tenderness   Throat: Lips, mucosa, and tongue normal   Neck: Supple, symmetrical, trachea midline, no adenopathy, thyroid: not enlarged, symmetric, no tenderness/mass/nodules, no carotid bruit or JVD       Lungs:   Reduced to auscultation bilaterally, respirations unlabored   Chest Wall:  No tenderness or deformity   Heart:  irregular rhythm and normal rate; 2/6 systolic murmur   Abdomen:   Soft, non-tender, bowel sounds active all four quadrants,  no masses, no organomegaly           Extremities: Extremities normal, atraumatic, no cyanosis or edema   Pulses: 2+ and symmetric   Skin: Skin color, texture, turgor normal, no rashes or lesions   Pysch: Fatigued mood and affect   Neurologic: Normal gross motor and sensory exam.  Appears to be some degree of delirium or dementia. Poor memory and recall. Labs  Recent Labs     06/13/22  0539 06/14/22  0614   WBC 8.7 7.7   HGB 9.8* 9.9*   HCT 28.5* 29.3*   MCV 93.9 94.1    234     Recent Labs     06/13/22  0539 06/14/22  0614   CREATININE 1.1 1.1   BUN 30* 28*   * 129*   K 4.4 4.8   CL 97* 94*   CO2 23 25     No results for input(s): INR, PROTIME in the last 72 hours. No results for input(s): TROPONINI in the last 72 hours.   Invalid input(s): PRO-BNP  No results for input(s): CHOL, LDL, HDL in the last 72 hours. Invalid input(s): TG      Imaging:  I have reviewed the below testing personally and my interpretation is below. EKG:   Atrial fibrillationBaseline artifactLow voltage QRSCannot rule out Anterior infarct , age undeterminedAbnormal ECGNo significant change was foundWhen compared with ECG of6.11.20Confirmed by Landy Navarrete MD, [address] (1986) on 6/5/2022 8:54:02 PM    CXR:      Assessment:  80 y.o. 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 *      Problem List Items Addressed This Visit     None          Plan:  1. I had the opportunity to review the Crissy Good clinical presentation and the available pertinent data. With the patient's clinical risk factors and symptoms, there is a high pretest likelihood for CAD. I have asked Crissy Good to undergo cardiac angiography for further diagnostic testing. The procedure was explained in depth. All questions and alternate treatment strategies were discussed. The patient agrees to proceed. They understand all the risks associated with the procedure, including myocardial infarction, stroke, death, vascular complications, and the possible need for emergent surgery. 2. Keep NPO   3. Pre-cath orders will be placed. 4. AC has been held for 48 hours  5. Renal function stable  6. Will likely need admission to hospital post procedure. Medical Decision Making:   The following items were considered in medical decision making:  Independent review of images  Review / order clinical lab tests  Review / order radiology tests  Decision to obtain old records  Review and summation of old records as accessed through [facility] (a summary of my findings in these old records)      Time Based Itemization  A total of 90 was spent on today's patient encounter. If applicable, non-patient-facing activities:  (X)Preparing to see the patient and reviewing records  (X) Individual interpretation of results  (X) Discussion or coordination of care with other health care professionals  (X) Ordering of unique tests, medications, or procedures  (X) Documentation within the EHR       All questions and concerns were addressed to the patient/family. Alternatives to my treatment were discussed. The note was completed using EMR. Every effort was made to ensure accuracy; however, inadvertent computerized transcription errors may be present.     Eugenia Luz MD, [address], [place], [place]  [phone] [facility]  [phone] [facility]  6/14/2022  10:47 AM c/o weakness and decreased appetite +nasal congestion

## 2022-06-14 NOTE — PRE SEDATION
Brief Pre-Op Note/Sedation Assessment      Adán Sumner  1940  [id number]  10:54 AM    Planned Procedure: Cardiac Catheterization Procedure  Post Procedure Plan: Return to same level of care  Consent: I have discussed with the patient and/or the patient representative the indication, alternatives, and the possible risks and/or complications of the planned procedure and the anesthesia methods. The patient and/or patient representative appear to understand and agree to proceed. Chief Complaint:   Dyspnea on Exertion  Dyspnea      Indications for Cath Procedure:  1. Presentation:  Valvular Disease - Mitral Regurgitation; Regurgitation Severity: Severe (4+)  2. Anginal Classification within 2 weeks:  No symptoms  3. Angina Symptoms Assessment:  Asymptomatic  4. Heart Failure Class within last 2 weeks:  Yes:  Heart Failure Type: Diastolic Severity:  Class IV - Symptoms of HF at rest  5. Cardiovascular Instability:  Yes:  Decompensating heart failure    Prior Ischemic Workup/Eval:  1. Pre-Procedural Medications: Yes: Beta Blockers and STATIN  2. Stress Test Completed? No    Does Patient need surgery? Cath Valve Surgery:  Yes:  Mitral Regurgitation; Regurgitation Severity: Severe (4+) High Risk: Vascular < 4 METS    Pre-Procedure Medical History:  Vital Signs: There were no vitals taken for this visit. Allergies: Allergies   Allergen Reactions    Zithromax [Azithromycin] Rash     Pt was started on IV Zithromax and 25 min into infusion started itching and had a rash on her arm.     Hydrocodone Hives    Hydrocodone Bitartrate Hives    Lisinopril Other (See Comments)     Cough    Hydrocodone Rash    Naproxen Rash     Medications:    Current Facility-Administered Medications   Medication Dose Route Frequency Provider Last Rate Last Admin    sodium chloride flush 0.9 % injection 5-40 mL  5-40 mL IntraVENous 2 times per day Charity Olvera MD        sodium chloride flush 0.9 % injection 5-40 mL  5-40 mL IntraVENous PRN Lea Fernandez MD        0.9 % sodium chloride infusion  25 mL IntraVENous PRN Lea Fernandez MD        meperidine (DEMEROL) injection 12.5 mg  12.5 mg IntraVENous Q5 Min PRN Lea Fernandez MD        oxyCODONE (ROXICODONE) immediate release tablet 5 mg  5 mg Oral PRN Lea Fernandez MD        Or    oxyCODONE (ROXICODONE) immediate release tablet 10 mg  10 mg Oral PRN Lea Fernandez MD        ondansetron [facility]) injection 4 mg  4 mg IntraVENous Once PRN Lea Fernandez MD        0.9 % sodium chloride infusion   IntraVENous PRN Alejandra Shay MD        sodium chloride flush 0.9 % injection 5-40 mL  5-40 mL IntraVENous 2 times per day Alejandra Shay MD        sodium chloride flush 0.9 % injection 5-40 mL  5-40 mL IntraVENous PRN Alejandra Shay MD        0.9 % sodium chloride infusion   IntraVENous PRN Alejandra Shay MD        acetaminophen (TYLENOL) tablet 650 mg  650 mg Oral Q6H PRN Alejandra Shay MD        Or   [facility] acetaminophen (TYLENOL) suppository 650 mg  650 mg Rectal Q6H PRN Alejandra Shay MD        ondansetron (ZOFRAN-ODT) disintegrating tablet 4 mg  4 mg Oral Q8H PRN Alejandra Shay MD        Or    ondansetron [facility]) injection 4 mg  4 mg IntraVENous Q6H PRN Alejandra Shay MD        polyethylene glycol (GLYCOLAX) packet 17 g  17 g Oral Daily PRN Alejandra Shay MD        sodium chloride flush 0.9 % injection 5-40 mL  5-40 mL IntraVENous 2 times per day Alejandra Shay MD        sodium chloride flush 0.9 % injection 5-40 mL  5-40 mL IntraVENous PRN Alejandra Shay MD        albuterol sulfate HFA (PROVENTIL;VENTOLIN;PROAIR) 108 (90 Base) MCG/ACT inhaler 2 puff  2 puff Inhalation TID Alejandra Shay MD        allopurinol (ZYLOPRIM) tablet 100 mg  100 mg Oral BID Alejandra Shay MD        apixaban Kathia Garcia) tablet 2.5 mg  2.5 mg Oral BID Alejandra Shay MD  atorvastatin (LIPITOR) tablet 20 mg  20 mg Oral Nightly Cathy Keyes MD        carvedilol (COREG) tablet 12.5 mg  12.5 mg Oral BID  Cathy eKyes MD        diphenhydrAMINE (BENADRYL) tablet 25 mg  25 mg Oral Q6H PRN Cathy Keyes MD        doxazosin (CARDURA) tablet 2 mg  2 mg Oral BID Cathy Keyes MD        furosemide (LASIX) tablet 20 mg  20 mg Oral BID Cathy Keyes MD        guaiFENesin [facility]) extended release tablet 600 mg  600 mg Oral BID Cathy Keyes MD        ipratropium-albuterol (DUONEB) nebulizer solution 1 ampule  1 ampule Inhalation Q4H PRN Cathy Keyes MD        potassium & sodium phosphates (PHOS-NAK) 280-160-250 MG packet 250 mg  1 packet Oral Daily Cathy Keyes MD        potassium chloride (KLOR-CON M) extended release tablet 20 mEq  20 mEq Oral BID  Cathy Keyes MD        therapeutic multivitamin-minerals 1 tablet  1 tablet Oral Daily Cathy Keyes MD        traZODone (DESYREL) tablet 50 mg  50 mg Oral Nightly PRN Cathy Keyes MD        Vitamin D (CHOLECALCIFEROL) tablet 2,000 Units  2,000 Units Oral Daily Cathy Keyes MD           Past Medical History:    Past Medical History:   Diagnosis Date    Arthritis     [facility]     CAD (coronary artery disease)     a-fib    Cerebral artery occlusion with cerebral infarction ([facility] 2007    resolved w/ rehab    CHF (congestive heart failure) ([facility]     CKD (chronic kidney disease)     Cutaneous skin tags 4/8/2013    Depression     Gout     Grief reaction 4/8/2013    Hyperlipidemia     Hypertension     Obesity     Osteoarthritis     Pulmonary nodules     Unspecified cerebral artery occlusion with cerebral infarction 01/2007       Surgical History:    Past Surgical History:   Procedure Laterality Date    CARPAL TUNNEL RELEASE Bilateral     CARPAL TUNNEL RELEASE      CATARACT REMOVAL      both    CHOLECYSTECTOMY      EYE SURGERY      JOINT REPLACEMENT Right     knee    KNEE ARTHROSCOPY Right     fluid removal    KNEE ARTHROSCOPY      RIGHT    OTHER SURGICAL HISTORY Left 02/27/2013    Left myringotomy, left PE tube insertion    OTHER SURGICAL HISTORY Bilateral 2/24/14    EXCISION OF RT NECK AND LEFT TEMPLE LESIONS    MD TOTAL KNEE ARTHROPLASTY Right 8/1/2018    RIGHT TOTAL KNEE MAKOPLASTY WITH PLATELET RICH PLASMA, ADDUCTOR CANAL BLOCK FOR PAIN CONTROL                    MAKOPLASTY CHEPE  CPT CODE - 66731 performed by Melva Raza MD at [address] [facility] Left 9/25/2019    LEFT TOTAL KNEE MAKOPLASTY WITH ADDUCTOR CANAL BLOCK FOR PAIN CONTROL             CHEPE RAMÓN performed by Melva Raza MD at [facility] [place] [place]  Pre-Sedation Documentation and Exam:  I have assessed the patient and reviewed the H&P on the chart. Prior History of Anesthesia Complications:   none    Modified Mallampati:  II (soft palate, uvula, fauces visible)    ASA Classification:  Class 3 - A patient with severe systemic disease that limits activity but is not incapacitating    Syd Scale: Activity:  2 - Able to move 4 extremities voluntarily on command  Respiration:  1 - Dyspnic, shallow, or limited breathing  Circulation:  2 - BP+/- 20mmHg of normal  Consciousness:  1 - Arousable on calling  Oxygen Saturation (color):  1 - Needs oxygen to maintain oxygen saturation >90%    Sedation/Anesthesia Plan:  Guard the patient's safety and welfare. Minimize physical discomfort and pain. Minimize negative psychological responses to treatment by providing sedation and analgesia and maximize the potential amnesia. Patient to meet pre-procedure discharge plan.     Medication Planned:  midazolam intravenously and fentanyl intravenously    Patient is an appropriate candidate for plan of sedation:   yes      Electronically signed by Dianne Haley MD on 6/14/2022 at 10:54 AM

## 2022-06-15 VITALS
RESPIRATION RATE: 18 BRPM | SYSTOLIC BLOOD PRESSURE: 151 MMHG | BODY MASS INDEX: 35.54 KG/M2 | HEART RATE: 59 BPM | WEIGHT: 213.6 LBS | DIASTOLIC BLOOD PRESSURE: 82 MMHG | TEMPERATURE: 98.4 F | OXYGEN SATURATION: 97 %

## 2022-06-15 LAB
ANION GAP SERPL CALCULATED.3IONS-SCNC: 9 MMOL/L (ref 3–16)
BUN BLDV-MCNC: 24 MG/DL (ref 7–20)
CALCIUM SERPL-MCNC: 9 MG/DL (ref 8.3–10.6)
CHLORIDE BLD-SCNC: 98 MMOL/L (ref 99–110)
CO2: 25 MMOL/L (ref 21–32)
CREAT SERPL-MCNC: 1.2 MG/DL (ref 0.6–1.2)
GFR AFRICAN AMERICAN: 52
GFR NON-AFRICAN AMERICAN: 43
GLUCOSE BLD-MCNC: 106 MG/DL (ref 70–99)
HCT VFR BLD CALC: 30 % (ref 36–48)
HEMOGLOBIN: 10.1 G/DL (ref 12–16)
MAGNESIUM: 1.5 MG/DL (ref 1.8–2.4)
MCH RBC QN AUTO: 32.2 PG (ref 26–34)
MCHC RBC AUTO-ENTMCNC: 33.8 G/DL (ref 31–36)
MCV RBC AUTO: 95.4 FL (ref 80–100)
PDW BLD-RTO: 14.7 % (ref 12.4–15.4)
PHOSPHORUS: 3.3 MG/DL (ref 2.5–4.9)
PLATELET # BLD: 241 K/UL (ref 135–450)
PMV BLD AUTO: 7 FL (ref 5–10.5)
POTASSIUM SERPL-SCNC: 4.7 MMOL/L (ref 3.5–5.1)
RBC # BLD: 3.14 M/UL (ref 4–5.2)
SODIUM BLD-SCNC: 132 MMOL/L (ref 136–145)
WBC # BLD: 6.7 K/UL (ref 4–11)

## 2022-06-15 PROCEDURE — G0378 HOSPITAL OBSERVATION PER HR: HCPCS

## 2022-06-15 PROCEDURE — 97530 THERAPEUTIC ACTIVITIES: CPT

## 2022-06-15 PROCEDURE — 80048 BASIC METABOLIC PNL TOTAL CA: CPT

## 2022-06-15 PROCEDURE — 99232 SBSQ HOSP IP/OBS MODERATE 35: CPT | Performed by: INTERNAL MEDICINE

## 2022-06-15 PROCEDURE — 84100 ASSAY OF PHOSPHORUS: CPT

## 2022-06-15 PROCEDURE — 36415 COLL VENOUS BLD VENIPUNCTURE: CPT

## 2022-06-15 PROCEDURE — 97165 OT EVAL LOW COMPLEX 30 MIN: CPT

## 2022-06-15 PROCEDURE — 6370000000 HC RX 637 (ALT 250 FOR IP): Performed by: INTERNAL MEDICINE

## 2022-06-15 PROCEDURE — 97161 PT EVAL LOW COMPLEX 20 MIN: CPT

## 2022-06-15 PROCEDURE — 83735 ASSAY OF MAGNESIUM: CPT

## 2022-06-15 PROCEDURE — 6370000000 HC RX 637 (ALT 250 FOR IP): Performed by: REGISTERED NURSE

## 2022-06-15 PROCEDURE — 85027 COMPLETE CBC AUTOMATED: CPT

## 2022-06-15 PROCEDURE — 2580000003 HC RX 258: Performed by: INTERNAL MEDICINE

## 2022-06-15 PROCEDURE — 97116 GAIT TRAINING THERAPY: CPT

## 2022-06-15 RX ORDER — LANOLIN ALCOHOL/MO/W.PET/CERES
400 CREAM (GRAM) TOPICAL ONCE
Status: COMPLETED | OUTPATIENT
Start: 2022-06-15 | End: 2022-06-15

## 2022-06-15 RX ORDER — POTASSIUM CHLORIDE 20 MEQ/1
20 TABLET, EXTENDED RELEASE ORAL 2 TIMES DAILY WITH MEALS
Qty: 60 TABLET | Refills: 3 | Status: SHIPPED | OUTPATIENT
Start: 2022-06-15

## 2022-06-15 RX ORDER — FUROSEMIDE 20 MG/1
20 TABLET ORAL 2 TIMES DAILY
Qty: 60 TABLET | Refills: 3 | Status: SHIPPED | OUTPATIENT
Start: 2022-06-15 | End: 2022-06-17 | Stop reason: SDUPTHER

## 2022-06-15 RX ADMIN — POTASSIUM CHLORIDE 20 MEQ: 1500 TABLET, EXTENDED RELEASE ORAL at 09:19

## 2022-06-15 RX ADMIN — FUROSEMIDE 20 MG: 20 TABLET ORAL at 09:18

## 2022-06-15 RX ADMIN — POTASSIUM CHLORIDE 20 MEQ: 1500 TABLET, EXTENDED RELEASE ORAL at 17:17

## 2022-06-15 RX ADMIN — CARVEDILOL 12.5 MG: 6.25 TABLET, FILM COATED ORAL at 09:20

## 2022-06-15 RX ADMIN — FUROSEMIDE 20 MG: 20 TABLET ORAL at 17:17

## 2022-06-15 RX ADMIN — Medication 2000 UNITS: at 09:20

## 2022-06-15 RX ADMIN — APIXABAN 2.5 MG: 5 TABLET, FILM COATED ORAL at 09:19

## 2022-06-15 RX ADMIN — DOXAZOSIN 2 MG: 2 TABLET ORAL at 17:17

## 2022-06-15 RX ADMIN — Medication 400 MG: at 13:46

## 2022-06-15 RX ADMIN — GUAIFENESIN 600 MG: 600 TABLET, EXTENDED RELEASE ORAL at 09:20

## 2022-06-15 RX ADMIN — Medication 1 TABLET: at 09:19

## 2022-06-15 RX ADMIN — CARVEDILOL 12.5 MG: 6.25 TABLET, FILM COATED ORAL at 17:17

## 2022-06-15 RX ADMIN — DIBASIC SODIUM PHOSPHATE, MONOBASIC POTASSIUM PHOSPHATE AND MONOBASIC SODIUM PHOSPHATE 1 TABLET: 852; 155; 130 TABLET ORAL at 09:20

## 2022-06-15 RX ADMIN — Medication 10 ML: at 09:22

## 2022-06-15 RX ADMIN — DOXAZOSIN 2 MG: 2 TABLET ORAL at 09:20

## 2022-06-15 RX ADMIN — ALLOPURINOL 100 MG: 100 TABLET ORAL at 09:20

## 2022-06-15 ASSESSMENT — PAIN SCALES - GENERAL: PAINLEVEL_OUTOF10: 0

## 2022-06-15 NOTE — PLAN OF CARE
[facility]    Progress Note    Lyla Jones Patient Status:  Inpatient    10/13/1951 MRN [id number]   Location [facility] 5SW/SE Attending Jules Luke MD   [facility] Day # 10 PCP Nelly Guy.  DO Chris        Subjective:   Subj Problem: Chronic Conditions and Co-morbidities  Goal: Patient's chronic conditions and co-morbidity symptoms are monitored and maintained or improved  Outcome: Progressing  Problem: Safety - Adult  Goal: Free from fall injury  6/15/2022 1258 by Deysi Gil RN  Outcome: Progressing        Problem: ABCDS Injury Assessment  Goal: Absence of physical injury  Outcome: Progressing     Patient's EF (Ejection Fraction) is greater than 40%    Heart Failure Medications:  Diuretics[de-identified] Furosemide    (One of the following REQUIRED for EF </= 40%/SYSTOLIC FAILURE but MAY be used in EF% >40%/DIASTOLIC FAILURE)        ACE[de-identified] None        ARB[de-identified] None         ARNI[de-identified] None    (Beta Blockers)  NON- Evidenced Based Beta Blocker (for EF% >40%/DIASTOLIC FAILURE): None    Evidenced Based Beta Blocker::(REQUIRED for EF% <40%/SYSTOLIC FAILURE) Carvedilol- Coreg  . .................................................................................................................................................. Patient's weights and intake/output reviewed: Yes    Patient's Last Weight: 213 lbs obtained by standing scale. Difference of 0 lbs  than last documented weight. Intake/Output Summary (Last 24 hours) at 6/15/2022 1258  Last data filed at 6/15/2022 1207  Gross per 24 hour   Intake 160 ml   Output 1025 ml   Net -865 ml       Comorbidities Reviewed Yes    Patient has a past medical history of Arthritis, Atrial fibrillation ([address] CAD (coronary artery disease), Cerebral artery occlusion with cerebral infarction ([address] CHF (congestive heart failure) ([address] CKD (chronic kidney disease), Cutaneous skin tags, Depression, Gout, Grief reaction, Hyperlipidemia, Hypertension, Obesity, Osteoarthritis, Pulmonary nodules, and Unspecified cerebral artery occlusion with cerebral infarction.      >>For CHF and Comorbidity documentation on Education Time and Topics, please see Education Tab    Progressive Mobility Assessment:  What is this CREATSERUM 1.19 09/16/2018    BUN 32 09/16/2018     09/16/2018    K 4.0 09/16/2018    K 4.0 09/16/2018     09/16/2018    CO2 23 09/16/2018     09/16/2018    CA 8.2 09/16/2018    INR 2.1 09/16/2018    MG 2.0 09/16/2018    PHOS 4.2 09/16/2 patient's Current Level of Mobility?: Ambulatory- with Assistance  How was this patient Mobilized today?: Edge of Bed, Up to Chair, and Bedside Commode, ambulated 10 ft                 With Whom? Nurse, PCA, and Self                 Level of Difficulty/Assistance: 1x Assist     Pt up in chair at this time on room air. Pt denies shortness of breath. Pt with pitting lower extremity edema.      Patient and/or Family's stated Goal of Care this Admission: increase activity tolerance, better understand heart failure and disease management, be more comfortable, and reduce lower extremity edema prior to discharge        : coumadin   INR 2.1     DNR    Dispo: pending, will need rehab on DC      Results:     Lab Results   Component Value Date    WBC 8.5 09/16/2018    HGB 8.4 (L) 09/16/2018    HCT 28.9 (L) 09/16/2018     (L) 09/16/2018    CREATSERUM 1.19 09/16/2018    B proximal stomach. Correlate for desired slight displacement. 2. Layering left pleural effusion and compressive atelectasis, with the wording of superimposed pneumonia.   3. Postthoracotomy chest demonstrate cardiomegaly, mild pulmonary vascular congestion,

## 2022-06-15 NOTE — PROGRESS NOTES
Occupational Therapy  [facility]upational Therapy Initial Assessment    Name: Albina Colunga  : 1940  MRN: [id number]  Date of Service: 6/15/2022    Discharge Recommendations:  Home with assist PRN  OT Equipment Recommendations  Equipment Needed: No       Patient Diagnosis(es): There were no encounter diagnoses. Past Medical History:  has a past medical history of Arthritis, Atrial fibrillation ([facility] [address] CAD (coronary artery disease), Cerebral artery occlusion with cerebral infarction ([facility] [address] CHF (congestive heart failure) ([facility] [address] CKD (chronic kidney disease), Cutaneous skin tags, Depression, Gout, Grief reaction, Hyperlipidemia, Hypertension, Obesity, Osteoarthritis, Pulmonary nodules, and Unspecified cerebral artery occlusion with cerebral infarction. Past Surgical History:  has a past surgical history that includes Carpal tunnel release (Bilateral); Knee arthroscopy (Right); Carpal tunnel release; Knee arthroscopy; Cataract removal; other surgical history (Left, 2013); Cholecystectomy; other surgical history (Bilateral, 14); pr total knee arthroplasty (Right, 2018); joint replacement (Right); eye surgery; and Total knee arthroplasty (Left, 2019). Assessment   Assessment: Pt is an 81yo woman s/p heart cath . Pt reports Mod I at baseline with all ADLs/IADLs. Pt req SPV this day d/t limited hospital mobility and fatigue however is safe to return home with prn A from dtr. Pt reports this level of assistance is achievable.  no acute OT needs, d/c home with prn A  Prognosis: Good  Decision Making: Low Complexity  REQUIRES OT FOLLOW-UP: No  Activity Tolerance  Activity Tolerance: Patient Tolerated treatment well        Plan   Plan  Times per Week: 1x only     Restrictions  Restrictions/Precautions  Restrictions/Precautions: Fall Risk    Subjective   General  Chart Reviewed: Yes  Patient assessed for rehabilitation services?: Yes  Family / Caregiver Present: No  Referring Practitioner: Jesica Salas MD  Subjective  Subjective: Pt pleasant and agreeable to therapy  General Comment  Comments: RN approved     Social/Functional History  Social/Functional History  Lives With: Alone  Type of Home: House  Home Layout: One level  Home Access: Ramped entrance  Bathroom Shower/Tub: Tub/Shower unit,Shower chair with back  Bathroom Toilet: Handicap height  Bathroom Equipment: Grab bars in shower,Grab bars around toilet  Home Equipment: Pelon Lento, rolling,Cane  Has the patient had two or more falls in the past year or any fall with injury in the past year?: No  ADL Assistance: [address] [place] [address] Avenue: Independent  Homemaking Responsibilities: Yes  Ambulation Assistance: Independent  Transfer Assistance: Independent  Active : Yes  Leisure & Hobbies: shopping       Objective   Heart Rate: 65  Heart Rate Source: Monitor  BP: (!) 143/64  BP Location: Right upper arm  BP Method: Automatic  Patient Position: Sitting  MAP (Calculated): 90.33  Resp: 18  SpO2: 96 %  O2 Device: None (Room air)  Vision Exceptions: Wears glasses for reading  Hearing: Within functional limits       Observation/Palpation  Posture: Good  Safety Devices  Type of Devices: All fall risk precautions in place;Call light within reach;Gait belt;Patient at risk for falls; Left in chair;Nurse notified; Chair alarm in place  Bed Mobility Training  Bed Mobility Training: No (in chair at start and end of session)  Balance  Sitting: Intact  Standing: With support  Transfer Training  Transfer Training: Yes  Overall Level of Assistance: Supervision  Sit to Stand: Supervision  Stand to Sit: Supervision  Toilet Transfer: Supervision  Gait Training  Gait Training: Yes  Gait  Overall Level of Assistance: Stand-by assistance  Speed/Guerda: Pace decreased (< 100 feet/min)  Gait Abnormalities: Decreased step clearance  Distance (ft): 40 Feet  Assistive Device: Walker, rolling     AROM: Within functional limits  PROM: Within functional limits  Strength: Within functional limits  Coordination: Within functional limits  Tone: Normal  Sensation: Intact  ADL  Feeding: Independent  Grooming: Modified independent   UE Dressing: Independent  LE Dressing: Modified independent   Toileting: Modified independent      Activity Tolerance  Activity Tolerance: Patient tolerated treatment well           Cognition  Overall Cognitive Status: WFL                  Education Given To: Patient  Education Provided: Role of Therapy;Transfer Training;Plan of Care;ADL Adaptive Strategies; Energy Conservation  Education Method: Demonstration;Verbal  Barriers to Learning: None  Education Outcome: Verbalized understanding;Demonstrated understanding             AM-PAC Score        AM-PAC Inpatient Daily Activity Raw Score: 20 (06/15/22 1338)  AM-PAC Inpatient ADL T-Scale Score : 42.03 (06/15/22 1338)  ADL Inpatient CMS 0-100% Score: 38.32 (06/15/22 1338)  ADL Inpatient CMS G-Code Modifier : Kennethdaren Owen (06/15/22 1338)    Goals  Short Term Goals  Time Frame for Short term goals: 1x only  Short Term Goal 1: Pt will demo baseline level function/safety with ADLs-- GOAL MET 6/15  Patient Goals   Patient goals : \"to go home\"       Therapy Time   Individual Concurrent Group Co-treatment   Time In 8714         Time Out 1325         Minutes 18         Timed Code Treatment Minutes: 8 Minutes (10 minute eval)       Cari Boles OT   If pt is unable to be seen after this session, please let this note serve as discharge summary. Please see case management note for discharge disposition. Thank you.

## 2022-06-15 NOTE — PROGRESS NOTES
Physical Therapy  Facility/Department: [facility]  Physical Therapy Initial Assessment    Name: Negin Muhammad  : 1940  MRN: [id number]  Date of Service: 6/15/2022    Discharge Recommendations:  Home with assist PRN   PT Equipment Recommendations  Equipment Needed: No      Patient Diagnosis(es): There were no encounter diagnoses. Past Medical History:  has a past medical history of Arthritis, Atrial fibrillation ([facility] [address] CAD (coronary artery disease), Cerebral artery occlusion with cerebral infarction ([facility] [address] CHF (congestive heart failure) ([facility] [address] CKD (chronic kidney disease), Cutaneous skin tags, Depression, Gout, Grief reaction, Hyperlipidemia, Hypertension, Obesity, Osteoarthritis, Pulmonary nodules, and Unspecified cerebral artery occlusion with cerebral infarction. Past Surgical History:  has a past surgical history that includes Carpal tunnel release (Bilateral); Knee arthroscopy (Right); Carpal tunnel release; Knee arthroscopy; Cataract removal; other surgical history (Left, 2013); Cholecystectomy; other surgical history (Bilateral, 14); pr total knee arthroplasty (Right, 2018); joint replacement (Right); eye surgery; and Total knee arthroplasty (Left, 2019). Assessment   Body Structures, Functions, Activity Limitations Requiring Skilled Therapeutic Intervention: Decreased functional mobility   Assessment: Pt is an 81 y/o female who presents with valvular heart disease. Pt was independent prior to admit with RW living alone in single story home. Pt currently requires SBA for transfers and ambulation. Pt would benefit from continued skilled therapy to address current limitations. Anticipate pt will be safe to return home with PRN A.   Treatment Diagnosis: impaired functional mobility  Therapy Prognosis: Good  Decision Making: Low Complexity  Requires PT Follow-Up: Yes  Activity Tolerance  Activity Tolerance: Patient tolerated treatment well     Plan   Plan  Plan: 3-5 times per week  Current Treatment Recommendations: Strengthening,Balance training,Functional mobility training,Gait training,Patient/Caregiver education & training,Home exercise program  Safety Devices  Type of Devices: All fall risk precautions in place,Call light within reach,Gait belt,Patient at risk for falls,Left in chair,Nurse notified     Restrictions  Restrictions/Precautions  Restrictions/Precautions: Fall Risk     Subjective   General  Chart Reviewed: Yes  Patient assessed for rehabilitation services?: Yes  Family / Caregiver Present: No  Referring Practitioner: Jay Jay Drew MD  Referral Date : 06/15/22  Diagnosis: valvular heart disease  Follows Commands: Within Functional Limits  General Comment  Comments: Pt up in chair upon arrival.  Subjective  Subjective: Pt agreeable to evaluation. Denies pain.          Social/Functional History  Social/Functional History  Lives With: Alone  Type of Home: House  Home Layout: One level  Home Access: Ramped entrance  Bathroom Shower/Tub: Tub/Shower unit,Shower chair with back  Bathroom Toilet: Handicap height  Bathroom Equipment: Grab bars in shower,Grab bars around toilet  Home Equipment: Walker, rolling,Cane  Has the patient had two or more falls in the past year or any fall with injury in the past year?: No  ADL Assistance: [address] [place] [address] Avenue: Independent  Homemaking Responsibilities: Yes  Ambulation Assistance: Independent  Transfer Assistance: Independent  Active : Yes  Leisure & Hobbies: shopping  Vision/Hearing  Vision  Vision: Impaired  Vision Exceptions: Wears glasses for reading  Hearing  Hearing: Within functional limits    Cognition         Objective   Heart Rate: 65  Heart Rate Source: Monitor  BP: (!) 143/64  BP Location: Right upper arm  BP Method: Automatic  Patient Position: Sitting  MAP (Calculated): 90.33  Resp: 18  SpO2: 96 %  O2 Device: None (Room air)        Gross Assessment  AROM: Within functional limits  Strength: Within functional limits  Sensation: Intact   Bed Mobility Training  Bed Mobility Training: No (up in chair upon arrival and exit)  Balance  Sitting: Intact  Standing: With support  Transfer Training  Transfer Training: Yes  Overall Level of Assistance: Stand-by assistance  Sit to Stand: Stand-by assistance  Stand to Sit: Stand-by assistance  Gait Training  Gait Training: Yes  Gait  Overall Level of Assistance: Stand-by assistance  Speed/Guerda: Pace decreased (< 100 feet/min)  Gait Abnormalities: Decreased step clearance  Distance (ft): 40 Feet  Assistive Device: Walker, rolling       AM-PAC Score  AM-PAC Inpatient Mobility Raw Score : 21 (06/15/22 1331)  AM-PAC Inpatient T-Scale Score : 50.25 (06/15/22 1331)  Mobility Inpatient CMS 0-100% Score: 28.97 (06/15/22 1331)  Mobility Inpatient CMS G-Code Modifier : CJ (06/15/22 1331)        Goals  Short Term Goals  Time Frame for Short term goals: 7 days (6/22/22)  Short term goal 1: Pt will perform bed mobility mod I  Short term goal 2: Pt will perform transfers mod I  Short term goal 3: Pt will ambulate 100' with RW mod I  Short term goal 4: By 6/18/22, pt will tolerate 15 reps of LE ther ex for improved activity tolerance  Patient Goals   Patient goals : \"to go back home\"     Education  Patient Education  Education Given To: Patient  Education Provided: Role of Therapy;Transfer Training  Education Method: Demonstration;Verbal  Barriers to Learning: None  Education Outcome: Verbalized understanding    Therapy Time   Individual Concurrent Group Co-treatment   Time In 1300         Time Out 1318         Minutes 18         Timed Code Treatment Minutes: [address] [facility] [address],   [facility]

## 2022-06-15 NOTE — CARE COORDINATION
CASE MANAGEMENT INITIAL ASSESSMENT AND DISCHARGE SUMMARY      Discharge to: home w/no needs  IMM given: (date) 06/15/2022  Transportation: private  Confirmed discharge plan with:     Patient: yes, pt stated she would contact daughter     RN, name: Kari Javier RN    Note: Pt a/o, able to ambulate, has PCP and insurance. Pt has no DCP needs at this time. Should needs arise, please contact DCP.  Mariposa Oconnor RN

## 2022-06-15 NOTE — PLAN OF CARE
Problem: Safety - Adult  Goal: Free from fall injury  Outcome: Progressing   Bed locked and in lowest position, bed alarm in place, call light within reach

## 2022-06-15 NOTE — PLAN OF CARE
Patient's EF (Ejection Fraction) is greater than 40%    Heart Failure Medications:   Diuretics[de-identified] Furosemide     (One of the following REQUIRED for EF </= 40%/SYSTOLIC FAILURE but MAY be used in EF% >40%/DIASTOLIC FAILURE)        ACE[de-identified] None        ARB[de-identified] None         ARNI[de-identified] None    (Beta Blockers)   NON- Evidenced Based Beta Blocker (for EF% >40%/DIASTOLIC FAILURE): None     Evidenced Based Beta Blocker::(REQUIRED for EF% <40%/SYSTOLIC FAILURE) Carvedilol- Coreg  . .................................................................................................................................................. Patient's weights and intake/output reviewed: Yes    Patient's Last Weight: 213 lbs obtained by standing scale. Difference of 0 lbs less than last documented weight. Intake/Output Summary (Last 24 hours) at 6/14/2022 2320  Last data filed at 6/14/2022 2021  Gross per 24 hour   Intake --   Output 325 ml   Net -325 ml       Comorbidities Reviewed Yes    Patient has a past medical history of Arthritis, Atrial fibrillation ([address] CAD (coronary artery disease), Cerebral artery occlusion with cerebral infarction ([address] CHF (congestive heart failure) ([address] CKD (chronic kidney disease), Cutaneous skin tags, Depression, Gout, Grief reaction, Hyperlipidemia, Hypertension, Obesity, Osteoarthritis, Pulmonary nodules, and Unspecified cerebral artery occlusion with cerebral infarction. >>For CHF and Comorbidity documentation on Education Time and Topics, please see Education Tab    Progressive Mobility Assessment:  What is this patient's Current Level of Mobility?: Ambulatory- with Assistance  How was this patient Mobilized today?: Edge of Bed, Bedside Commode,  Up to Toilet/Shower, Up in Room and Up in Hallway, ambulated 5 ft                 With Whom? Nurse, PCA, PT and OT                 Level of Difficulty/Assistance: 1x Assist     Pt resting in bed at this time on room air. Pt denies shortness of breath. Pt pitting lower extremity edema.      Patient and/or Family's stated Goal of Care this Admission: better understand heart failure and disease management prior to discharge        :

## 2022-06-15 NOTE — DISCHARGE SUMMARY
Hospital Medicine Discharge Summary    Patient ID: Lorita Halsted      Patient's PCP: Hailey Ha MD    Admit Date: 6/14/2022     Discharge Date: 6/15/22    Admitting Provider: No admitting provider for patient encounter. 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 Hospital Course: This is Sabinus.[email or web address] [email or web address]. WF with PMHx sig for HTN, hyperlipidemia, afib on eliquis, CKDII, CVA, chronic diastolic CHF and moderate aortic and tricuspid regurgitation who presented to [facility] on 6/5 with hypotension.  Pt felt weak and dizzy.  She did have acute on chronic renal failure with creatinine of 1.7 and baseline of 1.2 and mag level of 1.1.  Pt renal function corrected after holding ARB.  Pt dyspnea was thought likely to be from valvular heart disease and decompensated CHF.  Pt did have allergic response to azithromycin.  An ECHO showed worsening mitral and tricuspid regurgitation and pt transferred to [facility] for cardiac cath to evaluate and manage valvular heart disease.  PT was also found to have UTI which was treated with ceftriaxone.  Cardiac cath found mild non-obstructive CAD, normal EF at 55-60% and SANFORD showed mild to moderate aortic regurg and severe biatrial enlargement.  Medical mangement was indicated. Valvular heart disease. Echo 6/14: EF 55-60%; mod MR. Heart cath 6/14: mild non-obstructive CAD, preserved LF function.  SANFORD 6/14: mod MR; mild-mod AR; no evidence of nerves: II-XII intact, grossly non-focal.  Psychiatric: Alert and oriented, thought content appropriate, normal insight  Capillary Refill: Brisk,< 3 seconds   Peripheral Pulses: +2 palpable, equal bilaterally       Labs: For convenience and continuity at follow-up the following most recent labs are provided:      CBC:    Lab Results   Component Value Date    WBC 6.7 06/15/2022    HGB 10.1 06/15/2022    HCT 30.0 06/15/2022     06/15/2022       Renal:    Lab Results   Component Value Date     06/15/2022    K 4.7 06/15/2022    K 3.5 06/06/2022    CL 98 06/15/2022    CO2 25 06/15/2022    BUN 24 06/15/2022    CREATININE 1.2 06/15/2022    CALCIUM 9.0 06/15/2022    PHOS 3.3 06/15/2022         Significant Diagnostic Studies    Radiology:   No orders to display          Consults:     IP CONSULT TO CASE MANAGEMENT    Disposition:  home     Condition at Discharge: Stable    Discharge Instructions/Follow-up:  PCP within 1 week. PT/OT.  Follow up with cardiology    Code Status:  Full Code     Activity: activity as tolerated    Diet: regular diet      Discharge Medications:     Current Discharge Medication List           Details   potassium chloride (KLOR-CON M) 20 MEQ extended release tablet Take 1 tablet by mouth 2 times daily (with meals)  Qty: 60 tablet, Refills: 3              Details   furosemide (LASIX) 20 MG tablet Take 1 tablet by mouth 2 times daily  Qty: 60 tablet, Refills: 3      apixaban (ELIQUIS) 5 MG TABS tablet TAKE 1 TABLET TWICE A DAY  Qty: 180 tablet, Refills: 0              Details   traZODone (DESYREL) 50 MG tablet TAKE 1 TABLET NIGHTLY  Qty: 90 tablet, Refills: 0      allopurinol (ZYLOPRIM) 100 MG tablet TAKE 1 TABLET TWICE A DAY  Qty: 180 tablet, Refills: 0      Cholecalciferol (VITAMIN D3) 50 MCG (2000 UT) CAPS Take 2,000 Units by mouth daily      carvedilol (COREG) 12.5 MG tablet TAKE 2 TABLETS TWICE A DAY WITH MEALS  Qty: 360 tablet, Refills: 0      doxazosin (CARDURA) 4 MG tablet TAKE 1 TABLET BY MOUTH EVERY DAY  Qty: 30 tablet, Refills: 1      atorvastatin (LIPITOR) 20 MG tablet TAKE 1 TABLET NIGHTLY  Qty: 90 tablet, Refills: 3      Multiple Vitamins-Minerals (THERAPEUTIC MULTIVITAMIN-MINERALS) tablet Take 1 tablet by mouth daily      acetaminophen (TYLENOL) 325 MG tablet Take 650 mg by mouth every 6 hours as needed for Pain             Time Spent on discharge is more than 45 minutes in the examination, evaluation, counseling and review of medications and discharge plan. Signed:    KWAME Angel - CNP   6/15/2022      Thank you Zainab Rangel MD for the opportunity to be involved in this patient's care. If you have any questions or concerns, please feel free to contact me at [phone].

## 2022-06-15 NOTE — PROGRESS NOTES
day  sodium chloride flush 0.9 % injection 5-40 mL, PRN  allopurinol (ZYLOPRIM) tablet 100 mg, BID  apixaban (ELIQUIS) tablet 2.5 mg, BID  atorvastatin (LIPITOR) tablet 20 mg, Nightly  carvedilol (COREG) tablet 12.5 mg, BID WC  diphenhydrAMINE (BENADRYL) tablet 25 mg, Q6H PRN  doxazosin (CARDURA) tablet 2 mg, BID  furosemide (LASIX) tablet 20 mg, BID  guaiFENesin (MUCINEX) extended release tablet 600 mg, BID  ipratropium-albuterol (DUONEB) nebulizer solution 1 ampule, Q4H PRN  phosphorus (K PHOS NEUTRAL) tablet 1 tablet, Daily  potassium chloride (KLOR-CON M) extended release tablet 20 mEq, BID WC  therapeutic multivitamin-minerals 1 tablet, Daily  traZODone (DESYREL) tablet 50 mg, Nightly PRN  Vitamin D (CHOLECALCIFEROL) tablet 2,000 Units, Daily  sodium chloride flush 0.9 % injection 5-40 mL, 2 times per day  sodium chloride flush 0.9 % injection 5-40 mL, PRN  0.9 % sodium chloride infusion, PRN  hydrALAZINE (APRESOLINE) injection 10 mg, Q10 Min PRN  albuterol sulfate HFA (PROVENTIL;VENTOLIN;PROAIR) 108 (90 Base) MCG/ACT inhaler 2 puff, Q4H PRN           PHYSICAL EXAM   BP (!) 142/66   Pulse 75   Temp 98.1 °F (36.7 °C) (Oral)   Resp 18   Wt 213 lb 9.6 oz (96.9 kg)   SpO2 94%   BMI 35.54 kg/m²    Vitals:    06/15/22 0020 06/15/22 0337 06/15/22 0412 06/15/22 0917   BP: (!) 156/79  132/86 (!) 142/66   Pulse: 78  73 75   Resp: 18  16 18   Temp: 98.1 °F (36.7 °C)  98 °F (36.7 °C) 98.1 °F (36.7 °C)   TempSrc: Oral  Oral Oral   SpO2: 96%  99% 94%   Weight:  213 lb 9.6 oz (96.9 kg)           Intake/Output Summary (Last 24 hours) at 6/15/2022 0927  Last data filed at 6/15/2022 0918  Gross per 24 hour   Intake 160 ml   Output 625 ml   Net -465 ml     Wt Readings from Last 3 Encounters:   06/15/22 213 lb 9.6 oz (96.9 kg)   06/12/22 211 lb 8 oz (95.9 kg)   04/28/22 203 lb (92.1 kg)         Gen: Patient in NAD, resting comfortably  Neck: No JVD or bruits  Respiratory: CTAB no WRR  Chest: normal without deformity  Cardiovascular:irregular rr, S1S2, 2/6 sm, rt radial site w/ nml pulse, no hematoma/bruit/thrill   Abdomen: Soft, NTND, Normal BS  Extremities: No clubbing, cyanosis, or edema  Neurological/Psychiatric: AxO x4, No gross motors/sensory deficits  Skin:  Warm and dry      Labs:  CBC:   Recent Labs     06/14/22  0614 06/14/22  1958 06/15/22  0713   WBC 7.7 7.7 6.7   HGB 9.9* 10.6* 10.1*   HCT 29.3* 31.5* 30.0*   MCV 94.1 95.7 95.4    265 241     BMP:   Recent Labs     06/13/22  0539 06/14/22  0614 06/14/22  1958 06/15/22  0713   * 129* 132* 132*   K 4.4 4.8 4.7 4.7   CL 97* 94* 96* 98*   CO2 23 25 25 25   PHOS 3.8 3.5  --  3.3   BUN 30* 28* 27* 24*   CREATININE 1.1 1.1 1.2 1.2     MG:    Recent Labs     06/13/22  0539 06/14/22  0614 06/15/22  0713   MG 1.30* 1.80 1.50*      PT/INR: No results for input(s): PROTIME, INR in the last 72 hours. APTT: No results for input(s): APTT in the last 72 hours. Cardiac Enzymes: No results for input(s): CKTOTAL, CKMB, CKMBINDEX, TROPONINI in the last 72 hours. Cardiac Studies:    SANFORD       Summary   Ejection fraction is visually estimated to be 55-60 %. Moderate mitral regurgitation. Mild to moderate aortic regurgitation. Severe bi-atrial enlargement. There is no evidence of mass or thrombus in the left atrium or appendage. A bubble study was performed and fails to show evidence of shunting. I have reviewed labs and imaging/xray/diagnostic testing in this note.     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 ([address]    Severe mitral regurgitation    Atrial fibrillation ([address]    Long term current use of anticoagulant therapy    Seborrheic keratosis    Actinic keratoses    Cough due to ACE inhibitor    Rotator cuff (capsule) sprain    Primary localized osteoarthrosis, lower leg    Chronic kidney disease, stage III (moderate)    Essential hypertension    Localized osteoarthritis of right knee    Primary localized osteoarthritis of right knee    Mixed hyperlipidemia    Atrial fibrillation, chronic (HCC)    Chronic ischemic heart disease    Gout    Primary localized osteoarthritis of left knee    Status post total left knee replacement    Chronic diastolic congestive heart failure (HCC)    Chronic renal disease, stage III (HCC) [130108]    OLY (acute kidney injury) ([facility]    Hypotension    Atrial fibrillation, controlled (HCC)    Hypomagnesemia    Arthritis of left shoulder region    Dyspnea    Heart failure due to valvular disease, acute, systolic (HCC)    Valvular heart disease     Acute diast hf, hfpef, probnp improving, continue oral diuretics     Htn, bblocker    Hchol, statin    Pafib, continue eliquis    Valvular ht disease, mild to mod by SANFORD, continue expectant/med mgtment    Addend: d/w pt and her daughter, updated them on findings/plans, they understood/agreed    No further inpt cv w/u or tx is planned, will sign off, please call with ?s      Thank you for allowing me to participate in the care of your patient. Please call me with any questions [phone].       Amish Will MD, [facility]   Interventional Cardiologist  [facility]  [phone] [facility]  [phone] [address] [place] [address]  6/15/2022 9:27 AM

## 2022-06-15 NOTE — PROGRESS NOTES
06/14/22 2048   RT Protocol   History Pulmonary Disease 0   Respiratory pattern 0   Breath sounds 0   Cough 0   Indications for Bronchodilator Therapy None   Bronchodilator Assessment Score 0

## 2022-06-15 NOTE — PROGRESS NOTES
Writer placed call to pt's daughter ensuring she would be able to pick her up. Per daughter, she will not be here until [phone]. Pt aware.

## 2022-06-15 NOTE — DISCHARGE INSTR - COC
Continuity of Care Form    Patient Name: Anupama Strickland   :  1940  MRN:  [id number]    Admit date:  2022  Discharge date:  ***    Code Status Order: Full Code   Advance Directives:      Admitting Physician:  No admitting provider for patient encounter.   PCP: Prasad Blanco MD    Discharging Nurse: [facility] Unit/Room#: 0219/0219-01  Discharging Unit Phone Number: ***    Emergency Contact:   Extended Emergency Contact Information  Primary Emergency Contact: [place] [address] Phone: [phone]  Relation: Child  Secondary Emergency Contact: [address] [place] [address] Road Phone: [phone]  Relation: Child    Past Surgical History:  Past Surgical History:   Procedure Laterality Date    CARPAL TUNNEL RELEASE Bilateral     CARPAL TUNNEL RELEASE      CATARACT REMOVAL      both    CHOLECYSTECTOMY      EYE SURGERY      JOINT REPLACEMENT Right     knee    KNEE ARTHROSCOPY Right     fluid removal    KNEE ARTHROSCOPY      RIGHT    OTHER SURGICAL HISTORY Left 2013    Left myringotomy, left PE tube insertion    OTHER SURGICAL HISTORY Bilateral 14    EXCISION OF RT NECK AND LEFT TEMPLE LESIONS    [place] TOTAL KNEE ARTHROPLASTY Right 2018    RIGHT TOTAL KNEE MAKOPLASTY WITH PLATELET [address] PLASMA, ADDUCTOR CANAL BLOCK FOR PAIN CONTROL                    MAKOPLASTY CHEPE  CPT CODE - 68596 performed by Jai Tovar MD at [address] [place] [address] Left 2019    LEFT TOTAL KNEE MAKOPLASTY WITH ADDUCTOR CANAL BLOCK FOR PAIN CONTROL             CHEPE RAMÓN performed by Jai Tovar MD at [place] [address]       Immunization History:   Immunization History   Administered Date(s) Administered    COVID-19, J&J, PF, 0.5 mL 2021, 2021    Influenza A (K1O7-36) Vaccine PF IM 2009    Influenza Virus Vaccine 10/11/2011, 10/01/2012, 10/13/2013, 10/12/2016    Influenza Whole 10/20/2010, 10/01/2012, 10/13/2013    Influenza, High Dose (Fluzone 65 yrs and older) 2018, 10/28/2019 Influenza, High-dose, Quadv, 65 yrs +, IM (Fluzone) 10/19/2021    Influenza, Quadv, IM, PF (6 mo and older Fluzone, Flulaval, Fluarix, and 3 yrs and older Afluria) 11/11/2015    Pneumococcal Conjugate 7-valent (Prevnar7) 10/20/2010    Pneumococcal Polysaccharide ([id number]) 01/16/2006, 10/11/2011, 11/11/2015       Active Problems:  Patient Active Problem List   Diagnosis Code    Heart failure due to valvular disease ([facility] I50.9, I38    Severe mitral regurgitation I34.0    Atrial fibrillation (HCC) I48.91    Long term current use of anticoagulant therapy Z79.01    Seborrheic keratosis L82.1    Actinic keratoses L57.0    Cough due to ACE inhibitor R05.8, T46.4X5A    Rotator cuff (capsule) sprain S43.429A    Primary localized osteoarthrosis, lower leg M17.10    Chronic kidney disease, stage III (moderate) N18.30    Essential hypertension I10    Localized osteoarthritis of right knee M17.11    Primary localized osteoarthritis of right knee M17.11    Mixed hyperlipidemia E78.2    Atrial fibrillation, chronic (HCC) I48.20    Chronic ischemic heart disease I25.9    Gout M10.9    Primary localized osteoarthritis of left knee M17.12    Status post total left knee replacement Z96.652    Chronic diastolic congestive heart failure (HCC) I50.32    Chronic renal disease, stage III ([facility] [046513] N18.30    LOY (acute kidney injury) ([facility] N17.9    Hypotension I95.9    Atrial fibrillation, controlled ([facility]) I48.91    Hypomagnesemia E83.42    Arthritis of left shoulder region M19.012    Dyspnea R06.00    Heart failure due to valvular disease, acute, systolic (HCC) J82.60, H20    Valvular heart disease I38       Isolation/Infection:   Isolation            No Isolation          Patient Infection Status       Infection Onset Added Last Indicated Last Indicated By Review Planned Expiration Resolved Resolved By    None active    Resolved    COVID-19 (Rule Out) 06/05/22 06/05/22 06/05/22 COVID-19 & Influenza Combo (Ordered)   06/05/22 Rule-Out Test Resulted            Nurse Assessment:  Last Vital Signs: BP (!) 143/64   Pulse 65   Temp 97.3 °F (36.3 °C) (Oral)   Resp 18   Wt 213 lb 9.6 oz (96.9 kg)   SpO2 96%   BMI 35.54 kg/m²     Last documented pain score (0-10 scale): Pain Level: 0  Last Weight:   Wt Readings from Last 1 Encounters:   06/15/22 213 lb 9.6 oz (96.9 kg)     Mental Status:  {IP PT MENTAL STATUS:}    IV Access:  { DARRYN IV ACCESS:887441206}    Nursing Mobility/ADLs:  Walking   {CHP DME EPGH:805360496}  Transfer  {CHP DME GJP}  Bathing  {CHP DME IPBZ:292789951}  Dressing  {CHP DME MQVS:340884061}  Toileting  {CHP DME MIZN:245290117}  Feeding  {CHP DME HZME:989693657}  Med Admin  {P DME SPHY:072044127}  Med Delivery   { DARRYN MED Delivery:054276524}    Wound Care Documentation and Therapy:  Incision 19 Knee Left (Active)   Number of days: 994        Elimination:  Continence:    Bowel: {YES / PV:42520}  Bladder: {YES / ME:67607}  Urinary Catheter: {Urinary Catheter:509243607}   Colostomy/Ileostomy/Ileal Conduit: {YES / MT:90043}       Date of Last BM: ***    Intake/Output Summary (Last 24 hours) at 6/15/2022 1235  Last data filed at 6/15/2022 1207  Gross per 24 hour   Intake 160 ml   Output 1025 ml   Net -865 ml     I/O last 3 completed shifts:  In: -   Out: 325 [Urine:325]    Safety Concerns:     [employer] Safety Concerns:727583799}    Impairments/Disabilities:      [employer] Impairments/Disabilities:149555524}    Nutrition Therapy:  Current Nutrition Therapy:   [employer] Diet List:273884904}    Routes of Feeding: {CHP DME Other Feedings:638995633}  Liquids: {Slp liquid thickness:52225}  Daily Fluid Restriction: {CHP DME Yes amt example:325201409}  Last Modified Barium Swallow with Video (Video Swallowing Test): {Done Not Done SYZV:673140950}    Treatments at the Time of Hospital Discharge:   Respiratory Treatments: ***  Oxygen Therapy:  {Therapy; copd oxygen:12694}  Ventilator:    { CC Vent WIPX:792561124}    Rehab Therapies: {THERAPEUTIC INTERVENTION:9674303538}  Weight Bearing Status/Restrictions: [employer] Weight Bearin}  Other Medical Equipment (for information only, NOT a DME order):  {EQUIPMENT:756171295}  Other Treatments: ***    Patient's personal belongings (please select all that are sent with patient):  {CHP DME Belongings:519725328}    RN SIGNATURE:  {Esignature:025441412}    CASE MANAGEMENT/SOCIAL WORK SECTION    Inpatient Status Date: ***    Readmission Risk Assessment Score:  Readmission Risk              Risk of Unplanned Readmission:  17           Discharging to Facility/ Agency   Name:   Address:  Phone:  Fax:    Dialysis Facility (if applicable)   Name:  Address:  Dialysis Schedule:  Phone:  Fax:    / signature: {Esignature:092658741}    PHYSICIAN SECTION    Prognosis: Good    Condition at Discharge: Stable    Rehab Potential (if transferring to Rehab): Good    Recommended Labs or Other Treatments After Discharge: [facility] . PT/OT    Physician Certification: I certify the above information and transfer of Angel Ferrari  is necessary for the continuing treatment of the diagnosis listed and that she requires Home Care for greater 30 days.      Update Admission H&P: No change in H&P    PHYSICIAN SIGNATURE:  Electronically signed by KWAME Bray CNP on 6/15/22 at 12:35 PM EDT

## 2022-06-16 ENCOUNTER — CARE COORDINATION (OUTPATIENT)
Dept: CASE MANAGEMENT | Age: 82
End: 2022-06-16

## 2022-06-16 ENCOUNTER — TELEPHONE (OUTPATIENT)
Dept: INTERNAL MEDICINE CLINIC | Age: 82
End: 2022-06-16

## 2022-06-16 NOTE — TELEPHONE ENCOUNTER
----- Message from Shena Armenta sent at 6/16/2022 12:21 PM EDT -----  Contact: [address]  [phone]  Left message to return call.  ----- Message -----  From: Hernando Ulloa MD  Sent: 6/16/2022  12:03 PM EDT  To: Sandra Aguayo  ----- Message -----  From: Nehemiah Barrientos  Sent: 6/16/2022  10:46 AM EDT  To: Hernando Ulloa MD    [facility] called and sated the patient is being released form the hospital. Patient needs a hospital follow up for one week from today.        [address] [phone] [phone] (Ph: [phone])

## 2022-06-17 ENCOUNTER — OFFICE VISIT (OUTPATIENT)
Dept: INTERNAL MEDICINE CLINIC | Age: 82
End: 2022-06-17

## 2022-06-17 VITALS
HEART RATE: 70 BPM | BODY MASS INDEX: 34.82 KG/M2 | SYSTOLIC BLOOD PRESSURE: 150 MMHG | RESPIRATION RATE: 18 BRPM | DIASTOLIC BLOOD PRESSURE: 80 MMHG | HEIGHT: 65 IN | WEIGHT: 209 LBS

## 2022-06-17 DIAGNOSIS — I50.32 CHRONIC DIASTOLIC CONGESTIVE HEART FAILURE (HCC): Primary | ICD-10-CM

## 2022-06-17 DIAGNOSIS — N18.31 STAGE 3A CHRONIC KIDNEY DISEASE (HCC): ICD-10-CM

## 2022-06-17 DIAGNOSIS — I10 BENIGN ESSENTIAL HTN: ICD-10-CM

## 2022-06-17 DIAGNOSIS — I27.20 PULMONARY HTN (HCC): ICD-10-CM

## 2022-06-17 DIAGNOSIS — I50.32 CHRONIC DIASTOLIC CONGESTIVE HEART FAILURE (HCC): ICD-10-CM

## 2022-06-17 DIAGNOSIS — I34.0 SEVERE MITRAL REGURGITATION: ICD-10-CM

## 2022-06-17 DIAGNOSIS — I48.21 PERMANENT ATRIAL FIBRILLATION (HCC): ICD-10-CM

## 2022-06-17 DIAGNOSIS — Z09 HOSPITAL DISCHARGE FOLLOW-UP: Primary | ICD-10-CM

## 2022-06-17 PROCEDURE — 1111F DSCHRG MED/CURRENT MED MERGE: CPT | Performed by: INTERNAL MEDICINE

## 2022-06-17 PROCEDURE — 99496 TRANSJ CARE MGMT HIGH F2F 7D: CPT | Performed by: INTERNAL MEDICINE

## 2022-06-17 RX ORDER — FUROSEMIDE 20 MG/1
20 TABLET ORAL 2 TIMES DAILY
Qty: 180 TABLET | Refills: 0 | Status: SHIPPED | OUTPATIENT
Start: 2022-06-17 | End: 2022-10-04

## 2022-06-17 NOTE — PROGRESS NOTES
Post-Discharge Transitional Care  Follow Up      Albina Colunga   YOB: 1940    Date of Office Visit:  6/17/2022  Date of Hospital Admission: 6/14/22  Date of Hospital Discharge: 6/15/22  Risk of hospital readmission (high >=14%. Medium >=10%) :Readmission Risk Score: 19.3 ( )      Care management risk score Rising risk (score 2-5) and Complex Care (Scores >=6): 7     Non face to face  following discharge, date last encounter closed (first attempt may have been earlier): 6/16/2022 10:57 AM    Call initiated 2 business days of discharge: Yes    ASSESSMENT/PLAN:   Chronic diastolic congestive heart failure (HCC)  Permanent atrial fibrillation (HCC)  Pulmonary HTN (HCC)  Stage 3a chronic kidney disease ([facility]  Benign essential HTN      Medical Decision Making: high complexity  Return in about 4 weeks (around 7/15/2022) for chf. On this date 6/17/2022 I have spent 35 minutes reviewing previous notes, test results and face to face with the patient discussing the diagnosis and importance of compliance with the treatment plan as well as documenting on the day of the visit. Subjective:   HPI:  Follow up of Hospital problems/diagnosis(es):CHF     Inpatient course: Discharge summary reviewed- see chart. 80 y.o. female with hx  Of HTN, hyperlipidemia, hyperthyroidism, AFibb on coumadin, CKD and pulmonary nodules was admitted to [facility] for renal failure and hypotension , dehydration . Home meds held and given IVF improved renal function but course complicated with development of CHF . ECHo with moderate to severe MR, moderate AR. Cardiology consulted . SANFORD with moderate MR and transferred to [facility] for cath . At [facility] , cath showed non obstructive disease and medical mx advised. meds adjusted and sent home on lasix bid  Pt today reports she is still 10 lbs heavier than her weight and does not feel well. Dyspneic with ambulation and fatigued. No dizziness or falls. No fevers or chills      HTN -. Currently on coreg, cardura, , remains off hyzaar      Interval history/Current status: home with daughter    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 ([address]    Severe mitral regurgitation    Atrial fibrillation ([address]    Long term current use of anticoagulant therapy    Seborrheic keratosis    Actinic keratoses    Cough due to ACE inhibitor    Rotator cuff (capsule) sprain    Primary localized osteoarthrosis, lower leg    Chronic kidney disease, stage III (moderate)    Essential hypertension    Localized osteoarthritis of right knee    Primary localized osteoarthritis of right knee    Mixed hyperlipidemia    Atrial fibrillation, chronic (HCC)    Chronic ischemic heart disease    Gout    Primary localized osteoarthritis of left knee    Status post total left knee replacement    Chronic diastolic congestive heart failure (HCC)    Chronic renal disease, stage III ([address] [640609]    OLY (acute kidney injury) ([address]    Hypotension    Atrial fibrillation, controlled ([address]    Hypomagnesemia    Arthritis of left shoulder region    Dyspnea    Heart failure due to valvular disease, acute, systolic ([address]    Valvular heart disease       Medications listed as ordered at the time of discharge from hospital     Medication List          Accurate as of June 17, 2022 11:57 AM. If you have any questions, ask your nurse or doctor. CHANGE how you take these medications    carvedilol 12.5 MG tablet  Commonly known as: COREG  TAKE 2 TABLETS TWICE A DAY WITH MEALS  What changed:   · how much to take  · when to take this     doxazosin 4 MG tablet  Commonly known as: CARDURA  TAKE 1 TABLET BY MOUTH EVERY DAY  What changed: See the new instructions.         CONTINUE taking these medications    acetaminophen 325 MG tablet  Commonly known as: TYLENOL     allopurinol 100 MG tablet  Commonly known as: ZYLOPRIM  TAKE 1 TABLET TWICE A DAY     apixaban 5 MG Tabs tablet  Commonly known as: Eliquis  TAKE 1 TABLET TWICE A DAY     atorvastatin 20 MG tablet  Commonly known as: LIPITOR  TAKE 1 TABLET NIGHTLY     furosemide 20 MG tablet  Commonly known as: LASIX  Take 1 tablet by mouth 2 times daily     potassium chloride 20 MEQ extended release tablet  Commonly known as: KLOR-CON M  Take 1 tablet by mouth 2 times daily (with meals)     therapeutic multivitamin-minerals tablet     traZODone 50 MG tablet  Commonly known as: DESYREL  TAKE 1 TABLET NIGHTLY     Vitamin D3 50 MCG (2000 UT) Caps              Medications marked \"taking\" at this time  No outpatient medications have been marked as taking for the 6/17/22 encounter (Office Visit) with Bianca Albarado MD.        Medications patient taking as of now reconciled against medications ordered at time of hospital discharge: Yes    A comprehensive review of systems was negative except for what was noted in the HPI. Angiogram 6/14      Procedure Findings:  1. Mild non-obstructive coronary artery disease. 2. Normal left ventricular function with EF estimated at 55-60%  3. Normal left heart hemodynamics    SANFORD   Ejection fraction is visually estimated to be 55-60 %. Moderate mitral regurgitation. Mild to moderate aortic regurgitation. Severe bi-atrial enlargement. There is no evidence of mass or thrombus in the left atrium or appendage. A bubble study was performed and fails to show evidence of shunting. ECHO      Summary   Left ventricular systolic function is normal with ejection fraction   estimated at 55 %. Inferior wall hypokinesis. All remaining wall segments appear normal in function. Left ventricular size is mildly increased. Elevated left ventricular diastolic filling pressure: Septal E/e'' = 13.7 . The left atrium is severely dilated. The right atrium is severely dilated. Mild anterior and posterior mitral annular calcification is present. Severe mitral regurgitation.    Moderate aortic regurgitation is present. Severe tricuspid regurgitation. Systolic pulmonary artery pressure (SPAP) estimated at 67 mmHg (RA pressure   15 mmHg), consistent with severe pulmonary hypertension. 7- 55-60%, DD, LAE< BRII, RVE, Asc Ao 3.8, MAC, Mod MR, AI and TR,   SPAP 53 mmHg. General: elderly female healthy appearing  Awake, alert and oriented. Appears to be not in any distress  Mucous Membranes:  Pink , anicteric  Neck: No JVD, no carotid bruit, no thyromegaly  Chest:  Clear to auscultation bilaterally, no added sounds  Cardiovascular:  Irregular , S1S2 heard, no murmurs or gallops  Abdomen:  Soft, undistended, non tender, no organomegaly, BS present  Extremities:. Trace charles pedal edema   Distal pulses well felt  Neurological : grossly normal           Wt Readings from Last 3 Encounters:   06/17/22 209 lb (94.8 kg)   06/15/22 213 lb 9.6 oz (96.9 kg)   06/12/22 211 lb 8 oz (95.9 kg)       Objective:    Ht 5' 5\" (1.651 m)   Wt 209 lb (94.8 kg)   BMI 34.78 kg/m²         HTN  Was hypotensive with renal failure on adm to [facility]. Now off hyzaar and norvasc  Continue coreg , continue lasix bid  Plan to start losartan next week   Dc cardura        Moderate AR and TR   Moderate pulm HTN  Acute on Chronic diastolic CHF   - medical mx for now  - fw Dr. Ed Alexandre at [address]- recent admission at [facility] as above  - on lasix 20 mg bid   - change to 40 mg lasix in am and 20 mg nightly  Monitor weights and low salt diet  Can add aldactone if needed       CAD - non obstructive cath  Continue statins- remains asymptomatic   Cath as above       H.o CVA - 2007 - off ASA for being on eliquis . No issues     Afibb, chronic and permanent -rate cotnrolled. On coreg bid, ELIQUIS  No bleeding issues    Hyperlipidemia - on statins    Gout - with no recurrence - continue allopurinol    bnp , bmp on Monday   See me in 1 week  Low salt diet  Daily weights    An electronic signature was used to authenticate this note.   --Joseline Alan Bard Amanuel MD

## 2022-06-23 ENCOUNTER — CARE COORDINATION (OUTPATIENT)
Dept: CASE MANAGEMENT | Age: 82
End: 2022-06-23

## 2022-06-23 NOTE — CARE COORDINATION
up.        Care Transitions Subsequent and Final Call    Subsequent and Final Calls  Care Transitions Interventions  Other Interventions:            Follow Up  Future Appointments   Date Time Provider Ashley Arora   8/30/2022  1:00 PM MD Shea Vargas 12 Int None       Mamie Sams RN

## 2022-06-27 ENCOUNTER — TELEPHONE (OUTPATIENT)
Dept: INTERNAL MEDICINE CLINIC | Age: 82
End: 2022-06-27

## 2022-06-27 NOTE — TELEPHONE ENCOUNTER
----- Message from Polly Roche sent at 6/27/2022  1:19 PM EDT -----  Contact: [address] [facility] [phone]    ----- Message -----  From: Anurag Michael MD  Sent: 6/27/2022   1:14 PM EDT  To: Richar Carver to schedule  ----- Message -----  From: Clemente Patiño  Sent: 6/27/2022   9:23 AM EDT  To: Anurag Michael MD    [place] [id number] called and stated the patient has be released from the hospital. The patient needs  to be seen by Friday July 1st. Please call Griffin Villegas and let her know that the patient has been scheduled.

## 2022-06-28 ENCOUNTER — TELEPHONE (OUTPATIENT)
Dept: INTERNAL MEDICINE CLINIC | Age: 82
End: 2022-06-28

## 2022-06-28 NOTE — TELEPHONE ENCOUNTER
----- Message from Maria D Andres MD sent at 6/28/2022  4:43 PM EDT -----  Contact: Danyel Arreola [phone]  Keep appt when scheduled  ----- Message -----  From: Diana Preston  Sent: 6/28/2022   1:31 PM EDT  To: Maria D Andres MD    Last hemoglobin was 10.0 on 6/25/22.  ----- Message -----  From: Maria D Andres MD  Sent: 6/28/2022   1:21 PM EDT  To: Diana Preston    What is her Hb last one  ----- Message -----  From: Saba Slade MA  Sent: 6/28/2022   9:59 AM EDT  To: Maria D Andres MD    Pt daughter called said the nurse called her and told her the pts hemoglobin was very low and she is still very weak would like to know if you want her to wait to Friday to be seen.

## 2022-06-29 ENCOUNTER — CARE COORDINATION (OUTPATIENT)
Dept: CASE MANAGEMENT | Age: 82
End: 2022-06-29

## 2022-06-29 NOTE — CARE COORDINATION
[facility] Transitions Follow Up Call    2022    Patient: Demarcus Messer  Patient : 1940   MRN: [id number]  Reason for Admission: valvular disease s/p cardiac cath   Discharge Date: 6/15/22 RARS: Readmission Risk Score: 19.3 ( )         Spoke with: daughterJuan Jose form verified    Care Transitions Follow Up Call    Needs to be reviewed by the provider   Additional needs identified to be addressed with provider: No  none             Method of communication with provider : none      Care Transition Nurse contacted the family by telephone to follow up after admission. Verified name and  with family as identifiers. Addressed changes since last contact: none  Discussed follow-up appointments. If no appointment was previously scheduled, appointment scheduling offered: Yes. Is follow up appointment scheduled within 7 days of discharge? Yes. Advance Care Planning:   Does patient have an Advance Directive: reviewed and current, reviewed and needs to be updated, not on file; education provided, not on file, patient declined education, decision maker updated and referral to internal ACP facilitator. CTN reviewed discharge instructions, medical action plan and red flags with family and discussed any barriers to care and/or understanding of plan of care after discharge. Discussed appropriate site of care based on symptoms and resources available to patient including: PCP. The family agrees to contact the PCP office for questions related to their healthcare. Patients top risk factors for readmission: medical condition-valvular disease s/p cardiac cath  Interventions to address risk factors: Obtained and reviewed discharge summary and/or continuity of care documents      Non-[facility] follow up appointment(s): patient to follow up with [facility] pulmonary    Patient's daughter answered call and HIPAA form verified. Patient continues to be about the same with weakness and anemia. Daughter stated that patient was at [facility] from 6/21-6/26 and after testing was determined to be pulmonary. Physicians told daughter that patient didn't need open heart surgery. Daughter was told that she had pulmonary hypertension and going to be referred to [facility] pulmonologist. Daughter declined any further transition support d/t [facility] team is calling as well. Daughter appreciative of calls and supports provided by CTN. Episode ended due to request    CTN provided contact information for future needs. No further follow-up call indicated based on severity of symptoms and risk factors. Care Transitions Subsequent and Final Call    Subsequent and Final Calls  Do you have any ongoing symptoms?: No  Have your medications changed?: No  Do you have any questions related to your medications?: No  Do you currently have any active services?: No  Do you have any needs or concerns that I can assist you with?: No  Identified Barriers: None  Care Transitions Interventions  Other Interventions:            Follow Up  Future Appointments   Date Time Provider Ashley Arora   7/1/2022  3:10 PM Bianca Albarado MD [facility] None   8/30/2022  1:00 PM MD [facility] None       Sirisha Castanon, RN

## 2022-07-01 ENCOUNTER — OFFICE VISIT (OUTPATIENT)
Dept: INTERNAL MEDICINE CLINIC | Age: 82
End: 2022-07-01

## 2022-07-01 VITALS
WEIGHT: 193 LBS | DIASTOLIC BLOOD PRESSURE: 75 MMHG | HEART RATE: 70 BPM | BODY MASS INDEX: 32.15 KG/M2 | HEIGHT: 65 IN | RESPIRATION RATE: 18 BRPM | SYSTOLIC BLOOD PRESSURE: 150 MMHG

## 2022-07-01 DIAGNOSIS — I50.32 CHRONIC DIASTOLIC CONGESTIVE HEART FAILURE (HCC): ICD-10-CM

## 2022-07-01 DIAGNOSIS — I27.20 PULMONARY HTN (HCC): ICD-10-CM

## 2022-07-01 DIAGNOSIS — I34.0 SEVERE MITRAL REGURGITATION: ICD-10-CM

## 2022-07-01 DIAGNOSIS — I48.21 PERMANENT ATRIAL FIBRILLATION (HCC): ICD-10-CM

## 2022-07-01 DIAGNOSIS — I10 BENIGN ESSENTIAL HTN: ICD-10-CM

## 2022-07-01 DIAGNOSIS — N18.31 STAGE 3A CHRONIC KIDNEY DISEASE (HCC): ICD-10-CM

## 2022-07-01 DIAGNOSIS — Z09 HOSPITAL DISCHARGE FOLLOW-UP: Primary | ICD-10-CM

## 2022-07-01 PROCEDURE — 99214 OFFICE O/P EST MOD 30 MIN: CPT | Performed by: INTERNAL MEDICINE

## 2022-07-01 PROCEDURE — 1111F DSCHRG MED/CURRENT MED MERGE: CPT | Performed by: INTERNAL MEDICINE

## 2022-07-01 RX ORDER — SPIRONOLACTONE 25 MG/1
25 TABLET ORAL DAILY
COMMUNITY
Start: 2022-06-20 | End: 2022-09-28 | Stop reason: ALTCHOICE

## 2022-07-01 RX ORDER — ALBUTEROL SULFATE 90 UG/1
2 AEROSOL, METERED RESPIRATORY (INHALATION) EVERY 6 HOURS PRN
COMMUNITY
Start: 2022-06-26 | End: 2022-10-17 | Stop reason: SDUPTHER

## 2022-07-01 RX ORDER — VALSARTAN 40 MG/1
40 TABLET ORAL DAILY
COMMUNITY
Start: 2022-06-20 | End: 2022-09-28 | Stop reason: ALTCHOICE

## 2022-07-01 NOTE — PROGRESS NOTES
Post-Discharge Transitional Care Management Progress Note      Karrie Gomes   YOB: 1940    Date of Office Visit:  7/1/2022  Date of Hospital Admission: 6/21  Date of Hospital Discharge: 6/26    Care management risk score Rising risk (score 2-5) and Complex Care (Scores >=6): 7     Non face to face  following discharge, date last encounter closed (first attempt may have been earlier): *No documented post hospital discharge outreach found in the last 14 days *No documented post hospital discharge outreach found in the last 14 days    Call initiated 2 business days of discharge: *No response recorded in the last 14 days    ASSESSMENT/PLAN:   Hospital discharge follow-up  -     CO DISCHARGE MEDS RECONCILED W/ CURRENT OUTPATIENT MED LIST  Severe mitral regurgitation  Chronic diastolic congestive heart failure ([facility]  -     Brain Natriuretic Peptide; Future  Permanent atrial fibrillation (HCC)  Pulmonary HTN (HCC)  Stage 3a chronic kidney disease ([facility]  -     Basic Metabolic Panel; Future  Benign essential HTN      Medical Decision Making: high complexity  Return in 4 weeks (on 7/29/2022) for htn chf. On this date 7/1/2022 I have spent 40  minutes reviewing previous notes, test results and face to face with the patient discussing the diagnosis and importance of compliance with the treatment plan as well as documenting on the day of the visit.      Subjective:   HPI:  Follow up of Hospital problems/diagnosis(es): for heart failure      80 y.o. female with hx  Of HTN, hyperlipidemia, hyperthyroidism, AFibb on coumadin, CKD and pulmonary nodules was admitted to [address] [place] [address] for increasing sob and CHF from 6/21 to 6/26      At [facility] on [facility]/24 - had right heart cath showing precapillary pulm HTN and referred to pulm  Had iv diuresis with 15 lb weight loss ,cut down coreg to half , added aldactone, jardiance to lasix and sent home, she has not restarted her diovan yet , has appt with cardiology next TWICE A DAY WITH MEALS  What changed:   · how much to take  · when to take this        CONTINUE taking these medications    acetaminophen 325 MG tablet  Commonly known as: TYLENOL     albuterol sulfate  (90 Base) MCG/ACT inhaler  Commonly known as: PROVENTIL;VENTOLIN;PROAIR     allopurinol 100 MG tablet  Commonly known as: ZYLOPRIM  TAKE 1 TABLET TWICE A DAY     apixaban 5 MG Tabs tablet  Commonly known as: Eliquis  TAKE 1 TABLET TWICE A DAY     atorvastatin 20 MG tablet  Commonly known as: LIPITOR  TAKE 1 TABLET NIGHTLY     empagliflozin 10 MG tablet  Commonly known as: JARDIANCE     furosemide 20 MG tablet  Commonly known as: LASIX  Take 1 tablet by mouth 2 times daily     potassium chloride 20 MEQ extended release tablet  Commonly known as: KLOR-CON M  Take 1 tablet by mouth 2 times daily (with meals)     spironolactone 25 MG tablet  Commonly known as: ALDACTONE     therapeutic multivitamin-minerals tablet     traZODone 50 MG tablet  Commonly known as: DESYREL  TAKE 1 TABLET NIGHTLY     valsartan 40 MG tablet  Commonly known as: DIOVAN     Vitamin D3 50 MCG (2000 UT) Caps              Medications marked \"taking\" at this time  Outpatient Medications Marked as Taking for the 7/1/22 encounter (Office Visit) with Codey Mercedes MD   Medication Sig Dispense Refill    albuterol sulfate HFA (PROVENTIL;VENTOLIN;PROAIR) 108 (90 Base) MCG/ACT inhaler Inhale 2 puffs into the lungs every 6 hours as needed      empagliflozin (JARDIANCE) 10 MG tablet Take 10 mg by mouth daily      spironolactone (ALDACTONE) 25 MG tablet Take 25 mg by mouth daily          Medications patient taking as of now reconciled against medications ordered at time of hospital discharge: Yes    A comprehensive review of systems was negative except for what was noted in the HPI.     Objective:    Ht 5' 5\" (1.651 m)   Wt 193 lb (87.5 kg)   BMI 32.12 kg/m²      Vitals:    07/01/22 1511   BP: (!) 150/75   Pulse: 70   Resp: 18         General: elderly female healthy appearing  Awake, alert and oriented. Appears to be not in any distress  Mucous Membranes:  Pink , anicteric  Neck: No JVD, no carotid bruit, no thyromegaly  Chest:  Clear to auscultation bilaterally, no added sounds  Cardiovascular:  Irregular , S1S2 heard, no murmurs or gallops  Abdomen:  Soft, undistended, non tender, no organomegaly, BS present  Extremities:.  resolved charles pedal edema   Distal pulses well felt  Neurological : grossly normal       Wt Readings from Last 3 Encounters:   07/01/22 193 lb (87.5 kg)   06/17/22 209 lb (94.8 kg)   06/15/22 213 lb 9.6 oz (96.9 kg)         Testing at [facility]      Ct chest   6/25/2022    1. Minimal pulmonary edema 2. Geographic areas of groundglass opacity along the also be related to pulmonary edema. Asthma or pulmonary hypertension contact this appearance. No evidence of fibrotic lung disease 3. Moderate coronary artery calcifications. Cath right heart     CARDIAC CATHETERIZATION  : 6/24/2022  : 1) Moderate pre-capillary pulmonary HTN 2) Normal cardiac output and index 3) No evidence of intracardiac shunt (neg bubble study on [place]) 4) Normal left sided filling pressures 5) Normal right basilic vein by ultrasound 6) Abnormal ventilation scan (V/Q) at [facility] this month PLAN: 1) Recommend pulmonary evaluation 2) Continue HFpEF medical mgmt 3) THI rounding service is following 4) Upon discharge, fu with the ADHF service 5) FU transthoracic echo within 6 months [address], [place], [facility], [address] [facility]         Angiogram 6/14      Procedure Findings:  1. Mild non-obstructive coronary artery disease. 2. Normal left ventricular function with EF estimated at 55-60%  3. Normal left heart hemodynamics    [place]   Ejection fraction is visually estimated to be 55-60 %. Moderate mitral regurgitation. Mild to moderate aortic regurgitation. Severe bi-atrial enlargement.    There is no evidence of mass or thrombus in the left atrium or appendage. A bubble study was performed and fails to show evidence of shunting. ECHO      Summary   Left ventricular systolic function is normal with ejection fraction   estimated at 55 %. Inferior wall hypokinesis. All remaining wall segments appear normal in function. Left ventricular size is mildly increased. Elevated left ventricular diastolic filling pressure: Septal E/e'' = 13.7 . The left atrium is severely dilated. The right atrium is severely dilated. Mild anterior and posterior mitral annular calcification is present. Severe mitral regurgitation. Moderate aortic regurgitation is present. Severe tricuspid regurgitation. Systolic pulmonary artery pressure (SPAP) estimated at 67 mmHg (RA pressure   15 mmHg), consistent with severe pulmonary hypertension. 7- 55-60%, DD, LAE< BRII, RVE, Asc Ao 3.8, MAC, Mod MR, AI and TR,   SPAP 53 mmHg. Diagnosis Orders   1. Hospital discharge follow-up  RI DISCHARGE MEDS RECONCILED W/ CURRENT OUTPATIENT MED LIST   2. Severe mitral regurgitation     3. Chronic diastolic congestive heart failure (HCC)  Brain Natriuretic Peptide   4. Permanent atrial fibrillation ([address]     5. Pulmonary HTN (HCC)     6. Stage 3a chronic kidney disease ([address]  Basic Metabolic Panel   7. Benign essential HTN         Acute on chronic diastolic CHF , preserved EF   Pulmonary HTn noted on right heart cath  Moderate AR and TR       - on lasix 20 mg bid , aldactone 25 mg daily , jardiance 10 mg   - need to resume diovan  - referred to pulm for workup of pum HTN, ZOILA screen neg.  Ct chest as above  - might need ROXY screen    Monitor weights and low salt diet  Monitor sugars for excessive fatigue and recent initiation of jardiance    CAD - non obstructive cath  Continue statins- remains asymptomatic   Cath as above     HTN  - stable on coreg, lasix, aldactone and jardiance  Can resume ARB   Has appt with cards next week       H.o CVA - 2007 - off ASA for being on eliquis . No issues     Afibb, chronic and permanent -rate cotnrolled.  On coreg bid, ELIQUIS  No bleeding issues    Hyperlipidemia - on statins    Gout - with no recurrence - continue allopurinol    Mild anemia - likey iatrogenic         bnp , bmp today   See me in 1 month     Low salt diet  Daily weights        Barbi Cabrales MD, 7/1/2022 3:35 PM

## 2022-07-02 LAB
ANION GAP SERPL CALCULATED.3IONS-SCNC: 17 MMOL/L (ref 3–16)
BUN BLDV-MCNC: 33 MG/DL (ref 7–20)
CALCIUM SERPL-MCNC: 9.9 MG/DL (ref 8.3–10.6)
CHLORIDE BLD-SCNC: 92 MMOL/L (ref 99–110)
CO2: 24 MMOL/L (ref 21–32)
CREAT SERPL-MCNC: 1.5 MG/DL (ref 0.6–1.2)
GFR AFRICAN AMERICAN: 40
GFR NON-AFRICAN AMERICAN: 33
GLUCOSE BLD-MCNC: 101 MG/DL (ref 70–99)
MAGNESIUM: 1.7 MG/DL (ref 1.8–2.4)
POTASSIUM SERPL-SCNC: 5.1 MMOL/L (ref 3.5–5.1)
PRO-BNP: 1998 PG/ML (ref 0–449)
SODIUM BLD-SCNC: 133 MMOL/L (ref 136–145)

## 2022-07-05 ENCOUNTER — TELEPHONE (OUTPATIENT)
Dept: INTERNAL MEDICINE CLINIC | Age: 82
End: 2022-07-05

## 2022-07-05 RX ORDER — CALCIUM CARBONATE/VITAMIN D3 500-10/5ML
LIQUID (ML) ORAL
Qty: 10 CAPSULE | Refills: 0 | Status: SHIPPED | OUTPATIENT
Start: 2022-07-05 | End: 2022-10-19 | Stop reason: SDUPTHER

## 2022-09-02 NOTE — PROGRESS NOTES
IM Progress Note    Admit Date:  6/5/2022       Patient admitted with OLY, weakness, dyspnea, hypotension. Creatinine now starting to improve with IV hydration. Most of her antihypertensive medications were held. O2 sat stable on room air, VQ scan negative. Patient complained of persistent dyspnea. IV fluids stopped and resumed on low-dose Lasix as at home. Creatinine has remained stable. Subjective:  Ms. Carmen Hutchins continues to feel poorly. Complains of persistent dyspnea. Echocardiogram repeated and shows worsening of her valvular heart disease. O2 sats remained stable on room air. Overall she feels ill and fatigued. Intermittent wheezing. Telemetry A. fib is rate controlled          Objective:   BP (!) 143/77   Pulse 80   Temp 97.7 °F (36.5 °C) (Oral)   Resp 16   Ht 5' 5\" (1.651 m)   Wt 217 lb 14.4 oz (98.8 kg)   SpO2 94%   BMI 36.26 kg/m²       Intake/Output Summary (Last 24 hours) at 6/10/2022 1128  Last data filed at 6/9/2022 2247  Gross per 24 hour   Intake 180 ml   Output 300 ml   Net -120 ml         Physical Exam:  General:  Awake, alert, NAD  Patient appears ill and fatigued  Skin:  Warm and dry  Neck:  JVD absent. Neck supple  Chest: Diminished breath sounds , mild wheezes. Mild crackles at bases  Cardiovascular: irregular,S1S2 normal  Abdomen:  Soft, non tender, non distended, BS +  Extremities:  No edema. Intact peripheral pulses.  Brisk cap refill, < 2 secs  Neuro: non focal      Medications:   Scheduled Meds:   potassium & sodium phosphates  1 packet Oral Daily    magnesium sulfate  4,000 mg IntraVENous Once    magnesium oxide  400 mg Oral BID    furosemide  20 mg IntraVENous Once    potassium chloride  20 mEq Oral BID     guaiFENesin  600 mg Oral BID    albuterol sulfate HFA  2 puff Inhalation TID    cefTRIAXone (ROCEPHIN) IV  1,000 mg IntraVENous Q24H    Vitamin D  2,000 Units Oral Daily    doxazosin  2 mg Oral BID    furosemide  20 mg Oral BID    allopurinol  100 mg Oral BID    apixaban  2.5 mg Oral BID    atorvastatin  20 mg Oral Nightly    carvedilol  12.5 mg Oral BID WC    therapeutic multivitamin-minerals  1 tablet Oral Daily    sodium chloride flush  5-40 mL IntraVENous 2 times per day    carvedilol  12.5 mg Oral Once       Continuous Infusions:   sodium chloride 5 mL/hr at 06/07/22 1031       Data:  CBC:   Recent Labs     06/08/22  0606 06/09/22  0716 06/10/22  0450   WBC 7.2 7.7 9.6   RBC 3.18* 2.98* 3.01*   HGB 10.5* 9.8* 9.9*   HCT 30.7* 28.1* 28.7*   MCV 96.6 94.3 95.3   RDW 14.9 14.7 14.5   PLT 91* 110* 116*     BMP:   Recent Labs     06/08/22  0606 06/09/22  0716 06/10/22  0450   * 131* 130*   K 3.6 3.4* 3.5    100 97*   CO2 18* 20* 22   PHOS 2.8 2.5 2.4*   BUN 53* 47* 42*   CREATININE 1.7* 1.4* 1.4*     BNP: No results for input(s): BNP in the last 72 hours. PT/INR: No results for input(s): PROTIME, INR in the last 72 hours. APTT: No results for input(s): APTT in the last 72 hours. CARDIAC ENZYMES:   No results for input(s): CKMB, CKMBINDEX, TROPONINI in the last 72 hours. Invalid input(s): CKTOTAL;3  FASTING LIPID PANEL:  Lab Results   Component Value Date    CHOL 112 02/27/2017    HDL 56 02/27/2017    TRIG 120 02/27/2017     LIVER PROFILE:   No results for input(s): AST, ALT, ALB, BILIDIR, BILITOT, ALKPHOS in the last 72 hours. Cultures    SARS-CoV-2 RNA, RT PCR NOT DETECTED        INFLUENZA A NOT DETECTED     INFLUENZA B NOT DETECTED         Radiology  XR CHEST (2 VW)   Final Result   Stable cardiomegaly. NM LUNG VENT/PERFUSION (VQ)   Final Result   Low probability for pulmonary embolism. Obstructive lung disease. XR SHOULDER LEFT (MIN 2 VIEWS)   Final Result   1. Subtle linear discontinuity at the base of the greater tuberosity with   mild sclerosis. If patient has had recent trauma, differential would include   a subacute nondisplaced fracture.    2. Mild acromioclavicular degenerative changes. XR CHEST PORTABLE   Final Result   No acute cardiopulmonary disease. ECHO   Summary   Left ventricular systolic function is normal with ejection fraction   estimated at 55 %. Inferior wall hypokinesis. All remaining wall segments appear normal in function. Left ventricular size is mildly increased. Elevated left ventricular diastolic filling pressure: Septal E/e'' = 13.7 . The left atrium is severely dilated. The right atrium is severely dilated. Mild anterior and posterior mitral annular calcification is present. Severe mitral regurgitation. Moderate aortic regurgitation is present. Severe tricuspid regurgitation. Systolic pulmonary artery pressure (SPAP) estimated at 67 mmHg (RA pressure   15 mmHg), consistent with severe pulmonary hypertension. 7- 55-60%, DD, LAE< BRII, RVE, Asc Ao 3.8, MAC, Mod MR, AI and TR,   SPAP 53 mmHg. Assessment:  Principal Problem:    OLY (acute kidney injury) ([address]  Active Problems:    Hypotension    Atrial fibrillation, controlled ([address]    Hypomagnesemia    Arthritis of left shoulder region    Dyspnea  Resolved Problems:    * No resolved hospital problems. *      Plan:      Hypotension  History of hypertension  -Patient presented with weakness and hypotension . s/p IV fluid bolus in the ED , continued on IV fluids on admission for a couple of days. Hypotension resolved. She has been off IV fluids since 6/8. -   Blood pressure starting to trend up. Resumed on Lasix and Cardura on 6/8. Continued on Coreg. Continue to hold Hyzaar. Monitor blood pressure and renal function closely.   -Blood pressure and heart rate stable today. Renal function stable today. Continue Lasix Cardura and Coreg. Hypomagnesemia  Hypokalemia  Hypophosphatemia  -Continue to replete  Potassium and mag level low again today we will replete some more.       OLY on CKD  -Likely prerenal , patient appears dehydrated on admission . given IV fluids . - crtn at baseline 0.9-1.1. crtn  on admission 1.7-> 2.0-> 2.1-> creatinine is down to 1.7-> 1.4 today  -S/p IV fluid hydration; resumed on Lasix only. Continue to hold Hyzaar. Renal function stable and improving.        Dyspnea  -Likely from valvular heart disease, and decompensated CHF. See below.  -Possible bronchitis. Patient is starting to cough up some sputum -continued on antibiotics Rocephin.  -Oxygen saturation stable on room air  -VQ scan was negative. -Chest x-ray with stable cardiomegaly, no infiltrates  -She had some wheezing-continue inhaled bronchodilators and Mucinex . -  We tried giving her Zithromax yesterday but she had an allergic response to this with a local itching and rash-Zithromax has been discontinued. Acute on Chronic diastolic CHF  Valvular heart disease . Echocardiogram repeated this admission shows worsening regurgitation . She now has severe MR and TR , moderate AR . She had moderate pulmonary hypertension in 2021 and now has severe pulmonary hypertension . EF normal.  Inferior wall hypokinesis  - follows with Dr. Shaun Madsen at [facility]  - pt is on Lasix 20 mg BID--held Lasix on admission due to OLY and dehydration. Oral Lasix has been resumed for 2 days now. Patient still very symptomatic, proBNP elevated at 10,000. will give IV Lasix now.  -Consult cardiology. -Monitor daily weights, and her weight has been trending up 208-> 217 pounds. - continue daily weights, low sodium diet, strict I/O. Repeat BNP      Left shoulder pain  - Xray showed subtle linear discontinuity at the base of the greater tuberosity with mild sclerosis- if patient has had recent trauma, differential would include a subacute nondisplaced fracture. Mild acromioclavicular degenerative changes   - no h/O trauma. Pain is chronic, over years   Left shoulder pain is chronic related to osteoarthritis    UTI  -Urine cultures growing E. Coli.    Continue Rocephin     Atrial fibrillation  - EKG with controlled rate Afib on admission   - on eliquis    - continue coreg and monitor blood pressure      HLD  - Continue statin       Hx Hyperthyroidism     Hx of CVA- 2007  - continue Eliquis and statin         DVT Prophylaxis: Eliquis   Diet: ADULT DIET; Regular  Code Status: Full Code. PLAN OF CARE discussed in detail with patient, and patient's nurse. Total time today 37  minutes.  > 50 % time dominated by coordination of care           Aj Wilson MD   6/10/2022 11:28 AM [id number]

## 2022-09-06 RX ORDER — TRAZODONE HYDROCHLORIDE 50 MG/1
TABLET ORAL
Qty: 90 TABLET | Refills: 0 | Status: SHIPPED | OUTPATIENT
Start: 2022-09-06

## 2022-09-06 RX ORDER — ALLOPURINOL 100 MG/1
TABLET ORAL
Qty: 180 TABLET | Refills: 0 | Status: SHIPPED | OUTPATIENT
Start: 2022-09-06

## 2022-09-28 ENCOUNTER — OFFICE VISIT (OUTPATIENT)
Dept: INTERNAL MEDICINE CLINIC | Age: 82
End: 2022-09-28

## 2022-09-28 VITALS — HEIGHT: 65 IN | WEIGHT: 191 LBS | BODY MASS INDEX: 31.82 KG/M2

## 2022-09-28 DIAGNOSIS — Z00.00 INITIAL MEDICARE ANNUAL WELLNESS VISIT: ICD-10-CM

## 2022-09-28 DIAGNOSIS — I50.32 CHRONIC DIASTOLIC CONGESTIVE HEART FAILURE (HCC): ICD-10-CM

## 2022-09-28 DIAGNOSIS — N18.31 STAGE 3A CHRONIC KIDNEY DISEASE (HCC): ICD-10-CM

## 2022-09-28 DIAGNOSIS — I48.21 PERMANENT ATRIAL FIBRILLATION (HCC): ICD-10-CM

## 2022-09-28 DIAGNOSIS — I27.20 PULMONARY HTN (HCC): ICD-10-CM

## 2022-09-28 DIAGNOSIS — E55.9 VITAMIN D DEFICIENCY: ICD-10-CM

## 2022-09-28 DIAGNOSIS — Z00.00 MEDICARE ANNUAL WELLNESS VISIT, SUBSEQUENT: Primary | ICD-10-CM

## 2022-09-28 DIAGNOSIS — I10 BENIGN ESSENTIAL HTN: ICD-10-CM

## 2022-09-28 DIAGNOSIS — I34.0 SEVERE MITRAL REGURGITATION: ICD-10-CM

## 2022-09-28 DIAGNOSIS — Z23 NEED FOR INFLUENZA VACCINATION: ICD-10-CM

## 2022-09-28 PROCEDURE — 1123F ACP DISCUSS/DSCN MKR DOCD: CPT | Performed by: INTERNAL MEDICINE

## 2022-09-28 PROCEDURE — 90662 IIV NO PRSV INCREASED AG IM: CPT | Performed by: INTERNAL MEDICINE

## 2022-09-28 PROCEDURE — G0438 PPPS, INITIAL VISIT: HCPCS | Performed by: INTERNAL MEDICINE

## 2022-09-28 PROCEDURE — G0008 ADMIN INFLUENZA VIRUS VAC: HCPCS | Performed by: INTERNAL MEDICINE

## 2022-09-28 RX ORDER — SACUBITRIL AND VALSARTAN 49; 51 MG/1; MG/1
1 TABLET, FILM COATED ORAL DAILY
COMMUNITY
Start: 2022-09-19 | End: 2022-10-11 | Stop reason: SDUPTHER

## 2022-09-28 ASSESSMENT — PATIENT HEALTH QUESTIONNAIRE - PHQ9
2. FEELING DOWN, DEPRESSED OR HOPELESS: 0
1. LITTLE INTEREST OR PLEASURE IN DOING THINGS: 0
SUM OF ALL RESPONSES TO PHQ QUESTIONS 1-9: 0
SUM OF ALL RESPONSES TO PHQ QUESTIONS 1-9: 0
SUM OF ALL RESPONSES TO PHQ9 QUESTIONS 1 & 2: 0
SUM OF ALL RESPONSES TO PHQ QUESTIONS 1-9: 0
1. LITTLE INTEREST OR PLEASURE IN DOING THINGS: 0
SUM OF ALL RESPONSES TO PHQ QUESTIONS 1-9: 0
2. FEELING DOWN, DEPRESSED OR HOPELESS: 0
SUM OF ALL RESPONSES TO PHQ QUESTIONS 1-9: 0
SUM OF ALL RESPONSES TO PHQ QUESTIONS 1-9: 0
SUM OF ALL RESPONSES TO PHQ9 QUESTIONS 1 & 2: 0

## 2022-09-28 ASSESSMENT — LIFESTYLE VARIABLES: HOW OFTEN DO YOU HAVE A DRINK CONTAINING ALCOHOL: NEVER

## 2022-09-28 NOTE — PATIENT INSTRUCTIONS
Personalized Preventive Plan for Nereyda Parnell - 9/28/2022  Medicare offers a range of preventive health benefits. Some of the tests and screenings are paid in full while other may be subject to a deductible, co-insurance, and/or copay. Some of these benefits include a comprehensive review of your medical history including lifestyle, illnesses that may run in your family, and various assessments and screenings as appropriate. After reviewing your medical record and screening and assessments performed today your provider may have ordered immunizations, labs, imaging, and/or referrals for you. A list of these orders (if applicable) as well as your Preventive Care list are included within your After Visit Summary for your review. Other Preventive Recommendations:    A preventive eye exam performed by an eye specialist is recommended every 1-2 years to screen for glaucoma; cataracts, macular degeneration, and other eye disorders. A preventive dental visit is recommended every 6 months. Try to get at least 150 minutes of exercise per week or 10,000 steps per day on a pedometer . Order or download the FREE \"Exercise & Physical Activity: Your Everyday Guide\" from [employer]. Call 2-421.989.9024 or search [employer] online. You need 9703-4715 mg of calcium and 9295-3975 IU of vitamin D per day. It is possible to meet your calcium requirement with diet alone, but a vitamin D supplement is usually necessary to meet this goal.  When exposed to the sun, use a sunscreen that protects against both UVA and UVB radiation with an SPF of 30 or greater. Reapply every 2 to 3 hours or after sweating, drying off with a towel, or swimming. Always wear a seat belt when traveling in a car. Always wear a helmet when riding a bicycle or motorcycle.

## 2022-09-28 NOTE — PROGRESS NOTES
Medicare Annual Wellness Visit    Bharath Davis is here for Medicare AWV    Assessment & Plan   Medicare annual wellness visit, subsequent  Need for influenza vaccination  -     Influenza, FLUZONE HIGH-DOSE, (age 72 y+), IM, Preservative Free, 0.7 mL  Benign essential HTN  Pulmonary HTN (HCC)  Chronic diastolic congestive heart failure (HCC)  Stage 3a chronic kidney disease (HCC)  Severe mitral regurgitation  Permanent atrial fibrillation ([address]    Recommendations for Preventive Services Due: see orders and patient instructions/AVS.  Recommended screening schedule for the next 5-10 years is provided to the patient in written form: see Patient Instructions/AVS.     Return in about 4 months (around 1/28/2023) for htn chf .     Subjective         80 y.o. female with hx  Of HTN, hyperlipidemia, hyperthyroidism, AFibb  CKD, pulm HTN, pulmonary nodules here for medicare wellness visit       Hx of pulm HTN with Diastolic CHF - last  echo with moderate AR and TR, moderate Pulm HTN       Was admitted to  [facility] - 6/15 for CHF  - ECHo with moderate to severe MR, moderate AR. Cardiology consulted . SANFORD with moderate MR and    At [facility] , cath showed non obstructive disease and medical mx advised. meds adjusted     at [facility] on 6/24 - had right heart cath showing precapillary pulm HTN and referred to pulm  Had iv diuresis with 15 lb weight loss ,cut down coreg to half ,added entresto and  Jardiance to lasix   Since last time pt has been having ongoing workup at [facility] [place] for pulm HTN, her connective disorder workup has been neg, normal PFT. And planning for [place] screen soon  She  has been doing much better since last time   Lost about 20 lbs , breathing has improved. Able to lay flat   No hypoxia. Pedal edema resolved      HTN -. Currently on coreg,  entresto and remains off norvasc .doing much better     Afibb, chronic and permanent -rate cotnrolled.  On coreg bid, ELIQUIS  No bleeding issues    Lives with daughter, no more falls  Independent of ADL and iADL  No depression issues      Patient's complete Health Risk Assessment and screening values have been reviewed and are found in Flowsheets. The following problems were reviewed today and where indicated follow up appointments were made and/or referrals ordered. Positive Risk Factor Screenings with Interventions:   Fall Risk Interventions:    Home safety tips provided     Cognitive Impairment Interventions:  None needed           General Health and ACP:  General  In general, how would you say your health is?: Fair  In the past 7 days, have you experienced any of the following: New or Increased Pain, New or Increased Fatigue, Loneliness, Social Isolation, Stress or Anger?: No  Do you get the social and emotional support that you need?: Yes  Do you have a Living Will?: Yes    Advance Directives       Power of  Living Will ACP-Advance Directive ACP-Power of     Not on File Not on File Not on File Not on File        General Health Risk Interventions:  No Living Will: Advance Care Planning addressed with patient today    Health Habits/Nutrition:  Physical Activity: Inactive    Days of Exercise per Week: 1 day    Minutes of Exercise per Session: 0 min     Have you lost any weight without trying in the past 3 months?: No  Body mass index: (!) 31.78  Have you seen the dentist within the past year?: (!) No  Health Habits/Nutrition Interventions:  Inadequate physical activity:  patient is not ready to increase his/her physical activity level at this time    Hearing/Vision:  Do you or your family notice any trouble with your hearing that hasn't been managed with hearing aids?: No  Do you have difficulty driving, watching TV, or doing any of your daily activities because of your eyesight?: No  Have you had an eye exam within the past year?: (!) No  No results found.   Hearing/Vision Interventions:  Vision concerns:  patient encouraged to make appointment with his/her eye specialist            Objective   Vitals:    09/28/22 0937   Weight: 191 lb (86.6 kg)   Height: 5' 5\" (1.651 m)      Body mass index is 31.78 kg/m². Allergies   Allergen Reactions    Zithromax [Azithromycin] Rash     Pt was started on IV Zithromax and 25 min into infusion started itching and had a rash on her arm. Hydrocodone Hives    Hydrocodone Bitartrate Hives    Lisinopril Other (See Comments)     Cough    Hydrocodone Rash    Naproxen Rash     Prior to Visit Medications    Medication Sig Taking?  Authorizing Provider   ENTRESTO 49-51 MG per tablet Take 1 tablet by mouth daily  Historical Provider, MD   apixaban (ELIQUIS) 5 MG TABS tablet TAKE 1 TABLET TWICE A DAY  Bhavya Erickson MD   traZODone (DESYREL) 50 MG tablet TAKE 1 TABLET NIGHTLY  Bhavya Erickson MD   allopurinol (ZYLOPRIM) 100 MG tablet TAKE 1 TABLET TWICE A DAY  Bhavya Erickson MD   Magnesium Oxide 400 MG CAPS Take 1 capsule by mouth once daily for 10 days  Bhavya Erickson MD   albuterol sulfate HFA (PROVENTIL;VENTOLIN;PROAIR) 108 (90 Base) MCG/ACT inhaler Inhale 2 puffs into the lungs every 6 hours as needed  Historical Provider, MD   empagliflozin (JARDIANCE) 10 MG tablet Take 10 mg by mouth daily  Historical Provider, MD   furosemide (LASIX) 20 MG tablet Take 1 tablet by mouth 2 times daily  Bhavya Erickson MD   potassium chloride (KLOR-CON M) 20 MEQ extended release tablet Take 1 tablet by mouth 2 times daily (with meals)  KWAME Cool - CNP   Cholecalciferol (VITAMIN D3) 50 MCG (2000 UT) CAPS Take 2,000 Units by mouth daily  Historical Provider, MD   carvedilol (COREG) 12.5 MG tablet TAKE 2 TABLETS TWICE A DAY WITH MEALS  Patient taking differently: 6.25 mg 2 times daily (with meals) TAKE 2 TABLETS TWICE A DAY WITH MEALS  Bhavya Erickson MD   atorvastatin (LIPITOR) 20 MG tablet TAKE 1 TABLET NIGHTLY  Bhavya Erickson MD   Multiple Vitamins-Minerals (THERAPEUTIC MULTIVITAMIN-MINERALS) tablet Take 1 tablet by mouth daily  Historical Provider, MD   acetaminophen (TYLENOL) 325 MG tablet Take 650 mg by mouth every 6 hours as needed for Pain  Historical Provider, MD       General: elderly female healthy appearing  Awake, alert and oriented. Appears to be not in any distress  Mucous Membranes:  Pink , anicteric  Neck: No JVD, no carotid bruit, no thyromegaly  Chest:  Clear to auscultation bilaterally, no added sounds  Cardiovascular:  Irregular , S1S2 heard, no murmurs or gallops  Abdomen:  Soft, undistended, non tender, no organomegaly, BS present  Extremities:.  Resolved charles pedal edema   Distal pulses well felt  Neurological : grossly normal   CN 2 to 12 intact coordination normal  Gait limping     Testing at [facility]      Ct chest   6/25/2022    1. Minimal pulmonary edema 2. Geographic areas of groundglass opacity along the also be related to pulmonary edema. Asthma or pulmonary hypertension contact this appearance. No evidence of fibrotic lung disease 3. Moderate coronary artery calcifications. Cath right heart     CARDIAC CATHETERIZATION  : 6/24/2022  : 1) Moderate pre-capillary pulmonary HTN 2) Normal cardiac output and index 3) No evidence of intracardiac shunt (neg bubble study on SANFROD) 4) Normal left sided filling pressures 5) Normal right basilic vein by ultrasound 6) Abnormal ventilation scan (V/Q) at [address] [facility] this month PLAN: 1) Recommend pulmonary evaluation 2) Continue HFpEF medical mgmt 3) THI rounding service is following 4) Upon discharge, fu with the ADHF service 5) FU transthoracic echo within 6 months [address], [place], [address] [facility], [address] [facility]         Angiogram 6/14      Procedure Findings:  1. Mild non-obstructive coronary artery disease. 2. Normal left ventricular function with EF estimated at 55-60%  3. Normal left heart hemodynamics    SAN[place]ORD   Ejection fraction is visually estimated to be 55-60 %. Moderate mitral regurgitation.    Mild to moderate aortic regurgitation. Severe bi-atrial enlargement. There is no evidence of mass or thrombus in the left atrium or appendage. A bubble study was performed and fails to show evidence of shunting. ECHO      Summary   Left ventricular systolic function is normal with ejection fraction   estimated at 55 %. Inferior wall hypokinesis. All remaining wall segments appear normal in function. Left ventricular size is mildly increased. Elevated left ventricular diastolic filling pressure: Septal E/e'' = 13.7 . The left atrium is severely dilated. The right atrium is severely dilated. Mild anterior and posterior mitral annular calcification is present. Severe mitral regurgitation. Moderate aortic regurgitation is present. Severe tricuspid regurgitation. Systolic pulmonary artery pressure (SPAP) estimated at 67 mmHg (RA pressure   15 mmHg), consistent with severe pulmonary hypertension. 7- 55-60%, DD, LAE< BRII, RVE, Asc Ao 3.8, MAC, Mod MR, AI and TR,   SPAP 53 mmHg. MRI cardiac       1. No evidence of amyloidosis or infiltration. The LV is normal in size and function. The catheter LVEF is 56%. 2. Massive left atrial enlargement. There is mild to moderate associated mitral regurgitation. 3. Severe right atrial enlargement. Mild tricuspid regurgitation. The pulmonary artery is normal in size. 4. Mild aortic insufficiency. Wt Readings from Last 3 Encounters:   09/28/22 191 lb (86.6 kg)   07/01/22 193 lb (87.5 kg)   06/17/22 209 lb (94.8 kg)         Assessment:         Diagnosis Orders   1. Medicare annual wellness visit, subsequent        2. Need for influenza vaccination  Influenza, FLUZONE HIGH-DOSE, (age 72 y+), IM, Preservative Free, 0.7 mL      3. Benign essential HTN        4. Pulmonary HTN ([address]        5. Chronic diastolic congestive heart failure (HCC)        6. Stage 3a chronic kidney disease ([address]        7. Severe mitral regurgitation        8.  Permanent atrial fibrillation ([facility]                Plan:           Acute on chronic diastolic CHF , preserved EF   Pulmonary HTn noted on right heart cath  Moderate AR and TR       - on lasix 20 mg bid , Entresto 49/51 mg bid , jardiance 10 mg   - doing much better and improved now   - pedal edema and dyspnea resolved. Weight lost by 20 lbs   - connective disorder workup neg   - referred to pulm for workup of pum HTN, ZOILA screen neg. Ct chest as above  - might need ROXY screen but pt mentions she wont use cpap  - Monitor weights and low salt diet    CAD - non obstructive cath  Continue statins- remains asymptomatic   Cath as above     HTN  - stable on coreg,  Entresto Jardiance  Off aldacatone  Has appt with cards next week       H.o CVA - 2007 - now off ASA for being on eliquis . No issues     Afibb, chronic and permanent -rate cotnrolled. On coreg bid, ELIQUIS  No bleeding issues    Hyperlipidemia - on statins- need lipids     Gout - with no recurrence - continue allopurinol    Mild anemia - on iron infusions now by hematology        bnp , bmp today   See me in 1 month     Low salt diet  Daily weights  CAD - f/w Dr. Mary Mtz. On , statins- remains asymptomatic   Stress test neg 7/18      H.o CVA - 2007 - off ASA for being on eliquis . No issues     Afibb, chronic and permanent -rate cotnrolled.  On coreg bid, ELIQUIS  No bleeding issues    Hyperlipidemia - on statins- need lipids when fasting    Gout - with no recurrence - continue allopurinol     avoid falls  Need eye exam   Had covid vaccines  Need shingrix  Daughter will be the POA  Avoid falls        CareTeam (Including outside providers/suppliers regularly involved in providing care):   Patient Care Team:  Kelly Forde MD as PCP - General (Internal Medicine)  Kelly Forde MD as PCP - [facility]  Chalino Morin MD as Consulting Physician (Cardiology)  Nevaeh Conn MD as Consulting Physician (Pulmonology)  Jewels Agarwal MD as Consulting Physician (Otolaryngology)  Aleah Escudero MD (Orthopedic Surgery)     Reviewed and updated this visit:  Allergies  Meds  Problems

## 2022-10-04 RX ORDER — FUROSEMIDE 20 MG/1
20 TABLET ORAL 2 TIMES DAILY
Qty: 180 TABLET | Refills: 0 | Status: SHIPPED | OUTPATIENT
Start: 2022-10-04

## 2022-10-11 RX ORDER — SACUBITRIL AND VALSARTAN 49; 51 MG/1; MG/1
1 TABLET, FILM COATED ORAL DAILY
Qty: 90 TABLET | Refills: 0 | Status: SHIPPED | OUTPATIENT
Start: 2022-10-11

## 2022-10-17 ENCOUNTER — TELEPHONE (OUTPATIENT)
Dept: INTERNAL MEDICINE CLINIC | Age: 82
End: 2022-10-17

## 2022-10-17 RX ORDER — ALBUTEROL SULFATE 90 UG/1
2 AEROSOL, METERED RESPIRATORY (INHALATION) EVERY 6 HOURS PRN
Qty: 18 G | Refills: 0 | Status: SHIPPED | OUTPATIENT
Start: 2022-10-17 | End: 2022-10-19 | Stop reason: SDUPTHER

## 2022-10-17 NOTE — TELEPHONE ENCOUNTER
----- Message from Rhonda Moses MD sent at 10/16/2022  9:57 AM EDT -----  Contact: Hannah Hall Daughter [phone]  Ok to refill 2 puff q6 hr prn    ----- Message -----  From: Juve Rao  Sent: 10/14/2022   3:18 PM EDT  To: Rhonda Moses MD      ----- Message -----  From: Hailee David  Sent: 10/14/2022   2:54 PM EDT  To: Katheryn Olivares MD    Patient of Dr. Candace Archer  Patient is requesting albuterol sulfate HFA (PROVENTIL;VENTOLIN;PROAIR) 108 (90 Base) MCG/ACT inhaleralbuterol sulfate HFA (PROVENTIL;VENTOLIN;PROAIR) 108 (90 Base) MCG/ACT inhaler. Caller states patient received this at the hospital and was told to follow up with her pcp to have it refilled.    Nelson Easton
No

## 2022-10-19 RX ORDER — ALBUTEROL SULFATE 90 UG/1
2 AEROSOL, METERED RESPIRATORY (INHALATION) EVERY 6 HOURS PRN
Qty: 3 EACH | Refills: 0 | Status: SHIPPED | OUTPATIENT
Start: 2022-10-19

## 2022-10-19 RX ORDER — CALCIUM CARBONATE/VITAMIN D3 500-10/5ML
LIQUID (ML) ORAL
Qty: 90 CAPSULE | Refills: 0 | Status: SHIPPED | OUTPATIENT
Start: 2022-10-19

## 2022-10-19 NOTE — TELEPHONE ENCOUNTER
----- Message from Lee Ann Held sent at 10/19/2022 11:45 AM EDT -----  Contact: Annabellecrista Solo [phone]  Patient is requesting refill for Magnesium Oxide 400 MG CAPS, albuterol sulfate HFA (PROVENTIL;VENTOLIN;PROAIR) 108 (90 Base) MCG/ACT inhaler, empagliflozin (JARDIANCE) 10 MG tablet.   [address] [phone] Bandar Gaines [phone] (Ph: [phone])  3/16/2023 10:20 AM OFFICE VISIT [facility]

## 2022-11-14 RX ORDER — SACUBITRIL AND VALSARTAN 49; 51 MG/1; MG/1
1 TABLET, FILM COATED ORAL DAILY
Qty: 30 TABLET | Refills: 0 | Status: SHIPPED | OUTPATIENT
Start: 2022-11-14 | End: 2022-11-15 | Stop reason: SDUPTHER

## 2022-11-14 NOTE — TELEPHONE ENCOUNTER
----- Message from Le Metz sent at 11/14/2022 12:32 PM EST -----  Contact: Luis Rubi  [phone]  Needs refills      ENTRESTO 49-51 MG per tablet Take 1 tablet by mouth daily    [address], [address] (Ph: [phone])      Patient states that it will take seven days to get the medication. So will need a few to hold her until then from     Yanick Pagan - P [phone] - F [phone] (Ph: [phone])

## 2022-11-15 RX ORDER — SACUBITRIL AND VALSARTAN 49; 51 MG/1; MG/1
1 TABLET, FILM COATED ORAL DAILY
Qty: 90 TABLET | Refills: 0 | Status: SHIPPED | OUTPATIENT
Start: 2022-11-15

## 2022-11-15 RX ORDER — SACUBITRIL AND VALSARTAN 49; 51 MG/1; MG/1
1 TABLET, FILM COATED ORAL DAILY
Qty: 90 TABLET | Refills: 0 | OUTPATIENT
Start: 2022-11-15

## 2022-12-05 RX ORDER — TRAZODONE HYDROCHLORIDE 50 MG/1
TABLET ORAL
Qty: 90 TABLET | Refills: 0 | Status: SHIPPED | OUTPATIENT
Start: 2022-12-05

## 2022-12-05 RX ORDER — ALLOPURINOL 100 MG/1
TABLET ORAL
Qty: 180 TABLET | Refills: 0 | Status: SHIPPED | OUTPATIENT
Start: 2022-12-05

## 2022-12-12 RX ORDER — ATORVASTATIN CALCIUM 20 MG/1
TABLET, FILM COATED ORAL
Qty: 90 TABLET | Refills: 0 | Status: SHIPPED | OUTPATIENT
Start: 2022-12-12 | End: 2023-01-09 | Stop reason: SDUPTHER

## 2022-12-16 RX ORDER — SACUBITRIL AND VALSARTAN 49; 51 MG/1; MG/1
1 TABLET, FILM COATED ORAL DAILY
Qty: 90 TABLET | Refills: 0 | Status: SHIPPED | OUTPATIENT
Start: 2022-12-16

## 2022-12-16 RX ORDER — CARVEDILOL 12.5 MG/1
TABLET ORAL
Qty: 360 TABLET | Refills: 0 | Status: SHIPPED | OUTPATIENT
Start: 2022-12-16

## 2022-12-16 NOTE — TELEPHONE ENCOUNTER
----- Message from [facility] sent at 12/16/2022  8:45 AM EST -----  Contact: Eduardo Monson [phone]  Patient needs the following refills:   atorvastatin (LIPITOR) 20 MG tablet    traZODone (DESYREL) 50 MG tablet    allopurinol (ZYLOPRIM) 100 MG tablet    apixaban (ELIQUIS) 5 MG TABS tablet    ENTRESTO 49-51 MG per tablet   empagliflozin (JARDIANCE) 10 MG tablet   carvedilol (COREG) 12.5 MG tablet     [facility] - [address], [place] - [address] - P [phone] Marly John [phone]    Thank you

## 2022-12-22 ENCOUNTER — TELEPHONE (OUTPATIENT)
Dept: INTERNAL MEDICINE CLINIC | Age: 82
End: 2022-12-22

## 2023-01-09 RX ORDER — SACUBITRIL AND VALSARTAN 49; 51 MG/1; MG/1
1 TABLET, FILM COATED ORAL DAILY
Qty: 90 TABLET | Refills: 0 | Status: SHIPPED | OUTPATIENT
Start: 2023-01-09

## 2023-01-09 RX ORDER — ATORVASTATIN CALCIUM 20 MG/1
TABLET, FILM COATED ORAL
Qty: 90 TABLET | Refills: 0 | Status: SHIPPED | OUTPATIENT
Start: 2023-01-09

## 2023-01-09 RX ORDER — TRAZODONE HYDROCHLORIDE 50 MG/1
TABLET ORAL
Qty: 90 TABLET | Refills: 0 | Status: SHIPPED | OUTPATIENT
Start: 2023-01-09

## 2023-01-09 RX ORDER — FUROSEMIDE 20 MG/1
20 TABLET ORAL 2 TIMES DAILY
Qty: 180 TABLET | Refills: 0 | Status: SHIPPED | OUTPATIENT
Start: 2023-01-09

## 2023-01-09 RX ORDER — CARVEDILOL 12.5 MG/1
TABLET ORAL
Qty: 360 TABLET | Refills: 0 | Status: SHIPPED | OUTPATIENT
Start: 2023-01-09

## 2023-01-09 RX ORDER — ALLOPURINOL 100 MG/1
TABLET ORAL
Qty: 180 TABLET | Refills: 0 | Status: SHIPPED | OUTPATIENT
Start: 2023-01-09

## 2023-01-11 ENCOUNTER — TELEPHONE (OUTPATIENT)
Dept: INTERNAL MEDICINE CLINIC | Age: 83
End: 2023-01-11

## 2023-01-11 RX ORDER — CALCIUM CARBONATE/VITAMIN D3 500-10/5ML
LIQUID (ML) ORAL
Qty: 90 CAPSULE | Refills: 0 | Status: SHIPPED | OUTPATIENT
Start: 2023-01-11

## 2023-01-17 ENCOUNTER — TELEPHONE (OUTPATIENT)
Dept: INTERNAL MEDICINE CLINIC | Age: 83
End: 2023-01-17

## 2023-01-17 RX ORDER — CARVEDILOL 12.5 MG/1
TABLET ORAL
Qty: 90 TABLET | Refills: 0 | Status: SHIPPED | OUTPATIENT
Start: 2023-01-17

## 2023-01-17 NOTE — TELEPHONE ENCOUNTER
----- Message from Hailey Ha MD sent at 1/17/2023 12:53 PM EST -----  Contact: Mirella Earl [phone]  I have cut down last time in office  Change to 0.5 bid    ----- Message -----  From: Eliecer Petty  Sent: 1/17/2023  10:54 AM EST  To: Hailey Ha MD      ----- Message -----  From: Marta Sosa  Sent: 1/17/2023  10:06 AM EST  To: Eliecer Petty    Patient states when she received below medication the instructions were different than what she was told. Bottle instructs  2 pills in A.M and 2 pills in P.M. She was told 1/2 in a.m. and 1/2 in p.m. please call patient to clarify.         carvedilol (COREG) 12.5 MG tablet     [facility] ([address] [facility]) [address] [facility] [phone] - F [phone]   [address] [address]   Phone:  [phone]  Fax:  [phone]

## 2023-01-17 NOTE — TELEPHONE ENCOUNTER
----- Message from Jaclyn Ruff MD sent at 1/17/2023 12:53 PM EST -----  Bid    ----- Message -----  From: Joe Garza  Sent: 1/17/2023   9:01 AM EST  To: Jaclyn Ruff MD    Cici Posey was written for one tab daily. Pharmacy states this is normally dosed twice daily. Please advise.      [facility] fax

## 2023-01-17 NOTE — TELEPHONE ENCOUNTER
----- Message from Yamilka Velázquez MD sent at 1/17/2023 12:53 PM EST -----  Contact: Shahla Olguin [phone]  I have cut down last time in office  Change to 0.5 bid    ----- Message -----  From: Fab Montes  Sent: 1/17/2023  10:54 AM EST  To: Yamilka Velázquez MD      ----- Message -----  From: Miriam Cordon  Sent: 1/17/2023  10:06 AM EST  To: Fab Montes    Patient states when she received below medication the instructions were different than what she was told. Bottle instructs  2 pills in A.M and 2 pills in P.M. She was told 1/2 in a.m. and 1/2 in p.m. please call patient to clarify.         carvedilol (COREG) 12.5 MG tablet     [facility] ([facility]) [address] [facility] [phone] - F [phone]   [address] [address]   Phone:  [phone]  Fax:  [phone]

## 2023-02-27 RX ORDER — FUROSEMIDE 20 MG/1
TABLET ORAL
Qty: 180 TABLET | Refills: 1 | Status: SHIPPED | OUTPATIENT
Start: 2023-02-27

## 2023-02-27 RX ORDER — EMPAGLIFLOZIN 10 MG/1
TABLET, FILM COATED ORAL
Qty: 90 TABLET | Refills: 1 | Status: SHIPPED | OUTPATIENT
Start: 2023-02-27

## 2023-02-27 RX ORDER — ATORVASTATIN CALCIUM 20 MG/1
TABLET, FILM COATED ORAL
Qty: 90 TABLET | Refills: 1 | Status: SHIPPED | OUTPATIENT
Start: 2023-02-27

## 2023-02-28 DIAGNOSIS — M25.512 LEFT SHOULDER PAIN, UNSPECIFIED CHRONICITY: Primary | ICD-10-CM

## 2023-02-28 RX ORDER — TRAMADOL HYDROCHLORIDE 50 MG/1
50 TABLET ORAL 2 TIMES DAILY PRN
Qty: 20 TABLET | Refills: 0 | Status: SHIPPED | OUTPATIENT
Start: 2023-02-28 | End: 2023-03-10

## 2023-02-28 NOTE — TELEPHONE ENCOUNTER
----- Message from Marti Glass sent at 2/28/2023  2:11 PM EST -----  Contact: Lazarus Micky [phone]    ----- Message -----  From: Hazel Bautista MD  Sent: 2/28/2023   1:12 PM EST  To: Jud Hunter    Cannot take nsaids with eliquis  Tramadol 50 mg bid prn #20  Check oarrs    ----- Message -----  From: Je Corey  Sent: 2/28/2023  10:31 AM EST  To: Hazel Bautista MD    Caller states pt is having a lot of pain in her left shoulder. Caller states she believes if the effect of pt stroke. Pt requesting medication for pain until made appointment 3-6.  Please advise       [facility]

## 2023-03-01 NOTE — PROGRESS NOTES
History of Present Illness:  Luis Carlos Mata is a 68 y.o. female who returns today for evaluation treatment of her ongoing right knee osteoarthritis. The patient continues to have right knee pain secondary to her osteoarthritis. Completed around a Visco supplementation back in October 2017. This provided her particular pain relief up until recently. She's been having more pain and discomfort and is having increasing episodes of instability and giving way. She's having more problems with standing and getting up out of a seated position. After providing factors include walking, standing, stairs, etc.  She reports her level pain to be a 7/10. Prior to the Visco supplementation injections we've tried bracing, cortisone injections, and oral medications with minimal improvement. He comes in today ready discussed total knee replacement surgery. PAIN:   Pain Assessment  Location of Pain: Knee  Location Modifiers: Right  Severity of Pain: 7  Quality of Pain: Sharp, Aching, Dull, Buckling  Duration of Pain: Persistent  Frequency of Pain: Intermittent  Aggravating Factors: Walking, Standing, Bending, Straightening, Stairs  Limiting Behavior: Some  Relieving Factors: Rest     The patients chronic pain has gradually worsening over the last 3 years.  The patient rates pain on a level of 7/10.       Pain impacts patients ability to drive, sleep and climb stairs.        Medical History:     Past Medical History        Past Medical History:   Diagnosis Date    Arthritis      Atrial fibrillation ([address]      CAD (coronary artery disease)       a-fib    Cutaneous skin tags 4/8/2013    Depression      Gout      Grief reaction 4/8/2013    Hyperlipidemia      Hypertension      Obesity      Osteoarthritis      Thyroid disease      Unspecified cerebral artery occlusion with cerebral infarction 01/2007               Patient Active Problem List     Diagnosis Date Noted    Atrial fibrillation ([address] 11/30/2012       Nutrition Services    Patient Name:  Albert Yadav  YOB: 1938  MRN: [id number]  Admit Date:  2/28/2023  Assessment Date:  03/01/23    Comment: Nutrition screen completed per skin report.   Dx: Covid-19 infection  Pt with No Pressure Injury on this admission  Speech eval done. Pt on consistent Carb diet with Regular liquids  Pt reports no appetite at this time.  He is not interested in any supplements.  Good po encouraged.    Will continue to follow clinical course and monitor nutritional needs.     CLINICAL NUTRITION ASSESSMENT      Reason for Assessment Other: skin report     Diagnosis/Problem   COVID-19   Medical/Surgical History Past Medical History:   Diagnosis Date   • Anesthesia     MILD VIOLENT BEHAVIOR AFTER ANESTHESIA LEG SURGERY   • Arthritis of back 10 years ago   • At risk for sleep apnea     STOP BANG = 5   • Broken bones     arm, collar bone, ankle   • Carotid artery disorder (HCC)    • Cervical disc disorder ?   • Cholelithiasis 1978    Gall bladder removed   • Diabetes mellitus (HCC) 2005    Type 2   • Falls    • Fracture of ankle 1972   • Fracture of wrist 1947   • Fracture, clavicle 26 years ago   • Fracture, foot 26 year ago   • Groin rash     PT STATES LEFT GROIN AT TIMES   • History of transfusion    • Hyperlipidemia    • Hypertension    • Low back pain    • Low back strain ?   • Lumbar spinal stenosis    • Numbness and tingling     RIGHT ARM, RIGHT LEG & FOOT   • Peripheral neuropathy    • Peripheral vascular disease (HCC)    • Retroperitoneal mass 11/2022    LEFT   • Rheumatoid arthritis (HCC)     HANDS DIALLO   • Staph infection     2015  [facility] POST SX   • Tendency toward bleeding easily (HCC)     due to blood thinners       Past Surgical History:   Procedure Laterality Date   • ADRENALECTOMY Left 12/20/2022    Procedure: LAPAROSCOPIC EXCISION LEFT RETROPERITONEAL MASS;  Surgeon: Moy Vargas MD;  Location: [facility] OR;  Service: General;  Laterality: Left;   •  "ANGIOPLASTY FEMORAL ARTERY Left 11/07/2016    Procedure: ULTRA SOUND ACCESS RIGHT FEMORAL ARTERY, AIF BILATERAL RUNOFF, SELECTIVE CATHETERIZATION LEFT FEMORAL ARTERY.;  Surgeon: Errol Michael MD;  Location: [facility] OR 18/19;  Service:    • ANKLE OPEN REDUCTION INTERNAL FIXATION  1980   • ARTERIOVENOUS FISTULA/SHUNT SURGERY Left 12/13/2016    Procedure: LEFT ILEO-FEMORAL GORTEX GRAFT AND LEFT LEG ARERIOGRAM ;  Surgeon: Errol Michael MD;  Location: [facility] OR 18/19;  Service:    • CAROTID ENDARTERECTOMY Right 09/29/2020    Procedure: CAROTID ENDARTERECTOMY;  Surgeon: James Graham MD;  [facility]: [facility] MAIN OR;  Service: Vascular;  Laterality: Right;   • CHOLECYSTECTOMY     • EPIDURAL BLOCK     • EYE SURGERY Left     as child   • FRACTURE SURGERY Left     arm   • ILIAC ARTERY STENT Left 2020   • KNEE SURGERY Left     ORIF   • ORIF ANKLE FRACTURE Left         Encounter Information        Nutrition History:  Pt reports no appetite   Food Preferences:    Supplements:    Factors Affecting Intake: No appetite     Anthropometrics        Current Height  Current Weight  BMI kg/m2 Height: 182.9 cm (72\")  Weight: 79.5 kg (175 lb 4.3 oz) (03/01/23 0502)  Body mass index is 23.77 kg/m².   Adjusted BMI (if applicable)        Admission Weight        Ideal Body Weight (IBW) 77.6 kg   Adjusted IBW (if applicable)        Usual Body Weight (UBW) See below           Weight History Wt Readings from Last 30 Encounters:   03/01/23 0502 79.5 kg (175 lb 4.3 oz)   02/28/23 2338 80 kg (176 lb 5.9 oz)   02/28/23 2046 72.6 kg (160 lb)   01/24/23 0804 49.4 kg (109 lb)   01/10/23 1326 83.9 kg (185 lb)   12/20/22 0612 84 kg (185 lb 3 oz)   12/08/22 0206 84.9 kg (187 lb 2.7 oz)   12/08/22 1146 84.8 kg (187 lb)   11/29/22 0851 86 kg (189 lb 8 oz)   10/13/22 0954 82.6 kg (182 lb)   07/23/22 0526 85.1 kg (187 lb 11.2 oz)   07/21/22 0535 85.5 kg (188 lb 9.6 oz)   07/20/22 0613 86 kg (189 lb 9.6 oz)   07/19/22 2314 83.9 " Pulse: 68         Body mass index is 35.67 kg/m².     Limitation in Activities of Daily Living (ADLs)  The patient is able to ambulate without the assistance of cane and walker for 5 steps  steps/feet.       Walking distance less than 1 block     Patient needs assistance with activities of daily living bathing and cooking.      Extended Care / [facility]: No     Safety Issues: Lives alone, Home safety issues, Difficulty with daily activities and Risk of falls  Patient is at risk for falls/fall history: Yes     General Exam:   Constitutional: Patient is adequately groomed with no evidence of malnutrition  DTRs: Deep tendon reflexes are intact  Mental Status: The patient is oriented to time, place and person. The patient's mood and affect are appropriate. Lymphatic: The lymphatic examination bilaterally reveals all areas to be without enlargement or induration. Vascular: Examination reveals no swelling or calf tenderness. Peripheral pulses are palpable and 2+. Neurological: The patient has good coordination. There is no weakness or sensory deficit.     Right Knee Examination:     Inspection:  No erythema or signs of infection. There are no cutaneous lesions. Mild valgus deformity     Palpation:  There is tenderness to palpation along the medial and lateral joint line.     Range of Motion:  10 extension to 110 of active flexion.     Strength:  4+/5 quadriceps and hamstrings     Special Tests: The knee is stable to varus valgus stressing/anterior posterior drawer. Negative Homans test.                                  Skin: There are no rashes, ulcerations or lesions.     Gait: mildly antalgic favoring the right side     Reflex 2+ patellar     Additional Comments:     Examination of the left knee reveals intact skin. There is no focal tenderness. The patient demonstrates full painless range of motion with regard to flexion and extension. There is a mild valgus deformity.   Strength is 5/5 kg (185 lb)   07/19/22 1806 85.3 kg (188 lb 0.8 oz)   07/21/22 0635 85.3 kg (188 lb)   03/16/22 1216 85.3 kg (188 lb)   10/15/21 0755 87.1 kg (192 lb)   03/08/21 0739 93.4 kg (206 lb)   01/20/21 1227 93.9 kg (207 lb)   01/18/21 1416 93.9 kg (207 lb)   12/22/20 0739 90.7 kg (200 lb)   12/15/20 1334 94.1 kg (207 lb 6.4 oz)   09/29/20 1700 93 kg (205 lb 0.4 oz)   09/25/20 0805 93 kg (205 lb)   06/09/20 0812 94.3 kg (208 lb)   11/20/19 0801 93 kg (205 lb)   09/20/19 0908 91.6 kg (202 lb)   07/10/19 0755 91.6 kg (202 lb)   03/07/19 0744 89.8 kg (198 lb)   08/27/18 0745 89.8 kg (198 lb)   05/09/18 0743 92.5 kg (204 lb)   11/29/17 1323 97.1 kg (214 lb)   11/20/17 2245 94.8 kg (209 lb)   11/17/17 1058 96.6 kg (213 lb)   09/07/17 0732 94.8 kg (209 lb)           --  Tests/Procedures        Tests/Procedures MRI     Labs       Pertinent Labs    Results from last 7 days   Lab Units 03/01/23  0839 02/28/23  2119   SODIUM mmol/L 135* 134*   POTASSIUM mmol/L 4.0 4.0   CHLORIDE mmol/L 100 102   CO2 mmol/L 21.7* 23.0   BUN mg/dL 18 19   CREATININE mg/dL 1.28* 1.36*   CALCIUM mg/dL 8.0* 8.4*   BILIRUBIN mg/dL 0.6 0.4   ALK PHOS U/L 112 122*   ALT (SGPT) U/L 25 21   AST (SGOT) U/L 24 16   GLUCOSE mg/dL 125* 122*     Results from last 7 days   Lab Units 03/01/23  0839   HEMOGLOBIN g/dL 12.8*   HEMATOCRIT % 37.4*   WBC 10*3/mm3 7.04   TRIGLYCERIDES mg/dL 71   ALBUMIN g/dL 3.2*     Results from last 7 days   Lab Units 03/01/23  0839 02/28/23  2119   PLATELETS 10*3/mm3 198 181     COVID19   Date Value Ref Range Status   02/28/2023 Detected (C) Not Detected - Ref. Range Final     Lab Results   Component Value Date    HGBA1C 6.00 (H) 03/01/2023          Medications           Scheduled Medications aspirin, 81 mg, Oral, Daily  atorvastatin, 80 mg, Oral, Nightly  cetirizine, 10 mg, Oral, QAM  clopidogrel, 75 mg, Oral, QAM  enoxaparin, 40 mg, Subcutaneous, Daily  folic acid, 1 mg, Oral, Daily  lisinopril, 40 mg, Oral, Q PM  remdesivir, 200 mg,  "Intravenous, Q24H   Followed by  [START ON 3/2/2023] remdesivir, 100 mg, Intravenous, Q24H  sodium chloride, 10 mL, Intravenous, Q12H  traZODone, 50 mg, Oral, Nightly       Infusions Pharmacy Consult - Remdesivir,        PRN Medications •  acetaminophen **OR** acetaminophen  •  albuterol sulfate HFA  •  benzonatate  •  dextromethorphan polistirex ER  •  ipratropium-albuterol  •  ondansetron  •  Pharmacy Consult - Remdesivir  •  [COMPLETED] Insert Peripheral IV **AND** sodium chloride  •  sodium chloride  •  sodium chloride     Physical Findings          Physical Appearance alert mild-moderate expressive aphasia.   Oral/Mouth Cavity WNL   Edema  no edema   Gastrointestinal last bowel movement: 2/28   Skin  skin intact   Tubes/Drains none   NFPE Not applicable at this time   -  Estimated/Assessed Needs       Energy Requirements    Height for Calculation  Height: 182.9 cm (72\")   Weight for Calculation 79.5 kg   Method for Estimation  30 kcal/kg   EST Needs (kcal/day) 2385       Protein Requirements    Weight for Calculation 79.56 kg   EST Protein Needs (g/kg) 1.0 gm/kg   EST Daily Needs (g/day) 80       Fluid Requirements     Method for Estimation 1 mL/kcal    Estimated Needs (mL/day) 2385       Fluid Deficit    Current Na Level (mEq/L)    Desired Na Level (mEq/L)    Estimated Fluid Deficit (L)     -  Current Nutrition Orders & Evaluation of Intake       Oral Nutrition     Food Allergies NKFA   Current PO Diet Diet: Diabetic Diets; Consistent Carbohydrate; Texture: Regular Texture (IDDSI 7); Fluid Consistency: Thin (IDDSI 0)   Supplement    PO Evaluation     % PO Intake Decrease intake    # of Days Evaluated    --  PES STATEMENT / NUTRITION DIAGNOSIS      Nutrition Dx Problem  Problem: Inadequate Nutrient Intake  Etiology: poor appetite  Signs/Symptoms: Report/observed    Comment:    --  NUTRITION INTERVENTION / PLAN OF CARE      Intervention Goal(s) Maintain nutrition status, Tolerate PO , Maintain weight and PO " benefits, and potential complications of knee replacement arthroplasty surgery. The patient voiced their understanding to concerns that include infection, deep vein thrombosis, neurological injury, and delayed rehabilitation. The patient also realizes that there are concerns regarding the potential need for manipulation under anesthesia if range of motion proves to be problematic. The patient also understands that there is always a chance of dystrophy and anesthetic complications that would include a stroke, cardiopulmonary pathology, and even death. We also discussed the rehabilitation process involved with this operation and options that involved not only the hospitalization but outpatient physical therapy and independent home exercise program.  The patient also realizes the need for a knee brace and ambulatory aids in the rehabilitation process as well as the very significant role that the patient plays in terms of rehabilitation after this type of operation. The patient also understands that although the patient typically functional by 6-8 weeks postop that it may take 9 months to year for full recovery. All questions were answered. intake goal %: 75         RD Intervention/Action Advise alternative selection, Advise available snack, Menu provided, Supplement offered/refused and Follow Tx Progress     --      Monitor/Evaluation Per protocol   Discharge Plan/Needs Pending clinical course   Education Will instruct as appropriate   --    RD to follow per protocol.      Electronically signed by:  Nuris Rosado RD  03/01/23 11:18 EST

## 2023-03-02 ENCOUNTER — TELEPHONE (OUTPATIENT)
Dept: INTERNAL MEDICINE CLINIC | Age: 83
End: 2023-03-02

## 2023-03-02 RX ORDER — LIDOCAINE 4 G/G
1 PATCH TOPICAL DAILY
Qty: 30 PATCH | Refills: 0 | Status: SHIPPED | OUTPATIENT
Start: 2023-03-02 | End: 2023-04-01

## 2023-03-02 NOTE — TELEPHONE ENCOUNTER
----- Message from [facility] sent at 3/2/2023  4:40 PM EST -----  Contact: Juan Wilson [phone]    ----- Message -----  From: Francisco Hendrix MD  Sent: 3/2/2023   3:19 PM EST  To: Monica Reyna    Yes can do lidocaine 4 % patches daily    ----- Message -----  From: Farley Bernheim  Sent: 3/2/2023   2:59 PM EST  To: Francisco Hendrix MD    Daughter of pt states that pt is nauseated from traMADol (ULTRAM) 50 MG tablet. Daughter is curious if she can request lidocaine for the pt instead. Please advise. [place], [place] [facility] Angelica Acevedo [phone] - F [phone]   [address] [place] [address]   Phone:  [phone]  Fax:  [phone]

## 2023-03-02 NOTE — TELEPHONE ENCOUNTER
----- Message from Trip Vick MD sent at 3/2/2023  3:18 PM EST -----  Contact: Jessica Love [phone]  Yes can do lidocaine 4 % patches daily    ----- Message -----  From: Doreen Ellis  Sent: 3/2/2023   2:59 PM EST  To: Trip Vick MD    Daughter of pt states that pt is nauseated from traMADol (ULTRAM) 50 MG tablet. Daughter is curious if she can request lidocaine for the pt instead. Please advise. Yanick Pagan [phone] - F [phone]   [address]  [place] [address]   Phone:  [phone]  Fax:  [phone]

## 2023-03-06 ENCOUNTER — ANTI-COAG VISIT (OUTPATIENT)
Dept: PHARMACY | Age: 83
End: 2023-03-06

## 2023-03-06 DIAGNOSIS — Z79.01 LONG TERM CURRENT USE OF ANTICOAGULANT THERAPY: ICD-10-CM

## 2023-03-06 DIAGNOSIS — I48.21 PERMANENT ATRIAL FIBRILLATION (HCC): Primary | ICD-10-CM

## 2023-03-06 RX ORDER — SACUBITRIL AND VALSARTAN 49; 51 MG/1; MG/1
TABLET, FILM COATED ORAL
Qty: 180 TABLET | Refills: 0 | Status: SHIPPED | OUTPATIENT
Start: 2023-03-06

## 2023-03-14 RX ORDER — CARVEDILOL 12.5 MG/1
TABLET ORAL
Qty: 90 TABLET | Refills: 0 | Status: SHIPPED | OUTPATIENT
Start: 2023-03-14

## 2023-03-27 ENCOUNTER — HOSPITAL ENCOUNTER (OUTPATIENT)
Age: 83
Discharge: HOME | End: 2023-03-27
Payer: MEDICARE

## 2023-03-27 ENCOUNTER — HOSPITAL ENCOUNTER (OUTPATIENT)
Dept: GENERAL RADIOLOGY | Age: 83
Discharge: HOME | End: 2023-03-27
Payer: MEDICARE

## 2023-03-27 ENCOUNTER — OFFICE VISIT (OUTPATIENT)
Dept: INTERNAL MEDICINE CLINIC | Age: 83
End: 2023-03-27

## 2023-03-27 VITALS
HEIGHT: 65 IN | DIASTOLIC BLOOD PRESSURE: 80 MMHG | HEART RATE: 60 BPM | SYSTOLIC BLOOD PRESSURE: 136 MMHG | WEIGHT: 194 LBS | RESPIRATION RATE: 14 BRPM | BODY MASS INDEX: 32.32 KG/M2

## 2023-03-27 DIAGNOSIS — M25.512 ACUTE PAIN OF LEFT SHOULDER: ICD-10-CM

## 2023-03-27 DIAGNOSIS — M25.512 ACUTE PAIN OF LEFT SHOULDER: Primary | ICD-10-CM

## 2023-03-27 PROCEDURE — 3074F SYST BP LT 130 MM HG: CPT | Performed by: NURSE PRACTITIONER

## 2023-03-27 PROCEDURE — 99213 OFFICE O/P EST LOW 20 MIN: CPT | Performed by: NURSE PRACTITIONER

## 2023-03-27 PROCEDURE — 1123F ACP DISCUSS/DSCN MKR DOCD: CPT | Performed by: NURSE PRACTITIONER

## 2023-03-27 PROCEDURE — 3078F DIAST BP <80 MM HG: CPT | Performed by: NURSE PRACTITIONER

## 2023-03-27 PROCEDURE — 73030 X-RAY EXAM OF SHOULDER: CPT

## 2023-03-27 RX ORDER — LIDOCAINE 50 MG/G
1 PATCH TOPICAL DAILY
Qty: 10 PATCH | Refills: 0 | Status: SHIPPED | OUTPATIENT
Start: 2023-03-27 | End: 2023-04-06

## 2023-03-27 NOTE — PROGRESS NOTES
HFA (PROVENTIL;VENTOLIN;PROAIR) 108 (90 Base) MCG/ACT inhaler Inhale 2 puffs into the lungs every 6 hours as needed for Wheezing or Shortness of Breath 3 each 0    potassium chloride (KLOR-CON M) 20 MEQ extended release tablet Take 1 tablet by mouth 2 times daily (with meals) 60 tablet 3    Cholecalciferol (VITAMIN D3) 50 MCG (2000 UT) CAPS Take 2,000 Units by mouth daily      Multiple Vitamins-Minerals (THERAPEUTIC MULTIVITAMIN-MINERALS) tablet Take 1 tablet by mouth daily      acetaminophen (TYLENOL) 325 MG tablet Take 650 mg by mouth every 6 hours as needed for Pain       No current facility-administered medications for this visit. Past Medical History  Past Medical History:   Diagnosis Date    Arthritis     Atrial fibrillation ([facility]     CAD (coronary artery disease)     a-fib    Cerebral artery occlusion with cerebral infarction ([facility] 2007    resolved w/ rehab    CHF (congestive heart failure) ([facility]     CKD (chronic kidney disease)     Cutaneous skin tags 4/8/2013    Depression     Gout     Grief reaction 4/8/2013    Hyperlipidemia     Hypertension     Obesity     Osteoarthritis     Pulmonary nodules     Unspecified cerebral artery occlusion with cerebral infarction 01/2007       Social History  Social History     Socioeconomic History    Marital status:       Spouse name: Not on file    Number of children: Not on file    Years of education: Not on file    Highest education level: Not on file   Occupational History    Not on file   Tobacco Use    Smoking status: Never    Smokeless tobacco: Never   Vaping Use    Vaping Use: Never used   Substance and Sexual Activity    Alcohol use: No    Drug use: No    Sexual activity: Not Currently   Other Topics Concern    Not on file   Social History Narrative    ** Merged History Encounter **          Social Determinants of Health     Financial Resource Strain: Not on file   Food Insecurity: Not on file   Transportation Needs: Not on file   Physical Activity:

## 2023-03-28 RX ORDER — TRAZODONE HYDROCHLORIDE 50 MG/1
TABLET ORAL
Qty: 90 TABLET | Refills: 0 | Status: SHIPPED | OUTPATIENT
Start: 2023-03-28

## 2023-03-28 RX ORDER — ALLOPURINOL 100 MG/1
TABLET ORAL
Qty: 180 TABLET | Refills: 0 | Status: SHIPPED | OUTPATIENT
Start: 2023-03-28

## 2023-03-28 RX ORDER — CARVEDILOL 12.5 MG/1
TABLET ORAL
Qty: 90 TABLET | Refills: 0 | OUTPATIENT
Start: 2023-03-28

## 2023-05-22 RX ORDER — CARVEDILOL 12.5 MG/1
TABLET ORAL
Qty: 30 TABLET | Refills: 1 | Status: SHIPPED | OUTPATIENT
Start: 2023-05-22

## 2023-06-30 RX ORDER — SACUBITRIL AND VALSARTAN 49; 51 MG/1; MG/1
TABLET, FILM COATED ORAL
Qty: 180 TABLET | Refills: 0 | Status: SHIPPED | OUTPATIENT
Start: 2023-06-30

## 2023-07-07 ENCOUNTER — HOSPITAL ENCOUNTER (OUTPATIENT)
Age: 83
Discharge: HOME | End: 2023-07-07
Payer: MEDICARE

## 2023-07-07 LAB — URATE SERPL-MCNC: 4.3 MG/DL (ref 2.6–6)

## 2023-07-07 PROCEDURE — 82306 VITAMIN D 25 HYDROXY: CPT

## 2023-07-07 PROCEDURE — 84550 ASSAY OF BLOOD/URIC ACID: CPT

## 2023-07-07 PROCEDURE — 36415 COLL VENOUS BLD VENIPUNCTURE: CPT

## 2023-07-08 LAB — 25(OH)D3 SERPL-MCNC: 28.7 NG/ML

## 2023-07-10 ENCOUNTER — HOSPITAL ENCOUNTER (OUTPATIENT)
Age: 83
Discharge: HOME | End: 2023-07-10
Payer: MEDICARE

## 2023-07-10 LAB
ALBUMIN SERPL-MCNC: 4.2 G/DL (ref 3.4–5)
ALBUMIN/GLOB SERPL: 1.6 {RATIO} (ref 1.1–2.2)
ALP SERPL-CCNC: 99 U/L (ref 40–129)
ALT SERPL-CCNC: 7 U/L (ref 10–40)
ANION GAP SERPL CALCULATED.3IONS-SCNC: 13 MMOL/L (ref 3–16)
AST SERPL-CCNC: 21 U/L (ref 15–37)
BASOPHILS # BLD: 0 K/UL (ref 0–0.2)
BASOPHILS NFR BLD: 0.5 %
BILIRUB SERPL-MCNC: 0.6 MG/DL (ref 0–1)
BUN SERPL-MCNC: 30 MG/DL (ref 7–20)
CALCIUM SERPL-MCNC: 9.2 MG/DL (ref 8.3–10.6)
CHLORIDE SERPL-SCNC: 98 MMOL/L (ref 99–110)
CO2 SERPL-SCNC: 24 MMOL/L (ref 21–32)
CREAT SERPL-MCNC: 1.3 MG/DL (ref 0.6–1.2)
DEPRECATED RDW RBC AUTO: 14.4 % (ref 12.4–15.4)
EOSINOPHIL # BLD: 0 K/UL (ref 0–0.6)
EOSINOPHIL NFR BLD: 0.1 %
GFR SERPLBLD CREATININE-BSD FMLA CKD-EPI: 41 ML/MIN/{1.73_M2}
GLUCOSE SERPL-MCNC: 99 MG/DL (ref 70–99)
HCT VFR BLD AUTO: 39.6 % (ref 36–48)
HGB BLD-MCNC: 13.1 G/DL (ref 12–16)
LYMPHOCYTES # BLD: 1.3 K/UL (ref 1–5.1)
LYMPHOCYTES NFR BLD: 33.6 %
MCH RBC QN AUTO: 32.3 PG (ref 26–34)
MCHC RBC AUTO-ENTMCNC: 33.2 G/DL (ref 31–36)
MCV RBC AUTO: 97.2 FL (ref 80–100)
MONOCYTES # BLD: 0.4 K/UL (ref 0–1.3)
MONOCYTES NFR BLD: 9.7 %
NEUTROPHILS # BLD: 2.2 K/UL (ref 1.7–7.7)
NEUTROPHILS NFR BLD: 56.1 %
PLATELET # BLD AUTO: 154 K/UL (ref 135–450)
PMV BLD AUTO: 7.5 FL (ref 5–10.5)
POTASSIUM SERPL-SCNC: 3.7 MMOL/L (ref 3.5–5.1)
PROT SERPL-MCNC: 6.8 G/DL (ref 6.4–8.2)
RBC # BLD AUTO: 4.07 M/UL (ref 4–5.2)
SODIUM SERPL-SCNC: 135 MMOL/L (ref 136–145)
WBC # BLD AUTO: 3.9 K/UL (ref 4–11)

## 2023-07-10 PROCEDURE — 80053 COMPREHEN METABOLIC PANEL: CPT

## 2023-07-10 PROCEDURE — 85025 COMPLETE CBC W/AUTO DIFF WBC: CPT

## 2023-08-01 ENCOUNTER — TELEPHONE (OUTPATIENT)
Dept: INTERNAL MEDICINE CLINIC | Age: 83
End: 2023-08-01

## 2023-08-01 NOTE — TELEPHONE ENCOUNTER
----- Message from Debra Bey MD sent at 8/1/2023 11:32 AM EDT -----  Contact: [facility] [phone]  See me this afternoon or tomorrow    ----- Message -----  From: Francheska Rashid  Sent: 8/1/2023   9:03 AM EDT  To: Debra Bey MD    Daughter of the pt called regarding an incident that happened two weeks ago. Daughter states that the pt fell sideways off her scooter onto the pavement sidewalk. Daughter states the pt's (R) leg is still swollen and in pain. Daughter is either requesting an order for an x-ray or if you suggest her coming in to be seen is fine either way the daughter said. Please advise.

## 2023-08-22 RX ORDER — TRAZODONE HYDROCHLORIDE 50 MG/1
TABLET ORAL
Qty: 90 TABLET | Refills: 0 | Status: SHIPPED | OUTPATIENT
Start: 2023-08-22 | End: 2023-08-31 | Stop reason: SDUPTHER

## 2023-08-25 RX ORDER — EMPAGLIFLOZIN 10 MG/1
TABLET, FILM COATED ORAL
Qty: 90 TABLET | Refills: 0 | Status: SHIPPED | OUTPATIENT
Start: 2023-08-25

## 2023-08-31 ENCOUNTER — OFFICE VISIT (OUTPATIENT)
Dept: INTERNAL MEDICINE CLINIC | Age: 83
End: 2023-08-31

## 2023-08-31 VITALS
HEIGHT: 65 IN | SYSTOLIC BLOOD PRESSURE: 116 MMHG | BODY MASS INDEX: 29.49 KG/M2 | WEIGHT: 177 LBS | RESPIRATION RATE: 14 BRPM | DIASTOLIC BLOOD PRESSURE: 70 MMHG | HEART RATE: 62 BPM

## 2023-08-31 DIAGNOSIS — E78.2 MIXED HYPERLIPIDEMIA: ICD-10-CM

## 2023-08-31 DIAGNOSIS — Z23 NEED FOR INFLUENZA VACCINATION: ICD-10-CM

## 2023-08-31 DIAGNOSIS — I50.32 CHRONIC DIASTOLIC CONGESTIVE HEART FAILURE (HCC): ICD-10-CM

## 2023-08-31 DIAGNOSIS — N18.31 STAGE 3A CHRONIC KIDNEY DISEASE (HCC): ICD-10-CM

## 2023-08-31 DIAGNOSIS — I10 BENIGN ESSENTIAL HTN: Primary | ICD-10-CM

## 2023-08-31 DIAGNOSIS — I10 BENIGN ESSENTIAL HTN: ICD-10-CM

## 2023-08-31 DIAGNOSIS — I27.20 PULMONARY HTN (HCC): ICD-10-CM

## 2023-08-31 DIAGNOSIS — I48.21 PERMANENT ATRIAL FIBRILLATION (HCC): ICD-10-CM

## 2023-08-31 DIAGNOSIS — E55.9 VITAMIN D DEFICIENCY: ICD-10-CM

## 2023-08-31 DIAGNOSIS — I34.0 SEVERE MITRAL REGURGITATION: ICD-10-CM

## 2023-08-31 LAB
ALBUMIN SERPL-MCNC: 4.9 G/DL (ref 3.4–5)
ALBUMIN/GLOB SERPL: 2.6 {RATIO} (ref 1.1–2.2)
ALP SERPL-CCNC: 96 U/L (ref 40–129)
ALT SERPL-CCNC: 11 U/L (ref 10–40)
ANION GAP SERPL CALCULATED.3IONS-SCNC: 12 MMOL/L (ref 3–16)
AST SERPL-CCNC: 22 U/L (ref 15–37)
BASOPHILS # BLD: 0 K/UL (ref 0–0.2)
BASOPHILS NFR BLD: 0.6 %
BILIRUB SERPL-MCNC: 0.9 MG/DL (ref 0–1)
BUN SERPL-MCNC: 22 MG/DL (ref 7–20)
CALCIUM SERPL-MCNC: 9.7 MG/DL (ref 8.3–10.6)
CHLORIDE SERPL-SCNC: 97 MMOL/L (ref 99–110)
CHOLEST SERPL-MCNC: 111 MG/DL (ref 0–199)
CO2 SERPL-SCNC: 25 MMOL/L (ref 21–32)
CREAT SERPL-MCNC: 1.1 MG/DL (ref 0.6–1.2)
DEPRECATED RDW RBC AUTO: 14.7 % (ref 12.4–15.4)
EOSINOPHIL # BLD: 0 K/UL (ref 0–0.6)
EOSINOPHIL NFR BLD: 0 %
GFR SERPLBLD CREATININE-BSD FMLA CKD-EPI: 50 ML/MIN/{1.73_M2}
GLUCOSE SERPL-MCNC: 90 MG/DL (ref 70–99)
HCT VFR BLD AUTO: 41.6 % (ref 36–48)
HDLC SERPL-MCNC: 57 MG/DL (ref 40–60)
HGB BLD-MCNC: 14.2 G/DL (ref 12–16)
LDLC SERPL CALC-MCNC: 37 MG/DL
LYMPHOCYTES # BLD: 1.6 K/UL (ref 1–5.1)
LYMPHOCYTES NFR BLD: 33.2 %
MCH RBC QN AUTO: 33.1 PG (ref 26–34)
MCHC RBC AUTO-ENTMCNC: 34.2 G/DL (ref 31–36)
MCV RBC AUTO: 96.8 FL (ref 80–100)
MONOCYTES # BLD: 0.4 K/UL (ref 0–1.3)
MONOCYTES NFR BLD: 8.7 %
NEUTROPHILS # BLD: 2.7 K/UL (ref 1.7–7.7)
NEUTROPHILS NFR BLD: 57.5 %
PLATELET # BLD AUTO: 176 K/UL (ref 135–450)
PMV BLD AUTO: 8.4 FL (ref 5–10.5)
POTASSIUM SERPL-SCNC: 3.9 MMOL/L (ref 3.5–5.1)
PROT SERPL-MCNC: 6.8 G/DL (ref 6.4–8.2)
RBC # BLD AUTO: 4.29 M/UL (ref 4–5.2)
SODIUM SERPL-SCNC: 134 MMOL/L (ref 136–145)
TRIGL SERPL-MCNC: 85 MG/DL (ref 0–150)
TSH SERPL DL<=0.005 MIU/L-ACNC: 1.81 UIU/ML (ref 0.27–4.2)
VLDLC SERPL CALC-MCNC: 17 MG/DL
WBC # BLD AUTO: 4.7 K/UL (ref 4–11)

## 2023-08-31 PROCEDURE — 3074F SYST BP LT 130 MM HG: CPT | Performed by: NURSE PRACTITIONER

## 2023-08-31 PROCEDURE — 90662 IIV NO PRSV INCREASED AG IM: CPT | Performed by: NURSE PRACTITIONER

## 2023-08-31 PROCEDURE — 1123F ACP DISCUSS/DSCN MKR DOCD: CPT | Performed by: NURSE PRACTITIONER

## 2023-08-31 PROCEDURE — 3078F DIAST BP <80 MM HG: CPT | Performed by: NURSE PRACTITIONER

## 2023-08-31 PROCEDURE — 99214 OFFICE O/P EST MOD 30 MIN: CPT | Performed by: NURSE PRACTITIONER

## 2023-08-31 PROCEDURE — G0008 ADMIN INFLUENZA VIRUS VAC: HCPCS | Performed by: NURSE PRACTITIONER

## 2023-08-31 RX ORDER — TRAZODONE HYDROCHLORIDE 50 MG/1
50 TABLET ORAL NIGHTLY
Qty: 90 TABLET | Refills: 0 | Status: SHIPPED | OUTPATIENT
Start: 2023-08-31

## 2023-08-31 NOTE — PROGRESS NOTES
Subjective:      Patient ID: Jayro Cordoba is a 80 y.o. female. HPI     80 y.o.  female with hx  Of HTN, hyperlipidemia, hyperthyroidism, AFibb,  CKD, pulm HTN, pulmonary nodules here for medicare wellness visit      Hx of pulm HTN with Diastolic CHF - last  echo with moderate AR and TR, moderate Pulm HTN      Was admitted to  [facility] - 6/15 for CHF  - ECHo with moderate to severe MR, moderate AR. Cardiology consulted . [facility] with moderate MR and    At [facility] , cath showed non obstructive disease and medical mx advised. meds adjusted      at [facility] on 6/24 - had right heart cath showing precapillary pulm HTN and referred to pulm  Had iv diuresis with 15 lb weight loss, cut down coreg to half, added entresto and Jardiance to lasix   Since last time pt has been having ongoing workup at [facility] for pulm HTN, her connective disorder workup has been neg, normal PFT. And planning for [place] screen soon  She  has been doing much better since last time   Lost about 20 lbs, breathing has improved. Able to lay flat   No hypoxia. Pedal edema resolved        HTN - Currently on coreg,  entresto and remains off norvasc.  doing much better      Afibb, chronic and permanent -rate cotnrolled. On coreg bid, ELIQUIS  No bleeding issues  Her weight last Sept was 191. Down to 177 today. Not eating well. Lives with daughter, no more falls  Independent of ADL and iADL  No depression issues       Allergies   Allergen Reactions    Zithromax [Azithromycin] Rash     Pt was started on IV Zithromax and 25 min into infusion started itching and had a rash on her arm.     Hydrocodone Hives    Hydrocodone Bitartrate Hives    Lisinopril Other (See Comments)     Cough    Hydrocodone Rash    Naproxen Rash         Current Outpatient Medications   Medication Sig Dispense Refill    JARDIANCE 10 MG tablet TAKE 1 TABLET BY MOUTH DAILY 90 tablet 0    traZODone (DESYREL) 50 MG tablet TAKE 1 TABLET BY MOUTH AT NIGHT 90 tablet 0    ENTRESTO 49-51 MG per

## 2023-09-26 RX ORDER — FUROSEMIDE 20 MG/1
TABLET ORAL
Qty: 180 TABLET | Refills: 3 | Status: SHIPPED | OUTPATIENT
Start: 2023-09-26

## 2023-09-26 RX ORDER — SACUBITRIL AND VALSARTAN 49; 51 MG/1; MG/1
TABLET, FILM COATED ORAL
Qty: 180 TABLET | Refills: 3 | Status: SHIPPED | OUTPATIENT
Start: 2023-09-26

## 2023-09-26 RX ORDER — ATORVASTATIN CALCIUM 20 MG/1
TABLET, FILM COATED ORAL
Qty: 90 TABLET | Refills: 3 | Status: SHIPPED | OUTPATIENT
Start: 2023-09-26

## 2023-11-09 RX ORDER — TRAZODONE HYDROCHLORIDE 50 MG/1
50 TABLET ORAL
Qty: 90 TABLET | Refills: 0 | Status: SHIPPED | OUTPATIENT
Start: 2023-11-09 | End: 2024-01-11

## 2023-11-21 RX ORDER — EMPAGLIFLOZIN 10 MG/1
TABLET, FILM COATED ORAL
Qty: 90 TABLET | Refills: 0 | Status: SHIPPED | OUTPATIENT
Start: 2023-11-21

## 2023-12-21 RX ORDER — ALLOPURINOL 100 MG/1
TABLET ORAL
Qty: 180 TABLET | Refills: 0 | Status: SHIPPED | OUTPATIENT
Start: 2023-12-21 | End: 2023-12-22 | Stop reason: SDUPTHER

## 2024-01-11 RX ORDER — TRAZODONE HYDROCHLORIDE 50 MG/1
50 TABLET ORAL
Qty: 90 TABLET | Refills: 0 | Status: SHIPPED | OUTPATIENT
Start: 2024-01-11

## 2024-01-22 ENCOUNTER — TELEPHONE (OUTPATIENT)
Dept: INTERNAL MEDICINE CLINIC | Age: 84
End: 2024-01-22

## 2024-01-22 RX ORDER — CALCIUM CARBONATE/VITAMIN D3 500-10/5ML
LIQUID (ML) ORAL
Qty: 90 CAPSULE | Refills: 0 | Status: SHIPPED | OUTPATIENT
Start: 2024-01-22

## 2024-01-22 RX ORDER — CALCIUM CARBONATE/VITAMIN D3 500-10/5ML
LIQUID (ML) ORAL
Qty: 30 CAPSULE | Refills: 0 | Status: SHIPPED | OUTPATIENT
Start: 2024-01-22 | End: 2024-01-22 | Stop reason: SDUPTHER

## 2024-01-22 NOTE — TELEPHONE ENCOUNTER
----- Message from Cheryl Parra MA sent at 1/22/2024 12:02 PM EST -----  Contact: [phone] daughter Cathy Garcia daughter called and is requesting a 30 day refill of the below medication to a local pharmacy and a 60 day supply to the mail in pharmacy.     Magnesium Oxide 400 MG CAPS      Local Pharmacy:   [facility] -  [phone] - F [phone]  [address] [place] [address]  Phone: [phone]  Fax: [phone]     Mail pharmacy:     [facility] [address] [place] [address] [phone] - F [phone]  [address] [place] [address]  Phone: [phone]  Fax: [phone]

## 2024-01-26 ENCOUNTER — OFFICE VISIT (OUTPATIENT)
Dept: INTERNAL MEDICINE CLINIC | Age: 84
End: 2024-01-26

## 2024-01-26 VITALS
SYSTOLIC BLOOD PRESSURE: 130 MMHG | HEIGHT: 65 IN | WEIGHT: 177 LBS | HEART RATE: 62 BPM | BODY MASS INDEX: 29.49 KG/M2 | RESPIRATION RATE: 18 BRPM | DIASTOLIC BLOOD PRESSURE: 75 MMHG

## 2024-01-26 DIAGNOSIS — N18.31 STAGE 3A CHRONIC KIDNEY DISEASE (HCC): ICD-10-CM

## 2024-01-26 DIAGNOSIS — I10 BENIGN ESSENTIAL HTN: ICD-10-CM

## 2024-01-26 DIAGNOSIS — I50.32 CHRONIC DIASTOLIC CONGESTIVE HEART FAILURE (HCC): Primary | ICD-10-CM

## 2024-01-26 DIAGNOSIS — I48.21 PERMANENT ATRIAL FIBRILLATION (HCC): ICD-10-CM

## 2024-01-26 DIAGNOSIS — I27.20 PULMONARY HTN (HCC): ICD-10-CM

## 2024-01-26 DIAGNOSIS — I34.0 SEVERE MITRAL REGURGITATION: ICD-10-CM

## 2024-01-26 DIAGNOSIS — E78.2 MIXED HYPERLIPIDEMIA: ICD-10-CM

## 2024-01-26 DIAGNOSIS — I50.32 CHRONIC DIASTOLIC CONGESTIVE HEART FAILURE (HCC): ICD-10-CM

## 2024-01-26 LAB
ALBUMIN SERPL-MCNC: 4.7 G/DL (ref 3.4–5)
ALBUMIN/GLOB SERPL: 2 {RATIO} (ref 1.1–2.2)
ALP SERPL-CCNC: 79 U/L (ref 40–129)
ALT SERPL-CCNC: 16 U/L (ref 10–40)
ANION GAP SERPL CALCULATED.3IONS-SCNC: 13 MMOL/L (ref 3–16)
AST SERPL-CCNC: 27 U/L (ref 15–37)
BILIRUB SERPL-MCNC: 0.7 MG/DL (ref 0–1)
BUN SERPL-MCNC: 27 MG/DL (ref 7–20)
CALCIUM SERPL-MCNC: 9.5 MG/DL (ref 8.3–10.6)
CHLORIDE SERPL-SCNC: 97 MMOL/L (ref 99–110)
CO2 SERPL-SCNC: 26 MMOL/L (ref 21–32)
CREAT SERPL-MCNC: 1.2 MG/DL (ref 0.6–1.2)
GFR SERPLBLD CREATININE-BSD FMLA CKD-EPI: 45 ML/MIN/{1.73_M2}
GLUCOSE SERPL-MCNC: 145 MG/DL (ref 70–99)
NT-PROBNP SERPL-MCNC: 1716 PG/ML (ref 0–449)
POTASSIUM SERPL-SCNC: 4.6 MMOL/L (ref 3.5–5.1)
PROT SERPL-MCNC: 7 G/DL (ref 6.4–8.2)
SODIUM SERPL-SCNC: 136 MMOL/L (ref 136–145)
TSH SERPL DL<=0.005 MIU/L-ACNC: 1.89 UIU/ML (ref 0.27–4.2)
URATE SERPL-MCNC: 4.6 MG/DL (ref 2.6–6)

## 2024-01-26 ASSESSMENT — PATIENT HEALTH QUESTIONNAIRE - PHQ9
SUM OF ALL RESPONSES TO PHQ QUESTIONS 1-9: 0
1. LITTLE INTEREST OR PLEASURE IN DOING THINGS: 0
SUM OF ALL RESPONSES TO PHQ9 QUESTIONS 1 & 2: 0
SUM OF ALL RESPONSES TO PHQ QUESTIONS 1-9: 0
2. FEELING DOWN, DEPRESSED OR HOPELESS: 0

## 2024-01-26 NOTE — PROGRESS NOTES
Subjective:      Patient ID: Lupis Wren is a 83 y.o. female.    HPI     83 y.o.  female with hx  Of HTN, hyperlipidemia, hyperthyroidism, AFibb,  CKD, pulm HTN, pulmonary nodules here for regular f. w     Since last time, pt remains stable with no new issues per daughter  Ongoing diet control and weight loss noted    Hx of moderate pulm HTN with Diastolic CHF /severe bilatrial dilatation  - last  echo with moderate AR and TR, moderate Pulm HTN      Was admitted to  [facility] - 6/15/22 for CHF  - ECHo with moderate to severe MR, moderate AR. Cardiology consulted . [place] with moderate MR and    At [facility] , cath showed non obstructive disease and medical mx advised. meds adjusted      at [facility] on 6/24/22 - had right heart cath showing precapillary pulm HTN and referred to pulm  Had iv diuresis with 15 lb weight loss, cut down coreg to half, added entresto and Jardiance to lasix   Since last time pt has been having ongoing workup at [facility] for pulm HTN, her connective disorder workup has been neg, normal PFT.   Recommended cpap but pt deferred   She  has been doing much better since last time   Lost about 20 lbs, breathing has improved.  Using only once a day lasix, off aldactone now    Able to lay flat   No hypoxia. Pedal edema resolved        HTN - Currently on coreg,  entresto and remains off norvasc.  doing much better      Afibb, chronic and permanent -rate cotnrolled. On coreg bid, ELIQUIS  No bleeding issues    Insomnia - on trazodone prn only     Lives with daughter, no more falls since last year   Independent of ADL and iADL  Driving ok   No depression issues       Allergies   Allergen Reactions    Zithromax [Azithromycin] Rash     Pt was started on IV Zithromax and 25 min into infusion started itching and had a rash on her arm.    Hydrocodone Hives    Hydrocodone Bitartrate Hives    Lisinopril Other (See Comments)     Cough    Hydrocodone Rash    Naproxen Rash         Current Outpatient Medications

## 2024-01-29 ENCOUNTER — TELEPHONE (OUTPATIENT)
Dept: INTERNAL MEDICINE CLINIC | Age: 84
End: 2024-01-29

## 2024-01-29 DIAGNOSIS — R73.09 ELEVATED GLUCOSE: Primary | ICD-10-CM

## 2024-01-29 NOTE — TELEPHONE ENCOUNTER
----- Message from Sandeep Jauregui MD sent at 1/29/2024  9:50 AM EST -----  No need for now    ----- Message -----  From: Teri Rush MA  Sent: 1/29/2024   8:42 AM EST  To: Sandeep Jauregui MD    Couldn't add A1c  BMP needed. Please advise.     Renal liver good   Uric acid ,thyroid good   Sugar high add A1c

## 2024-02-07 RX ORDER — ALLOPURINOL 100 MG/1
100 TABLET ORAL 2 TIMES DAILY
Qty: 180 TABLET | Refills: 0 | Status: SHIPPED | OUTPATIENT
Start: 2024-02-07

## 2024-02-23 RX ORDER — LANOLIN ALCOHOL/MO/W.PET/CERES
CREAM (GRAM) TOPICAL
Qty: 30 TABLET | Refills: 0 | Status: SHIPPED | OUTPATIENT
Start: 2024-02-23

## 2024-03-14 RX ORDER — EMPAGLIFLOZIN 10 MG/1
10 TABLET, FILM COATED ORAL DAILY
Qty: 90 TABLET | Refills: 0 | Status: SHIPPED | OUTPATIENT
Start: 2024-03-14

## 2024-03-28 ENCOUNTER — TELEPHONE (OUTPATIENT)
Dept: INTERNAL MEDICINE CLINIC | Age: 84
End: 2024-03-28

## 2024-03-28 RX ORDER — LANOLIN ALCOHOL/MO/W.PET/CERES
CREAM (GRAM) TOPICAL
Qty: 90 TABLET | Refills: 0 | Status: SHIPPED | OUTPATIENT
Start: 2024-03-28

## 2024-03-28 NOTE — TELEPHONE ENCOUNTER
----- Message from Laurence Acevedo sent at 3/28/2024  1:05 PM EDT -----  Contact: Patient [phone]  Patient requesting refill on magnesium oxide (MAG-OX) 400 (240 Mg) MG tablet.  Patient states pharmacy told her she had to call us          [facility] - [place], [place] - [address] [phone] - F [phone]  [address] [place] [address] [address], [facility] [address]  Phone: [phone]  Fax: [phone]

## 2024-04-12 RX ORDER — LANOLIN ALCOHOL/MO/W.PET/CERES
CREAM (GRAM) TOPICAL
Qty: 90 TABLET | Refills: 0 | Status: SHIPPED | OUTPATIENT
Start: 2024-04-12

## 2024-04-18 ENCOUNTER — TELEPHONE (OUTPATIENT)
Dept: INTERNAL MEDICINE CLINIC | Age: 84
End: 2024-04-18

## 2024-04-18 NOTE — TELEPHONE ENCOUNTER
----- Message from Cari Thomas sent at 4/18/2024  2:44 PM EDT -----  Caller pharmacy, states pt will need a short order of apixaban (ELIQUIS) 5 MG TABS tablet sent to local pharmacy. Pt is completley out and mail order will not be delivered for a week. Please advise            [facility] - [place] [address] - P [phone] - F [phone] (Ph: [phone])

## 2024-04-19 RX ORDER — ALLOPURINOL 100 MG/1
100 TABLET ORAL 2 TIMES DAILY
Qty: 180 TABLET | Refills: 0 | Status: SHIPPED | OUTPATIENT
Start: 2024-04-19

## 2024-05-01 RX ORDER — CARVEDILOL 12.5 MG/1
TABLET ORAL
Qty: 90 TABLET | Refills: 0 | Status: SHIPPED | OUTPATIENT
Start: 2024-05-01

## 2024-05-02 RX ORDER — TRAZODONE HYDROCHLORIDE 50 MG/1
50 TABLET ORAL NIGHTLY
Qty: 90 TABLET | Refills: 0 | Status: SHIPPED | OUTPATIENT
Start: 2024-05-02

## 2024-05-16 RX ORDER — EMPAGLIFLOZIN 10 MG/1
10 TABLET, FILM COATED ORAL DAILY
Qty: 90 TABLET | Refills: 0 | Status: SHIPPED | OUTPATIENT
Start: 2024-05-16

## 2024-06-03 ENCOUNTER — OFFICE VISIT (OUTPATIENT)
Dept: INTERNAL MEDICINE CLINIC | Age: 84
End: 2024-06-03

## 2024-06-03 VITALS — HEIGHT: 65 IN | WEIGHT: 178 LBS | BODY MASS INDEX: 29.66 KG/M2

## 2024-06-03 DIAGNOSIS — E78.2 MIXED HYPERLIPIDEMIA: ICD-10-CM

## 2024-06-03 DIAGNOSIS — I50.32 CHRONIC DIASTOLIC CONGESTIVE HEART FAILURE (HCC): ICD-10-CM

## 2024-06-03 DIAGNOSIS — I50.32 CHRONIC DIASTOLIC CONGESTIVE HEART FAILURE (HCC): Primary | ICD-10-CM

## 2024-06-03 DIAGNOSIS — I48.21 PERMANENT ATRIAL FIBRILLATION (HCC): ICD-10-CM

## 2024-06-03 DIAGNOSIS — I27.20 PULMONARY HTN (HCC): ICD-10-CM

## 2024-06-03 DIAGNOSIS — I34.0 SEVERE MITRAL REGURGITATION: ICD-10-CM

## 2024-06-03 DIAGNOSIS — N18.31 STAGE 3A CHRONIC KIDNEY DISEASE (HCC): ICD-10-CM

## 2024-06-03 DIAGNOSIS — I10 BENIGN ESSENTIAL HTN: ICD-10-CM

## 2024-06-03 PROCEDURE — G0009 ADMIN PNEUMOCOCCAL VACCINE: HCPCS | Performed by: INTERNAL MEDICINE

## 2024-06-03 PROCEDURE — 1123F ACP DISCUSS/DSCN MKR DOCD: CPT | Performed by: INTERNAL MEDICINE

## 2024-06-03 PROCEDURE — 90677 PCV20 VACCINE IM: CPT | Performed by: INTERNAL MEDICINE

## 2024-06-03 PROCEDURE — 99213 OFFICE O/P EST LOW 20 MIN: CPT | Performed by: INTERNAL MEDICINE

## 2024-06-03 ASSESSMENT — PATIENT HEALTH QUESTIONNAIRE - PHQ9
1. LITTLE INTEREST OR PLEASURE IN DOING THINGS: NOT AT ALL
SUM OF ALL RESPONSES TO PHQ9 QUESTIONS 1 & 2: 0
SUM OF ALL RESPONSES TO PHQ QUESTIONS 1-9: 0
SUM OF ALL RESPONSES TO PHQ QUESTIONS 1-9: 0
2. FEELING DOWN, DEPRESSED OR HOPELESS: NOT AT ALL
SUM OF ALL RESPONSES TO PHQ QUESTIONS 1-9: 0
SUM OF ALL RESPONSES TO PHQ QUESTIONS 1-9: 0

## 2024-06-03 NOTE — PROGRESS NOTES
patient).    .  Objective:   Physical Exam  There were no vitals filed for this visit.      General: elderly female healthy appearing  Awake, alert and oriented. Appears to be not in any distress  Mucous Membranes:  Pink , anicteric  Neck: No JVD, no carotid bruit, no thyromegaly  Chest:  Clear to auscultation bilaterally, no added sounds  Cardiovascular:  Irregular , S1S2 heard, no murmurs or gallops  Abdomen:  Soft, undistended, non tender, no organomegaly, BS present  Extremities:.  resolved charles pedal edema   Distal pulses well felt  Neurological : grossly normal non focal       ECHO 2021   Left ventricular systolic function is normal with ejection fraction   estimated at 55-60 %.   No regional wall motion abnormalities are noted.   Elevated left ventricular diastolic filling pressure: Septal E/e'' = 12.6 .   The left atrium is severely dilated.Ascending aorta is mildly dilated at   3.8cm.   The right atrium is severely dilated.   The right ventricle is mildly enlarged.   Mild anterior and posterior mitral annular calcification is present.   Moderate mitral regurgitation.   Aortic valve sclerosis without aortic stenosis.   Moderate aortic regurgitation is present.   Moderate tricuspid regurgitation.   Systolic pulmonary artery pressure (SPAP) estimated at 53 mmHg (RA pressure   15 mmHg), consistent with moderate pulmonary hypertension.   9/22/2015 55-60 LAE, BRII, trace MR< mild av sclerosis, mild AI    ECHO 1/22    Atrial fibrillation with a ventricular rate of 74 beats per minute.   The left ventricular wall motion is normal.   The left atrium is dilated.   Right ventricular systolic pressure is mildly elevated at 35-45 mmHg.   Overall left ventricular ejection fraction is estimated to be 55-60%.   There is mild to moderate mitral regurgitation.   Mild tricuspid regurgitation is present.   Mild aortic regurgitation.         Wt Readings from Last 3 Encounters:   06/03/24 80.7 kg (178 lb)   01/26/24 80.3 kg (177

## 2024-06-04 LAB
ALBUMIN SERPL-MCNC: 4.7 G/DL (ref 3.4–5)
ALBUMIN/GLOB SERPL: 2 {RATIO} (ref 1.1–2.2)
ALP SERPL-CCNC: 94 U/L (ref 40–129)
ALT SERPL-CCNC: 16 U/L (ref 10–40)
ANION GAP SERPL CALCULATED.3IONS-SCNC: 11 MMOL/L (ref 3–16)
AST SERPL-CCNC: 26 U/L (ref 15–37)
BASOPHILS # BLD: 0 K/UL (ref 0–0.2)
BASOPHILS NFR BLD: 0.8 %
BILIRUB SERPL-MCNC: 0.5 MG/DL (ref 0–1)
BUN SERPL-MCNC: 32 MG/DL (ref 7–20)
CALCIUM SERPL-MCNC: 9.8 MG/DL (ref 8.3–10.6)
CHLORIDE SERPL-SCNC: 96 MMOL/L (ref 99–110)
CO2 SERPL-SCNC: 26 MMOL/L (ref 21–32)
CREAT SERPL-MCNC: 1.4 MG/DL (ref 0.6–1.2)
DEPRECATED RDW RBC AUTO: 14.7 % (ref 12.4–15.4)
EOSINOPHIL # BLD: 0 K/UL (ref 0–0.6)
EOSINOPHIL NFR BLD: 0.1 %
GFR SERPLBLD CREATININE-BSD FMLA CKD-EPI: 37 ML/MIN/{1.73_M2}
GLUCOSE SERPL-MCNC: 106 MG/DL (ref 70–99)
HCT VFR BLD AUTO: 38.4 % (ref 36–48)
HGB BLD-MCNC: 12.9 G/DL (ref 12–16)
LYMPHOCYTES # BLD: 1.3 K/UL (ref 1–5.1)
LYMPHOCYTES NFR BLD: 31.5 %
MCH RBC QN AUTO: 33.1 PG (ref 26–34)
MCHC RBC AUTO-ENTMCNC: 33.7 G/DL (ref 31–36)
MCV RBC AUTO: 98 FL (ref 80–100)
MONOCYTES # BLD: 0.4 K/UL (ref 0–1.3)
MONOCYTES NFR BLD: 9.4 %
NEUTROPHILS # BLD: 2.3 K/UL (ref 1.7–7.7)
NEUTROPHILS NFR BLD: 58.2 %
NT-PROBNP SERPL-MCNC: 2179 PG/ML (ref 0–449)
PLATELET # BLD AUTO: 170 K/UL (ref 135–450)
PMV BLD AUTO: 8.1 FL (ref 5–10.5)
POTASSIUM SERPL-SCNC: 4.7 MMOL/L (ref 3.5–5.1)
RBC # BLD AUTO: 3.91 M/UL (ref 4–5.2)
SODIUM SERPL-SCNC: 133 MMOL/L (ref 136–145)
WBC # BLD AUTO: 4 K/UL (ref 4–11)

## 2024-06-24 ENCOUNTER — TELEPHONE (OUTPATIENT)
Dept: INTERNAL MEDICINE CLINIC | Age: 84
End: 2024-06-24

## 2024-06-24 NOTE — TELEPHONE ENCOUNTER
----- Message from Sandeep Jauregui MD sent at 6/24/2024  9:15 AM EDT -----  Contact: Cathy -[phone]  Blood in white part of the eye is not dangerous , usually from trauma and does not effect vision   If vision is effected need to see EYE MD  ----- Message -----  From: Cari Thomas  Sent: 6/24/2024   9:11 AM EDT  To: Sandeep Jauregui MD    Caller patients daughter, states Saturday patient woke up with blood in her eye. Patient states she feels fine, her BP is 159/70 and her eye doesn't bother her. Caller asking if patient needs to come see you or go to [facility] due to her vision getting blurry and actual blood in her eye? Please advise          [facility] [id number] - [facility], [place] - [address] - P [phone] - F [phone] (Ph: [phone])

## 2024-06-24 NOTE — TELEPHONE ENCOUNTER
----- Message from Sandeep Jauregui MD sent at 6/24/2024  9:59 AM EDT -----  Contact: SHUKRI [phone] (  Yes see eye MD  ----- Message -----  From: Cari Thomas  Sent: 6/24/2024   9:43 AM EDT  To: Sandeep Jauregui MD    Pt states the blood in her eye is also in the middle part of her eye as well. Caller asking if patient should still go see the eye doctor? Please advise

## 2024-06-25 RX ORDER — ALLOPURINOL 100 MG/1
100 TABLET ORAL 2 TIMES DAILY
Qty: 180 TABLET | Refills: 0 | Status: SHIPPED | OUTPATIENT
Start: 2024-06-25

## 2024-07-02 ENCOUNTER — TELEPHONE (OUTPATIENT)
Dept: INTERNAL MEDICINE CLINIC | Age: 84
End: 2024-07-02

## 2024-07-02 RX ORDER — CEPHALEXIN 500 MG/1
500 CAPSULE ORAL 3 TIMES DAILY
Qty: 15 CAPSULE | Refills: 0 | Status: SHIPPED | OUTPATIENT
Start: 2024-07-02 | End: 2024-07-07
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
## 2024-07-05 RX ORDER — TRAZODONE HYDROCHLORIDE 50 MG/1
50 TABLET ORAL NIGHTLY
Qty: 90 TABLET | Refills: 0 | Status: SHIPPED | OUTPATIENT
Start: 2024-07-05

## 2024-07-08 RX ORDER — CARVEDILOL 12.5 MG/1
TABLET ORAL
Qty: 90 TABLET | Refills: 0 | Status: SHIPPED | OUTPATIENT
Start: 2024-07-08

## 2024-07-09 ENCOUNTER — TELEPHONE (OUTPATIENT)
Dept: INTERNAL MEDICINE CLINIC | Age: 84
End: 2024-07-09

## 2024-07-09 RX ORDER — LANOLIN ALCOHOL/MO/W.PET/CERES
400 CREAM (GRAM) TOPICAL DAILY
Qty: 90 TABLET | Refills: 0 | Status: SHIPPED | OUTPATIENT
Start: 2024-07-09

## 2024-07-09 NOTE — TELEPHONE ENCOUNTER
----- Message from Cheryl Parra MA sent at 7/9/2024 10:12 AM EDT -----  Contact: [phone]  Patient is out of refills and requesting refills for the below medication.     magnesium oxide (MAG-OX) 400 (240 Mg) MG tablet      [facility] - [place], [place] - [address] - P [phone] - F [phone]  [facility] [facility] [address]  Phone: [phone]  Fax: [phone]

## 2024-07-19 RX ORDER — EMPAGLIFLOZIN 10 MG/1
10 TABLET, FILM COATED ORAL DAILY
Qty: 90 TABLET | Refills: 0 | Status: SHIPPED | OUTPATIENT
Start: 2024-07-19

## 2024-08-27 RX ORDER — ALLOPURINOL 100 MG/1
100 TABLET ORAL 2 TIMES DAILY
Qty: 180 TABLET | Refills: 0 | Status: SHIPPED | OUTPATIENT
Start: 2024-08-27

## 2024-09-05 RX ORDER — TRAZODONE HYDROCHLORIDE 50 MG/1
50 TABLET, FILM COATED ORAL NIGHTLY
Qty: 90 TABLET | Refills: 0 | Status: SHIPPED | OUTPATIENT
Start: 2024-09-05

## 2024-09-12 ENCOUNTER — PROCEDURE VISIT (OUTPATIENT)
Dept: SURGERY | Age: 84
End: 2024-09-12
Payer: MEDICARE

## 2024-09-12 VITALS — DIASTOLIC BLOOD PRESSURE: 69 MMHG | SYSTOLIC BLOOD PRESSURE: 128 MMHG

## 2024-09-12 DIAGNOSIS — D04.39 SQUAMOUS CELL CARCINOMA IN SITU (SCCIS) OF SKIN OF EYEBROW: Primary | ICD-10-CM

## 2024-09-12 PROCEDURE — 17311 MOHS 1 STAGE H/N/HF/G: CPT | Performed by: DERMATOLOGY

## 2024-09-12 PROCEDURE — 12053 INTMD RPR FACE/MM 5.1-7.5 CM: CPT | Performed by: DERMATOLOGY

## 2024-09-13 ENCOUNTER — TELEPHONE (OUTPATIENT)
Dept: SURGERY | Age: 84
End: 2024-09-13

## 2024-09-16 ENCOUNTER — TELEPHONE (OUTPATIENT)
Dept: SURGERY | Age: 84
End: 2024-09-16

## 2024-09-16 RX ORDER — ATORVASTATIN CALCIUM 20 MG/1
TABLET, FILM COATED ORAL
Qty: 90 TABLET | Refills: 3 | Status: SHIPPED | OUTPATIENT
Start: 2024-09-16

## 2024-09-16 RX ORDER — CARVEDILOL 12.5 MG/1
TABLET ORAL
Qty: 90 TABLET | Refills: 0 | Status: SHIPPED | OUTPATIENT
Start: 2024-09-16

## 2024-09-18 ENCOUNTER — TELEPHONE (OUTPATIENT)
Dept: SURGERY | Age: 84
End: 2024-09-18

## 2024-09-19 RX ORDER — EMPAGLIFLOZIN 10 MG/1
10 TABLET, FILM COATED ORAL DAILY
Qty: 90 TABLET | Refills: 0 | Status: SHIPPED | OUTPATIENT
Start: 2024-09-19

## 2024-09-19 RX ORDER — FUROSEMIDE 20 MG
TABLET ORAL
Qty: 180 TABLET | Refills: 0 | Status: SHIPPED | OUTPATIENT
Start: 2024-09-19

## 2024-10-11 RX ORDER — LANOLIN ALCOHOL/MO/W.PET/CERES
400 CREAM (GRAM) TOPICAL DAILY
Qty: 90 TABLET | Refills: 0 | Status: SHIPPED | OUTPATIENT
Start: 2024-10-11

## 2024-10-14 RX ORDER — SACUBITRIL AND VALSARTAN 49; 51 MG/1; MG/1
TABLET, FILM COATED ORAL
Qty: 180 TABLET | Refills: 0 | Status: SHIPPED | OUTPATIENT
Start: 2024-10-14

## 2024-10-28 RX ORDER — TRAZODONE HYDROCHLORIDE 50 MG/1
50 TABLET, FILM COATED ORAL NIGHTLY
Qty: 90 TABLET | Refills: 0 | Status: SHIPPED | OUTPATIENT
Start: 2024-10-28

## 2024-10-28 RX ORDER — EMPAGLIFLOZIN 10 MG/1
10 TABLET, FILM COATED ORAL DAILY
Qty: 90 TABLET | Refills: 0 | Status: SHIPPED | OUTPATIENT
Start: 2024-10-28

## 2024-10-28 RX ORDER — ALLOPURINOL 100 MG/1
100 TABLET ORAL 2 TIMES DAILY
Qty: 180 TABLET | Refills: 0 | Status: SHIPPED | OUTPATIENT
Start: 2024-10-28

## 2024-10-28 RX ORDER — FUROSEMIDE 20 MG/1
TABLET ORAL
Qty: 180 TABLET | Refills: 0 | Status: SHIPPED | OUTPATIENT
Start: 2024-10-28

## 2024-11-07 NOTE — PROGRESS NOTES
medications for this visit. Review of Systems  As above, otherwise neg    There are no changes to past medical history, family history, social history or review of systems(except as noted in the history section) since prior note (all reviewed with patient). .  Objective:   Physical Exam  Vitals:    08/23/18 1431   BP: 132/70   Pulse: 65   Resp: 18         General: elderly female healthy appearing  Awake, alert and oriented. Appears to be not in any distress  Mucous Membranes:  Pink , anicteric  Neck: No JVD, no carotid bruit, no thyromegaly  Chest:  Clear to auscultation bilaterally, no added sounds  Cardiovascular:  Irregular , S1S2 heard, no murmurs or gallops  Abdomen:  Soft, undistended, non tender, no organomegaly, BS present  Resolving abd wall hematoma   Extremities:. Right knee surgical scar healing well  1+ pedal edema charles   Distal pulses well felt  Neurological : grossly normal       Assessment:         Diagnosis Orders   1. Hospital discharge follow-up     2. Status post total right knee replacement     3. Permanent atrial fibrillation ([address]  PROTIME-INR   4. Essential hypertension     5. Hematoma of rectus sheath, subsequent encounter     6. Acute blood loss anemia         Plan:           abd wall hematoma - resolving    Acute blood loss anemia - s.p 2 units transfusion     S.p right TKR - doing well. Pain controlled      HTN - stable on losartan /hctz 100/25 mg  ,coreg 25 mg bid  Lasix as needed    CAD - f/w Dr. Emerson Calvert . On ASA, statins- remains asymptomatic     H.o CVA - 2007     Afibb, chronic rate cotnrolled.  On coreg bid, coumadin   F/w Coumadin clinic, no bleeding issues  Need INR      Hyperlipidemia - on statins- need lipids     Gout - with no recurrence - continue allopurinol         F/w in 3 months
No

## 2024-11-25 ENCOUNTER — TELEPHONE (OUTPATIENT)
Dept: INTERNAL MEDICINE CLINIC | Age: 84
End: 2024-11-25

## 2024-11-25 NOTE — TELEPHONE ENCOUNTER
----- Message from Ester MARROQUIN sent at 11/25/2024 10:30 AM EST -----  Contact: SHUKRI [phone]  Caller states the patient is short on the below medication this month as she misplaced some of the medication. Caller is requesting a 30 day supply sent to the [facility] pharmacy until the patients normal 90 day through [facility] in a few weeks. Please advise     JARDIANCE 10 MG tablet    [facility]  [address] [place] [id number] [phone]

## 2024-12-09 ENCOUNTER — TELEPHONE (OUTPATIENT)
Dept: INTERNAL MEDICINE CLINIC | Age: 84
End: 2024-12-09

## 2024-12-09 RX ORDER — OFLOXACIN 3 MG/ML
1 SOLUTION/ DROPS OPHTHALMIC 2 TIMES DAILY
Qty: 1 ML | Refills: 0 | Status: SHIPPED | OUTPATIENT
Start: 2024-12-09 | End: 2024-12-16

## 2024-12-09 NOTE — TELEPHONE ENCOUNTER
----- Message from Dr. Sandeep Jauregui MD sent at 12/9/2024 12:21 PM EST -----  Contact: [phone]  Oflox eye drops bid  x1 week   If not better see eye MD  ----- Message -----  From: William Cari  Sent: 12/9/2024   8:10 AM EST  To: Sandeep Jauregui MD    Caller pt's daughter states patient has pink eye. Symptoms started Friday of watery, itchy, crust in the morning, red and swollen in her right eye. Caller requesting eye drops sent to pharmacy. Please advise         [facility] - [place], [place] - [facility] [address] - SHAR [phone] - F [phone] (Ph: [phone])

## 2025-01-13 RX ORDER — ALLOPURINOL 100 MG/1
100 TABLET ORAL 2 TIMES DAILY
Qty: 180 TABLET | Refills: 0 | Status: SHIPPED | OUTPATIENT
Start: 2025-01-13

## 2025-01-20 RX ORDER — SACUBITRIL AND VALSARTAN 49; 51 MG/1; MG/1
TABLET, FILM COATED ORAL
Qty: 180 TABLET | Refills: 0 | Status: SHIPPED | OUTPATIENT
Start: 2025-01-20

## 2025-01-20 RX ORDER — TRAZODONE HYDROCHLORIDE 50 MG/1
50 TABLET, FILM COATED ORAL NIGHTLY
Qty: 90 TABLET | Refills: 0 | Status: SHIPPED | OUTPATIENT
Start: 2025-01-20

## 2025-01-29 RX ORDER — LANOLIN ALCOHOL/MO/W.PET/CERES
400 CREAM (GRAM) TOPICAL DAILY
Qty: 90 TABLET | Refills: 1 | Status: SHIPPED | OUTPATIENT
Start: 2025-01-29

## 2025-01-30 ENCOUNTER — OFFICE VISIT (OUTPATIENT)
Dept: INTERNAL MEDICINE CLINIC | Age: 85
End: 2025-01-30

## 2025-01-30 VITALS
HEART RATE: 62 BPM | BODY MASS INDEX: 28.16 KG/M2 | DIASTOLIC BLOOD PRESSURE: 78 MMHG | RESPIRATION RATE: 18 BRPM | SYSTOLIC BLOOD PRESSURE: 126 MMHG | HEIGHT: 65 IN | WEIGHT: 169 LBS

## 2025-01-30 DIAGNOSIS — I10 BENIGN ESSENTIAL HTN: ICD-10-CM

## 2025-01-30 DIAGNOSIS — E78.2 MIXED HYPERLIPIDEMIA: ICD-10-CM

## 2025-01-30 DIAGNOSIS — I48.21 PERMANENT ATRIAL FIBRILLATION (HCC): ICD-10-CM

## 2025-01-30 DIAGNOSIS — N18.31 STAGE 3A CHRONIC KIDNEY DISEASE (HCC): ICD-10-CM

## 2025-01-30 DIAGNOSIS — I27.20 PULMONARY HTN (HCC): ICD-10-CM

## 2025-01-30 DIAGNOSIS — Z00.00 MEDICARE ANNUAL WELLNESS VISIT, SUBSEQUENT: Primary | ICD-10-CM

## 2025-01-30 DIAGNOSIS — Z23 NEED FOR INFLUENZA VACCINATION: ICD-10-CM

## 2025-01-30 DIAGNOSIS — E55.9 VITAMIN D DEFICIENCY: ICD-10-CM

## 2025-01-30 DIAGNOSIS — I50.32 CHRONIC DIASTOLIC CONGESTIVE HEART FAILURE (HCC): ICD-10-CM

## 2025-01-30 PROCEDURE — 3074F SYST BP LT 130 MM HG: CPT | Performed by: INTERNAL MEDICINE

## 2025-01-30 PROCEDURE — 1123F ACP DISCUSS/DSCN MKR DOCD: CPT | Performed by: INTERNAL MEDICINE

## 2025-01-30 PROCEDURE — 90662 IIV NO PRSV INCREASED AG IM: CPT | Performed by: INTERNAL MEDICINE

## 2025-01-30 PROCEDURE — 1159F MED LIST DOCD IN RCRD: CPT | Performed by: INTERNAL MEDICINE

## 2025-01-30 PROCEDURE — 1160F RVW MEDS BY RX/DR IN RCRD: CPT | Performed by: INTERNAL MEDICINE

## 2025-01-30 PROCEDURE — 90471 IMMUNIZATION ADMIN: CPT | Performed by: INTERNAL MEDICINE

## 2025-01-30 PROCEDURE — G0439 PPPS, SUBSEQ VISIT: HCPCS | Performed by: INTERNAL MEDICINE

## 2025-01-30 PROCEDURE — 3078F DIAST BP <80 MM HG: CPT | Performed by: INTERNAL MEDICINE

## 2025-01-30 RX ORDER — TRIAMCINOLONE ACETONIDE 0.25 MG/G
CREAM TOPICAL
Qty: 80 G | Refills: 0 | Status: SHIPPED | OUTPATIENT
Start: 2025-01-30

## 2025-01-30 ASSESSMENT — PATIENT HEALTH QUESTIONNAIRE - PHQ9
2. FEELING DOWN, DEPRESSED OR HOPELESS: NOT AT ALL
SUM OF ALL RESPONSES TO PHQ QUESTIONS 1-9: 0
SUM OF ALL RESPONSES TO PHQ9 QUESTIONS 1 & 2: 0
SUM OF ALL RESPONSES TO PHQ9 QUESTIONS 1 & 2: 0
1. LITTLE INTEREST OR PLEASURE IN DOING THINGS: NOT AT ALL
SUM OF ALL RESPONSES TO PHQ QUESTIONS 1-9: 0
1. LITTLE INTEREST OR PLEASURE IN DOING THINGS: NOT AT ALL
SUM OF ALL RESPONSES TO PHQ QUESTIONS 1-9: 0
2. FEELING DOWN, DEPRESSED OR HOPELESS: NOT AT ALL
SUM OF ALL RESPONSES TO PHQ QUESTIONS 1-9: 0

## 2025-01-30 ASSESSMENT — LIFESTYLE VARIABLES
HOW MANY STANDARD DRINKS CONTAINING ALCOHOL DO YOU HAVE ON A TYPICAL DAY: PATIENT DOES NOT DRINK
HOW OFTEN DO YOU HAVE A DRINK CONTAINING ALCOHOL: NEVER

## 2025-01-30 NOTE — PROGRESS NOTES
Medicare Annual Wellness Visit    Lupis Wren is here for Medicare AWV    Assessment & Plan   Medicare annual wellness visit, subsequent  Permanent atrial fibrillation (HCC)  Pulmonary HTN (HCC)  Benign essential HTN  -     CBC with Auto Differential; Future  -     Comprehensive Metabolic Panel; Future  -     TSH reflex to FT4; Future  -     Uric Acid; Future  Chronic diastolic congestive heart failure (HCC)  -     Brain Natriuretic Peptide; Future  Mixed hyperlipidemia  -     Lipid, Fasting; Future  Stage 3a chronic kidney disease (HCC)  -     Vitamin D 25 Hydroxy; Future  Vitamin D deficiency  -     Vitamin D 25 Hydroxy; Future  Need for influenza vaccination  -     Influenza, FLUZONE HIGH DOSE Trivalent, (age 65 y+), IM, Preservative Free, 0.5mL       Return in about 6 months (around 7/30/2025) for htn CHf .     Subjective       84 y.o.  female with hx  Of HTN, hyperlipidemia, hyperthyroidism, AFibb,  CKD, pulm HTN, pulmonary nodules here for medicare wellness visit    Since last time, pt had mohs surgery on face for skin cancer and did well     Otherwise remains stable with no new issues per daughter  Ongoing diet control and weight loss noted      Hx of moderate pulm HTN with Diastolic CHF /severe bilatrial dilatation  - last  echo with moderate AR and TR, moderate Pulm HTN      Was admitted to  [facility] [place] - 6/15/22 for CHF  - ECHo with moderate to severe MR, moderate AR. Cardiology consulted . [place] with moderate MR and    At [facility] [place] , cath showed non obstructive disease and medical mx advised. meds adjusted      at [facility] on 6/24/22 - had right heart cath showing precapillary pulm HTN and referred to pulm  Had iv diuresis with 15 lb weight loss, cut down coreg to half, added entresto and Jardiance to lasix   Seen pulm for pulm HTN, her connective disorder workup has been neg, normal PFT.   Recommended cpap but pt deferred     Since last 6 months she  has been doing much better , no recent admissions or CHF flare up

## 2025-01-30 NOTE — PATIENT INSTRUCTIONS
Learning About Being Active as an Older Adult  Why is being active important as you get older?     Being active is one of the best things you can do for your health. And it's never too late to start. Being active--or getting active, if you aren't already--has definite benefits. It can:  Give you more energy,  Keep your mind sharp.  Improve balance to reduce your risk of falls.  Help you manage chronic illness with fewer medicines.  No matter how old you are, how fit you are, or what health problems you have, there is a form of activity that will work for you. And the more physical activity you can do, the better your overall health will be.  What kinds of activity can help you stay healthy?  Being more active will make your daily activities easier. Physical activity includes planned exercise and things you do in daily life. There are four types of activity:  Aerobic.  Doing aerobic activity makes your heart and lungs strong.  Includes walking, dancing, and gardening.  Aim for at least 2½ hours spread throughout the week.  It improves your energy and can help you sleep better.  Muscle-strengthening.  This type of activity can help maintain muscle and strengthen bones.  Includes climbing stairs, using resistance bands, and lifting or carrying heavy loads.  Aim for at least twice a week.  It can help protect the knees and other joints.  Stretching.  Stretching gives you better range of motion in joints and muscles.  Includes upper arm stretches, calf stretches, and gentle yoga.  Aim for at least twice a week, preferably after your muscles are warmed up from other activities.  It can help you function better in daily life.  Balancing.  This helps you stay coordinated and have good posture.  Includes heel-to-toe walking, abigail chi, and certain types of yoga.  Aim for at least 3 days a week.  It can reduce your risk of falling.  Even if you have a hard time meeting the recommendations, it's better to be more active

## 2025-02-24 ENCOUNTER — TELEPHONE (OUTPATIENT)
Dept: INTERNAL MEDICINE CLINIC | Age: 85
End: 2025-02-24

## 2025-02-24 NOTE — TELEPHONE ENCOUNTER
----- Message from Dr. Sandeep Jauregui MD sent at 2/24/2025  1:54 PM EST -----  Contact: Erica  [phone]  Yes one extra dose today  ----- Message -----  From: Alexandra Jeffrey  Sent: 2/24/2025   1:18 PM EST  To: Sandeep Jauregui MD    Daughter informed and states pt is taking lasix 40 mg, do you want her to go ahead and take extra?    She scheduled appointment for tomorrow with Shilpa but may cancel it if her leg looks better tomorrow morning, states the compression socks are helping some. Pt is also scheduled to have an ECHO done tomorrow.  ----- Message -----  From: Sandeep Jauregui MD  Sent: 2/24/2025   1:01 PM EST  To: Alexandra Jeffrey    I told her to take extra lasix for few days if it happens  Make appt with Shilpa tomorrow as it is only one leg- it may not be heart failure  ----- Message -----  From: Laurence Acevedo  Sent: 2/24/2025  10:07 AM EST  To: Sandeep Jauregui MD    Daughter states patients left ankle is swollen and she has gained 6 pounds within the past 3 days.  Patient has been putting an ace bandage on it and elevating.  Caller states patient thought you told her the last time this happened to quit taking her LASIX, so she hasn't taken it today.  Is this what you want her to do, or what do you recommend?  Please advise          [facility] - [place], [place] - [facility] [address] - P [phone] - F [phone]

## 2025-02-25 ENCOUNTER — HOSPITAL ENCOUNTER (OUTPATIENT)
Age: 85
Discharge: HOME | End: 2025-02-27
Payer: MEDICARE

## 2025-02-25 VITALS — WEIGHT: 169 LBS | BODY MASS INDEX: 28.16 KG/M2 | HEIGHT: 65 IN

## 2025-02-25 DIAGNOSIS — I48.21 PERMANENT ATRIAL FIBRILLATION (HCC): ICD-10-CM

## 2025-02-25 DIAGNOSIS — I50.32 CHRONIC DIASTOLIC CONGESTIVE HEART FAILURE (HCC): ICD-10-CM

## 2025-02-25 LAB
ECHO AO ASC DIAM: 3 CM
ECHO AO ASCENDING AORTA INDEX: 1.63 CM/M2
ECHO AO ROOT DIAM: 3.1 CM
ECHO AO ROOT INDEX: 1.68 CM/M2
ECHO AR MAX VEL PISA: 4.1 M/S
ECHO AV AREA PEAK VELOCITY: 1.7 CM2
ECHO AV AREA VTI: 1.5 CM2
ECHO AV AREA/BSA PEAK VELOCITY: 0.9 CM2/M2
ECHO AV AREA/BSA VTI: 0.8 CM2/M2
ECHO AV MEAN GRADIENT: 5 MMHG
ECHO AV MEAN VELOCITY: 1 M/S
ECHO AV PEAK GRADIENT: 9 MMHG
ECHO AV PEAK VELOCITY: 1.5 M/S
ECHO AV REGURGITANT PHT: 663 MS
ECHO AV VELOCITY RATIO: 0.6
ECHO AV VTI: 40.5 CM
ECHO BSA: 1.87 M2
ECHO EST RA PRESSURE: 15 MMHG
ECHO IVC EXP: 2.5 CM
ECHO IVC INSP: 1.8 CM
ECHO LA AREA 2C: 46.6 CM2
ECHO LA AREA 4C: 48.3 CM2
ECHO LA MAJOR AXIS: 8.7 CM
ECHO LA MINOR AXIS: 8.5 CM
ECHO LA VOL BP: 216 ML (ref 22–52)
ECHO LA VOL MOD A2C: 213 ML (ref 22–52)
ECHO LA VOL MOD A4C: 216 ML (ref 22–52)
ECHO LA VOL/BSA BIPLANE: 117 ML/M2 (ref 16–34)
ECHO LA VOLUME INDEX MOD A2C: 116 ML/M2 (ref 16–34)
ECHO LA VOLUME INDEX MOD A4C: 117 ML/M2 (ref 16–34)
ECHO LV E' LATERAL VELOCITY: 17.1 CM/S
ECHO LV E' SEPTAL VELOCITY: 7.72 CM/S
ECHO LV EDV 3D: 120 ML
ECHO LV EDV INDEX 3D: 65 ML/M2
ECHO LV EF PHYSICIAN: 55 %
ECHO LV ESV 3D: 43 ML
ECHO LV ESV INDEX 3D: 23 ML/M2
ECHO LV FRACTIONAL SHORTENING: 35 % (ref 28–44)
ECHO LV INTERNAL DIMENSION DIASTOLE INDEX: 2.83 CM/M2
ECHO LV INTERNAL DIMENSION DIASTOLIC: 5.2 CM (ref 3.9–5.3)
ECHO LV INTERNAL DIMENSION SYSTOLIC INDEX: 1.85 CM/M2
ECHO LV INTERNAL DIMENSION SYSTOLIC: 3.4 CM
ECHO LV IVSD: 1 CM (ref 0.6–0.9)
ECHO LV MASS 2D: 194.2 G (ref 67–162)
ECHO LV MASS 3D INDEX: 89.1 G/M2
ECHO LV MASS 3D: 164 G
ECHO LV MASS INDEX 2D: 105.5 G/M2 (ref 43–95)
ECHO LV POSTERIOR WALL DIASTOLIC: 1 CM (ref 0.6–0.9)
ECHO LV RELATIVE WALL THICKNESS RATIO: 0.38
ECHO LVOT AREA: 2.8 CM2
ECHO LVOT AV VTI INDEX: 0.52
ECHO LVOT DIAM: 1.9 CM
ECHO LVOT MEAN GRADIENT: 2 MMHG
ECHO LVOT PEAK GRADIENT: 3 MMHG
ECHO LVOT PEAK VELOCITY: 0.9 M/S
ECHO LVOT STROKE VOLUME INDEX: 32.3 ML/M2
ECHO LVOT SV: 59.5 ML
ECHO LVOT VTI: 21 CM
ECHO MV A VELOCITY: 0.53 M/S
ECHO MV E DECELERATION TIME (DT): 146 MS
ECHO MV E VELOCITY: 1.32 M/S
ECHO MV E/A RATIO: 2.49
ECHO MV E/E' LATERAL: 7.72
ECHO MV E/E' RATIO (AVERAGED): 12.41
ECHO MV E/E' SEPTAL: 17.1
ECHO PV ACCELERATION TIME (AT): 87 MS
ECHO PV MAX VELOCITY: 0.8 M/S
ECHO PV PEAK GRADIENT: 3 MMHG
ECHO RA AREA 4C: 38 CM2
ECHO RA END SYSTOLIC VOLUME APICAL 4 CHAMBER INDEX BSA: 89 ML/M2
ECHO RA VOLUME: 164 ML
ECHO RIGHT VENTRICULAR SYSTOLIC PRESSURE (RVSP): 60 MMHG
ECHO RV BASAL DIMENSION: 3.3 CM
ECHO RV FREE WALL PEAK S': 8.9 CM/S
ECHO RV LONGITUDINAL DIMENSION: 6.1 CM
ECHO RV MID DIMENSION: 2.8 CM
ECHO RV TAPSE: 1.8 CM (ref 1.7–?)
ECHO TV REGURGITANT MAX VELOCITY: 3.37 M/S
ECHO TV REGURGITANT PEAK GRADIENT: 45 MMHG

## 2025-02-25 PROCEDURE — 93306 TTE W/DOPPLER COMPLETE: CPT

## 2025-03-05 RX ORDER — SACUBITRIL AND VALSARTAN 49; 51 MG/1; MG/1
1 TABLET, FILM COATED ORAL 2 TIMES DAILY
Qty: 14 TABLET | Refills: 0 | Status: SHIPPED | OUTPATIENT
Start: 2025-03-05

## 2025-03-05 NOTE — TELEPHONE ENCOUNTER
----- Message from Cari LAN sent at 3/5/2025  9:03 AM EST -----  Contact: adrián [phone]  Caller patients daughter states below script was just sent out through mail order and patient is out. Caller requesting a short order sent to local pharmacy. Please advise       ENTRESTO 49-51 MG per tablet    [facility] pharmacy

## 2025-04-03 RX ORDER — FUROSEMIDE 20 MG/1
TABLET ORAL
Qty: 180 TABLET | Refills: 0 | Status: SHIPPED | OUTPATIENT
Start: 2025-04-03

## 2025-04-03 RX ORDER — CARVEDILOL 12.5 MG/1
TABLET ORAL
Qty: 90 TABLET | Refills: 0 | Status: SHIPPED | OUTPATIENT
Start: 2025-04-03

## 2025-04-07 RX ORDER — APIXABAN 5 MG/1
5 TABLET, FILM COATED ORAL 2 TIMES DAILY
Qty: 180 TABLET | Refills: 1 | Status: SHIPPED | OUTPATIENT
Start: 2025-04-07

## 2025-04-07 RX ORDER — ALLOPURINOL 100 MG/1
100 TABLET ORAL 2 TIMES DAILY
Qty: 180 TABLET | Refills: 1 | Status: SHIPPED | OUTPATIENT
Start: 2025-04-07

## 2025-04-07 RX ORDER — TRAZODONE HYDROCHLORIDE 50 MG/1
50 TABLET ORAL NIGHTLY
Qty: 90 TABLET | Refills: 1 | Status: SHIPPED | OUTPATIENT
Start: 2025-04-07

## 2025-05-28 ENCOUNTER — TELEPHONE (OUTPATIENT)
Dept: INTERNAL MEDICINE CLINIC | Age: 85
End: 2025-05-28

## 2025-05-28 NOTE — TELEPHONE ENCOUNTER
----- Message from Ester MARROQUIN sent at 5/28/2025  9:50 AM EDT -----  Contact: SHUKRI [phone]  Caller requesting a short term script for the below medication, sent to the [facility] pharmacy until she receive her refills via mail delivery. Please advise     empagliflozin (JARDIANCE) 10 MG tablet     [facility] - [place], [place] - [facility] [address] - P [phone] - F [phone]  [address] [facility] [address]  Phone: [phone]  Fax: [phone]

## 2025-06-13 RX ORDER — SACUBITRIL AND VALSARTAN 49; 51 MG/1; MG/1
1 TABLET, FILM COATED ORAL 2 TIMES DAILY
Qty: 14 TABLET | Refills: 0 | Status: SHIPPED | OUTPATIENT
Start: 2025-06-13

## 2025-06-16 RX ORDER — SACUBITRIL AND VALSARTAN 49; 51 MG/1; MG/1
TABLET, FILM COATED ORAL
Qty: 180 TABLET | Refills: 0 | OUTPATIENT
Start: 2025-06-16

## 2025-06-17 RX ORDER — SACUBITRIL AND VALSARTAN 49; 51 MG/1; MG/1
1 TABLET, FILM COATED ORAL 2 TIMES DAILY
Qty: 180 TABLET | Refills: 0 | Status: SHIPPED | OUTPATIENT
Start: 2025-06-17

## 2025-06-23 ENCOUNTER — TELEPHONE (OUTPATIENT)
Dept: PHARMACY | Facility: CLINIC | Age: 85
End: 2025-06-23

## 2025-06-23 NOTE — TELEPHONE ENCOUNTER
[facility] CLINICAL PHARMACY: ADHERENCE REVIEW  Identified care gap per [employer]: fills at [facility]: Statin adherence    Patient also appears to be prescribed: Diabetes    ASSESSMENT  DIABETES ADHERENCE    Insurance Records claims through 2025 (Prior Year PDC = not reported; YTD PDC = FIRST FILL; Potential Fail Date: 2025):   JARDIANCE TAB 10MG  last filled on 2025 for 30 day supply. Next refill due: 2025    Prescribed si tablet/capsule daily    Per Insurer Portal: last filled on 2025 for 90 day supply.     Per [facility]: last shipped out on 2025 for 90 day supply.    Lab Results   Component Value Date    LABA1C 5.1 2019    LABA1C 5.6 2018       STATIN ADHERENCE    Insurance Records claims through 2025 (Prior Year PDC = not reported; YTD PDC = FIRST FILL; Potential Fail Date: 2025):   ATORVASTATIN TAB 20MG  last filled on 2025 for 90 day supply. Next refill due: 2025    Prescribed si tablet/capsule daily    Per Insurer Portal: last filled on 2025 for 90 day supply.     Per [facility]: last picked up on 2025 for 90 day supply. Will get  90 day supply ready to  since past due.    Lab Results   Component Value Date    CHOL 111 2023    TRIG 85 2023    HDL 57 2023     Lab Results   Component Value Date    LDL 37 2023      ALT   Date Value Ref Range Status   2024 16 10 - 40 U/L Final     AST   Date Value Ref Range Status   2024 26 15 - 37 U/L Final     The ASCVD Risk score (Elías DK, et al., 2019) failed to calculate for the following reasons:    The 2019 ASCVD risk score is only valid for ages 40 to 79    Risk score cannot be calculated because patient has a medical history suggesting prior/existing ASCVD     PLAN  Per insurer report, LIS-0 - co-pays are based on tiers and patient is subject to coverage gap.      The following are interventions that have been identified:

## 2025-07-21 ENCOUNTER — APPOINTMENT (OUTPATIENT)
Dept: CT IMAGING | Age: 85
DRG: 291 | End: 2025-07-21
Payer: MEDICARE

## 2025-07-21 ENCOUNTER — HOSPITAL ENCOUNTER (INPATIENT)
Age: 85
LOS: 2 days | Discharge: HOME | DRG: 291 | End: 2025-07-23
Attending: EMERGENCY MEDICINE | Admitting: INTERNAL MEDICINE
Payer: MEDICARE

## 2025-07-21 ENCOUNTER — TELEPHONE (OUTPATIENT)
Dept: INTERNAL MEDICINE CLINIC | Age: 85
End: 2025-07-21

## 2025-07-21 ENCOUNTER — APPOINTMENT (OUTPATIENT)
Dept: GENERAL RADIOLOGY | Age: 85
DRG: 291 | End: 2025-07-21
Payer: MEDICARE

## 2025-07-21 DIAGNOSIS — N17.9 AKI (ACUTE KIDNEY INJURY): ICD-10-CM

## 2025-07-21 DIAGNOSIS — I50.33 ACUTE ON CHRONIC DIASTOLIC CONGESTIVE HEART FAILURE (HCC): ICD-10-CM

## 2025-07-21 DIAGNOSIS — I50.9 ACUTE ON CHRONIC CONGESTIVE HEART FAILURE, UNSPECIFIED HEART FAILURE TYPE (HCC): ICD-10-CM

## 2025-07-21 DIAGNOSIS — R53.83 FATIGUE, UNSPECIFIED TYPE: Primary | ICD-10-CM

## 2025-07-21 DIAGNOSIS — R06.02 SHORTNESS OF BREATH: ICD-10-CM

## 2025-07-21 PROBLEM — I50.31 ACUTE DIASTOLIC CHF (CONGESTIVE HEART FAILURE) (HCC): Status: ACTIVE | Noted: 2025-07-21

## 2025-07-21 LAB
ALBUMIN SERPL-MCNC: 3.6 G/DL (ref 3.4–5)
ALBUMIN/GLOB SERPL: 1.6 {RATIO} (ref 1.1–2.2)
ALP SERPL-CCNC: 77 U/L (ref 40–129)
ALT SERPL-CCNC: 21 U/L (ref 10–40)
AMORPH SED URNS QL MICRO: ABNORMAL /HPF
ANION GAP SERPL CALCULATED.3IONS-SCNC: 12 MMOL/L (ref 3–16)
AST SERPL-CCNC: 46 U/L (ref 15–37)
BASE EXCESS BLDV CALC-SCNC: -0.2 MMOL/L (ref -3–3)
BASOPHILS # BLD: 0 K/UL (ref 0–0.2)
BASOPHILS NFR BLD: 0.2 %
BILIRUB SERPL-MCNC: 0.6 MG/DL (ref 0–1)
BILIRUB UR QL STRIP.AUTO: NEGATIVE
BUN SERPL-MCNC: 29 MG/DL (ref 7–20)
CALCIUM SERPL-MCNC: 9 MG/DL (ref 8.3–10.6)
CHLORIDE SERPL-SCNC: 102 MMOL/L (ref 99–110)
CLARITY UR: CLEAR
CO2 BLDV-SCNC: 27 MMOL/L
CO2 SERPL-SCNC: 23 MMOL/L (ref 21–32)
COHGB MFR BLDV: 1.8 % (ref 0–1.5)
COLOR UR: YELLOW
CREAT SERPL-MCNC: 2 MG/DL (ref 0.6–1.2)
DEPRECATED RDW RBC AUTO: 14.8 % (ref 12.4–15.4)
EKG ATRIAL RATE: 66 BPM
EKG DIAGNOSIS: NORMAL
EKG P AXIS: 2 DEGREES
EKG P-R INTERVAL: 186 MS
EKG Q-T INTERVAL: 380 MS
EKG QRS DURATION: 76 MS
EKG QTC CALCULATION (BAZETT): 398 MS
EKG R AXIS: -10 DEGREES
EKG T AXIS: 51 DEGREES
EKG VENTRICULAR RATE: 66 BPM
EOSINOPHIL # BLD: 0 K/UL (ref 0–0.6)
EOSINOPHIL NFR BLD: 0 %
GFR SERPLBLD CREATININE-BSD FMLA CKD-EPI: 24 ML/MIN/{1.73_M2}
GLUCOSE SERPL-MCNC: 103 MG/DL (ref 70–99)
GLUCOSE UR STRIP.AUTO-MCNC: >=1000 MG/DL
HCO3 BLDV-SCNC: 25.3 MMOL/L (ref 23–29)
HCT VFR BLD AUTO: 37.6 % (ref 36–48)
HGB BLD-MCNC: 12.7 G/DL (ref 12–16)
HGB UR QL STRIP.AUTO: NEGATIVE
KETONES UR STRIP.AUTO-MCNC: NEGATIVE MG/DL
LACTATE BLDV-SCNC: 0.8 MMOL/L (ref 0.4–1.9)
LEUKOCYTE ESTERASE UR QL STRIP.AUTO: NEGATIVE
LIPASE SERPL-CCNC: 33 U/L (ref 13–60)
LYMPHOCYTES # BLD: 0.9 K/UL (ref 1–5.1)
LYMPHOCYTES NFR BLD: 11.6 %
MCH RBC QN AUTO: 32.5 PG (ref 26–34)
MCHC RBC AUTO-ENTMCNC: 33.8 G/DL (ref 31–36)
MCV RBC AUTO: 96 FL (ref 80–100)
METHGB MFR BLDV: 0 %
MONOCYTES # BLD: 0.8 K/UL (ref 0–1.3)
MONOCYTES NFR BLD: 10.6 %
NEUTROPHILS # BLD: 5.8 K/UL (ref 1.7–7.7)
NEUTROPHILS NFR BLD: 77.6 %
NITRITE UR QL STRIP.AUTO: NEGATIVE
NT-PROBNP SERPL-MCNC: 4981 PG/ML (ref 0–449)
O2 THERAPY: ABNORMAL
PCO2 BLDV: 44.7 MMHG (ref 40–50)
PH BLDV: 7.37 [PH] (ref 7.35–7.45)
PH UR STRIP.AUTO: 6 [PH] (ref 5–8)
PLATELET # BLD AUTO: 132 K/UL (ref 135–450)
PMV BLD AUTO: 8.3 FL (ref 5–10.5)
PO2 BLDV: 49.5 MMHG (ref 25–40)
POTASSIUM SERPL-SCNC: 3.6 MMOL/L (ref 3.5–5.1)
PROT SERPL-MCNC: 5.9 G/DL (ref 6.4–8.2)
PROT UR STRIP.AUTO-MCNC: 100 MG/DL
RBC # BLD AUTO: 3.91 M/UL (ref 4–5.2)
RBC #/AREA URNS HPF: ABNORMAL /HPF (ref 0–4)
SAO2 % BLDV: 84 %
SODIUM SERPL-SCNC: 137 MMOL/L (ref 136–145)
SP GR UR STRIP.AUTO: 1.01 (ref 1–1.03)
TROPONIN, HIGH SENSITIVITY: 30 NG/L (ref 0–14)
TROPONIN, HIGH SENSITIVITY: 32 NG/L (ref 0–14)
TSH SERPL DL<=0.005 MIU/L-ACNC: 0.97 UIU/ML (ref 0.27–4.2)
UA DIPSTICK W REFLEX MICRO PNL UR: YES
URN SPEC COLLECT METH UR: ABNORMAL
UROBILINOGEN UR STRIP-ACNC: 0.2 E.U./DL
WBC # BLD AUTO: 7.4 K/UL (ref 4–11)
WBC #/AREA URNS HPF: ABNORMAL /HPF (ref 0–5)

## 2025-07-21 PROCEDURE — 2500000003 HC RX 250 WO HCPCS: Performed by: NURSE PRACTITIONER

## 2025-07-21 PROCEDURE — 84484 ASSAY OF TROPONIN QUANT: CPT

## 2025-07-21 PROCEDURE — 71045 X-RAY EXAM CHEST 1 VIEW: CPT

## 2025-07-21 PROCEDURE — 84443 ASSAY THYROID STIM HORMONE: CPT

## 2025-07-21 PROCEDURE — 83690 ASSAY OF LIPASE: CPT

## 2025-07-21 PROCEDURE — 99223 1ST HOSP IP/OBS HIGH 75: CPT | Performed by: NURSE PRACTITIONER

## 2025-07-21 PROCEDURE — 85025 COMPLETE CBC W/AUTO DIFF WBC: CPT

## 2025-07-21 PROCEDURE — 74176 CT ABD & PELVIS W/O CONTRAST: CPT

## 2025-07-21 PROCEDURE — 6370000000 HC RX 637 (ALT 250 FOR IP): Performed by: PHYSICIAN ASSISTANT

## 2025-07-21 PROCEDURE — 81001 URINALYSIS AUTO W/SCOPE: CPT

## 2025-07-21 PROCEDURE — 93005 ELECTROCARDIOGRAM TRACING: CPT | Performed by: EMERGENCY MEDICINE

## 2025-07-21 PROCEDURE — 83605 ASSAY OF LACTIC ACID: CPT

## 2025-07-21 PROCEDURE — 93010 ELECTROCARDIOGRAM REPORT: CPT | Performed by: STUDENT IN AN ORGANIZED HEALTH CARE EDUCATION/TRAINING PROGRAM

## 2025-07-21 PROCEDURE — 80053 COMPREHEN METABOLIC PANEL: CPT

## 2025-07-21 PROCEDURE — 6370000000 HC RX 637 (ALT 250 FOR IP): Performed by: NURSE PRACTITIONER

## 2025-07-21 PROCEDURE — 36415 COLL VENOUS BLD VENIPUNCTURE: CPT

## 2025-07-21 PROCEDURE — 83880 ASSAY OF NATRIURETIC PEPTIDE: CPT

## 2025-07-21 PROCEDURE — 82803 BLOOD GASES ANY COMBINATION: CPT

## 2025-07-21 PROCEDURE — 99285 EMERGENCY DEPT VISIT HI MDM: CPT

## 2025-07-21 PROCEDURE — 6360000002 HC RX W HCPCS: Performed by: NURSE PRACTITIONER

## 2025-07-21 PROCEDURE — 51798 US URINE CAPACITY MEASURE: CPT

## 2025-07-21 PROCEDURE — 2060000000 HC ICU INTERMEDIATE R&B

## 2025-07-21 RX ORDER — ACETAMINOPHEN 325 MG/1
650 TABLET ORAL ONCE
Status: COMPLETED | OUTPATIENT
Start: 2025-07-21 | End: 2025-07-21

## 2025-07-21 RX ORDER — ACETAMINOPHEN 325 MG/1
650 TABLET ORAL EVERY 6 HOURS PRN
Status: DISCONTINUED | OUTPATIENT
Start: 2025-07-21 | End: 2025-07-23 | Stop reason: HOSPADM

## 2025-07-21 RX ORDER — ALLOPURINOL 100 MG/1
100 TABLET ORAL 2 TIMES DAILY
Status: DISCONTINUED | OUTPATIENT
Start: 2025-07-22 | End: 2025-07-23 | Stop reason: HOSPADM

## 2025-07-21 RX ORDER — ACETAMINOPHEN 650 MG/1
650 SUPPOSITORY RECTAL EVERY 6 HOURS PRN
Status: DISCONTINUED | OUTPATIENT
Start: 2025-07-21 | End: 2025-07-23 | Stop reason: HOSPADM

## 2025-07-21 RX ORDER — SODIUM CHLORIDE 9 MG/ML
INJECTION, SOLUTION INTRAVENOUS PRN
Status: DISCONTINUED | OUTPATIENT
Start: 2025-07-21 | End: 2025-07-23 | Stop reason: HOSPADM

## 2025-07-21 RX ORDER — SACUBITRIL AND VALSARTAN 49; 51 MG/1; MG/1
1 TABLET, FILM COATED ORAL 2 TIMES DAILY
Status: DISCONTINUED | OUTPATIENT
Start: 2025-07-21 | End: 2025-07-23 | Stop reason: HOSPADM

## 2025-07-21 RX ORDER — POLYETHYLENE GLYCOL 3350 17 G/17G
17 POWDER, FOR SOLUTION ORAL DAILY PRN
Status: DISCONTINUED | OUTPATIENT
Start: 2025-07-21 | End: 2025-07-23 | Stop reason: HOSPADM

## 2025-07-21 RX ORDER — ONDANSETRON 4 MG/1
4 TABLET, ORALLY DISINTEGRATING ORAL ONCE
Status: COMPLETED | OUTPATIENT
Start: 2025-07-21 | End: 2025-07-21

## 2025-07-21 RX ORDER — TRAZODONE HYDROCHLORIDE 50 MG/1
50 TABLET ORAL NIGHTLY
Status: DISCONTINUED | OUTPATIENT
Start: 2025-07-21 | End: 2025-07-23 | Stop reason: HOSPADM

## 2025-07-21 RX ORDER — SODIUM CHLORIDE 0.9 % (FLUSH) 0.9 %
5-40 SYRINGE (ML) INJECTION EVERY 12 HOURS SCHEDULED
Status: DISCONTINUED | OUTPATIENT
Start: 2025-07-21 | End: 2025-07-23 | Stop reason: HOSPADM

## 2025-07-21 RX ORDER — POTASSIUM CHLORIDE 7.45 MG/ML
10 INJECTION INTRAVENOUS PRN
Status: DISCONTINUED | OUTPATIENT
Start: 2025-07-21 | End: 2025-07-23 | Stop reason: HOSPADM

## 2025-07-21 RX ORDER — FUROSEMIDE 10 MG/ML
20 INJECTION INTRAMUSCULAR; INTRAVENOUS ONCE
Status: COMPLETED | OUTPATIENT
Start: 2025-07-21 | End: 2025-07-21

## 2025-07-21 RX ORDER — ALBUTEROL SULFATE 90 UG/1
2 INHALANT RESPIRATORY (INHALATION) EVERY 6 HOURS PRN
Status: DISCONTINUED | OUTPATIENT
Start: 2025-07-21 | End: 2025-07-23 | Stop reason: HOSPADM

## 2025-07-21 RX ORDER — POTASSIUM CHLORIDE 1500 MG/1
20 TABLET, EXTENDED RELEASE ORAL 2 TIMES DAILY WITH MEALS
Status: DISCONTINUED | OUTPATIENT
Start: 2025-07-21 | End: 2025-07-23 | Stop reason: HOSPADM

## 2025-07-21 RX ORDER — ONDANSETRON 2 MG/ML
4 INJECTION INTRAMUSCULAR; INTRAVENOUS EVERY 6 HOURS PRN
Status: DISCONTINUED | OUTPATIENT
Start: 2025-07-21 | End: 2025-07-23 | Stop reason: HOSPADM

## 2025-07-21 RX ORDER — POTASSIUM CHLORIDE 1500 MG/1
40 TABLET, EXTENDED RELEASE ORAL PRN
Status: DISCONTINUED | OUTPATIENT
Start: 2025-07-21 | End: 2025-07-23 | Stop reason: HOSPADM

## 2025-07-21 RX ORDER — SODIUM CHLORIDE 0.9 % (FLUSH) 0.9 %
5-40 SYRINGE (ML) INJECTION PRN
Status: DISCONTINUED | OUTPATIENT
Start: 2025-07-21 | End: 2025-07-23 | Stop reason: HOSPADM

## 2025-07-21 RX ORDER — LANOLIN ALCOHOL/MO/W.PET/CERES
400 CREAM (GRAM) TOPICAL DAILY
Status: DISCONTINUED | OUTPATIENT
Start: 2025-07-22 | End: 2025-07-23 | Stop reason: HOSPADM

## 2025-07-21 RX ORDER — MAGNESIUM SULFATE IN WATER 40 MG/ML
2000 INJECTION, SOLUTION INTRAVENOUS PRN
Status: DISCONTINUED | OUTPATIENT
Start: 2025-07-21 | End: 2025-07-23 | Stop reason: HOSPADM

## 2025-07-21 RX ORDER — ATORVASTATIN CALCIUM 10 MG/1
20 TABLET, FILM COATED ORAL NIGHTLY
Status: DISCONTINUED | OUTPATIENT
Start: 2025-07-22 | End: 2025-07-23 | Stop reason: HOSPADM

## 2025-07-21 RX ORDER — ONDANSETRON 4 MG/1
4 TABLET, ORALLY DISINTEGRATING ORAL EVERY 8 HOURS PRN
Status: DISCONTINUED | OUTPATIENT
Start: 2025-07-21 | End: 2025-07-23 | Stop reason: HOSPADM

## 2025-07-21 RX ORDER — CARVEDILOL 6.25 MG/1
6.25 TABLET ORAL 2 TIMES DAILY WITH MEALS
Status: DISCONTINUED | OUTPATIENT
Start: 2025-07-21 | End: 2025-07-22

## 2025-07-21 RX ADMIN — ACETAMINOPHEN 650 MG: 325 TABLET ORAL at 15:16

## 2025-07-21 RX ADMIN — Medication 10 ML: at 20:34

## 2025-07-21 RX ADMIN — CARVEDILOL 6.25 MG: 6.25 TABLET, FILM COATED ORAL at 20:33

## 2025-07-21 RX ADMIN — FUROSEMIDE 20 MG: 10 INJECTION, SOLUTION INTRAMUSCULAR; INTRAVENOUS at 20:33

## 2025-07-21 RX ADMIN — APIXABAN 5 MG: 5 TABLET, FILM COATED ORAL at 20:33

## 2025-07-21 RX ADMIN — ONDANSETRON 4 MG: 4 TABLET, ORALLY DISINTEGRATING ORAL at 15:16

## 2025-07-21 RX ADMIN — TRAZODONE HYDROCHLORIDE 50 MG: 50 TABLET ORAL at 21:42

## 2025-07-21 RX ADMIN — POTASSIUM CHLORIDE 20 MEQ: 1500 TABLET, EXTENDED RELEASE ORAL at 20:33

## 2025-07-21 NOTE — ED NOTES
[facility] Medication Reconciliation Status          [x] COMPLETE       Medication history has been reviewed and obtained from the following source(s):       [] patient/family verbal report             [] patient/family provided written list       [] external pharmacy   [] external facility list          [] IN PROGRESS       Medication reconciliation marked in progress at this time due to:       [] patient/family poor historian      [] waiting arrival of family to clarify       [] waiting for accurate list        [] external pharmacy needs called      * Follow up is needed.          [] UNABLE TO ASSESS       Medication reconciliation is incomplete and unable to assess at this time due to:       [] critical patient condition   [] patient is unresponsive        [] no family available                       [] unknown pharmacy       [] anonymous patient          * Follow up is needed.      [] Pharmacy consult placed for medication reconciliation assistance.   Additional comments:

## 2025-07-21 NOTE — ED PROVIDER NOTES
CARA ([id number]) on 7/21/2025 6:12:15 PM             CT ABDOMEN PELVIS WO CONTRAST Additional Contrast? None   Final Result   1. Mild left perinephric stranding. No definite stone or mass identified.   This may reflect pyelonephritis in the correct clinical setting.   2. Slight nodularity to the liver may reflect early cirrhosis.   3. Cholecystectomy.   4. Diverticulosis without diverticulitis.         XR CHEST PORTABLE   Final Result   Cardiomegaly with diffuse interstitial thickening which may represent mild   interstitial edema.  Chronic interstitial lung disease is differential   consideration.             Labs Reviewed   CBC WITH AUTO DIFFERENTIAL - Abnormal; Notable for the following components:       Result Value    RBC 3.91 (*)     Platelets 132 (*)     Lymphocytes Absolute 0.9 (*)     All other components within normal limits   COMPREHENSIVE METABOLIC PANEL W/ REFLEX TO MG FOR LOW K - Abnormal; Notable for the following components:    Glucose 103 (*)     BUN 29 (*)     Creatinine 2.0 (*)     Est, Glom Filt Rate 24 (*)     Total Protein 5.9 (*)     AST 46 (*)     All other components within normal limits   TROPONIN - Abnormal; Notable for the following components:    Troponin, High Sensitivity 32 (*)     All other components within normal limits   BRAIN NATRIURETIC PEPTIDE - Abnormal; Notable for the following components:    NT Pro-BNP 4,981 (*)     All other components within normal limits   BLOOD GAS, VENOUS - Abnormal; Notable for the following components:    PO2, Eliecer 49.5 (*)     Carboxyhemoglobin 1.8 (*)     All other components within normal limits   URINALYSIS WITH MICROSCOPIC - Abnormal; Notable for the following components:    Glucose, Ur >=1000 (*)     Protein,  (*)     All other components within normal limits   LIPASE   LACTATE, SEPSIS   LACTATE, SEPSIS         PLAN:   -Patient seen and evaluated.  Relevant records reviewed.    Patient seen and examined here.  Given Zofran.  Appears 
Take 1 tablet by mouth daily    POTASSIUM CHLORIDE (KLOR-CON M) 20 MEQ EXTENDED RELEASE TABLET    Take 1 tablet by mouth 2 times daily (with meals)    SACUBITRIL-VALSARTAN (ENTRESTO) 49-51 MG PER TABLET    Take 1 tablet by mouth 2 times daily    TRAZODONE (DESYREL) 50 MG TABLET    TAKE 1 TABLET BY MOUTH EVERY  NIGHT    TRIAMCINOLONE (KENALOG) 0.025 % CREAM    Apply Topically twice daily       ALLERGIES     Zithromax [azithromycin], Hydrocodone, Hydrocodone bitartrate, Lisinopril, Hydrocodone, and Naproxen    FAMILYHISTORY       Family History   Problem Relation Age of Onset    Depression Mother     Arthritis Father     Asthma Father     High Blood Pressure Father     High Cholesterol Father     High Blood Pressure Sister     High Cholesterol Sister     Arthritis Sister     High Blood Pressure Brother     Hearing Loss Brother     High Cholesterol Brother     Learning Disabilities Brother     Mental Illness Brother     Asthma Brother     Arthritis Brother         SOCIAL HISTORY       Social History     Tobacco Use    Smoking status: Never    Smokeless tobacco: Never   Vaping Use    Vaping status: Never Used   Substance Use Topics    Alcohol use: No    Drug use: No       SCREENINGS                         CIWA Assessment  BP: (!) 142/77  Pulse: 64             PHYSICAL EXAM  1 or more Elements     ED Triage Vitals [07/21/25 1419]   BP Systolic BP Percentile Diastolic BP Percentile Temp Temp src Pulse Respirations SpO2   (!) 148/76 -- -- 99.1 °F (37.3 °C) -- 89 18 95 %      Height Weight         -- --             Physical Exam  Vitals and nursing note reviewed.   Constitutional:       General: She is not in acute distress.     Appearance: She is not ill-appearing, toxic-appearing or diaphoretic.   HENT:      Head: Atraumatic.      Mouth/Throat:      Mouth: Mucous membranes are moist.      Pharynx: Oropharynx is clear. No oropharyngeal exudate or posterior oropharyngeal erythema.   Eyes:      General: No scleral icterus.

## 2025-07-21 NOTE — TELEPHONE ENCOUNTER
----- Message from Ester MARROQUIN sent at 7/21/2025 11:36 AM EDT -----  Contact: SHUKRI [phone]  Patient experiencing fever of 101 and left side back pain since 7/19. No urination trouble. Patients daughter requesting an appointment or your suggestions. Please advise     [facility] - [place], [place] - [facility] [address] - P [phone] - F [phone]  [facility] [address] [facility] [address]  Phone: [phone]  Fax: [phone]

## 2025-07-21 NOTE — ED NOTES
Lupis Wren is a 84 y.o. female admitted for  Active Problems:    * No active hospital problems. *  Resolved Problems:    * No resolved hospital problems. *  .   Patient Home via family with   Chief Complaint   Patient presents with    Fatigue     States felt warm at home, general fatigue since Saturday.     Back Pain     L sided back pain since Saturday.    .  Patient is alert and Person, Place, and Situation  Patient's baseline mobility: Baseline Mobility: Independent   Code Status: Prior   Cardiac Rhythm:       Is patient on baseline Oxygen: no how many Liters:   Abnormal Assessment Findings:     Isolation: None      NIH Score:    C-SSRS: Risk of Suicide: No Risk  Bedside swallow:        Active LDA's:    Patient admitted with a palma: no If the palma is chronic was it exchanged:NA  Reason for palma:   Patient admitted with Central Line:  NA . PICC line placement confirmed: YES OR NO:[id number]}   Reason for Central line:   Was central line Inserted from an outside facility: no       Family/Caregiver Present yes Any Concerns: no   Restraints no  Sitter no         Vitals:      Vitals:    07/21/25 1528 07/21/25 1659 07/21/25 1732 07/21/25 1802   BP: (!) 140/48 (!) 142/77 (!) 146/60 (!) 143/61   Pulse: 64  61 65   Resp: 16  20 18   Temp: 97.5 °F (36.4 °C)      TempSrc: Oral      SpO2: 95% 96% 96% 97%       Last documented pain score (0-10 scale)    Pain medication administered No.    Pertinent or High Risk Medications/Drips: No.    Pending Blood Product Administration: no    Abnormal labs:   Abnormal Labs Reviewed   CBC WITH AUTO DIFFERENTIAL - Abnormal; Notable for the following components:       Result Value    RBC 3.91 (*)     Platelets 132 (*)     Lymphocytes Absolute 0.9 (*)     All other components within normal limits   COMPREHENSIVE METABOLIC PANEL W/ REFLEX TO MG FOR LOW K - Abnormal; Notable for the following components:    Glucose 103 (*)     BUN 29 (*)     Creatinine 2.0 (*)     Est, Glom Filt Rate 24 (*)

## 2025-07-21 NOTE — H&P
CBC    HTN  - stable  - resume coreg  - monitor     Insomnia  - resume Trazodone     Hx of CVA  - resume eliquis and statin      Note above makes patient higher risk for morbidity and mortality requiring testing and treatment.        Plan of care discussed with patient, family at bedside and nursing.     DVT Prophylaxis: eliquis   Diet: ADULT DIET; Regular; Low Sodium (2 gm)  Code Status: Full - discussed with patient and family at bedside.     KWAME Davis - CNP

## 2025-07-22 ENCOUNTER — APPOINTMENT (OUTPATIENT)
Age: 85
DRG: 291 | End: 2025-07-22
Payer: MEDICARE

## 2025-07-22 LAB
ANION GAP SERPL CALCULATED.3IONS-SCNC: 11 MMOL/L (ref 3–16)
BUN SERPL-MCNC: 28 MG/DL (ref 7–20)
CALCIUM SERPL-MCNC: 8.4 MG/DL (ref 8.3–10.6)
CHLORIDE SERPL-SCNC: 104 MMOL/L (ref 99–110)
CO2 SERPL-SCNC: 24 MMOL/L (ref 21–32)
CREAT SERPL-MCNC: 1.7 MG/DL (ref 0.6–1.2)
ECHO AO ROOT DIAM: 3.1 CM
ECHO AO ROOT INDEX: 1.66 CM/M2
ECHO BSA: 1.91 M2
ECHO EST RA PRESSURE: 15 MMHG
ECHO IVC PROX: 2.3 CM
ECHO LA AREA 2C: 41.2 CM2
ECHO LA AREA 4C: 42.9 CM2
ECHO LA DIAMETER INDEX: 2.67 CM/M2
ECHO LA DIAMETER: 5 CM
ECHO LA MAJOR AXIS: 8.6 CM
ECHO LA MINOR AXIS: 8.3 CM
ECHO LA TO AORTIC ROOT RATIO: 1.61
ECHO LA VOL BP: 169 ML (ref 22–52)
ECHO LA VOL MOD A2C: 167 ML (ref 22–52)
ECHO LA VOL MOD A4C: 167 ML (ref 22–52)
ECHO LA VOL/BSA BIPLANE: 90 ML/M2 (ref 16–34)
ECHO LA VOLUME INDEX MOD A2C: 89 ML/M2 (ref 16–34)
ECHO LA VOLUME INDEX MOD A4C: 89 ML/M2 (ref 16–34)
ECHO LV EDV A2C: 63 ML
ECHO LV EDV A4C: 74 ML
ECHO LV EDV INDEX A4C: 40 ML/M2
ECHO LV EDV NDEX A2C: 34 ML/M2
ECHO LV EF PHYSICIAN: 58 %
ECHO LV EJECTION FRACTION A2C: 58 %
ECHO LV EJECTION FRACTION A4C: 58 %
ECHO LV EJECTION FRACTION BIPLANE: 59 % (ref 55–100)
ECHO LV ESV A2C: 26 ML
ECHO LV ESV A4C: 31 ML
ECHO LV ESV INDEX A2C: 14 ML/M2
ECHO LV ESV INDEX A4C: 17 ML/M2
ECHO LV FRACTIONAL SHORTENING: 32 % (ref 28–44)
ECHO LV INTERNAL DIMENSION DIASTOLE INDEX: 2.67 CM/M2
ECHO LV INTERNAL DIMENSION DIASTOLIC: 5 CM (ref 3.9–5.3)
ECHO LV INTERNAL DIMENSION SYSTOLIC INDEX: 1.82 CM/M2
ECHO LV INTERNAL DIMENSION SYSTOLIC: 3.4 CM
ECHO LV IVSD: 0.9 CM (ref 0.6–0.9)
ECHO LV MASS 2D: 158.2 G (ref 67–162)
ECHO LV MASS INDEX 2D: 84.6 G/M2 (ref 43–95)
ECHO LV POSTERIOR WALL DIASTOLIC: 0.9 CM (ref 0.6–0.9)
ECHO LV RELATIVE WALL THICKNESS RATIO: 0.36
ECHO MV E DECELERATION TIME (DT): 233 MS
ECHO MV E VELOCITY: 1.4 M/S
ECHO MV MAX VELOCITY: 1.5 M/S
ECHO MV MEAN GRADIENT: 2 MMHG
ECHO MV MEAN VELOCITY: 0.7 M/S
ECHO MV PEAK GRADIENT: 9 MMHG
ECHO MV REGURGITANT ALIASING (NYQUIST) VELOCITY: 31 CM/S
ECHO MV REGURGITANT RADIUS PISA: 0.7 CM
ECHO MV REGURGITANT VTIA: 182 CM
ECHO MV VTI: 41.4 CM
ECHO RIGHT VENTRICULAR SYSTOLIC PRESSURE (RVSP): 59 MMHG
ECHO TV ALIASING VELOCITY (NYQUIST): 40 CM/S
ECHO TV REGURGITANT MAX VELOCITY: 3.31 M/S
ECHO TV REGURGITANT PEAK GRADIENT: 44 MMHG
ECHO TV REGURGITANT RADIUS PISA: 0.7 CM
GFR SERPLBLD CREATININE-BSD FMLA CKD-EPI: 29 ML/MIN/{1.73_M2}
GLUCOSE SERPL-MCNC: 90 MG/DL (ref 70–99)
MAGNESIUM SERPL-MCNC: 1.79 MG/DL (ref 1.8–2.4)
POTASSIUM SERPL-SCNC: 3.5 MMOL/L (ref 3.5–5.1)
SODIUM SERPL-SCNC: 139 MMOL/L (ref 136–145)

## 2025-07-22 PROCEDURE — 36415 COLL VENOUS BLD VENIPUNCTURE: CPT

## 2025-07-22 PROCEDURE — 93325 DOPPLER ECHO COLOR FLOW MAPG: CPT | Performed by: INTERNAL MEDICINE

## 2025-07-22 PROCEDURE — 93321 DOPPLER ECHO F-UP/LMTD STD: CPT | Performed by: INTERNAL MEDICINE

## 2025-07-22 PROCEDURE — 93321 DOPPLER ECHO F-UP/LMTD STD: CPT

## 2025-07-22 PROCEDURE — 6370000000 HC RX 637 (ALT 250 FOR IP): Performed by: STUDENT IN AN ORGANIZED HEALTH CARE EDUCATION/TRAINING PROGRAM

## 2025-07-22 PROCEDURE — 6370000000 HC RX 637 (ALT 250 FOR IP): Performed by: NURSE PRACTITIONER

## 2025-07-22 PROCEDURE — 93308 TTE F-UP OR LMTD: CPT | Performed by: INTERNAL MEDICINE

## 2025-07-22 PROCEDURE — 2500000003 HC RX 250 WO HCPCS: Performed by: NURSE PRACTITIONER

## 2025-07-22 PROCEDURE — 80048 BASIC METABOLIC PNL TOTAL CA: CPT

## 2025-07-22 PROCEDURE — 6360000002 HC RX W HCPCS: Performed by: STUDENT IN AN ORGANIZED HEALTH CARE EDUCATION/TRAINING PROGRAM

## 2025-07-22 PROCEDURE — 83735 ASSAY OF MAGNESIUM: CPT

## 2025-07-22 PROCEDURE — 2060000000 HC ICU INTERMEDIATE R&B

## 2025-07-22 RX ORDER — HYDRALAZINE HYDROCHLORIDE 25 MG/1
25 TABLET, FILM COATED ORAL EVERY 8 HOURS SCHEDULED
Status: DISCONTINUED | OUTPATIENT
Start: 2025-07-22 | End: 2025-07-23

## 2025-07-22 RX ORDER — CARVEDILOL 3.12 MG/1
3.12 TABLET ORAL 2 TIMES DAILY WITH MEALS
Status: DISCONTINUED | OUTPATIENT
Start: 2025-07-22 | End: 2025-07-23 | Stop reason: HOSPADM

## 2025-07-22 RX ORDER — FUROSEMIDE 10 MG/ML
80 INJECTION INTRAMUSCULAR; INTRAVENOUS 2 TIMES DAILY
Status: DISCONTINUED | OUTPATIENT
Start: 2025-07-22 | End: 2025-07-23

## 2025-07-22 RX ADMIN — Medication 400 MG: at 08:28

## 2025-07-22 RX ADMIN — CARVEDILOL 3.12 MG: 3.12 TABLET, FILM COATED ORAL at 16:59

## 2025-07-22 RX ADMIN — POTASSIUM CHLORIDE 20 MEQ: 1500 TABLET, EXTENDED RELEASE ORAL at 16:59

## 2025-07-22 RX ADMIN — APIXABAN 2.5 MG: 5 TABLET, FILM COATED ORAL at 20:35

## 2025-07-22 RX ADMIN — TRAZODONE HYDROCHLORIDE 50 MG: 50 TABLET ORAL at 20:36

## 2025-07-22 RX ADMIN — HYDRALAZINE HYDROCHLORIDE 25 MG: 25 TABLET ORAL at 20:39

## 2025-07-22 RX ADMIN — APIXABAN 5 MG: 5 TABLET, FILM COATED ORAL at 08:28

## 2025-07-22 RX ADMIN — ATORVASTATIN CALCIUM 20 MG: 10 TABLET, FILM COATED ORAL at 20:35

## 2025-07-22 RX ADMIN — HYDRALAZINE HYDROCHLORIDE 25 MG: 25 TABLET ORAL at 14:38

## 2025-07-22 RX ADMIN — FUROSEMIDE 80 MG: 10 INJECTION, SOLUTION INTRAMUSCULAR; INTRAVENOUS at 16:59

## 2025-07-22 RX ADMIN — FUROSEMIDE 80 MG: 10 INJECTION, SOLUTION INTRAMUSCULAR; INTRAVENOUS at 12:25

## 2025-07-22 RX ADMIN — Medication 10 ML: at 20:37

## 2025-07-22 RX ADMIN — ALLOPURINOL 100 MG: 100 TABLET ORAL at 20:35

## 2025-07-22 RX ADMIN — ALLOPURINOL 100 MG: 100 TABLET ORAL at 08:28

## 2025-07-22 RX ADMIN — POTASSIUM CHLORIDE 20 MEQ: 1500 TABLET, EXTENDED RELEASE ORAL at 08:28

## 2025-07-22 RX ADMIN — Medication 10 ML: at 08:28

## 2025-07-22 NOTE — CONSULTS
[facility]   HEART FAILURE PROGRAM      NAME:  Lupis Wren  AGE: 84 y.o.   GENDER: female  : 1940  TODAY'S DATE:  2025    Subjective:     VISIT TYPE: Education    ADMIT DATE: 2025    PAST MEDICAL HISTORY:      Diagnosis Date    Arthritis     Atrial fibrillation (HCC)     CAD (coronary artery disease)     a-fib    Cerebral artery occlusion with cerebral infarction (HCC)     resolved w/ rehab    CHF (congestive heart failure) (HCC)     CKD (chronic kidney disease)     Cutaneous skin tags 2013    Depression     Gout     Grief reaction 2013    Hyperlipidemia     Hypertension     Obesity     Osteoarthritis     Pulmonary nodules     Unspecified cerebral artery occlusion with cerebral infarction 2007     HOME MEDICATIONS:  Prior to Admission medications    Medication Sig Start Date End Date Taking? Authorizing Provider   empagliflozin (JARDIANCE) 10 MG tablet Take one tablet daily 25  Yes Sandeep Jauregui MD   sacubitril-valsartan (ENTRESTO) 49-51 MG per tablet Take 1 tablet by mouth 2 times daily 25  Yes Sandeep Jauregui MD   allopurinol (ZYLOPRIM) 100 MG tablet TAKE 1 TABLET BY MOUTH TWICE  DAILY 25  Yes Sandeep Jauregui MD   traZODone (DESYREL) 50 MG tablet TAKE 1 TABLET BY MOUTH EVERY  NIGHT 25  Yes Sandeep Jauregui MD   ELIQUIS 5 MG TABS tablet TAKE 1 TABLET BY MOUTH TWICE  DAILY 25  Yes Sandeep Jauregui MD   furosemide (LASIX) 20 MG tablet TAKE 1 TABLET BY MOUTH IN THE  MORNING AND 1 TABLET BY MOUTH IN THE EVENING 4/3/25  Yes Sandeep Jauregui MD   carvedilol (COREG) 12.5 MG tablet TAKE ONE-HALF TABLET BY MOUTH  TWICE DAILY WITH MEALS 4/3/25  Yes Sandeep Jauregui MD   magnesium oxide (MAG-OX) 400 (240 Mg) MG tablet TAKE 1 TABLET BY MOUTH DAILY 25  Yes Sandeep Jauregui MD   atorvastatin (LIPITOR) 20 MG tablet TAKE 1 TABLET BY MOUTH AT NIGHT 24  Yes Sandeep Jauregui MD   Magnesium Oxide

## 2025-07-22 NOTE — DISCHARGE INSTRUCTIONS
Heart Failure Resources:  Heart Failure Interactive Workbook:  Go to https://FreenomitalCambly.[employer]fing/publication/?e=836561 for a Free Heart Failure Interactive Workbook provided by The American Heart Association. This interactive workbook will provide information on Healthier Living with Heart Failure. Please copy and paste link into search bar. Use your mouse to scroll through the pages.    HF [place] nancy:   Heart Failure Free smart phone nancy available for iPhone and Android download. Use your phone to track sodium intake, fluid intake, symptoms, and weight.     Low Sodium Diet / Recipes:  Go to www.[employer] website for “renal” diet which is Low Sodium! [employer] is a dialysis company, but this website offers free seasonal cookbooks. Each quarter, they will release 25-30 new recipes with a breakdown of calories, sodium, and glucose. You can also go to wwwQingKe/recipes website for free recipes.     Discharge Instruction Video:  Scan the QR code below with your camera and click the canva.com link to open the video and watch educational information on Heart Failure and Medications from one of our nurses.   [email or web address]/edit    Home Exercise Program:   Identification of Green/Yellow/Red zones:  You should be able to identify when you feel good (green zone), if you have 1-2 symptoms of HF (yellow zone), or if you are in need of medical attention (red zone).  In your CHF education folder you were provided a “stop light tool” to outline this information.     We want to you to rate your exertion levels:    Our therapy team has discussed means of identification with you such as the \"Last scale.\"  The Last rating scale ranges from 6 to 20, where 6 means \"no exertion at all\" and 20 means \"maximal exertion.\" The goal is to use this to gauge how much effort it is taking for you to do your normal daily tasks.   You should be able to recognize when too much exertion is

## 2025-07-22 NOTE — PROGRESS NOTES
RT Inhaler-Nebulizer Bronchodilator Protocol Note    There is a bronchodilator order in the chart from a provider indicating to follow the RT Bronchodilator Protocol and there is an “Initiate RT Inhaler-Nebulizer Bronchodilator Protocol” order as well (see protocol at bottom of note).    CXR Findings:  XR CHEST PORTABLE  Result Date: 7/21/2025  Cardiomegaly with diffuse interstitial thickening which may represent mild interstitial edema.  Chronic interstitial lung disease is differential consideration.       The findings from the last RT Protocol Assessment were as follows:   History Pulmonary Disease: None or smoker <15 pack years  Respiratory Pattern: Regular pattern and RR 12-20 bpm  Breath Sounds: Clear breath sounds  Cough: Strong, spontaneous, non-productive  Indication for Bronchodilator Therapy: None  Bronchodilator Assessment Score: 0    Aerosolized bronchodilator medication orders have been revised according to the RT Inhaler-Nebulizer Bronchodilator Protocol below.    Respiratory Therapist to perform RT Therapy Protocol Assessment initially then follow the protocol.  Repeat RT Therapy Protocol Assessment PRN for score 0-3 or on second treatment, BID, and PRN for scores above 3.    No Indications - adjust the frequency to every 6 hours PRN wheezing or bronchospasm, if no treatments needed after 48 hours then discontinue using Per Protocol order mode.     If indication present, adjust the RT bronchodilator orders based on the Bronchodilator Assessment Score as indicated below.  Use Inhaler orders unless patient has one or more of the following: on home nebulizer, not able to hold breath for 10 seconds, is not alert and oriented, cannot activate and use MDI correctly, or respiratory rate 25 breaths per minute or more, then use the equivalent nebulizer order(s) with same Frequency and PRN reasons based on the score.  If a patient is on this medication at home then do not decrease Frequency below that used at

## 2025-07-22 NOTE — PROGRESS NOTES
Patient admitted to room 306 from ER. Patient oriented to room, call light, bed rails, phone, lights and bathroom. Patient instructed about the schedule of the day including: vital sign frequency, lab draws, possible tests, frequency of MD and staff rounds, daily weights, I &O's and prescribed diet.  Telemetry box in place, patient aware of placement and reason. Bed locked, in lowest position, side rails up 2/4, call light within reach.        Recliner Assessment  Patient is able to demonstrate the ability to move from a reclining position to an upright position within the recliner.       4 Eyes Skin Assessment     NAME:  Lupis Wren  YOB: 1940  MEDICAL RECORD NUMBER:  [id number]    The patient is being assessed for  Admission    I agree that at least one RN has performed a thorough Head to Toe Skin Assessment on the patient. ALL assessment sites listed below have been assessed.      Areas assessed by both nurses:    Head, Face, Ears, Shoulders, Back, Chest, Arms, Elbows, Hands, Sacrum. Buttock, Coccyx, Ischium, and Legs. Feet and Heels   No skin issues noted.      Does the Patient have a Wound? No noted wound(s)       Richard Prevention initiated by RN: No  Wound Care Orders initiated by RN: No    Pressure Injury (Stage 3,4, Unstageable, DTI, NWPT, and Complex wounds) if present, place Wound referral order by RN under : No    New Ostomies, if present place, Ostomy referral order under : No     Nurse 1 eSignature: Electronically signed by Evi Kyle RN on 7/21/25 at 9:00 PM EDT    **SHARE this note so that the co-signing nurse can place an eSignature**    Nurse 2 eSignature: Electronically signed by Cathy Rainey RN on 7/22/25 at 2:29 AM EDT

## 2025-07-22 NOTE — PROGRESS NOTES
Pt ambulated to bathroom, tolerated well. Only voided 30 ML, Post void residual  shows 250. IV lasix given at this time. Will reevaluate after next void.  Evi Kyle RN

## 2025-07-22 NOTE — PROGRESS NOTES
Dr. Traylor made aware of AM carvedilol being help.   Patient okay to eat per MD. Diet order entered.

## 2025-07-22 NOTE — FLOWSHEET NOTE
07/22/25 0736   Vitals   Temp 98.2 °F (36.8 °C)   Temp Source Oral   Pulse 62   Heart Rate Source Monitor   /69   MAP (Calculated) 87   BP Location Right upper arm   BP Method Automatic   Patient Position Semi fowlers   SpO2 94 %   O2 Device None (Room air)     Patient sitting up in chair with family at bedside. No s/s of distress noted.   NPO for cardiology consult this AM.   PO carvedilol held as HR was dropping into 40s last night and current HR 50s-60s.  Shift assessment complete, see flow sheet. Denies needs at this time. Call light in reach. Will monitor.

## 2025-07-22 NOTE — PROGRESS NOTES
Pt in bed, eyes closed. No distress noted. Call light in reach. Will continue to monitor.  Evi Kyle RN

## 2025-07-22 NOTE — CONSULTS
CARDIOLOGY CONSULTATION        Patient Name: Lupis Wren  Date of admission: 7/21/2025  2:33 PM  Admission Dx: OLY (acute kidney injury) [N17.9]  Acute on chronic diastolic congestive heart failure (HCC) [I50.33]  Acute diastolic CHF (congestive heart failure) (HCC) [I50.31]  Fatigue, unspecified type [R53.83]  Acute on chronic congestive heart failure, unspecified heart failure type (HCC) [I50.9]  Requesting Physician: Fabiola Powers DO  Primary Care physician: Sandeep Jauregui MD    Reason for Consultation/Chief Complaint: \"Heart Failure\"    Pt follows with Dr. Ramirez Cehry- [facility].      History of Present Illness:     Lupis Wren is a 84 y.o. patient with PMHx significant for HFpEF, Chronic  A-fib, possible precapillary pulmonary HTN WHO Group 1, CAD, mild AR, moderate MR, mixed HLD, ROXY, falls, HTN, COPD, who presented to the hospital with complaints of back pain, fatigue, subjective fever and lower extremity edema.    ED course/Vital signs on presentation:   On arrival to ED patient was hypertensive to 140's/70's with other vitals within normal ranges.   PE remarkable for irregular rhythm (hx chronic afib), abdominal tenderness, left CVA tenderness, B/L lower extremity edema and impaired recent memory.   Remarkable labs included Cr 2.0 (baseline 1.3-1.4) with GFR 24, AST 46, Trop 32, NT Jenaro-BNP 4,981.  UA noninfectious although with glucosuria. Lactate, Lipase, TSH and Potassium all within normal limits.   EKG: Coarse Atrial fibrillation. Inferior infarct , age undetermined  XR: Cardiomegaly with diffuse interstitial thickening   CT Abdomen: 1. Mild left perinephric stranding. No definite stone or mass identified. Slight nodularity to the liver. Cholecystectomy. Diverticulosis without diverticulitis.  Zofran and Tylenol given.     On interview this morning with patient and daughter Cathy, patient reports symptoms of mid left sided aching back pain that began Friday evening

## 2025-07-23 VITALS
BODY MASS INDEX: 28.01 KG/M2 | WEIGHT: 168.1 LBS | HEART RATE: 57 BPM | RESPIRATION RATE: 18 BRPM | HEIGHT: 65 IN | TEMPERATURE: 97.9 F | DIASTOLIC BLOOD PRESSURE: 62 MMHG | SYSTOLIC BLOOD PRESSURE: 116 MMHG | OXYGEN SATURATION: 95 %

## 2025-07-23 PROBLEM — R53.83 FATIGUE: Status: ACTIVE | Noted: 2025-07-23

## 2025-07-23 LAB
ANION GAP SERPL CALCULATED.3IONS-SCNC: 14 MMOL/L (ref 3–16)
BUN SERPL-MCNC: 30 MG/DL (ref 7–20)
CALCIUM SERPL-MCNC: 9.3 MG/DL (ref 8.3–10.6)
CHLORIDE SERPL-SCNC: 99 MMOL/L (ref 99–110)
CO2 SERPL-SCNC: 24 MMOL/L (ref 21–32)
CREAT SERPL-MCNC: 1.7 MG/DL (ref 0.6–1.2)
GFR SERPLBLD CREATININE-BSD FMLA CKD-EPI: 29 ML/MIN/{1.73_M2}
GLUCOSE SERPL-MCNC: 93 MG/DL (ref 70–99)
MAGNESIUM SERPL-MCNC: 1.69 MG/DL (ref 1.8–2.4)
NT-PROBNP SERPL-MCNC: 5978 PG/ML (ref 0–449)
POTASSIUM SERPL-SCNC: 3.6 MMOL/L (ref 3.5–5.1)
SODIUM SERPL-SCNC: 137 MMOL/L (ref 136–145)

## 2025-07-23 PROCEDURE — 99232 SBSQ HOSP IP/OBS MODERATE 35: CPT | Performed by: STUDENT IN AN ORGANIZED HEALTH CARE EDUCATION/TRAINING PROGRAM

## 2025-07-23 PROCEDURE — 83735 ASSAY OF MAGNESIUM: CPT

## 2025-07-23 PROCEDURE — 6360000002 HC RX W HCPCS: Performed by: STUDENT IN AN ORGANIZED HEALTH CARE EDUCATION/TRAINING PROGRAM

## 2025-07-23 PROCEDURE — 36415 COLL VENOUS BLD VENIPUNCTURE: CPT

## 2025-07-23 PROCEDURE — 83880 ASSAY OF NATRIURETIC PEPTIDE: CPT

## 2025-07-23 PROCEDURE — 6370000000 HC RX 637 (ALT 250 FOR IP): Performed by: NURSE PRACTITIONER

## 2025-07-23 PROCEDURE — 6360000002 HC RX W HCPCS

## 2025-07-23 PROCEDURE — 6370000000 HC RX 637 (ALT 250 FOR IP): Performed by: STUDENT IN AN ORGANIZED HEALTH CARE EDUCATION/TRAINING PROGRAM

## 2025-07-23 PROCEDURE — 80048 BASIC METABOLIC PNL TOTAL CA: CPT

## 2025-07-23 PROCEDURE — 2500000003 HC RX 250 WO HCPCS: Performed by: NURSE PRACTITIONER

## 2025-07-23 RX ORDER — CARVEDILOL 3.12 MG/1
3.12 TABLET ORAL 2 TIMES DAILY WITH MEALS
Qty: 60 TABLET | Refills: 3 | Status: SHIPPED | OUTPATIENT
Start: 2025-07-23

## 2025-07-23 RX ORDER — BUMETANIDE 1 MG/1
1 TABLET ORAL 2 TIMES DAILY
Qty: 30 TABLET | Refills: 3 | Status: SHIPPED | OUTPATIENT
Start: 2025-07-23

## 2025-07-23 RX ORDER — BUMETANIDE 1 MG/1
1 TABLET ORAL 2 TIMES DAILY
Status: DISCONTINUED | OUTPATIENT
Start: 2025-07-23 | End: 2025-07-23 | Stop reason: HOSPADM

## 2025-07-23 RX ORDER — MAGNESIUM SULFATE IN WATER 40 MG/ML
2000 INJECTION, SOLUTION INTRAVENOUS ONCE
Status: COMPLETED | OUTPATIENT
Start: 2025-07-23 | End: 2025-07-23

## 2025-07-23 RX ADMIN — APIXABAN 2.5 MG: 5 TABLET, FILM COATED ORAL at 08:53

## 2025-07-23 RX ADMIN — POTASSIUM CHLORIDE 20 MEQ: 1500 TABLET, EXTENDED RELEASE ORAL at 08:53

## 2025-07-23 RX ADMIN — HYDRALAZINE HYDROCHLORIDE 25 MG: 25 TABLET ORAL at 05:47

## 2025-07-23 RX ADMIN — Medication 400 MG: at 08:53

## 2025-07-23 RX ADMIN — ALLOPURINOL 100 MG: 100 TABLET ORAL at 08:53

## 2025-07-23 RX ADMIN — MAGNESIUM SULFATE HEPTAHYDRATE 2000 MG: 40 INJECTION, SOLUTION INTRAVENOUS at 08:57

## 2025-07-23 RX ADMIN — FUROSEMIDE 80 MG: 10 INJECTION, SOLUTION INTRAMUSCULAR; INTRAVENOUS at 08:53

## 2025-07-23 RX ADMIN — CARVEDILOL 3.12 MG: 3.12 TABLET, FILM COATED ORAL at 08:53

## 2025-07-23 RX ADMIN — Medication 10 ML: at 08:53

## 2025-07-23 NOTE — PROGRESS NOTES
Patient educated on discharge instructions as well as new medications use, dosage, administration and possible side effects.  Patient verified knowledge. IV removed without difficulty and dry dressing in place. Telemetry monitor removed and returned to CMU. Pt left facility in stable condition to Home with all of their personal belongings.

## 2025-07-23 NOTE — PLAN OF CARE
Problem: Chronic Conditions and Co-morbidities  Goal: Patient's chronic conditions and co-morbidity symptoms are monitored and maintained or improved  Outcome: Progressing     Problem: Discharge Planning  Goal: Discharge to home or other facility with appropriate resources  Outcome: Progressing     Problem: Safety - Adult  Goal: Free from fall injury  Outcome: Progressing     Problem: ABCDS Injury Assessment  Goal: Absence of physical injury  Outcome: Progressing     
Admission: Reduce shortness of breath, increase activity tolerance, better understand heart failure and disease management, be more comfortable, and reduce lower extremity edema prior to discharge.     5 minutes of education    Electronically signed by Cathy Rainey RN on 7/22/2025 at 9:49 PM

## 2025-07-23 NOTE — PROGRESS NOTES
Contacted patient's PCP office to schedule hospital follow up appointment.  Per office-message sent to provider, Dr. Jauregui, for follow up availability/time.  Stated office/provider will contact the patient directly to schedule follow up appointment.

## 2025-07-23 NOTE — CARE COORDINATION
Discharge Planning:     (CM) reviewed the patient's chart to assess needs. Patient's Readmission Risk Score is 15%. Patient's medical insurance is [facility] Medicare. Patient's PCP is Sandeep Jauregui MD. No needs anticipated, at this time. CM team to follow. Staff to inform CM if additional discharge needs arise.

## 2025-07-23 NOTE — PROGRESS NOTES
Shift assessment complete- see flowsheets.  Pt is A&Ox4.  VSS  Respirations are easy, even and unlabored.  PIV WDL. Flushed at this time.  Plan of care and goals reviewed. Call light within reach.  Bed in lowest position with wheels locked. No needs expressed at this time. Will continue to monitor.

## 2025-07-23 NOTE — FLOWSHEET NOTE
07/23/25 0709   Vitals   Temp 97.6 °F (36.4 °C)   Temp Source Oral   Pulse 61   Heart Rate Source Monitor   /72   MAP (Calculated) 93   BP Location Right upper arm   BP Upper/Lower Upper   BP Method Automatic   Patient Position Semi fowlers   SpO2 96 %   O2 Device None (Room air)     Patient sitting up in chair. No s/s of distress noted.   IV magnesium replacement initiated at this time per order.   Shift assessment complete, see flow sheet. Denies needs at this time. Call light in reach. Will monitor.

## 2025-07-23 NOTE — PROGRESS NOTES
CARDIOLOGY Daily Progress Note        Patient Name: Lupis Wren  Date of admission: 7/21/2025  2:33 PM  Admission Dx: OLY (acute kidney injury) [N17.9]  Acute on chronic diastolic congestive heart failure (HCC) [I50.33]  Acute diastolic CHF (congestive heart failure) (HCC) [I50.31]  Fatigue, unspecified type [R53.83]  Acute on chronic congestive heart failure, unspecified heart failure type (HCC) [I50.9]  Requesting Physician: Fabiola Powers DO  Primary Care physician: Sandeep Jauregui MD    Reason for Consultation/Chief Complaint: \"Heart Failure\"    Pt previously followed with Dr. Ramirez Chery- [facility].      Subjective:  Patient seen and examined.  Patient diuresed well overnight.  I's and O's not accurate.  Patient standing weight from 175 to 168 pounds last 24 hours.  Patient denies any orthopnea, shortness of breath, chest pain, lower extreme edema.  Patient feels back to baseline.    Past Medical History:   has a past medical history of Arthritis, Atrial fibrillation (HCC), CAD (coronary artery disease), Cerebral artery occlusion with cerebral infarction (HCC), CHF (congestive heart failure) (HCC), CKD (chronic kidney disease), Cutaneous skin tags, Depression, Gout, Grief reaction, Hyperlipidemia, Hypertension, Obesity, Osteoarthritis, Pulmonary nodules, and Unspecified cerebral artery occlusion with cerebral infarction.    Surgical History:   has a past surgical history that includes Carpal tunnel release (Bilateral); Knee arthroscopy (Right); Carpal tunnel release; Knee arthroscopy; Cataract removal; other surgical history (Left, 02/27/2013); Cholecystectomy; other surgical history (Bilateral, 02/24/2014); pr arthrp kne condyle&platu medial&lat compartments (Right, 08/01/2018); joint replacement (Right); eye surgery; Total knee arthroplasty (Left, 09/25/2019); and Mohs surgery (09/12/2024).     Social History:   reports that she has never smoked. She has never used smokeless

## 2025-07-24 ENCOUNTER — FOLLOWUP TELEPHONE ENCOUNTER (OUTPATIENT)
Dept: ADMINISTRATIVE | Age: 85
End: 2025-07-24

## 2025-07-24 ENCOUNTER — TELEPHONE (OUTPATIENT)
Dept: INTERNAL MEDICINE CLINIC | Age: 85
End: 2025-07-24

## 2025-07-24 ENCOUNTER — CARE COORDINATION (OUTPATIENT)
Dept: CASE MANAGEMENT | Age: 85
End: 2025-07-24

## 2025-07-24 NOTE — CARE COORDINATION
Care Transitions Note    Initial Call - Call within 2 business days of discharge: Yes    Attempted to reach patient for transitions of care follow up. Unable to reach patient.    Outreach Attempts:   HIPAA compliant voicemail left for patient.     Patient: Lupis Wren    Patient : 1940   MRN: [id number]    Reason for Admission: Acute/Chronic CHF, Other  Discharge Date: 25  RURS: Readmission Risk Score: 14.9    Last Discharge Facility       Date Complaint Diagnosis Description Type Department Provider    25 Fatigue; Back Pain Fatigue, unspecified type ... ED [facility]Ad[facility]Discharged[facility]; [facility].            Was this an external facility discharge? No    Follow Up Appointment:   Patient does not have a follow up appointment scheduled at time of call. UTR.  Will forward a message to PCP.  Future Appointments         Provider Specialty Dept Phone    8/15/2025 9:00 AM Rimma Joseph APRN - [facility] Card[facility] [phone]    2025 10:20 AM Sandeep Jauregui MD Internal Medicine [phone]          Plan for follow-up on next business day.      Joyce Webb RN

## 2025-07-24 NOTE — TELEPHONE ENCOUNTER
----- Message from Dr. Sandeep Jauregui MD sent at 7/24/2025 12:03 PM EDT -----  Contact: [phone]  Today 1 30  ----- Message -----  From: Alexandra Jeffrey  Sent: 7/23/2025   4:41 PM EDT  To: Sandeep Jauregui MD    What time? You already have 4 patients scheduled between 1 and 1:10  ----- Message -----  From: Sandeep Jauregui MD  Sent: 7/23/2025   4:24 PM EDT  To: Alexandra Jeffrey    Friday  ----- Message -----  From: Ester Carter  Sent: 7/23/2025   3:23 PM EDT  To: Sandeep Jauregui MD    Patient in need of a hospital follow up as soon as possible due to heart failure, discharging today 7/23. Please advise

## 2025-07-24 NOTE — FLOWSHEET NOTE
Hospital follow up call  Patient's daughter Cathy answered phone stating the patient is doing well.  Cathy stated the patient's breathing is okay, and doesn't seem to have any swelling at this time.    Encouraged Cathy to have the patient contact her PCP office (Dr. Jauregui) for follow up appointment as it appears they were reaching out today and left VM.  Cathy verbalized understanding.

## 2025-07-25 ENCOUNTER — TELEPHONE (OUTPATIENT)
Dept: INTERNAL MEDICINE CLINIC | Age: 85
End: 2025-07-25

## 2025-07-25 ENCOUNTER — CARE COORDINATION (OUTPATIENT)
Dept: CASE MANAGEMENT | Age: 85
End: 2025-07-25

## 2025-07-25 DIAGNOSIS — I50.23 ACUTE ON CHRONIC SYSTOLIC CONGESTIVE HEART FAILURE (HCC): Primary | ICD-10-CM

## 2025-07-25 PROCEDURE — 1111F DSCHRG MED/CURRENT MED MERGE: CPT | Performed by: INTERNAL MEDICINE

## 2025-07-25 NOTE — TELEPHONE ENCOUNTER
----- Message from Dr. Sandeep Jauregui MD sent at 7/25/2025  7:47 AM EDT -----  Contact: [phone]  Today  at 1245  ----- Message -----  From: Sylvia Reyna  Sent: 7/24/2025   3:19 PM EDT  To: Sandeep Jauregui MD    Daughter called back but it was already too late.  Please advise.  ----- Message -----  From: Sandeep Jauregui MD  Sent: 7/24/2025  12:03 PM EDT  To: Alexandra Jeffrey    Today 1 30  ----- Message -----  From: Alexandra Jeffrey  Sent: 7/23/2025   4:41 PM EDT  To: Sandeep Jauregui MD    What time? You already have 4 patients scheduled between 1 and 1:10  ----- Message -----  From: Sandeep Jauregui MD  Sent: 7/23/2025   4:24 PM EDT  To: Alexandra Jeffrey    Friday  ----- Message -----  From: Ester Carter  Sent: 7/23/2025   3:23 PM EDT  To: Sandeep Jauregui MD    Patient in need of a hospital follow up as soon as possible due to heart failure, discharging today 7/23. Please advise

## 2025-07-25 NOTE — TELEPHONE ENCOUNTER
Daughter did not want to bring patient in today due to the heat.  Per Dr Jauregui Wednesday 7/30/2025

## 2025-07-25 NOTE — CARE COORDINATION
Care Transitions Note    Initial Call - Call within 2 business days of discharge: Yes    Patient Current Location:   Home: [facility] [address] [place] [id number]    Care Transition Nurse contacted the daughter, Erica, by telephone to perform post hospital discharge assessment, verified name and  as identifiers.  Provided introduction to self, and explanation of the Care Transition Nurse role.    Patient: Lupis Wren    Patient : 1940   MRN: [id number]    Reason for Admission: Acute on Chronic CHF, Other  Discharge Date: 25  RURS: Readmission Risk Score: 14.9    Last Discharge Facility       Date Complaint Diagnosis Description Type Department Provider    25 Fatigue; Back Pain Fatigue, unspecified type ... ED to Hosp-Admission (Discharged) (ADMITTED) [facility] Fabiola Powers DO; Luna Banks.          Was this an external facility discharge? No    Additional needs identified to be addressed with provider   No needs identified       Method of communication with provider: none.    Patients top risk factors for readmission: functional physical ability, medical condition-CHF, A. Fib, CKD, Severe Valvular Regurgitation, OLY , medication management, and multiple health system providers    Interventions to address risk factors:   Discharge instructions available and reviewed as per AVS.  Other education regarding self care management in the home related to:  specific disease process management - CHF, CKD  symptom management - weight gain, other CHF symptoms  diet/hydration/nutritional needs - low sodium, heart healthy diet  pain/pain management - no reported pain at this time  medication management - meds reviewed, teaching done related to Bumex  activity level - up and about in home  falling precautions/home safety needs - reviewed falling precautions, other safety measures, orthostasis, etc  infection control measures - good handwashing, etc  seeking medical attention - who/when to call

## 2025-07-29 ENCOUNTER — CARE COORDINATION (OUTPATIENT)
Dept: CASE MANAGEMENT | Age: 85
End: 2025-07-29

## 2025-07-29 NOTE — CARE COORDINATION
Care Transitions Note    Follow Up Call     Attempted to reach patient for transitions of care follow up.  Unable to reach patient.      Outreach Attempts:   Unable to leave message.     Follow Up Appointment:   Future Appointments         Provider Specialty Dept Phone    7/30/2025 1:40 PM Sandeep Jauregui MD Internal Medicine [phone]    8/15/2025 9:00 AM Rimma Joseph APRN - [facility] Card[facility] [phone]    8/25/2025 10:20 AM Sandeep Jauregui MD Internal Medicine [phone]          Plan for follow-up call in 2-5 days based on severity of symptoms and risk factors.  Plan for next call:  symptom management-cardiac issues, activity tolerance, appetite, sleep patterns, other  self management-self care needs in the home, weights, vitals, other mgmt needs  follow-up appointment-findings from HFU appt with PCP for 7/30  medication management-med effects, ie Bumex  referrals-need for RD or SW referral ?     Joyce Webb RN

## 2025-07-30 ENCOUNTER — OFFICE VISIT (OUTPATIENT)
Dept: INTERNAL MEDICINE CLINIC | Age: 85
End: 2025-07-30

## 2025-07-30 ENCOUNTER — CARE COORDINATION (OUTPATIENT)
Dept: CARE COORDINATION | Age: 85
End: 2025-07-30

## 2025-07-30 VITALS — HEIGHT: 65 IN | BODY MASS INDEX: 27.99 KG/M2 | WEIGHT: 168 LBS

## 2025-07-30 DIAGNOSIS — I38 DIASTOLIC HEART FAILURE DUE TO VALVULAR DISEASE, UNSPECIFIED HEART FAILURE CHRONICITY (HCC): ICD-10-CM

## 2025-07-30 DIAGNOSIS — R41.81 AGE-RELATED COGNITIVE DECLINE: ICD-10-CM

## 2025-07-30 DIAGNOSIS — I10 ESSENTIAL HYPERTENSION: ICD-10-CM

## 2025-07-30 DIAGNOSIS — Z09 HOSPITAL DISCHARGE FOLLOW-UP: ICD-10-CM

## 2025-07-30 DIAGNOSIS — Z79.01 LONG TERM CURRENT USE OF ANTICOAGULANT THERAPY: ICD-10-CM

## 2025-07-30 DIAGNOSIS — E78.2 MIXED HYPERLIPIDEMIA: ICD-10-CM

## 2025-07-30 DIAGNOSIS — I50.30 DIASTOLIC HEART FAILURE DUE TO VALVULAR DISEASE, UNSPECIFIED HEART FAILURE CHRONICITY (HCC): ICD-10-CM

## 2025-07-30 DIAGNOSIS — I48.20 ATRIAL FIBRILLATION, CHRONIC (HCC): ICD-10-CM

## 2025-07-30 DIAGNOSIS — I34.0 SEVERE MITRAL REGURGITATION: Primary | ICD-10-CM

## 2025-07-30 DIAGNOSIS — N18.32 STAGE 3B CHRONIC KIDNEY DISEASE (HCC): ICD-10-CM

## 2025-07-30 LAB
BILIRUBIN, POC: NORMAL
BLOOD URINE, POC: NORMAL
CLARITY, POC: NORMAL
COLOR, POC: NORMAL
GLUCOSE URINE, POC: NORMAL MG/DL
KETONES, POC: NORMAL MG/DL
LEUKOCYTE EST, POC: NORMAL
NITRITE, POC: NORMAL
PH, POC: NORMAL
PROTEIN, POC: NORMAL MG/DL
SPECIFIC GRAVITY, POC: NORMAL
UROBILINOGEN, POC: NORMAL MG/DL

## 2025-07-30 PROCEDURE — 81002 URINALYSIS NONAUTO W/O SCOPE: CPT | Performed by: INTERNAL MEDICINE

## 2025-07-30 PROCEDURE — 1111F DSCHRG MED/CURRENT MED MERGE: CPT | Performed by: INTERNAL MEDICINE

## 2025-07-30 PROCEDURE — 99496 TRANSJ CARE MGMT HIGH F2F 7D: CPT | Performed by: INTERNAL MEDICINE

## 2025-07-30 NOTE — PROGRESS NOTES
Post-Discharge Transitional Care  Follow Up      Lupis Wren   YOB: 1940    Date of Office Visit:  7/30/2025  Date of Hospital Admission: 7/21/25  Date of Hospital Discharge: 7/23/25  Risk of hospital readmission (high >=14%. Medium >=10%) :Readmission Risk Score: 14.9      Care management risk score Rising risk (score 2-5) and Complex Care (Scores >=6): No Risk Score On File     Non face to face  following discharge, date last encounter closed (first attempt may have been earlier): 07/25/2025    Call initiated 2 business days of discharge: Yes    ASSESSMENT/PLAN:   Severe mitral regurgitation  Diastolic heart failure due to valvular disease, unspecified heart failure chronicity (HCC)  -     Comprehensive Metabolic Panel; Future  -     Brain Natriuretic Peptide; Future  Long term current use of anticoagulant therapy  Stage 3b chronic kidney disease (HCC)  Essential hypertension  Mixed hyperlipidemia  Atrial fibrillation, chronic (HCC)      Medical Decision Making: moderate complexity  No follow-ups on file.    On this date 7/30/2025 I have spent 35 minutes reviewing previous notes, test results and face to face with the patient discussing the diagnosis and importance of compliance with the treatment plan as well as documenting on the day of the visit.       Subjective:   HPI:  Follow up of Hospital problems/diagnosis(es): CHF      84 y.o.  female with hx  Of HTN, hyperlipidemia, hyperthyroidism, AFibb,  CKD, pulm HTN, pulmonary nodules here for recent hospital dc f/w     Admitted to [facility] for chf exacerbation , fatigue and ARF with CKD. Improved with diuresis and sent home on lower dose of entresto and coreg   Chagned lasix to bumex , doing ok per daughter  No sob or cough  No pedal edema  Compliant with low salt diet    daughter concerned about her memory issues, pt lives alone  She took her car away     Inpatient course: Discharge summary reviewed- see chart.    Interval history/Current status:

## 2025-07-31 ENCOUNTER — RESULTS FOLLOW-UP (OUTPATIENT)
Dept: INTERNAL MEDICINE CLINIC | Age: 85
End: 2025-07-31

## 2025-07-31 DIAGNOSIS — R79.89 ELEVATED SERUM CREATININE: Primary | ICD-10-CM

## 2025-07-31 LAB
ALBUMIN SERPL-MCNC: 4.5 G/DL (ref 3.4–5)
ALBUMIN/GLOB SERPL: 1.7 {RATIO} (ref 1.1–2.2)
ALP SERPL-CCNC: 88 U/L (ref 40–129)
ALT SERPL-CCNC: 15 U/L (ref 10–40)
ANION GAP SERPL CALCULATED.3IONS-SCNC: 15 MMOL/L (ref 3–16)
AST SERPL-CCNC: 26 U/L (ref 15–37)
BILIRUB SERPL-MCNC: 0.6 MG/DL (ref 0–1)
BUN SERPL-MCNC: 62 MG/DL (ref 7–20)
CALCIUM SERPL-MCNC: 9.6 MG/DL (ref 8.3–10.6)
CHLORIDE SERPL-SCNC: 92 MMOL/L (ref 99–110)
CO2 SERPL-SCNC: 25 MMOL/L (ref 21–32)
CREAT SERPL-MCNC: 2.1 MG/DL (ref 0.6–1.2)
GFR SERPLBLD CREATININE-BSD FMLA CKD-EPI: 23 ML/MIN/{1.73_M2}
GLUCOSE SERPL-MCNC: 109 MG/DL (ref 70–99)
NT-PROBNP SERPL-MCNC: 1687 PG/ML (ref 0–449)
POTASSIUM SERPL-SCNC: 4.8 MMOL/L (ref 3.5–5.1)
PROT SERPL-MCNC: 7.2 G/DL (ref 6.4–8.2)
SODIUM SERPL-SCNC: 132 MMOL/L (ref 136–145)

## 2025-08-01 ENCOUNTER — CARE COORDINATION (OUTPATIENT)
Dept: CARE COORDINATION | Age: 85
End: 2025-08-01

## 2025-08-01 ENCOUNTER — TELEPHONE (OUTPATIENT)
Dept: INTERNAL MEDICINE CLINIC | Age: 85
End: 2025-08-01

## 2025-08-01 NOTE — TELEPHONE ENCOUNTER
Pt's daughter, Cathy, called stating pt's BP was 102/54 with pulse of 53 then came up to 146/86 with pulse of 65.    Per Shilpa, pt's daughter informed to have pt go to ER if she is not feeling well.

## 2025-08-01 NOTE — TELEPHONE ENCOUNTER
----- Message from KWAME Borrero CNP sent at 8/1/2025  8:40 AM EDT -----  Creatinine increased to 2.1  Can repeat BMP next week.

## 2025-08-01 NOTE — CARE COORDINATION
Care Transitions Note    Follow Up Call     Patient Current Location:    Home: [address] [place] [id number]    Care Transition Nurse contacted the daughter, Sang, by telephone. Verified name and  as identifiers.    Additional needs identified to be addressed with provider   High priority:    Daughter, Cathy, is concerned that the pm dosage of Bumex may be too much for the patient and may be pulling too much fluid from her.  She said her am blood pressure is running low, 102/54 this morning, 107/?? yesterday morning.  She said patient's weight is stable, 165-166.2 for several days.  No edema or other abnormal symptoms.  She is drinking per her fluid restriction.  So far, she has not had any signs of dizziness, other orthostatic symptoms, but Cathy is concerned about dehydration, etc.  Please advise.         Method of communication with provider: chart routing.    Care Summary Note:   Placed a call to patient's daughter for follow up.  She reported she is doing fair.  She did voice concerns as above regarding the Bumex.  She said the blood pressure has been lower each morning, but later in the day, it does comes up.  She denied any edema, shortness of breath, cough, congestion, wheezing, other symptoms.  Said her appetite is good.  She is sleeping well.  She said her creatinine was elevated when she saw the PCP and they want it rechecked next week.  She is have a brain scan also.  No other needs noted at this time.  Will report concerns about Bumex.  Did instruct Cathy about precautions.  She is aware.      Plan of care updates since last contact:  Review of patient management of conditions/medications:    Education regarding orthostatic hypotension, blood pressure checks, medication effects, side effects, usage.      Medication Review:  No changes since last call.     Follow Up Appointment:   LEON[facility] appointment attended as scheduled   Future Appointments         Provider Specialty Dept Phone

## 2025-08-05 ENCOUNTER — CARE COORDINATION (OUTPATIENT)
Dept: CASE MANAGEMENT | Age: 85
End: 2025-08-05

## 2025-08-07 ENCOUNTER — CARE COORDINATION (OUTPATIENT)
Dept: CASE MANAGEMENT | Age: 85
End: 2025-08-07

## 2025-08-08 RX ORDER — BUMETANIDE 1 MG/1
1 TABLET ORAL 2 TIMES DAILY
Qty: 30 TABLET | Refills: 2 | Status: SHIPPED | OUTPATIENT
Start: 2025-08-08

## 2025-08-11 ENCOUNTER — TELEPHONE (OUTPATIENT)
Dept: INTERNAL MEDICINE CLINIC | Age: 85
End: 2025-08-11

## 2025-08-11 RX ORDER — BUMETANIDE 1 MG/1
1 TABLET ORAL DAILY
COMMUNITY

## 2025-08-14 RX ORDER — EMPAGLIFLOZIN 10 MG/1
10 TABLET, FILM COATED ORAL DAILY
Qty: 90 TABLET | Refills: 0 | Status: SHIPPED | OUTPATIENT
Start: 2025-08-14

## 2025-08-14 RX ORDER — SACUBITRIL AND VALSARTAN 49; 51 MG/1; MG/1
1 TABLET, FILM COATED ORAL 2 TIMES DAILY
Qty: 180 TABLET | Refills: 0 | Status: SHIPPED | OUTPATIENT
Start: 2025-08-14

## 2025-08-18 ENCOUNTER — OFFICE VISIT (OUTPATIENT)
Dept: INTERNAL MEDICINE CLINIC | Age: 85
End: 2025-08-18

## 2025-08-18 ENCOUNTER — OFFICE VISIT (OUTPATIENT)
Dept: CARDIOLOGY CLINIC | Age: 85
End: 2025-08-18
Payer: MEDICARE

## 2025-08-18 VITALS
BODY MASS INDEX: 27.99 KG/M2 | HEIGHT: 65 IN | SYSTOLIC BLOOD PRESSURE: 125 MMHG | DIASTOLIC BLOOD PRESSURE: 75 MMHG | HEART RATE: 58 BPM | WEIGHT: 168 LBS | RESPIRATION RATE: 18 BRPM

## 2025-08-18 VITALS
HEIGHT: 65 IN | BODY MASS INDEX: 27.82 KG/M2 | SYSTOLIC BLOOD PRESSURE: 120 MMHG | DIASTOLIC BLOOD PRESSURE: 52 MMHG | OXYGEN SATURATION: 96 % | HEART RATE: 51 BPM | WEIGHT: 167 LBS

## 2025-08-18 DIAGNOSIS — N18.32 STAGE 3B CHRONIC KIDNEY DISEASE (HCC): ICD-10-CM

## 2025-08-18 DIAGNOSIS — Z79.01 LONG TERM CURRENT USE OF ANTICOAGULANT THERAPY: ICD-10-CM

## 2025-08-18 DIAGNOSIS — R41.81 AGE-RELATED COGNITIVE DECLINE: ICD-10-CM

## 2025-08-18 DIAGNOSIS — I48.20 ATRIAL FIBRILLATION, CHRONIC (HCC): ICD-10-CM

## 2025-08-18 DIAGNOSIS — I38 VALVULAR HEART DISEASE: ICD-10-CM

## 2025-08-18 DIAGNOSIS — I50.30 DIASTOLIC HEART FAILURE DUE TO VALVULAR DISEASE, UNSPECIFIED HEART FAILURE CHRONICITY (HCC): Primary | ICD-10-CM

## 2025-08-18 DIAGNOSIS — I48.21 PERMANENT ATRIAL FIBRILLATION (HCC): ICD-10-CM

## 2025-08-18 DIAGNOSIS — Z09 ENCOUNTER FOR EXAMINATION FOLLOWING TREATMENT AT HOSPITAL: Primary | ICD-10-CM

## 2025-08-18 DIAGNOSIS — I34.0 SEVERE MITRAL REGURGITATION: ICD-10-CM

## 2025-08-18 DIAGNOSIS — I50.32 CHRONIC DIASTOLIC CONGESTIVE HEART FAILURE (HCC): ICD-10-CM

## 2025-08-18 DIAGNOSIS — I10 ESSENTIAL HYPERTENSION: ICD-10-CM

## 2025-08-18 DIAGNOSIS — E78.2 MIXED HYPERLIPIDEMIA: ICD-10-CM

## 2025-08-18 DIAGNOSIS — I38 DIASTOLIC HEART FAILURE DUE TO VALVULAR DISEASE, UNSPECIFIED HEART FAILURE CHRONICITY (HCC): Primary | ICD-10-CM

## 2025-08-18 PROCEDURE — 1159F MED LIST DOCD IN RCRD: CPT | Performed by: INTERNAL MEDICINE

## 2025-08-18 PROCEDURE — G8419 CALC BMI OUT NRM PARAM NOF/U: HCPCS | Performed by: NURSE PRACTITIONER

## 2025-08-18 PROCEDURE — 1111F DSCHRG MED/CURRENT MED MERGE: CPT | Performed by: INTERNAL MEDICINE

## 2025-08-18 PROCEDURE — 1090F PRES/ABSN URINE INCON ASSESS: CPT | Performed by: NURSE PRACTITIONER

## 2025-08-18 PROCEDURE — G8400 PT W/DXA NO RESULTS DOC: HCPCS | Performed by: NURSE PRACTITIONER

## 2025-08-18 PROCEDURE — G8427 DOCREV CUR MEDS BY ELIG CLIN: HCPCS | Performed by: NURSE PRACTITIONER

## 2025-08-18 PROCEDURE — 1123F ACP DISCUSS/DSCN MKR DOCD: CPT | Performed by: INTERNAL MEDICINE

## 2025-08-18 PROCEDURE — 1036F TOBACCO NON-USER: CPT | Performed by: NURSE PRACTITIONER

## 2025-08-18 PROCEDURE — 3078F DIAST BP <80 MM HG: CPT | Performed by: NURSE PRACTITIONER

## 2025-08-18 PROCEDURE — 1090F PRES/ABSN URINE INCON ASSESS: CPT | Performed by: INTERNAL MEDICINE

## 2025-08-18 PROCEDURE — 1160F RVW MEDS BY RX/DR IN RCRD: CPT | Performed by: INTERNAL MEDICINE

## 2025-08-18 PROCEDURE — 1036F TOBACCO NON-USER: CPT | Performed by: INTERNAL MEDICINE

## 2025-08-18 PROCEDURE — G8419 CALC BMI OUT NRM PARAM NOF/U: HCPCS | Performed by: INTERNAL MEDICINE

## 2025-08-18 PROCEDURE — 1160F RVW MEDS BY RX/DR IN RCRD: CPT | Performed by: NURSE PRACTITIONER

## 2025-08-18 PROCEDURE — 1111F DSCHRG MED/CURRENT MED MERGE: CPT | Performed by: NURSE PRACTITIONER

## 2025-08-18 PROCEDURE — 3074F SYST BP LT 130 MM HG: CPT | Performed by: NURSE PRACTITIONER

## 2025-08-18 PROCEDURE — 1123F ACP DISCUSS/DSCN MKR DOCD: CPT | Performed by: NURSE PRACTITIONER

## 2025-08-18 PROCEDURE — 3074F SYST BP LT 130 MM HG: CPT | Performed by: INTERNAL MEDICINE

## 2025-08-18 PROCEDURE — G8427 DOCREV CUR MEDS BY ELIG CLIN: HCPCS | Performed by: INTERNAL MEDICINE

## 2025-08-18 PROCEDURE — 1159F MED LIST DOCD IN RCRD: CPT | Performed by: NURSE PRACTITIONER

## 2025-08-18 PROCEDURE — 99214 OFFICE O/P EST MOD 30 MIN: CPT | Performed by: NURSE PRACTITIONER

## 2025-08-18 PROCEDURE — 99213 OFFICE O/P EST LOW 20 MIN: CPT | Performed by: INTERNAL MEDICINE

## 2025-08-18 PROCEDURE — G8400 PT W/DXA NO RESULTS DOC: HCPCS | Performed by: INTERNAL MEDICINE

## 2025-08-18 PROCEDURE — 3078F DIAST BP <80 MM HG: CPT | Performed by: INTERNAL MEDICINE

## 2025-08-18 SDOH — ECONOMIC STABILITY: TRANSPORTATION INSECURITY
IN THE PAST 12 MONTHS, HAS LACK OF TRANSPORTATION KEPT YOU FROM MEETINGS, WORK, OR FROM GETTING THINGS NEEDED FOR DAILY LIVING?: NO

## 2025-08-18 SDOH — ECONOMIC STABILITY: INCOME INSECURITY: IN THE LAST 12 MONTHS, WAS THERE A TIME WHEN YOU WERE NOT ABLE TO PAY THE MORTGAGE OR RENT ON TIME?: NO

## 2025-08-18 SDOH — ECONOMIC STABILITY: FOOD INSECURITY: WITHIN THE PAST 12 MONTHS, YOU WORRIED THAT YOUR FOOD WOULD RUN OUT BEFORE YOU GOT MONEY TO BUY MORE.: NEVER TRUE

## 2025-08-18 SDOH — ECONOMIC STABILITY: FOOD INSECURITY: WITHIN THE PAST 12 MONTHS, THE FOOD YOU BOUGHT JUST DIDN'T LAST AND YOU DIDN'T HAVE MONEY TO GET MORE.: NEVER TRUE

## 2025-08-18 SDOH — ECONOMIC STABILITY: TRANSPORTATION INSECURITY
IN THE PAST 12 MONTHS, HAS THE LACK OF TRANSPORTATION KEPT YOU FROM MEDICAL APPOINTMENTS OR FROM GETTING MEDICATIONS?: NO

## (undated) DEVICE — DRESSING FOAM W10XL30CM ANTIMIC SELF ADH SAFETAC TECHNOLOGY

## (undated) DEVICE — SURGICAL PROCEDURE PACK RESTORIS MCK CAPMAKOPFJ] MAKO]

## (undated) DEVICE — 35 ML SYRINGE LUER-LOCK TIP: Brand: MONOJECT

## (undated) DEVICE — SUTURE ETHBND EXCEL SZ 2 L30IN NONABSORBABLE GRN L40MM V-37 MX69G

## (undated) DEVICE — SUTURE MCRYL SZ 0 L18IN ABSRB VLT L36MM CT-1 1/2 CIR Y740D

## (undated) DEVICE — SUTURE MCRYL SZ 4-0 L18IN ABSRB UD L19MM PS-2 3/8 CIR PRIM Y496G

## (undated) DEVICE — 3M™ TEGADERM™ TRANSPARENT FILM DRESSING FRAME STYLE WITH BORDER, 1616, 4 IN X 4-3/4 IN (10 CM X 12 CM), 50/CT 4CT/CASE: Brand: 3M™ TEGADERM™

## (undated) DEVICE — SOLUTION IV IRRIG POUR BRL 0.9% SODIUM CHL 2F7124

## (undated) DEVICE — SET CVR FT AND 8IN UPR

## (undated) DEVICE — KIT TRK KNEE PROC VIZADISC

## (undated) DEVICE — GLOVE SURG SZ 65 THK91MIL LTX FREE SYN POLYISOPRENE

## (undated) DEVICE — BLADE SURG SAW S STL NAR OSC W/ SERR EDGE DISP

## (undated) DEVICE — PEEL-AWAY TOGA, 2X LARGE: Brand: FLYTE

## (undated) DEVICE — PENCIL SMK EVAC ALL IN 1 DSGN ENH VISIBILITY IMPROVED AIR

## (undated) DEVICE — ADHESIVE SKIN CLSR 0.7ML TOP DERMBND ADV

## (undated) DEVICE — SYSTEM AUTOTRNS 3/16IN HRD SHELL RESVR TBNG DRN CBC II

## (undated) DEVICE — PIN BONE FIX L110MM DIA3.2MM

## (undated) DEVICE — KIT DRP FOR RIO ROBOTIC ARM ASST SYS

## (undated) DEVICE — GAUZE,SPONGE,2"X2",8PLY,STERILE,LF,2'S: Brand: MEDLINE

## (undated) DEVICE — GLOVE ORANGE PI 8   MSG9080

## (undated) DEVICE — BLADE SURG SAW STD S STL OSC W/ SERR EDGE DISP

## (undated) DEVICE — KIT INT FIX FEM TIB CKPT MAKOPLASTY

## (undated) DEVICE — SUTURE MCRYL SZ 2-0 L18IN ABSRB VLT L36MM CT-1 1/2 CIR Y739D

## (undated) DEVICE — Device

## (undated) DEVICE — BOOT POS LEG DEMAYO

## (undated) DEVICE — PIN GUIDE ORTHO 3.2X89MM FLTD DISP PK OF 4 PER PATIENT

## (undated) DEVICE — GLOVE ORANGE PI 8 1/2   MSG9085

## (undated) DEVICE — SOLUTION IRRIG 2000ML 0.9% SOD CHL USP UROMATIC PLAS CONT

## (undated) DEVICE — PIN BNE FIX L140MM DIA3.2MM SELF DRL

## (undated) DEVICE — HOOD, PEEL-AWAY: Brand: FLYTE

## (undated) DEVICE — ZINACTIVE USE 2539607 PIN FIX L3.5IN DIA1/8IN LNG HDLSS FOR MIS KNEE JT REPL

## (undated) DEVICE — SOLUTION IV 100ML 0.9% SOD CHL CONT USP MINI-BAG +

## (undated) DEVICE — HANDPIECE SET WITH HIGH FLOW TIP AND SUCTION TUBE: Brand: INTERPULSE

## (undated) DEVICE — SUTURE ETHLN SZ 3-0 L30IN NONABSORBABLE BLK FSL L30MM 3/8 1671H

## (undated) DEVICE — BIPOLAR SEALER 23-112-1 AQM 6.0: Brand: AQUAMANTYS ®

## (undated) DEVICE — Device: Brand: POWER-FLO®

## (undated) DEVICE — CORD RETRCT SIL

## (undated) DEVICE — DRESSING FOAM W4XL12IN SIL RECT ADH WTRPRF FLM BK W/ BORD

## (undated) DEVICE — PIN FIX L140MM DIA4MM BNE

## (undated) DEVICE — PIN FIX L170MM DIA4MM BNE SELF DRL

## (undated) DEVICE — SPLINT KNEE UNIV FOR LESS THAN 36IN L20IN FOAM LAM E CNTCT

## (undated) DEVICE — CONTAINER,SPECIMEN,O.R.STRL,4.5OZ: Brand: MEDLINE

## (undated) DEVICE — CUTTER SURG OD42MM PAT KNEE DISP FOR RM SYS XCELERATE

## (undated) DEVICE — CUTTER SURG OD38MM PAT KNEE DISP FOR RM SYS XCELERATE

## (undated) DEVICE — DRAIN SURG 15FR 3/16IN MID PERF TRCR SIL RND TB HUBLESS NO

## (undated) DEVICE — STERILE PVP: Brand: MEDLINE INDUSTRIES, INC.